# Patient Record
Sex: MALE | Race: WHITE | NOT HISPANIC OR LATINO | Employment: OTHER | URBAN - METROPOLITAN AREA
[De-identification: names, ages, dates, MRNs, and addresses within clinical notes are randomized per-mention and may not be internally consistent; named-entity substitution may affect disease eponyms.]

---

## 2017-01-11 ENCOUNTER — HOSPITAL ENCOUNTER (EMERGENCY)
Facility: HOSPITAL | Age: 47
Discharge: HOME | End: 2017-01-11
Attending: FAMILY MEDICINE
Payer: MEDICAID

## 2017-01-11 VITALS
HEIGHT: 72 IN | WEIGHT: 260 LBS | HEART RATE: 87 BPM | TEMPERATURE: 98 F | BODY MASS INDEX: 35.21 KG/M2 | OXYGEN SATURATION: 98 % | RESPIRATION RATE: 20 BRPM | DIASTOLIC BLOOD PRESSURE: 87 MMHG | SYSTOLIC BLOOD PRESSURE: 133 MMHG

## 2017-01-11 DIAGNOSIS — R10.11 RUQ PAIN: ICD-10-CM

## 2017-01-11 DIAGNOSIS — R73.9 HYPERGLYCEMIA: ICD-10-CM

## 2017-01-11 DIAGNOSIS — R74.8 ELEVATED LIVER ENZYMES: ICD-10-CM

## 2017-01-11 DIAGNOSIS — R10.31 RLQ ABDOMINAL PAIN: Primary | ICD-10-CM

## 2017-01-11 LAB
ALBUMIN SERPL BCP-MCNC: 4.6 G/DL
ALP SERPL-CCNC: 36 IU/L
ALT SERPL W/O P-5'-P-CCNC: 192 IU/L
AMYLASE SERPL-CCNC: 66 U/L
ANION GAP SERPL CALC-SCNC: 15 MMOL/L
AST SERPL-CCNC: 184 IU/L
BACTERIA #/AREA URNS AUTO: NORMAL /HPF
BASOPHILS # BLD AUTO: 0.05 K/UL
BASOPHILS NFR BLD: 0.7 %
BILIRUB SERPL-MCNC: 1.6 MG/DL
BILIRUB UR QL STRIP: NEGATIVE
BUN SERPL-MCNC: 13 MG/DL
CALCIUM SERPL-MCNC: 9.5 MG/DL
CHLORIDE SERPL-SCNC: 97 MMOL/L
CLARITY UR REFRACT.AUTO: CLEAR
CO2 SERPL-SCNC: 26 MMOL/L
COLOR UR AUTO: ABNORMAL
CREAT SERPL-MCNC: 0.75 MG/DL
DIFFERENTIAL METHOD: ABNORMAL
EOSINOPHIL # BLD AUTO: 0.1 K/UL
EOSINOPHIL NFR BLD: 1.9 %
ERYTHROCYTE [DISTWIDTH] IN BLOOD BY AUTOMATED COUNT: 11.8 %
EST. GFR  (AFRICAN AMERICAN): >60 ML/MIN/1.73 M^2
EST. GFR  (NON AFRICAN AMERICAN): >60 ML/MIN/1.73 M^2
GLUCOSE SERPL-MCNC: 293 MG/DL
GLUCOSE UR QL STRIP: ABNORMAL
HCT VFR BLD AUTO: 47.1 %
HGB BLD-MCNC: 17.2 G/DL
HGB UR QL STRIP: NEGATIVE
HYALINE CASTS UR QL AUTO: 0 /LPF
KETONES UR QL STRIP: ABNORMAL
LEUKOCYTE ESTERASE UR QL STRIP: ABNORMAL
LIPASE SERPL-CCNC: 78 U/L
LYMPHOCYTES # BLD AUTO: 1.8 K/UL
LYMPHOCYTES NFR BLD: 24.9 %
MCH RBC QN AUTO: 35.8 PG
MCHC RBC AUTO-ENTMCNC: 36.5 %
MCV RBC AUTO: 98 FL
MICROSCOPIC COMMENT: NORMAL
MONOCYTES # BLD AUTO: 0.6 K/UL
MONOCYTES NFR BLD: 8.3 %
NEUTROPHILS # BLD AUTO: 4.7 K/UL
NEUTROPHILS NFR BLD: 63.9 %
NITRITE UR QL STRIP: NEGATIVE
PH UR STRIP: 6 [PH] (ref 5–8)
PLATELET # BLD AUTO: 178 K/UL
PMV BLD AUTO: 10.3 FL
POTASSIUM SERPL-SCNC: 4.3 MMOL/L
PROT SERPL-MCNC: 8.3 G/DL
PROT UR QL STRIP: ABNORMAL
RBC # BLD AUTO: 4.8 M/UL
RBC #/AREA URNS AUTO: 1 /HPF (ref 0–4)
SODIUM SERPL-SCNC: 138 MMOL/L
SP GR UR STRIP: 1.01 (ref 1–1.03)
URN SPEC COLLECT METH UR: ABNORMAL
UROBILINOGEN UR STRIP-ACNC: ABNORMAL EU/DL
WBC # BLD AUTO: 7.34 K/UL
WBC #/AREA URNS AUTO: 1 /HPF (ref 0–5)
YEAST UR QL AUTO: NORMAL

## 2017-01-11 PROCEDURE — 80053 COMPREHEN METABOLIC PANEL: CPT

## 2017-01-11 PROCEDURE — 96375 TX/PRO/DX INJ NEW DRUG ADDON: CPT

## 2017-01-11 PROCEDURE — 96361 HYDRATE IV INFUSION ADD-ON: CPT

## 2017-01-11 PROCEDURE — 83690 ASSAY OF LIPASE: CPT

## 2017-01-11 PROCEDURE — 81000 URINALYSIS NONAUTO W/SCOPE: CPT

## 2017-01-11 PROCEDURE — 85025 COMPLETE CBC W/AUTO DIFF WBC: CPT

## 2017-01-11 PROCEDURE — 82150 ASSAY OF AMYLASE: CPT

## 2017-01-11 PROCEDURE — 63600175 PHARM REV CODE 636 W HCPCS: Performed by: FAMILY MEDICINE

## 2017-01-11 PROCEDURE — 96374 THER/PROPH/DIAG INJ IV PUSH: CPT

## 2017-01-11 PROCEDURE — 99284 EMERGENCY DEPT VISIT MOD MDM: CPT | Mod: 25

## 2017-01-11 PROCEDURE — 25000003 PHARM REV CODE 250: Performed by: FAMILY MEDICINE

## 2017-01-11 RX ORDER — KETOROLAC TROMETHAMINE 30 MG/ML
30 INJECTION, SOLUTION INTRAMUSCULAR; INTRAVENOUS
Status: COMPLETED | OUTPATIENT
Start: 2017-01-11 | End: 2017-01-11

## 2017-01-11 RX ORDER — OMEPRAZOLE 20 MG/1
CAPSULE, DELAYED RELEASE ORAL
Qty: 30 CAPSULE | Refills: 1 | Status: ON HOLD | OUTPATIENT
Start: 2017-01-11 | End: 2021-05-28 | Stop reason: HOSPADM

## 2017-01-11 RX ORDER — ONDANSETRON 2 MG/ML
4 INJECTION INTRAMUSCULAR; INTRAVENOUS
Status: COMPLETED | OUTPATIENT
Start: 2017-01-11 | End: 2017-01-11

## 2017-01-11 RX ORDER — ONDANSETRON 4 MG/1
4 TABLET, ORALLY DISINTEGRATING ORAL EVERY 6 HOURS PRN
Qty: 10 TABLET | Refills: 1 | Status: SHIPPED | OUTPATIENT
Start: 2017-01-11 | End: 2017-08-10

## 2017-01-11 RX ORDER — SODIUM CHLORIDE 9 MG/ML
500 INJECTION, SOLUTION INTRAVENOUS
Status: COMPLETED | OUTPATIENT
Start: 2017-01-11 | End: 2017-01-11

## 2017-01-11 RX ORDER — OMEPRAZOLE 20 MG/1
20 TABLET, DELAYED RELEASE ORAL DAILY
COMMUNITY
End: 2017-08-10

## 2017-01-11 RX ORDER — FAMOTIDINE 10 MG/ML
20 INJECTION INTRAVENOUS
Status: COMPLETED | OUTPATIENT
Start: 2017-01-11 | End: 2017-01-11

## 2017-01-11 RX ORDER — TRAMADOL HYDROCHLORIDE 50 MG/1
50 TABLET ORAL EVERY 6 HOURS PRN
Qty: 12 TABLET | Refills: 0 | Status: SHIPPED | OUTPATIENT
Start: 2017-01-11 | End: 2017-01-21

## 2017-01-11 RX ADMIN — ONDANSETRON HYDROCHLORIDE 4 MG: 2 SOLUTION INTRAMUSCULAR; INTRAVENOUS at 02:01

## 2017-01-11 RX ADMIN — FAMOTIDINE 20 MG: 10 INJECTION INTRAVENOUS at 03:01

## 2017-01-11 RX ADMIN — KETOROLAC TROMETHAMINE 30 MG: 30 INJECTION, SOLUTION INTRAMUSCULAR at 12:01

## 2017-01-11 RX ADMIN — SODIUM CHLORIDE 500 ML: 0.9 INJECTION, SOLUTION INTRAVENOUS at 12:01

## 2017-01-11 NOTE — DISCHARGE INSTRUCTIONS
Abdominal Pain    Abdominal pain is pain in the stomach or belly area. Everyone has this pain from time to time. In many cases it goes away on its own. But abdominal pain can sometimes be due to a serious problem, such as appendicitis. So its important to know when to seek help.  Causes of abdominal pain  There are many possible causes of abdominal pain. Common causes in adults include:  · Constipation, diarrhea, or gas  · Stomach acid flowing back up into the esophagus (acid reflux or heartburn)  · Severe acid reflux, called GERD (gastroesophageal reflux disease)  · A sore in the lining of the stomach or small intestine (peptic ulcer)  · Inflammation of the gallbladder, liver, or pancreas  · Gallstones or kidney stones  · Appendicitis   · Intestinal blockage   · An internal organ pushing through a muscle or other tissue (hernia)  · Urinary tract infections  · In women, menstrual cramps, fibroids, or endometriosis  · Inflammation or infection of the intestines  Diagnosing the cause of abdominal pain  Your healthcare provider will do a physical exam help find the cause of your pain. If needed, tests will be ordered. Belly pain has many possible causes. So it can be hard to find the reason for your pain. Giving details about your pain can help. Tell your provider where and when you feel the pain, and what makes it better or worse. Also let your provider know if you have other symptoms such as:  · Fever  · Tiredness  · Upset stomach (nausea)  · Vomiting  · Changes in bathroom habits  Treating abdominal pain  Some causes of pain need emergency medical treatment right away. These include appendicitis or a bowel blockage. Other problems can be treated with rest, fluids, or medicines. Your healthcare provider can give you specific instructions for treatment or self-care based on what is causing your pain.  If you have vomiting or diarrhea, sip water or other clear fluids. When you are ready to eat solid foods again,  start with small amounts of easy-to-digest, low-fat foods. These include apple sauce, toast, or crackers.   When to seek medical care  Call 911 or go to the hospital right away if you:  · Cant pass stool and are vomiting  · Are vomiting blood or have bloody diarrhea or black, tarry diarrhea  · Have chest, neck, or shoulder pain  · Feel like you might pass out  · Have pain in your shoulder blades with nausea  · Have sudden, severe belly pain  · Have new, severe pain unlike any you have felt before  · Have a belly that is rigid, hard, and tender to touch  Call your healthcare provider if you have:  · Pain for more than 5 days  · Bloating for more than 2 days  · Diarrhea for more than 5 days  · A fever of 100.4°F (38.0°C) or higher, or as directed by your provider  · Pain that gets worse  · Weight loss for no reason  · Continued lack of appetite  · Blood in your stool  How to prevent abdominal pain  Here are some tips to help prevent abdominal pain:  · Eat smaller amounts of food at one time.  · Avoid greasy, fried, or other high-fat foods.  · Avoid foods that give you gas.  · Exercise regularly.  · Drink plenty of fluids.  To help prevent GERD symptoms:  · Quit smoking.  · Reduce alcohol and certain foods that increase stomach acid.  · Avoid aspirin and over-the-counter pain and fever medicines (NSAIDS or nonsteroidal anti-inflammatory drugs), if possible  · Lose extra weight.  · Finish eating at least 2 hours before you go to bed or lie down.  · Raise the head of your bed.  © 4039-3253 [employer]. [address] [place] [address], [place] [address]. All rights reserved. This information is not intended as a substitute for professional medical care. Always follow your healthcare professional's instructions.

## 2017-01-11 NOTE — ED AVS SNAPSHOT
[facility]  [address]  [place] [address]               [place]   2017 11:55 AM   ED    Description:  Male : 1970   Department:  [facility]           Your Care was Coordinated By:     Provider Role From Andrews Peters MD Attending Provider 17 1216 --      Reason for Visit     Abdominal Pain           Diagnoses this Visit        Comments    RLQ abdominal pain    -  Primary     RUQ pain         Hyperglycemia         Elevated liver enzymes           ED Disposition     ED Disposition Condition Comment    Discharge             To Do List           Follow-up Information     Schedule an appointment as soon as possible for a visit with your gastroenterologist.       These Medications        Disp Refills Start End    omeprazole (PRILOSEC) 20 MG capsule 30 capsule 1 2017     One po bid for 7 days then one each morning    ondansetron (ZOFRAN-ODT) 4 MG TbDL 10 tablet 1 2017     Take 1 tablet (4 mg total) by mouth every 6 (six) hours as needed. For nausea and vomiting - Oral    tramadol (ULTRAM) 50 mg tablet 12 tablet 0 2017    Take 1 tablet (50 mg total) by mouth every 6 (six) hours as needed for Pain. - Oral      [facility] On Call Nurse Care Line -  Assistance  Registered nurses in the [facility] provide clinical advisement, health education, appointment booking, and other advisory services.  Call for this free service at [phone].             Medications           Message regarding Medications     Verify the changes and/or additions to your medication regime listed below are the same as discussed with your clinician today.  If any of these changes or additions are incorrect, please notify your healthcare provider.        START taking these NEW medications        Refills    omeprazole (PRILOSEC) 20 MG capsule 1    Sig: One po bid for 7 days then one each  morning    Class: Print    ondansetron (ZOFRAN-ODT) 4 MG TbDL 1    Sig: Take 1 tablet (4 mg total) by mouth every 6 (six) hours as needed. For nausea and vomiting    Class: Print    Route: Oral    tramadol (ULTRAM) 50 mg tablet 0    Sig: Take 1 tablet (50 mg total) by mouth every 6 (six) hours as needed for Pain.    Class: Print    Route: Oral      These medications were administered today        Dose Freq    0.9%  NaCl infusion 500 mL ED 1 Time    Sig: Inject 500 mLs into the vein ED 1 Time.    Class: Normal    Route: Intravenous    ketorolac injection 30 mg 30 mg ED 1 Time    Sig: Inject 30 mg into the vein ED 1 Time.    Class: Normal    Route: Intravenous    Non-formulary Exception Code: Defer to pharmacy    ondansetron injection 4 mg 4 mg ED 1 Time    Sig: Inject 4 mg into the vein ED 1 Time.    Class: Normal    Route: Intravenous    famotidine (PF) 20 mg/2 mL injection 20 mg 20 mg ED 1 Time    Sig: Inject 2 mLs (20 mg total) into the vein ED 1 Time.    Class: Normal    Route: Intravenous           Verify that the below list of medications is an accurate representation of the medications you are currently taking.  If none reported, the list may be blank. If incorrect, please contact your healthcare provider. Carry this list with you in case of emergency.           Current Medications     omeprazole (PRILOSEC OTC) 20 MG tablet Take 20 mg by mouth once daily.    omeprazole (PRILOSEC) 20 MG capsule One po bid for 7 days then one each morning    ondansetron (ZOFRAN-ODT) 4 MG TbDL Take 1 tablet (4 mg total) by mouth every 6 (six) hours as needed. For nausea and vomiting    tramadol (ULTRAM) 50 mg tablet Take 1 tablet (50 mg total) by mouth every 6 (six) hours as needed for Pain.           Clinical Reference Information           Your Vitals Were     BP Pulse Temp Resp Height Weight    133/87 87 98.2 °F (36.8 °C) (Oral) 20 6' (1.829 m) 117.9 kg (260 lb)    SpO2 BMI             98% 35.26 kg/m2         Allergies as of  1/11/2017        Reactions    Codeine Nausea And Vomiting    Pcn [Penicillins] Other (See Comments)    Unknown reaction      Immunizations Administered on Date of Encounter - 1/11/2017     None      ED Micro, Lab, POCT     Start Ordered       Status Ordering Provider    01/11/17 1222 01/11/17 1221  Amylase  STAT      Final result     01/11/17 1222 01/11/17 1221  CBC auto differential  STAT      Final result     01/11/17 1222 01/11/17 1221  Comprehensive metabolic panel  STAT      Final result     01/11/17 1222 01/11/17 1221  Lipase  STAT      Final result     01/11/17 1222 01/11/17 1221  Urinalysis  STAT      Final result     01/11/17 1221 01/11/17 1221  Urinalysis Microscopic  Once      Final result       ED Imaging Orders     Start Ordered       Status Ordering Provider    01/11/17 1501 01/11/17 1501  US Abdomen Limited (Gallbladder)  1 time imaging      Final result     01/11/17 1401 01/11/17 1401    1 time imaging,   Status:  Canceled      Canceled     01/11/17 1401 01/11/17 1401    1 time imaging,   Status:  Canceled      Canceled     01/11/17 1346 01/11/17 1345  CT Renal Stone Study ABD Pelvis WO  1 time imaging      Final result         Discharge Instructions         Abdominal Pain    Abdominal pain is pain in the stomach or belly area. Everyone has this pain from time to time. In many cases it goes away on its own. But abdominal pain can sometimes be due to a serious problem, such as appendicitis. So its important to know when to seek help.  Causes of abdominal pain  There are many possible causes of abdominal pain. Common causes in adults include:  · Constipation, diarrhea, or gas  · Stomach acid flowing back up into the esophagus (acid reflux or heartburn)  · Severe acid reflux, called GERD (gastroesophageal reflux disease)  · A sore in the lining of the stomach or small intestine (peptic ulcer)  · Inflammation of the gallbladder, liver, or pancreas  · Gallstones or kidney  stones  · Appendicitis   · Intestinal blockage   · An internal organ pushing through a muscle or other tissue (hernia)  · Urinary tract infections  · In women, menstrual cramps, fibroids, or endometriosis  · Inflammation or infection of the intestines  Diagnosing the cause of abdominal pain  Your healthcare provider will do a physical exam help find the cause of your pain. If needed, tests will be ordered. Belly pain has many possible causes. So it can be hard to find the reason for your pain. Giving details about your pain can help. Tell your provider where and when you feel the pain, and what makes it better or worse. Also let your provider know if you have other symptoms such as:  · Fever  · Tiredness  · Upset stomach (nausea)  · Vomiting  · Changes in bathroom habits  Treating abdominal pain  Some causes of pain need emergency medical treatment right away. These include appendicitis or a bowel blockage. Other problems can be treated with rest, fluids, or medicines. Your healthcare provider can give you specific instructions for treatment or self-care based on what is causing your pain.  If you have vomiting or diarrhea, sip water or other clear fluids. When you are ready to eat solid foods again, start with small amounts of easy-to-digest, low-fat foods. These include apple sauce, toast, or crackers.   When to seek medical care  Call 911 or go to the hospital right away if you:  · Cant pass stool and are vomiting  · Are vomiting blood or have bloody diarrhea or black, tarry diarrhea  · Have chest, neck, or shoulder pain  · Feel like you might pass out  · Have pain in your shoulder blades with nausea  · Have sudden, severe belly pain  · Have new, severe pain unlike any you have felt before  · Have a belly that is rigid, hard, and tender to touch  Call your healthcare provider if you have:  · Pain for more than 5 days  · Bloating for more than 2 days  · Diarrhea for more than 5 days  · A fever of 100.4°F (38.0°C)  or higher, or as directed by your provider  · Pain that gets worse  · Weight loss for no reason  · Continued lack of appetite  · Blood in your stool  How to prevent abdominal pain  Here are some tips to help prevent abdominal pain:  · Eat smaller amounts of food at one time.  · Avoid greasy, fried, or other high-fat foods.  · Avoid foods that give you gas.  · Exercise regularly.  · Drink plenty of fluids.  To help prevent GERD symptoms:  · Quit smoking.  · Reduce alcohol and certain foods that increase stomach acid.  · Avoid aspirin and over-the-counter pain and fever medicines (NSAIDS or nonsteroidal anti-inflammatory drugs), if possible  · Lose extra weight.  · Finish eating at least 2 hours before you go to bed or lie down.  · Raise the head of your bed.  © 2630-9119 [employer]. [address] [place] [address], [place] [address]. All rights reserved. This information is not intended as a substitute for professional medical care. Always follow your healthcare professional's instructions.          [facility] Sign-Up     Activating your [facility] account is as easy as 1-2-3!     1) Visit [email or web address], select Sign Up Now, enter this activation code and your date of birth, then select Next.  [email or web address]  Expires: 2/25/2017  4:16 PM      2) Create a username and password to use when you visit [facility] in the future and select a security question in case you lose your password and select Next.    3) Enter your e-mail address and click Sign Up!    Additional Information  If you have questions, please e-mail [email or web address] or call [phone] to talk to our [facility] staff. Remember, [facility] is NOT to be used for urgent needs. For medical emergencies, dial 911.         Smoking Cessation     If you would like to quit smoking:   You may be eligible for free services if you are a [place] resident and started smoking cigarettes before September 1, 1988.  Call the Smoking Cessation Trust (SCT) toll  free at (145) 006-6712 or (548) 137-0431.   Call 1-800-QUIT-NOW if you do not meet the above criteria.             [facility] complies with applicable Federal civil rights laws and does not discriminate on the basis of race, color, national origin, age, disability, or sex.        Language Assistance Services     ATTENTION: Language assistance services are available, free of charge. Please call [phone].      ATENCIÓN: Si habla español, tiene a kirby disposición servicios gratuitos de asistencia lingüística. Llame al [phone].     CHAITANYA Ý: N?u b?n nói Ti?ng Vi?t, có các d?ch v? h? tr? ngôn ng? mi?n phí dành cho b?n. G?i s? [phone].

## 2017-01-11 NOTE — ED PROVIDER NOTES
Encounter Date: 1/11/2017       History     Chief Complaint   Patient presents with    Abdominal Pain     right upper abdominal pain since Tuesday +nausea and vomiting x 1      Review of patient's allergies indicates:   Allergen Reactions    Codeine Nausea And Vomiting    Pcn [penicillins] Other (See Comments)     Unknown reaction     HPI Comments: Patient's a 46-year-old white male miller who presents complaining of right sided abdominal pain.  He states symptoms began with bilateral lower mid back aching 2 days ago.  It was more pronounced on the right but then moved around to involve the abdomen primarily on the right side.  He points to his right upper quadrant as area of most pain.  Is not food related.  He has no history of kidney stones or UTIs.  Normally with no hematuria.  He states that initially his pain was relieved by having a bowel movement but it returned.  He states now he has some discomfort in his right testicle.  He noticed no swelling of the scrotum/testicles: No dysuria/discharge.  Reports chronic daily alcohol intake, relatively heavy but no more than usual lately.  No NSAIDs.. No hematochezia, hematemesis, melenic stools    The history is provided by the patient.     Past Medical History   Diagnosis Date    GERD (gastroesophageal reflux disease)      No past medical history pertinent negatives.  Past Surgical History   Procedure Laterality Date    Vasectomy      Lasik       History reviewed. No pertinent family history.  Social History   Substance Use Topics    Smoking status: Current Every Day Smoker     Packs/day: 1.50    Smokeless tobacco: None    Alcohol use 3.6 - 7.2 oz/week     6 - 12 Cans of beer per week      Comment: heavy etoh use      Review of Systems   Constitutional: Negative for fever.   Respiratory: Negative for shortness of breath.    Cardiovascular: Negative for chest pain.   Gastrointestinal: Positive for abdominal pain, nausea and vomiting. Negative for  abdominal distention, constipation and diarrhea.   Genitourinary: Positive for flank pain and testicular pain. Negative for difficulty urinating, discharge, dysuria, frequency, hematuria, penile pain, penile swelling and scrotal swelling.   Musculoskeletal: Negative for neck pain and neck stiffness.   Skin: Negative for color change and rash.   Neurological: Negative for syncope.       Physical Exam   Initial Vitals   BP Pulse Resp Temp SpO2   01/11/17 1156 01/11/17 1156 01/11/17 1156 01/11/17 1156 01/11/17 1156   175/109 76 20 98.2 °F (36.8 °C) 98 %     Physical Exam    Nursing note and vitals reviewed.  Constitutional: He appears well-developed and well-nourished. No distress.   HENT:   Head: Normocephalic.   Eyes: Conjunctivae are normal. Pupils are equal, round, and reactive to light. No scleral icterus.   Neck: Neck supple.   Cardiovascular: Normal rate, regular rhythm and normal heart sounds. Exam reveals no friction rub.    No murmur heard.  Pulmonary/Chest: Breath sounds normal. No respiratory distress.   Abdominal: Soft. Bowel sounds are normal. He exhibits no distension and no mass. There is tenderness in the right upper quadrant, right lower quadrant, suprapubic area and left lower quadrant. There is no rebound and no guarding. Hernia confirmed negative in the right inguinal area and confirmed negative in the left inguinal area.       Genitourinary: Penis normal. Right testis shows tenderness. Right testis shows no mass and no swelling. Left testis shows no mass. No discharge found.   Musculoskeletal: Normal range of motion.   Lymphadenopathy: No inguinal adenopathy noted on the right or left side.   Neurological: He is alert and oriented to person, place, and time.   Skin: Skin is warm and dry.   Psychiatric: He has a normal mood and affect.         ED Course   Procedures  Labs Reviewed   AMYLASE   CBC W/ AUTO DIFFERENTIAL   COMPREHENSIVE METABOLIC PANEL   LIPASE   URINALYSIS             Medical Decision  Making:   Differential Diagnosis:   Acute appendicitis, cholecystitis, renal colic, unit tract infection, reticulitis, laboratory bowel disease, astral enteritis.  Clinical Tests:   Lab Tests: Ordered and Reviewed       <> Summary of Lab: LFTs are elevated.  Blood sugars also elevated with glucosuria and ketonuria but CO2 and anion gap normal. Pt has had elevated glucose in past.  Patient got relief with IV Pepcid.  Advised patient to abstain from alcohol and NSAIDs.  Drink plenty of non-sugared liquids.  Reduce carbohydrates and diet and follow-up with his physician procedures patient is go follow-up with a family friend gastroenterologist back in [place]).  Return to emergency if worsens  Radiological Study: Ordered and Reviewed                   ED Course     Clinical Impression:   The primary encounter diagnosis was RLQ abdominal pain. Diagnoses of RUQ pain, Hyperglycemia, and Elevated liver enzymes were also pertinent to this visit.          CANDELARIO Peters MD  01/11/17 9420

## 2017-08-01 ENCOUNTER — HOSPITAL ENCOUNTER (EMERGENCY)
Facility: HOSPITAL | Age: 47
Discharge: HOME | End: 2017-08-01
Attending: EMERGENCY MEDICINE
Payer: MEDICAID

## 2017-08-01 VITALS
HEIGHT: 72 IN | HEART RATE: 87 BPM | RESPIRATION RATE: 20 BRPM | WEIGHT: 246 LBS | DIASTOLIC BLOOD PRESSURE: 103 MMHG | SYSTOLIC BLOOD PRESSURE: 193 MMHG | OXYGEN SATURATION: 98 % | TEMPERATURE: 98 F | BODY MASS INDEX: 33.32 KG/M2

## 2017-08-01 DIAGNOSIS — F17.200 TOBACCO DEPENDENCE: ICD-10-CM

## 2017-08-01 DIAGNOSIS — I10 ESSENTIAL HYPERTENSION: Primary | ICD-10-CM

## 2017-08-01 PROCEDURE — 25000003 PHARM REV CODE 250: Performed by: EMERGENCY MEDICINE

## 2017-08-01 PROCEDURE — 99283 EMERGENCY DEPT VISIT LOW MDM: CPT

## 2017-08-01 RX ORDER — OLMESARTAN MEDOXOMIL AND HYDROCHLOROTHIAZIDE 40/12.5 40; 12.5 MG/1; MG/1
1 TABLET ORAL DAILY
Qty: 90 TABLET | Refills: 3 | Status: SHIPPED | OUTPATIENT
Start: 2017-08-01 | End: 2017-08-01 | Stop reason: CLARIF

## 2017-08-01 RX ORDER — CLONIDINE HYDROCHLORIDE 0.1 MG/1
0.1 TABLET ORAL
Status: COMPLETED | OUTPATIENT
Start: 2017-08-01 | End: 2017-08-01

## 2017-08-01 RX ORDER — OLMESARTAN MEDOXOMIL AND HYDROCHLOROTHIAZIDE 20/12.5 20; 12.5 MG/1; MG/1
1 TABLET ORAL DAILY
Qty: 90 TABLET | Refills: 3 | Status: SHIPPED | OUTPATIENT
Start: 2017-08-01 | End: 2018-02-09

## 2017-08-01 RX ADMIN — CLONIDINE HYDROCHLORIDE 0.1 MG: 0.1 TABLET ORAL at 09:08

## 2017-08-01 NOTE — ED NOTES
"Pt complains of hypertension post Dr's visit from having a lesion removed from face this am, pt denies any hx of HTN but states "I partied like a  this past weekend"  "

## 2017-08-01 NOTE — ED PROVIDER NOTES
"Encounter Date: 8/1/2017       History     Chief Complaint   Patient presents with    Hypertension     I was sent here from the dermatology clinic bc my blood pressure was high. It was 220/118.      " I was sent here from the dermatology clinic bc my   blood pressure was high. It was 220/118."  He has no complaints at this time.  No distress.  No SOB, No chest pain, No visual disturbance.             Review of patient's allergies indicates:   Allergen Reactions    Codeine Nausea And Vomiting    Pcn [penicillins] Other (See Comments)     Unknown reaction     Past Medical History:   Diagnosis Date    GERD (gastroesophageal reflux disease)      Past Surgical History:   Procedure Laterality Date    LASIK      VASECTOMY       History reviewed. No pertinent family history.  Social History   Substance Use Topics    Smoking status: Current Every Day Smoker     Packs/day: 1.50    Smokeless tobacco: Never Used    Alcohol use 3.6 - 7.2 oz/week     6 - 12 Cans of beer per week      Comment: heavy etoh use daily     Review of Systems   Constitutional: Negative for fever.   HENT: Negative for sore throat.    Respiratory: Negative for shortness of breath.    Cardiovascular: Negative for chest pain.   Gastrointestinal: Negative for nausea.   Genitourinary: Negative for dysuria.   Musculoskeletal: Negative for back pain.   Skin: Negative for rash.   Neurological: Negative for weakness.   Hematological: Does not bruise/bleed easily.   All other systems reviewed and are negative.      Physical Exam     Initial Vitals [08/01/17 0943]   BP Pulse Resp Temp SpO2   (!) 193/103 87 20 98.4 °F (36.9 °C) 98 %      MAP       133         Physical Exam    Nursing note and vitals reviewed.  Constitutional: Vital signs are normal. He appears well-developed and well-nourished. He is cooperative.  Non-toxic appearance.   HENT:   Head: Normocephalic and atraumatic.   Eyes: Conjunctivae, EOM and lids are normal.   Neck: Trachea normal and " normal range of motion. Neck supple. No stridor present. No tracheal deviation present. No neck rigidity. No Brudzinski's sign and no Kernig's sign noted.   Cardiovascular: Normal rate, regular rhythm, normal heart sounds and normal pulses.   Abdominal: Soft. Normal appearance and bowel sounds are normal. He exhibits no abdominal bruit. There is no tenderness. There is no rebound.   Neurological: He is alert and oriented to person, place, and time. He has normal strength. GCS eye subscore is 4. GCS verbal subscore is 5. GCS motor subscore is 6.   Skin: Skin is warm, dry and intact. Capillary refill takes less than 2 seconds.   Psychiatric: He has a normal mood and affect. His behavior is normal. Judgment normal. Thought content is not delusional. He expresses no homicidal and no suicidal ideation.         ED Course   Procedures  Labs Reviewed - No data to display              I have a low suspicion for medical, surgical or other life threatening illness and I believe patient is safe for discharge.  I specifically counseled the patient and/or family/caretaker that despite an unrevealing evaluation in the ED, patient may still be at risk for serious, even life threatening illness.  I have attempted to answer all questions related to her stay today.  I have given the patient explicit instructions to return immediately for any worsening or change in current symptoms, or for any concern.            Pre-hypertension/Hypertension: The pt has been informed that they may have pre-hypertension or hypertension based on a blood pressure reading in the ED. I recommend that the pt call the PCP listed on their discharge instructions or a physician of their choice this week to arrange f/u for further evaluation of possible pre-hypertension or hypertension.            ED Course     Clinical Impression:   The primary encounter diagnosis was Essential hypertension. A diagnosis of Tobacco dependence was also pertinent to this  visit.    Disposition:   Disposition: Discharged  Condition: Stable                        Juancarlos Goff MD  08/01/17 0958

## 2017-08-10 ENCOUNTER — HOSPITAL ENCOUNTER (EMERGENCY)
Facility: HOSPITAL | Age: 47
Discharge: HOME | End: 2017-08-10
Attending: EMERGENCY MEDICINE
Payer: MEDICAID

## 2017-08-10 VITALS
HEIGHT: 72 IN | HEART RATE: 61 BPM | TEMPERATURE: 99 F | WEIGHT: 246 LBS | OXYGEN SATURATION: 100 % | SYSTOLIC BLOOD PRESSURE: 157 MMHG | BODY MASS INDEX: 33.32 KG/M2 | RESPIRATION RATE: 16 BRPM | DIASTOLIC BLOOD PRESSURE: 68 MMHG

## 2017-08-10 DIAGNOSIS — N17.9 ACUTE KIDNEY INJURY: ICD-10-CM

## 2017-08-10 DIAGNOSIS — E86.0 DEHYDRATION: Primary | ICD-10-CM

## 2017-08-10 LAB
ALBUMIN SERPL BCP-MCNC: 3.9 G/DL
ALP SERPL-CCNC: 27 U/L
ALT SERPL W/O P-5'-P-CCNC: 223 U/L
ANION GAP SERPL CALC-SCNC: 10 MMOL/L
ANION GAP SERPL CALC-SCNC: 10 MMOL/L
AST SERPL-CCNC: 168 U/L
BASOPHILS # BLD AUTO: 0.07 K/UL
BASOPHILS NFR BLD: 0.8 %
BILIRUB SERPL-MCNC: 0.7 MG/DL
BILIRUB UR QL STRIP: NEGATIVE
BUN SERPL-MCNC: 22 MG/DL
BUN SERPL-MCNC: 24 MG/DL
CALCIUM SERPL-MCNC: 10 MG/DL
CALCIUM SERPL-MCNC: 9.4 MG/DL
CHLORIDE SERPL-SCNC: 101 MMOL/L
CHLORIDE SERPL-SCNC: 97 MMOL/L
CK SERPL-CCNC: 57 U/L
CLARITY UR: CLEAR
CO2 SERPL-SCNC: 23 MMOL/L
CO2 SERPL-SCNC: 24 MMOL/L
COLOR UR: YELLOW
CREAT SERPL-MCNC: 1.3 MG/DL
CREAT SERPL-MCNC: 1.5 MG/DL
DIFFERENTIAL METHOD: ABNORMAL
EOSINOPHIL # BLD AUTO: 0.1 K/UL
EOSINOPHIL NFR BLD: 1.7 %
ERYTHROCYTE [DISTWIDTH] IN BLOOD BY AUTOMATED COUNT: 12.2 %
EST. GFR  (AFRICAN AMERICAN): >60 ML/MIN/1.73 M^2
EST. GFR  (AFRICAN AMERICAN): >60 ML/MIN/1.73 M^2
EST. GFR  (NON AFRICAN AMERICAN): 55 ML/MIN/1.73 M^2
EST. GFR  (NON AFRICAN AMERICAN): >60 ML/MIN/1.73 M^2
GLUCOSE SERPL-MCNC: 136 MG/DL
GLUCOSE SERPL-MCNC: 200 MG/DL
GLUCOSE UR QL STRIP: NEGATIVE
HCT VFR BLD AUTO: 44.8 %
HGB BLD-MCNC: 16.3 G/DL
HGB UR QL STRIP: NEGATIVE
KETONES UR QL STRIP: NEGATIVE
LEUKOCYTE ESTERASE UR QL STRIP: NEGATIVE
LYMPHOCYTES # BLD AUTO: 2.6 K/UL
LYMPHOCYTES NFR BLD: 31.7 %
MCH RBC QN AUTO: 36.2 PG
MCHC RBC AUTO-ENTMCNC: 36.4 G/DL
MCV RBC AUTO: 100 FL
MONOCYTES # BLD AUTO: 0.8 K/UL
MONOCYTES NFR BLD: 9.2 %
NEUTROPHILS # BLD AUTO: 4.7 K/UL
NEUTROPHILS NFR BLD: 56.4 %
NITRITE UR QL STRIP: NEGATIVE
PH UR STRIP: 6 [PH] (ref 5–8)
PLATELET # BLD AUTO: 191 K/UL
PMV BLD AUTO: 9.7 FL
POTASSIUM SERPL-SCNC: 4.3 MMOL/L
POTASSIUM SERPL-SCNC: 4.3 MMOL/L
PROT SERPL-MCNC: 8 G/DL
PROT UR QL STRIP: NEGATIVE
RBC # BLD AUTO: 4.5 M/UL
SODIUM SERPL-SCNC: 130 MMOL/L
SODIUM SERPL-SCNC: 135 MMOL/L
SP GR UR STRIP: 1.01 (ref 1–1.03)
TROPONIN I SERPL DL<=0.01 NG/ML-MCNC: 0.01 NG/ML
URN SPEC COLLECT METH UR: NORMAL
UROBILINOGEN UR STRIP-ACNC: NEGATIVE EU/DL
WBC # BLD AUTO: 8.29 K/UL

## 2017-08-10 PROCEDURE — 96360 HYDRATION IV INFUSION INIT: CPT

## 2017-08-10 PROCEDURE — 81003 URINALYSIS AUTO W/O SCOPE: CPT

## 2017-08-10 PROCEDURE — 93005 ELECTROCARDIOGRAM TRACING: CPT

## 2017-08-10 PROCEDURE — 99284 EMERGENCY DEPT VISIT MOD MDM: CPT | Mod: 25

## 2017-08-10 PROCEDURE — 25000003 PHARM REV CODE 250: Performed by: NURSE PRACTITIONER

## 2017-08-10 PROCEDURE — 82550 ASSAY OF CK (CPK): CPT

## 2017-08-10 PROCEDURE — 85025 COMPLETE CBC W/AUTO DIFF WBC: CPT

## 2017-08-10 PROCEDURE — 80053 COMPREHEN METABOLIC PANEL: CPT

## 2017-08-10 PROCEDURE — 80048 BASIC METABOLIC PNL TOTAL CA: CPT

## 2017-08-10 PROCEDURE — 84484 ASSAY OF TROPONIN QUANT: CPT

## 2017-08-10 RX ORDER — LISINOPRIL AND HYDROCHLOROTHIAZIDE 12.5; 2 MG/1; MG/1
1 TABLET ORAL DAILY
COMMUNITY
End: 2017-08-10 | Stop reason: CLARIF

## 2017-08-10 RX ORDER — AMLODIPINE BESYLATE 5 MG/1
10 TABLET ORAL DAILY
Qty: 30 TABLET | Refills: 0 | Status: SHIPPED | OUTPATIENT
Start: 2017-08-10 | End: 2017-08-10

## 2017-08-10 RX ORDER — AMLODIPINE BESYLATE 5 MG/1
10 TABLET ORAL DAILY
Qty: 30 TABLET | Refills: 0 | Status: SHIPPED | OUTPATIENT
Start: 2017-08-10 | End: 2022-04-26 | Stop reason: DRUGHIGH

## 2017-08-10 RX ADMIN — SODIUM CHLORIDE 1000 ML: 0.9 INJECTION, SOLUTION INTRAVENOUS at 02:08

## 2017-08-10 RX ADMIN — SODIUM CHLORIDE 1000 ML: 0.9 INJECTION, SOLUTION INTRAVENOUS at 01:08

## 2017-08-10 NOTE — ED NOTES
Pt c/o with bilateral lower leg muscle cramps for 2 days and also states has back soreness. Pt works outside in the heat but thinks he is hydrating appropriately. Pt reports he started taking a new medication Lisinopril hctz 1 week ago. Pt has been urinating frequently since starting that medication.

## 2017-08-10 NOTE — ED PROVIDER NOTES
Encounter Date: 8/10/2017       History     Chief Complaint   Patient presents with    Muscle Pain     cramping to hand, feet, and legs, states he feel dehydrated and works outdoors - does take electrolyte supplements     45yo  Male with pmhx of GERD and hypertension is here for muscle cramping.  He reports that he feels dehydrated.  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  He reports that for the past 2-3 days, he has noticed muscle cramping in his arms, legs, and back.  He reports that he has been drinking fluids without difficulty and using electrolyte supplements.  Two weeks ago, he was seen in ED for elevated blood pressure and a started him on Prinzide. He reports he has been taking the medication as instructed.  He denies trauma, fever/chills, headache, numbness/tingling, weakness, neck stiffness, recent antibiotic use, nasal congestion, cough, chest pain, shortness of breath, palpitations, abdominal pain, nausea/vomiting/diarrhea, dysuria, decreased urinary output, and rash.  He has noticed that for the past 3-4 months, his urine is occasionally dark.  He denies hematuria.  He denies history of renal problems.      The history is provided by the patient.     Review of patient's allergies indicates:   Allergen Reactions    Codeine Nausea And Vomiting    Pcn [penicillins] Other (See Comments)     Unknown reaction     Past Medical History:   Diagnosis Date    GERD (gastroesophageal reflux disease)     Hypertension      Past Surgical History:   Procedure Laterality Date    LASIK      VASECTOMY       History reviewed. No pertinent family history.  Social History   Substance Use Topics    Smoking status: Current Every Day Smoker     Packs/day: 1.50    Smokeless tobacco: Never Used    Alcohol use 3.6 - 7.2 oz/week     6 - 12 Cans of beer per week      Comment: heavy etoh use daily     Review of Systems   Constitutional: Negative for activity change, appetite change,  chills and fever.   HENT: Negative for congestion.    Respiratory: Negative for cough and shortness of breath.    Cardiovascular: Negative for chest pain.   Gastrointestinal: Negative for abdominal pain, diarrhea, nausea and vomiting.   Genitourinary: Negative for decreased urine volume, difficulty urinating, dysuria and hematuria.   Musculoskeletal: Positive for myalgias. Negative for back pain.   Skin: Negative for rash.   Neurological: Negative for dizziness, weakness, light-headedness, numbness and headaches.   Psychiatric/Behavioral: Negative for confusion.       Physical Exam     Initial Vitals [08/10/17 1221]   BP Pulse Resp Temp SpO2   132/67 80 18 98.1 °F (36.7 °C) 100 %      MAP       88.67         Physical Exam    Nursing note and vitals reviewed.  Constitutional: Vital signs are normal. He appears well-developed and well-nourished. He is Obese . He is active and cooperative. He is easily aroused.  Non-toxic appearance. He does not have a sickly appearance. He does not appear ill. No distress.   HENT:   Head: Normocephalic and atraumatic.   Right Ear: External ear normal.   Left Ear: External ear normal.   Nose: Nose normal.   Mouth/Throat: Uvula is midline. Mucous membranes are not pale, dry and not cyanotic. No posterior oropharyngeal erythema.   Eyes: Conjunctivae are normal.   Neck: Normal range of motion and full passive range of motion without pain. Neck supple.   Cardiovascular: Normal rate, regular rhythm and normal heart sounds.   Pulmonary/Chest: Effort normal and breath sounds normal.   Abdominal: Soft. Normal appearance and bowel sounds are normal. There is no tenderness.   Neurological: He is alert, oriented to person, place, and time and easily aroused. GCS eye subscore is 4. GCS verbal subscore is 5. GCS motor subscore is 6.   Skin: Skin is warm, dry and intact. No rash noted.   Psychiatric: He has a normal mood and affect. His speech is normal and behavior is normal. Judgment and thought  content normal. Cognition and memory are normal.         ED Course   Procedures  Labs Reviewed   CBC W/ AUTO DIFFERENTIAL - Abnormal; Notable for the following:        Result Value    RBC 4.50 (*)      (*)     MCH 36.2 (*)     MCHC 36.4 (*)     All other components within normal limits   COMPREHENSIVE METABOLIC PANEL - Abnormal; Notable for the following:     Sodium 130 (*)     Glucose 200 (*)     BUN, Bld 24 (*)     Creatinine 1.5 (*)     Alkaline Phosphatase 27 (*)      (*)      (*)     eGFR if non  55 (*)     All other components within normal limits   BASIC METABOLIC PANEL - Abnormal; Notable for the following:     Sodium 135 (*)     Glucose 136 (*)     BUN, Bld 22 (*)     All other components within normal limits    Narrative:     After second bolus   TROPONIN I   URINALYSIS   CK             Medical Decision Making:   History:   Old Medical Records: I decided to obtain old medical records.  Old Records Summarized: other records.       <> Summary of Records: 1/11/17 BUN/Creat-13/0.75  Initial Assessment:   46-year-old male here for muscle cramping.  He reports that he feels dehydrated.  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  He also reports he recently started Prinzide.  No fever/chills, headache, weakness, numbness/tingling, chest pain, shortness of breath, abdominal pain, nausea/vomiting/diarrhea, decreased urinary output, dysuria, or hematuria.  Patient appears well, nontoxic.  Vital stable.  His membranes dry.  Heart rate regular rate and rhythm.  Lungs clear to auscultation and equal bilaterally.  Abdomen soft, nontender, nondistended.  No rebound, rigidity, or guarding.  No rash.  No signs of trauma.  Differential Diagnosis:   Dehydration, electrolyte derangement, rhabdomyolysis, UTI, JOSIE, arrhythmia, medication side effect  Clinical Tests:   Lab Tests: Ordered and Reviewed  Medical Tests: Ordered and Reviewed  ED Management:  Labs, IV fluids  BUN and  creatinine 24 and 1.5.  After second liter of normal saline, repeat BUN and creatinine 22/1.3.  The patient declined admission for acute kidney injury.  He is not an rhabdomyolysis.  He is tolerating by mouth fluids and is requesting discharge.  Dr. Son did consult  who sent the resident to the ED to speak with the patient.  He will f/u in clinic on Thursday.  I advised DC Prinzide.  Rx Norvasc 5mg PO daily.  RX was written for patient to take 10mg a day.  However, I did reach the patient by phone to inform him to only take 5mg (1 tablet) daily.  Pt verbalized understanding, compliance, and agreement with the treatment plan.  He will f/u as scheduled on Thursday.                Attending Attestation:     Physician Attestation Statement for NP/PA:   I have conducted a face to face encounter with this patient in addition to the NP/PA, due to NP/PA Request    Other NP/PA Attestation Additions:    History of Present Illness: 46-year-old male with cramping to his extremities.  Patient feels he may be dehydrated.   Physical Exam: Unremarkable physical exam.                  ED Course     Clinical Impression:   The primary encounter diagnosis was Dehydration. A diagnosis of Acute kidney injury was also pertinent to this visit.                           KATHERYN Delgado  08/10/17 1716       David Son MD  08/10/17 5319

## 2017-10-16 ENCOUNTER — OFFICE VISIT (OUTPATIENT)
Dept: UROLOGY | Facility: CLINIC | Age: 47
End: 2017-10-16
Attending: UROLOGY
Payer: MEDICAID

## 2017-10-16 VITALS
DIASTOLIC BLOOD PRESSURE: 87 MMHG | SYSTOLIC BLOOD PRESSURE: 171 MMHG | HEART RATE: 87 BPM | BODY MASS INDEX: 32.18 KG/M2 | WEIGHT: 237.56 LBS | HEIGHT: 72 IN

## 2017-10-16 DIAGNOSIS — N48.6 PEYRONIE'S DISEASE: Primary | ICD-10-CM

## 2017-10-16 DIAGNOSIS — N52.01 ERECTILE DYSFUNCTION DUE TO ARTERIAL INSUFFICIENCY: ICD-10-CM

## 2017-10-16 PROCEDURE — 99203 OFFICE O/P NEW LOW 30 MIN: CPT | Mod: S$GLB,,, | Performed by: UROLOGY

## 2017-10-16 RX ORDER — SILDENAFIL CITRATE 20 MG/1
20 TABLET ORAL SEE ADMIN INSTRUCTIONS
Qty: 100 TABLET | Refills: 11 | Status: SHIPPED | OUTPATIENT
Start: 2017-10-16 | End: 2019-03-15 | Stop reason: SDUPTHER

## 2017-10-16 NOTE — PROGRESS NOTES
"Subjective:      Veto Park is a 46 y.o. male who was self-referred for evaluation of knot in penis.      He reports a firm "knot" in dorsal penis since June with associated bend in erections toward his head. He has discomfort when erect and decreased erectile quality as well. He has never tried medication for ED.    The following portions of the patient's history were reviewed and updated as appropriate: allergies, current medications, past family history, past medical history, past social history, past surgical history and problem list.    Review of Systems  Constitutional: no fever or chills  ENT: no nasal congestion or sore throat  Respiratory: no cough or shortness of breath  Cardiovascular: no chest pain or palpitations  Gastrointestinal: no nausea or vomiting, tolerating diet  Genitourinary: as per HPI  Hematologic/Lymphatic: no easy bruising or lymphadenopathy  Musculoskeletal: no arthralgias or myalgias  Neurological: no seizures or tremors  Behavioral/Psych: no auditory or visual hallucinations     Objective:   Vitals: BP (!) 171/87 (BP Location: Left arm, Patient Position: Sitting, BP Method: X-Large (Automatic))   Pulse 87   Ht 6' (1.829 m)   Wt 107.7 kg (237 lb 8.7 oz)   BMI 32.22 kg/m²     Physical Exam   General: alert and oriented, no acute distress  Head: normocephalic, atraumatic  Neck: supple, no lymphadenopathy, normal ROM, no masses  Respiratory: Symmetric expansion, non-labored breathing  Cardiovascular: regular rate and rhythm, nomal pulses, no peripheral edema  Abdomen: soft, non tender, non distended, no palpable masses, no hernias, no hepatomegaly or splenomegaly  Genitourinary:   Penis: patent orthotopic meatus, 3cm firm plaque in dorsal shaft near the base  Scrotum: no rashes or skin changes;   Testes: descended bilaterally, no masses, nontender, normal epididymides bilaterally, no hydroceles  Skin: normal coloration and turgor, no rashes, no suspicious skin lesions " noted  Neuro: alert and oriented x3, no gross deficits  Psych: normal judgment and insight, normal mood/affect and non-anxious    Lab Review     Lab Results   Component Value Date    WBC 8.29 08/10/2017    HGB 16.3 08/10/2017    HCT 44.8 08/10/2017     (H) 08/10/2017     08/10/2017     Lab Results   Component Value Date    CREATININE 1.3 08/10/2017    BUN 22 (H) 08/10/2017       Assessment:     1. Peyronie's disease    2. Erectile dysfunction due to arterial insufficiency        Plan:   1. Explained diagnosis and natural history; he has immature plaque that is only about 4 months old.  2. Discussed treatment options, including Xiafelx and surgery. Explained these would only be indicated if problems persist once plaque has reached full maturity.  3. Will trial generic viagra for now for ED  4. FU 4 mos

## 2018-02-09 ENCOUNTER — TELEPHONE (OUTPATIENT)
Dept: UROLOGY | Facility: CLINIC | Age: 48
End: 2018-02-09

## 2018-02-09 ENCOUNTER — OFFICE VISIT (OUTPATIENT)
Dept: UROLOGY | Facility: CLINIC | Age: 48
End: 2018-02-09
Payer: MEDICAID

## 2018-02-09 VITALS
BODY MASS INDEX: 33.33 KG/M2 | DIASTOLIC BLOOD PRESSURE: 93 MMHG | HEART RATE: 88 BPM | SYSTOLIC BLOOD PRESSURE: 178 MMHG | HEIGHT: 72 IN | WEIGHT: 246.06 LBS

## 2018-02-09 DIAGNOSIS — N48.6 PEYRONIE'S DISEASE: Primary | ICD-10-CM

## 2018-02-09 PROCEDURE — 99999 PR PBB SHADOW E&M-EST. PATIENT-LVL III: CPT | Mod: PBBFAC,,, | Performed by: UROLOGY

## 2018-02-09 PROCEDURE — 99213 OFFICE O/P EST LOW 20 MIN: CPT | Mod: PBBFAC,PO | Performed by: UROLOGY

## 2018-02-09 PROCEDURE — 99214 OFFICE O/P EST MOD 30 MIN: CPT | Mod: S$PBB,,, | Performed by: UROLOGY

## 2018-02-09 PROCEDURE — 3008F BODY MASS INDEX DOCD: CPT | Mod: ,,, | Performed by: UROLOGY

## 2018-02-09 NOTE — PROGRESS NOTES
Subjective:      Veto Park is a 47 y.o. male who returns today regarding his peyronie's disease.    He states plaque is a little smaller than in October but he still has severe bend that inhibits intercourse. Erectile quality is improved w/ sildenafil.     Plaque first noted by patient in June 2017.    The following portions of the patient's history were reviewed and updated as appropriate: allergies, current medications, past family history, past medical history, past social history, past surgical history and problem list.    Review of Systems  A comprehensive multipoint review of systems was negative except as otherwise stated in the HPI.     Objective:   Vitals: BP (!) 178/93 (BP Location: Left arm, Patient Position: Sitting, BP Method: Large (Automatic))   Pulse 88   Ht 6' (1.829 m)   Wt 111.6 kg (246 lb 0.5 oz)   BMI 33.37 kg/m²     Physical Exam   General: alert and oriented, no acute distress  Respiratory: Symmetric expansion, non-labored breathing  Cardiovascular: regular rate and rhythm, no peripheral edema  Abdomen: soft, non distended  Genitourinary: uncircumcised penis; 3-4 x 1 cm plaque along dorsal proximal shaft of penis  Skin: normal coloration and turgor, no rashes, no suspicious skin lesions noted  Neuro: no gross deficits  Psych: normal judgment and insight, normal mood/affect and non-anxious    Lab Review     Lab Results   Component Value Date    WBC 8.29 08/10/2017    HGB 16.3 08/10/2017    HCT 44.8 08/10/2017     (H) 08/10/2017     08/10/2017     Lab Results   Component Value Date    CREATININE 1.3 08/10/2017    BUN 22 (H) 08/10/2017         Assessment and Plan:   1. Peyronie's disease  -- Again reviewed options for treatment and common indications  -- Currently unable to have intercourse due to severity of bend, therefore I explained further treatment is appropriate  -- He wishes to consider further. Will schedule FU w/ Dr. Greenwood to discuss.    2. Erectile  dysfunction  -- Improved w/ sildenafil - continue

## 2018-02-09 NOTE — TELEPHONE ENCOUNTER
Pt is being requesting to be seen by Dr. Greenwood, he's a medicaid pt, he prefer any day but Friday 3 months from today.      Thanks Kaci

## 2018-02-23 ENCOUNTER — HOSPITAL ENCOUNTER (EMERGENCY)
Facility: HOSPITAL | Age: 48
Discharge: HOME | End: 2018-02-23
Attending: SURGERY
Payer: MEDICAID

## 2018-02-23 VITALS
WEIGHT: 243 LBS | TEMPERATURE: 98 F | SYSTOLIC BLOOD PRESSURE: 133 MMHG | BODY MASS INDEX: 32.91 KG/M2 | RESPIRATION RATE: 18 BRPM | OXYGEN SATURATION: 94 % | HEART RATE: 70 BPM | DIASTOLIC BLOOD PRESSURE: 62 MMHG | HEIGHT: 72 IN

## 2018-02-23 DIAGNOSIS — R07.81 PLEURITIC CHEST PAIN: Primary | ICD-10-CM

## 2018-02-23 DIAGNOSIS — K21.9 GASTROESOPHAGEAL REFLUX DISEASE WITHOUT ESOPHAGITIS: ICD-10-CM

## 2018-02-23 DIAGNOSIS — R07.9 CHEST PAIN: ICD-10-CM

## 2018-02-23 LAB
ALBUMIN SERPL BCP-MCNC: 4.7 G/DL
ALP SERPL-CCNC: 33 U/L
ALT SERPL W/O P-5'-P-CCNC: 194 U/L
ANION GAP SERPL CALC-SCNC: 16 MMOL/L
AST SERPL-CCNC: 111 U/L
BASOPHILS # BLD AUTO: 0.05 K/UL
BASOPHILS NFR BLD: 0.5 %
BILIRUB SERPL-MCNC: 0.7 MG/DL
BUN SERPL-MCNC: 14 MG/DL
CALCIUM SERPL-MCNC: 9.9 MG/DL
CHLORIDE SERPL-SCNC: 106 MMOL/L
CO2 SERPL-SCNC: 24 MMOL/L
CREAT SERPL-MCNC: 0.95 MG/DL
D DIMER PPP FEU-MCNC: 268 NG/ML
DIFFERENTIAL METHOD: ABNORMAL
EOSINOPHIL # BLD AUTO: 0.1 K/UL
EOSINOPHIL NFR BLD: 1.2 %
ERYTHROCYTE [DISTWIDTH] IN BLOOD BY AUTOMATED COUNT: 13.6 %
EST. GFR  (AFRICAN AMERICAN): >60 ML/MIN/1.73 M^2
EST. GFR  (NON AFRICAN AMERICAN): >60 ML/MIN/1.73 M^2
GLUCOSE SERPL-MCNC: 189 MG/DL
HCT VFR BLD AUTO: 48.1 %
HGB BLD-MCNC: 16.5 G/DL
LYMPHOCYTES # BLD AUTO: 1.3 K/UL
LYMPHOCYTES NFR BLD: 13.1 %
MCH RBC QN AUTO: 34.5 PG
MCHC RBC AUTO-ENTMCNC: 34.3 G/DL
MCV RBC AUTO: 101 FL
MONOCYTES # BLD AUTO: 0.8 K/UL
MONOCYTES NFR BLD: 8.4 %
NEUTROPHILS # BLD AUTO: 7.5 K/UL
NEUTROPHILS NFR BLD: 76.5 %
NT-PROBNP: 25 PG/ML
PLATELET # BLD AUTO: 204 K/UL
PMV BLD AUTO: 10 FL
POTASSIUM SERPL-SCNC: 4 MMOL/L
PROT SERPL-MCNC: 8.4 G/DL
RBC # BLD AUTO: 4.78 M/UL
SODIUM SERPL-SCNC: 146 MMOL/L
TROPONIN I SERPL DL<=0.01 NG/ML-MCNC: <0.012 NG/ML
WBC # BLD AUTO: 9.82 K/UL

## 2018-02-23 PROCEDURE — 83880 ASSAY OF NATRIURETIC PEPTIDE: CPT

## 2018-02-23 PROCEDURE — 85379 FIBRIN DEGRADATION QUANT: CPT

## 2018-02-23 PROCEDURE — 93005 ELECTROCARDIOGRAM TRACING: CPT

## 2018-02-23 PROCEDURE — 85025 COMPLETE CBC W/AUTO DIFF WBC: CPT

## 2018-02-23 PROCEDURE — 93010 ELECTROCARDIOGRAM REPORT: CPT | Mod: ,,, | Performed by: INTERNAL MEDICINE

## 2018-02-23 PROCEDURE — 96374 THER/PROPH/DIAG INJ IV PUSH: CPT

## 2018-02-23 PROCEDURE — 99284 EMERGENCY DEPT VISIT MOD MDM: CPT | Mod: 25

## 2018-02-23 PROCEDURE — 63600175 PHARM REV CODE 636 W HCPCS: Performed by: SURGERY

## 2018-02-23 PROCEDURE — 96375 TX/PRO/DX INJ NEW DRUG ADDON: CPT

## 2018-02-23 PROCEDURE — 80053 COMPREHEN METABOLIC PANEL: CPT

## 2018-02-23 PROCEDURE — 25000003 PHARM REV CODE 250: Performed by: SURGERY

## 2018-02-23 PROCEDURE — 96361 HYDRATE IV INFUSION ADD-ON: CPT

## 2018-02-23 PROCEDURE — 84484 ASSAY OF TROPONIN QUANT: CPT

## 2018-02-23 RX ORDER — HYDROCODONE BITARTRATE AND ACETAMINOPHEN 7.5; 325 MG/1; MG/1
1 TABLET ORAL EVERY 6 HOURS PRN
Qty: 10 TABLET | Refills: 0 | Status: SHIPPED | OUTPATIENT
Start: 2018-02-23 | End: 2018-02-26

## 2018-02-23 RX ORDER — METFORMIN HYDROCHLORIDE 500 MG/1
500 TABLET ORAL DAILY
COMMUNITY
End: 2022-04-29

## 2018-02-23 RX ORDER — ASPIRIN 325 MG
325 TABLET ORAL
Status: COMPLETED | OUTPATIENT
Start: 2018-02-23 | End: 2018-02-23

## 2018-02-23 RX ORDER — METOCLOPRAMIDE HYDROCHLORIDE 5 MG/ML
10 INJECTION INTRAMUSCULAR; INTRAVENOUS
Status: COMPLETED | OUTPATIENT
Start: 2018-02-23 | End: 2018-02-23

## 2018-02-23 RX ORDER — LISINOPRIL 20 MG/1
40 TABLET ORAL DAILY
Status: ON HOLD | COMMUNITY
End: 2022-04-29 | Stop reason: SDUPTHER

## 2018-02-23 RX ORDER — METOCLOPRAMIDE 10 MG/1
10 TABLET ORAL
Qty: 30 TABLET | Refills: 0 | Status: SHIPPED | OUTPATIENT
Start: 2018-02-23 | End: 2018-11-08

## 2018-02-23 RX ORDER — KETOROLAC TROMETHAMINE 30 MG/ML
30 INJECTION, SOLUTION INTRAMUSCULAR; INTRAVENOUS
Status: COMPLETED | OUTPATIENT
Start: 2018-02-23 | End: 2018-02-23

## 2018-02-23 RX ORDER — ONDANSETRON 2 MG/ML
8 INJECTION INTRAMUSCULAR; INTRAVENOUS
Status: COMPLETED | OUTPATIENT
Start: 2018-02-23 | End: 2018-02-23

## 2018-02-23 RX ADMIN — SODIUM CHLORIDE 500 ML: 0.9 INJECTION, SOLUTION INTRAVENOUS at 09:02

## 2018-02-23 RX ADMIN — ASPIRIN 325 MG ORAL TABLET 325 MG: 325 PILL ORAL at 09:02

## 2018-02-23 RX ADMIN — ONDANSETRON 8 MG: 2 INJECTION INTRAMUSCULAR; INTRAVENOUS at 09:02

## 2018-02-23 RX ADMIN — METOCLOPRAMIDE 10 MG: 5 INJECTION, SOLUTION INTRAMUSCULAR; INTRAVENOUS at 09:02

## 2018-02-23 RX ADMIN — KETOROLAC TROMETHAMINE 30 MG: 30 INJECTION, SOLUTION INTRAMUSCULAR at 09:02

## 2018-02-23 NOTE — ED PROVIDER NOTES
Encounter Date: 2/23/2018       History     Chief Complaint   Patient presents with    Chest Pain     since 4 this am. intermittent and sharp. reports SOB at well. rates pain 10/10.      Onset of right-sided pleuritic chest pain this morning associated with taking a deep breath.  There is no history of substernal chest pain no radiation and no cardiac history.  He has a history of GERD as well as hypertension he has mild elevated liver enzymes in the past his pain is gone at the time of admission to the ER      The history is provided by the patient. No  was used.   Chest Pain   The current episode started several hours ago. Duration of episode(s) is 30 minutes. Chest pain occurs intermittently. The chest pain is improving. The pain is associated with breathing. At its most intense, the chest pain is at 7/10. The chest pain is currently at 4/10. The quality of the pain is described as pleuritic. The pain does not radiate. Chest pain is worsened by deep breathing. Pertinent negatives for primary symptoms include no shortness of breath and no wheezing. He tried nothing for the symptoms.   His past medical history is significant for hypertension.     Review of patient's allergies indicates:   Allergen Reactions    Codeine Nausea And Vomiting    Pcn [penicillins] Other (See Comments)     Unknown reaction     Past Medical History:   Diagnosis Date    Erectile dysfunction     GERD (gastroesophageal reflux disease)     Hypertension      Past Surgical History:   Procedure Laterality Date    LASIK      VASECTOMY       Family History   Problem Relation Age of Onset    Clotting disorder Father     Heart disease Father     Hypertension Father     Heart disease Mother     Hypertension Mother     Heart disease Maternal Aunt     Heart disease Maternal Uncle     Heart disease Paternal Aunt     Heart disease Paternal Uncle     Hypertension Sister     Hypertension Brother     Prostate cancer  Brother      Social History   Substance Use Topics    Smoking status: Current Every Day Smoker     Packs/day: 1.50     Types: Cigarettes    Smokeless tobacco: Never Used    Alcohol use 3.6 - 7.2 oz/week     6 - 12 Cans of beer per week      Comment: heavy etoh use daily     Review of Systems   Constitutional: Negative.    HENT: Negative.    Eyes: Negative.    Respiratory: Positive for chest tightness. Negative for shortness of breath and wheezing.    Cardiovascular: Positive for chest pain.   Gastrointestinal: Negative.    Endocrine: Negative.    Genitourinary: Negative.    Musculoskeletal: Negative.    Skin: Negative.    Allergic/Immunologic: Negative.    Neurological: Negative.    Hematological: Negative.    Psychiatric/Behavioral: Negative.        Physical Exam     Initial Vitals [02/23/18 0842]   BP Pulse Resp Temp SpO2   (!) 168/84 88 16 98.4 °F (36.9 °C) 97 %      MAP       112         Physical Exam    Nursing note and vitals reviewed.  Constitutional: He appears well-developed and well-nourished.   HENT:   Head: Normocephalic.   Eyes: Conjunctivae are normal.   Neck: Normal range of motion. Neck supple.   Cardiovascular: Normal rate, regular rhythm, normal heart sounds and intact distal pulses.   Pulmonary/Chest: Breath sounds normal.   Abdominal: Soft.   Musculoskeletal: Normal range of motion.   Neurological: He is alert.   Skin: Skin is warm. Capillary refill takes less than 2 seconds.         ED Course   Procedures  Labs Reviewed   CBC W/ AUTO DIFFERENTIAL - Abnormal; Notable for the following:        Result Value     (*)     MCH 34.5 (*)     Gran% 76.5 (*)     Lymph% 13.1 (*)     All other components within normal limits   COMPREHENSIVE METABOLIC PANEL - Abnormal; Notable for the following:     Sodium 146 (*)     Glucose 189 (*)     Alkaline Phosphatase 33 (*)      (*)      (*)     All other components within normal limits   D DIMER - Abnormal; Notable for the following:      D-Dimer 268 (*)     All other components within normal limits   TROPONIN I   NT-PRO NATRIURETIC PEPTIDE   TROPONIN I     EKG Readings: (Independently Interpreted)   Rhythm: Normal Sinus Rhythm.   No acute ischemic changes          Medical Decision Making:   Initial Assessment:   Pleuritic right sided chest pain  ED Management:  Patient was admitted chest pain which was present at 4 AM but at the time of the ED visit it was gone.  The pain woke him up and associated on the right side radiating down to the right lower rib cage.  It worsens on deep breath.  Patient has a history of GERD.  He has no cardiac history he has a history of hypertension the patient's workup does not show any evidence of acute myocardial ischemia or pulmonary embolus provisional diagnosis is pleurisy versus GERD                      Clinical Impression:   The primary encounter diagnosis was Pleuritic chest pain. Diagnoses of Chest pain and Gastroesophageal reflux disease without esophagitis were also pertinent to this visit.                           STANISLAW English III, MD  02/23/18 1038

## 2018-02-23 NOTE — ED NOTES
Pt sleeping. Wakes to verbal stimuli. Denies any pain at this time. Reports good relief with meds

## 2018-10-01 ENCOUNTER — TELEPHONE (OUTPATIENT)
Dept: UROLOGY | Facility: CLINIC | Age: 48
End: 2018-10-01

## 2018-10-01 NOTE — TELEPHONE ENCOUNTER
----- Message from Ed Chatman sent at 10/1/2018 12:26 PM CDT -----  Contact: pt   Pt would like a call back regarding scheduling f/up appointment pt also has new issues insist on message being sent   Pt can be reached at [phone]

## 2018-11-06 ENCOUNTER — HOSPITAL ENCOUNTER (EMERGENCY)
Facility: HOSPITAL | Age: 48
Discharge: HOME | End: 2018-11-06
Attending: EMERGENCY MEDICINE
Payer: MEDICAID

## 2018-11-06 VITALS
BODY MASS INDEX: 34 KG/M2 | RESPIRATION RATE: 16 BRPM | TEMPERATURE: 99 F | DIASTOLIC BLOOD PRESSURE: 96 MMHG | HEART RATE: 104 BPM | WEIGHT: 251 LBS | HEIGHT: 72 IN | OXYGEN SATURATION: 96 % | SYSTOLIC BLOOD PRESSURE: 169 MMHG

## 2018-11-06 DIAGNOSIS — T63.511A TOXIC EFFECT OF CONTACT WITH STINGRAY, UNINTENTIONAL, INITIAL ENCOUNTER: Primary | ICD-10-CM

## 2018-11-06 DIAGNOSIS — L53.9 REDNESS: ICD-10-CM

## 2018-11-06 LAB — POCT GLUCOSE: 217 MG/DL (ref 70–110)

## 2018-11-06 PROCEDURE — 99283 EMERGENCY DEPT VISIT LOW MDM: CPT | Mod: 25

## 2018-11-06 PROCEDURE — 82962 GLUCOSE BLOOD TEST: CPT

## 2018-11-06 RX ORDER — DOXYCYCLINE 100 MG/1
100 CAPSULE ORAL EVERY 12 HOURS
Qty: 20 CAPSULE | Refills: 0 | Status: ON HOLD | OUTPATIENT
Start: 2018-11-06 | End: 2018-11-12 | Stop reason: HOSPADM

## 2018-11-06 NOTE — ED NOTES
Went fishing on Thursday and was stung by sting ray to right inner wrist.  Bruising noted. Cap refill < 2 sec to right digits.  Radial/ulnar pulses palpable and strong.  Nailbeds pink.  Full ROM to right wrist

## 2018-11-06 NOTE — ED PROVIDER NOTES
Encounter Date: 11/6/2018       History     Chief Complaint   Patient presents with    Wrist Injury     stung by sting ray on Thursday to wrist and started swelling and tender today.     46yo male with pmhx of DM, GERD, and HTN presents to the ED for a right wrist injury.  On Thursday, he was stung by a stingray to the volar aspect of his right wrist.  The pain has improved, but he has noticed some redness and bruising around the site. No numbness/tingling.  No drainage from site.  No other complaints at this time. LUCHO UTD.       The history is provided by the patient.     Review of patient's allergies indicates:   Allergen Reactions    Codeine Nausea And Vomiting    Pcn [penicillins] Other (See Comments)     Unknown reaction     Past Medical History:   Diagnosis Date    Diabetes mellitus     Erectile dysfunction     GERD (gastroesophageal reflux disease)     Hypertension      Past Surgical History:   Procedure Laterality Date    LASIK      VASECTOMY       Family History   Problem Relation Age of Onset    Clotting disorder Father     Heart disease Father     Hypertension Father     Heart disease Mother     Hypertension Mother     Heart disease Maternal Aunt     Heart disease Maternal Uncle     Heart disease Paternal Aunt     Heart disease Paternal Uncle     Hypertension Sister     Hypertension Brother     Prostate cancer Brother      Social History     Tobacco Use    Smoking status: Current Every Day Smoker     Packs/day: 1.50     Types: Cigarettes    Smokeless tobacco: Never Used   Substance Use Topics    Alcohol use: Yes     Alcohol/week: 3.6 - 7.2 oz     Types: 6 - 12 Cans of beer per week     Comment: heavy etoh use daily    Drug use: No     Review of Systems   Constitutional: Negative for activity change, chills, fatigue and fever.   HENT: Negative for congestion.    Respiratory: Negative for cough.    Cardiovascular: Negative for chest pain.   Gastrointestinal: Negative for vomiting.    Musculoskeletal: Negative for arthralgias, back pain, joint swelling, myalgias and neck pain.   Skin: Positive for color change and wound.   Allergic/Immunologic: Positive for immunocompromised state (DM).   Neurological: Negative for weakness and headaches.   Psychiatric/Behavioral: Negative for confusion.   All other systems reviewed and are negative.      Physical Exam     Initial Vitals [11/06/18 1150]   BP Pulse Resp Temp SpO2   (!) 169/96 104 16 98.7 °F (37.1 °C) 96 %      MAP       --         Physical Exam    Nursing note and vitals reviewed.  Constitutional: He appears well-developed and well-nourished. He is active and cooperative. He is easily aroused.  Non-toxic appearance. He does not have a sickly appearance. He does not appear ill. No distress.   HENT:   Head: Normocephalic and atraumatic.   Eyes: Conjunctivae are normal.   Neck: Normal range of motion.   Cardiovascular: Normal rate.   Pulses:       Radial pulses are 2+ on the right side, and 2+ on the left side.   Pulmonary/Chest: Effort normal.   Abdominal: Soft. Normal appearance.   Musculoskeletal:        Right wrist: He exhibits tenderness. He exhibits normal range of motion, no bony tenderness, no swelling, no effusion, no crepitus, no deformity and no laceration.   Neurological: He is alert, oriented to person, place, and time and easily aroused. He has normal strength. No sensory deficit. GCS eye subscore is 4. GCS verbal subscore is 5. GCS motor subscore is 6.   Skin: Skin is warm, dry and intact. Capillary refill takes less than 2 seconds. Bruising noted. No rash noted. There is erythema.   Please see picture.    Psychiatric: He has a normal mood and affect. His speech is normal and behavior is normal. Judgment and thought content normal. Cognition and memory are normal.             ED Course   Procedures  Labs Reviewed - No data to display       Imaging Results          X-Ray Wrist Complete Right (Final result)  Result time 11/06/18  12:36:07    Final result by Rodney Zambrano MD (11/06/18 12:36:07)                 Impression:      No acute displaced fracture-dislocation identified.      Electronically signed by: Rodney Zambrano MD  Date:    11/06/2018  Time:    12:36             Narrative:    EXAMINATION:  XR WRIST COMPLETE 3 VIEWS RIGHT    CLINICAL HISTORY:  Erythematous condition, unspecified    TECHNIQUE:  PA, lateral, and oblique views of the right wrist were performed.    COMPARISON:  None    FINDINGS:  Bones are well mineralized. Overall alignment is within normal limits.  No displaced fracture, dislocation or destructive osseous process.  Minimal degenerative change about the wrist.  No subcutaneous emphysema or radiodense retained foreign body.                                 Medical Decision Making:   Initial Assessment:   48yo male here for a stingray sting which occurred on Thursday. Pt is diabetic.  No drainage from site.    Differential Diagnosis:   Cellulitis, foreign body, abscess  Clinical Tests:   Radiological Study: Reviewed and Ordered  ED Management:  Xray. Pt declined pain medicaiton   X-rays negative foreign body.  The patient will be placed on antibiotics for cellulitis.  Pt to follow up with PCP within 2 days.  I reviewed strict return precautions. In addition, pt is to return to the ED if condition changes, progresses, or if there are any concerns.  Pt verbalized understanding, compliance, and agreement with the treatment plan.    RX doxycycline (brackish water exposure)              Attending Attestation:     Physician Attestation Statement for NP/PA:   I have conducted a face to face encounter with this patient in addition to the NP/PA, due to NP/PA Request    Other NP/PA Attestation Additions:    History of Present Illness: 47-year-old male who was stung by a stingray few days ago.  He noticed increased redness to the area on his right wrist today.   Physical Exam: Circular area of erythema to the volar aspect of the  right wrist.  No areas of fluctuance.                     Clinical Impression:   The primary encounter diagnosis was Toxic effect of contact with stingray, unintentional, initial encounter. A diagnosis of Redness was also pertinent to this visit.                             KATHERYN Delgado  11/06/18 1881       David Son MD  11/06/18 1848

## 2018-11-06 NOTE — ED NOTES
APPEARANCE: Alert, oriented and in no acute distress.  CARDIAC: Normal rate and rhythm.   PERIPHERAL VASCULAR: peripheral pulses present. Normal cap refill. No edema. Warm to touch.    RESPIRATORY:Normal rate and effort. Respirations are equal and unlabored no obvious signs of distress.  GASTRO: soft, no tenderness, no abdominal distention.  MUSC: Full ROM. No bony tenderness or soft tissue tenderness. No obvious deformity.  SKIN: Skin is warm and dry, normal skin turgor, mucous membranes moist.  NEURO: 5/5 strength major flexors/extensors bilaterally. Sensory intact to light touch bilaterally. Helder coma scale: eyes open spontaneously-4, oriented & converses-5, obeys commands-6. No neurological abnormalities.   MENTAL STATUS: awake, alert and aware of environment.  EYE: PERRL, both eyes: pupils brisk and reactive to light. Normal size.  ENT: EARS: no obvious drainage. NOSE: no active bleeding.

## 2018-11-08 ENCOUNTER — TELEPHONE (OUTPATIENT)
Dept: UROLOGY | Facility: CLINIC | Age: 48
End: 2018-11-08

## 2018-11-08 ENCOUNTER — OFFICE VISIT (OUTPATIENT)
Dept: UROLOGY | Facility: CLINIC | Age: 48
End: 2018-11-08
Attending: UROLOGY
Payer: MEDICAID

## 2018-11-08 VITALS
WEIGHT: 250 LBS | HEART RATE: 106 BPM | BODY MASS INDEX: 33.86 KG/M2 | SYSTOLIC BLOOD PRESSURE: 134 MMHG | HEIGHT: 72 IN | DIASTOLIC BLOOD PRESSURE: 77 MMHG

## 2018-11-08 DIAGNOSIS — N48.6 PEYRONIE'S DISEASE: Primary | ICD-10-CM

## 2018-11-08 DIAGNOSIS — N48.6 PEYRONIE DISEASE: ICD-10-CM

## 2018-11-08 PROCEDURE — 99214 OFFICE O/P EST MOD 30 MIN: CPT | Mod: S$GLB,,, | Performed by: UROLOGY

## 2018-11-08 NOTE — PROGRESS NOTES
Subjective:      Veto Park is a 47 y.o. male who returns today regarding his peyronie's disease.    He continues to report dorsal penile curvature, close to 90 degrees. He is now having some pain with erection and is unable to penetrate.     The plaque has been present for over 1 year.    The following portions of the patient's history were reviewed and updated as appropriate: allergies, current medications, past family history, past medical history, past social history, past surgical history and problem list.    Review of Systems  A comprehensive multipoint review of systems was negative except as otherwise stated in the HPI.     Objective:   Vitals: /77   Pulse 106   Ht 6' (1.829 m)   Wt 113.4 kg (250 lb)   BMI 33.91 kg/m²     Physical Exam   General: alert and oriented, no acute distress  Respiratory: Symmetric expansion, non-labored breathing  Cardiovascular: regular rate and rhythm, no peripheral edema  Abdomen: soft, non distended  Genitourinary: uncircumcised penis; 3-4 x 1 cm plaque along dorsal proximal shaft of penis  Skin: normal coloration and turgor, no rashes, no suspicious skin lesions noted  Neuro: no gross deficits  Psych: normal judgment and insight, normal mood/affect and non-anxious    Lab Review     Lab Results   Component Value Date    WBC 9.82 02/23/2018    HGB 16.5 02/23/2018    HCT 48.1 02/23/2018     (H) 02/23/2018     02/23/2018     Lab Results   Component Value Date    CREATININE 0.95 02/23/2018    BUN 14 02/23/2018         Assessment and Plan:   1. Peyronie's disease  -- Again reviewed options for treatment and common indications  -- Currently unable to have intercourse due to severity of bend, therefore I explained further treatment is appropriate  -- Discussed options for surgery including penile plication vs plaque excision and grafting. I recommend plication for quicker recovery and lower risk of ED. He wishes to proceed.  -- Risks of surgery  were reviewed including pain, bleeding, infection, penile shortening, palpable knots, and ED.

## 2018-11-08 NOTE — TELEPHONE ENCOUNTER
----- Message from Risa Walker sent at 11/8/2018  2:49 PM CST -----  Contact: Self            Name of Who is Calling: FARIDA ARAIZA [[id number]]      What is the request in detail: Pt states he is returning a call to the clinical team. Please contact to further discuss and advise.        Can the clinic reply by [facility]: N      What Number [facility] Back [facility]: [phone]

## 2018-11-08 NOTE — TELEPHONE ENCOUNTER
----- Message from Anh Sanchez sent at 11/8/2018  2:38 PM CST -----  Contact: Pt   Name of Who is Calling: FARIDA ARAIZA [[id number]]    What is the request in detail: Pt states that the date of surgery will not work and would need to reschedule, please advise.    Can the clinic reply by [facility]:     What Number to Call Back if not in [facility]: [phone]

## 2018-11-10 ENCOUNTER — HOSPITAL ENCOUNTER (INPATIENT)
Facility: HOSPITAL | Age: 48
LOS: 2 days | Discharge: HOME | DRG: 603 | End: 2018-11-12
Attending: EMERGENCY MEDICINE | Admitting: HOSPITALIST
Payer: MEDICAID

## 2018-11-10 DIAGNOSIS — L03.113 CELLULITIS OF RIGHT UPPER EXTREMITY: Primary | ICD-10-CM

## 2018-11-10 PROBLEM — E11.9 TYPE 2 DIABETES MELLITUS, WITHOUT LONG-TERM CURRENT USE OF INSULIN: Chronic | Status: ACTIVE | Noted: 2018-11-10

## 2018-11-10 PROBLEM — I10 ESSENTIAL HYPERTENSION: Status: ACTIVE | Noted: 2018-11-10

## 2018-11-10 PROBLEM — E11.9 TYPE 2 DIABETES MELLITUS, WITHOUT LONG-TERM CURRENT USE OF INSULIN: Status: ACTIVE | Noted: 2018-11-10

## 2018-11-10 PROBLEM — K21.9 GERD (GASTROESOPHAGEAL REFLUX DISEASE): Status: ACTIVE | Noted: 2018-11-10

## 2018-11-10 PROBLEM — I10 ESSENTIAL HYPERTENSION: Chronic | Status: ACTIVE | Noted: 2018-11-10

## 2018-11-10 PROBLEM — R74.8 ABNORMAL LIVER ENZYMES: Status: ACTIVE | Noted: 2018-11-10

## 2018-11-10 LAB
ALBUMIN SERPL BCP-MCNC: 4 G/DL
ALP SERPL-CCNC: 20 U/L
ALT SERPL W/O P-5'-P-CCNC: 111 U/L
ANION GAP SERPL CALC-SCNC: 12 MMOL/L
AST SERPL-CCNC: 80 U/L
BASOPHILS # BLD AUTO: 0.04 K/UL
BASOPHILS NFR BLD: 0.6 %
BILIRUB SERPL-MCNC: 1 MG/DL
BUN SERPL-MCNC: 13 MG/DL
CALCIUM SERPL-MCNC: 10 MG/DL
CHLORIDE SERPL-SCNC: 103 MMOL/L
CO2 SERPL-SCNC: 23 MMOL/L
CREAT SERPL-MCNC: 0.9 MG/DL
DIFFERENTIAL METHOD: ABNORMAL
EOSINOPHIL # BLD AUTO: 0.3 K/UL
EOSINOPHIL NFR BLD: 3.9 %
ERYTHROCYTE [DISTWIDTH] IN BLOOD BY AUTOMATED COUNT: 12.5 %
EST. GFR  (AFRICAN AMERICAN): >60 ML/MIN/1.73 M^2
EST. GFR  (NON AFRICAN AMERICAN): >60 ML/MIN/1.73 M^2
ESTIMATED AVG GLUCOSE: 126 MG/DL
GLUCOSE SERPL-MCNC: 105 MG/DL
HBA1C MFR BLD HPLC: 6 %
HCT VFR BLD AUTO: 47 %
HGB BLD-MCNC: 16.3 G/DL
LACTATE SERPL-SCNC: 1.1 MMOL/L
LYMPHOCYTES # BLD AUTO: 1.6 K/UL
LYMPHOCYTES NFR BLD: 22.2 %
MCH RBC QN AUTO: 37.6 PG
MCHC RBC AUTO-ENTMCNC: 34.7 G/DL
MCV RBC AUTO: 108 FL
MONOCYTES # BLD AUTO: 0.8 K/UL
MONOCYTES NFR BLD: 11.6 %
NEUTROPHILS # BLD AUTO: 4.3 K/UL
NEUTROPHILS NFR BLD: 61.6 %
PLATELET # BLD AUTO: 156 K/UL
PMV BLD AUTO: 9.7 FL
POCT GLUCOSE: 124 MG/DL (ref 70–110)
POTASSIUM SERPL-SCNC: 4 MMOL/L
PROT SERPL-MCNC: 7.9 G/DL
RBC # BLD AUTO: 4.34 M/UL
SODIUM SERPL-SCNC: 138 MMOL/L
WBC # BLD AUTO: 6.98 K/UL

## 2018-11-10 PROCEDURE — 80053 COMPREHEN METABOLIC PANEL: CPT

## 2018-11-10 PROCEDURE — 63600175 PHARM REV CODE 636 W HCPCS: Performed by: PHYSICIAN ASSISTANT

## 2018-11-10 PROCEDURE — S4991 NICOTINE PATCH NONLEGEND: HCPCS | Performed by: HOSPITALIST

## 2018-11-10 PROCEDURE — 85025 COMPLETE CBC W/AUTO DIFF WBC: CPT

## 2018-11-10 PROCEDURE — A9585 GADOBUTROL INJECTION: HCPCS | Performed by: HOSPITALIST

## 2018-11-10 PROCEDURE — 83036 HEMOGLOBIN GLYCOSYLATED A1C: CPT

## 2018-11-10 PROCEDURE — 96374 THER/PROPH/DIAG INJ IV PUSH: CPT

## 2018-11-10 PROCEDURE — 25000003 PHARM REV CODE 250: Performed by: EMERGENCY MEDICINE

## 2018-11-10 PROCEDURE — 99285 EMERGENCY DEPT VISIT HI MDM: CPT | Mod: 25

## 2018-11-10 PROCEDURE — 25500020 PHARM REV CODE 255: Performed by: HOSPITALIST

## 2018-11-10 PROCEDURE — 63600175 PHARM REV CODE 636 W HCPCS: Performed by: EMERGENCY MEDICINE

## 2018-11-10 PROCEDURE — 83605 ASSAY OF LACTIC ACID: CPT

## 2018-11-10 PROCEDURE — 87040 BLOOD CULTURE FOR BACTERIA: CPT

## 2018-11-10 PROCEDURE — 25000003 PHARM REV CODE 250: Performed by: PHYSICIAN ASSISTANT

## 2018-11-10 PROCEDURE — 25000003 PHARM REV CODE 250: Performed by: HOSPITALIST

## 2018-11-10 PROCEDURE — 11000001 HC ACUTE MED/SURG PRIVATE ROOM

## 2018-11-10 PROCEDURE — 96375 TX/PRO/DX INJ NEW DRUG ADDON: CPT

## 2018-11-10 RX ORDER — AMLODIPINE BESYLATE 5 MG/1
10 TABLET ORAL DAILY
Status: DISCONTINUED | OUTPATIENT
Start: 2018-11-10 | End: 2018-11-12 | Stop reason: HOSPADM

## 2018-11-10 RX ORDER — ACETAMINOPHEN 325 MG/1
650 TABLET ORAL EVERY 4 HOURS PRN
Status: DISCONTINUED | OUTPATIENT
Start: 2018-11-10 | End: 2018-11-12 | Stop reason: HOSPADM

## 2018-11-10 RX ORDER — IBUPROFEN 200 MG
1 TABLET ORAL DAILY
Status: DISCONTINUED | OUTPATIENT
Start: 2018-11-10 | End: 2018-11-12 | Stop reason: HOSPADM

## 2018-11-10 RX ORDER — ONDANSETRON 2 MG/ML
4 INJECTION INTRAMUSCULAR; INTRAVENOUS EVERY 8 HOURS PRN
Status: DISCONTINUED | OUTPATIENT
Start: 2018-11-10 | End: 2018-11-12 | Stop reason: HOSPADM

## 2018-11-10 RX ORDER — ENOXAPARIN SODIUM 100 MG/ML
40 INJECTION SUBCUTANEOUS EVERY 24 HOURS
Status: DISCONTINUED | OUTPATIENT
Start: 2018-11-10 | End: 2018-11-12 | Stop reason: HOSPADM

## 2018-11-10 RX ORDER — PANTOPRAZOLE SODIUM 40 MG/1
40 TABLET, DELAYED RELEASE ORAL DAILY
Status: DISCONTINUED | OUTPATIENT
Start: 2018-11-10 | End: 2018-11-12 | Stop reason: HOSPADM

## 2018-11-10 RX ORDER — MEROPENEM AND SODIUM CHLORIDE 1 G/50ML
1 INJECTION, SOLUTION INTRAVENOUS
Status: COMPLETED | OUTPATIENT
Start: 2018-11-10 | End: 2018-11-10

## 2018-11-10 RX ORDER — SODIUM CHLORIDE 0.9 % (FLUSH) 0.9 %
5 SYRINGE (ML) INJECTION
Status: DISCONTINUED | OUTPATIENT
Start: 2018-11-10 | End: 2018-11-12 | Stop reason: HOSPADM

## 2018-11-10 RX ORDER — IBUPROFEN 200 MG
24 TABLET ORAL
Status: DISCONTINUED | OUTPATIENT
Start: 2018-11-10 | End: 2018-11-12 | Stop reason: HOSPADM

## 2018-11-10 RX ORDER — LISINOPRIL 20 MG/1
20 TABLET ORAL DAILY
Status: DISCONTINUED | OUTPATIENT
Start: 2018-11-10 | End: 2018-11-12 | Stop reason: HOSPADM

## 2018-11-10 RX ORDER — INSULIN ASPART 100 [IU]/ML
1-10 INJECTION, SOLUTION INTRAVENOUS; SUBCUTANEOUS
Status: DISCONTINUED | OUTPATIENT
Start: 2018-11-10 | End: 2018-11-12 | Stop reason: HOSPADM

## 2018-11-10 RX ORDER — GLUCAGON 1 MG
1 KIT INJECTION
Status: DISCONTINUED | OUTPATIENT
Start: 2018-11-10 | End: 2018-11-12 | Stop reason: HOSPADM

## 2018-11-10 RX ORDER — IBUPROFEN 200 MG
16 TABLET ORAL
Status: DISCONTINUED | OUTPATIENT
Start: 2018-11-10 | End: 2018-11-12 | Stop reason: HOSPADM

## 2018-11-10 RX ORDER — GADOBUTROL 604.72 MG/ML
10 INJECTION INTRAVENOUS
Status: COMPLETED | OUTPATIENT
Start: 2018-11-10 | End: 2018-11-10

## 2018-11-10 RX ORDER — MORPHINE SULFATE 4 MG/ML
4 INJECTION, SOLUTION INTRAMUSCULAR; INTRAVENOUS
Status: COMPLETED | OUTPATIENT
Start: 2018-11-10 | End: 2018-11-10

## 2018-11-10 RX ORDER — DOXYCYCLINE HYCLATE 100 MG
100 TABLET ORAL EVERY 12 HOURS
Status: DISCONTINUED | OUTPATIENT
Start: 2018-11-10 | End: 2018-11-12 | Stop reason: HOSPADM

## 2018-11-10 RX ADMIN — ENOXAPARIN SODIUM 40 MG: 100 INJECTION SUBCUTANEOUS at 06:11

## 2018-11-10 RX ADMIN — GADOBUTROL 10 ML: 604.72 INJECTION INTRAVENOUS at 06:11

## 2018-11-10 RX ADMIN — ACETAMINOPHEN 650 MG: 325 TABLET ORAL at 07:11

## 2018-11-10 RX ADMIN — NICOTINE 1 PATCH: 21 PATCH, EXTENDED RELEASE TRANSDERMAL at 06:11

## 2018-11-10 RX ADMIN — LISINOPRIL 20 MG: 20 TABLET ORAL at 06:11

## 2018-11-10 RX ADMIN — PANTOPRAZOLE SODIUM 40 MG: 40 TABLET, DELAYED RELEASE ORAL at 06:11

## 2018-11-10 RX ADMIN — MEROPENEM 1 G: 1 INJECTION, POWDER, FOR SOLUTION INTRAVENOUS at 06:11

## 2018-11-10 RX ADMIN — VANCOMYCIN HYDROCHLORIDE 3000 MG: 1 INJECTION, POWDER, LYOPHILIZED, FOR SOLUTION INTRAVENOUS at 01:11

## 2018-11-10 RX ADMIN — DOXYCYCLINE HYCLATE 100 MG: 100 TABLET, COATED ORAL at 08:11

## 2018-11-10 RX ADMIN — MEROPENEM AND SODIUM CHLORIDE 1 G: 1 INJECTION, SOLUTION INTRAVENOUS at 01:11

## 2018-11-10 RX ADMIN — MORPHINE SULFATE 4 MG: 4 INJECTION INTRAVENOUS at 03:11

## 2018-11-10 RX ADMIN — MEROPENEM 1 G: 1 INJECTION, POWDER, FOR SOLUTION INTRAVENOUS at 10:11

## 2018-11-10 RX ADMIN — AMLODIPINE BESYLATE 10 MG: 5 TABLET ORAL at 03:11

## 2018-11-10 NOTE — ED NOTES
APPEARANCE: Alert, oriented and in no acute distress.  CARDIAC: Normal rate and rhythm, no murmur heard. BLOOD PRESSURE ELEVATED.  PERIPHERAL VASCULAR: peripheral pulses present. Normal cap refill. No edema. Warm to touch.    RESPIRATORY:Normal rate and effort, breath sounds clear bilaterally throughout chest. Respirations are equal and unlabored no obvious signs of distress.  GASTRO: soft, bowel sounds normal, no tenderness, no abdominal distention.  MUSC: Full ROM. No bony tenderness or soft tissue tenderness. No obvious deformity.  SKIN: Skin is warm and dry, normal skin turgor, mucous membranes moist. RASH TO RIGHT WRIST FROM STING RAY BITE TWO WEEKS AGO  NEURO: 5/5 strength major flexors/extensors bilaterally. Sensory intact to light touch bilaterally. Helder coma scale: eyes open spontaneously-4, oriented & converses-5, obeys commands-6. No neurological abnormalities.   MENTAL STATUS: awake, alert and aware of environment.  EYE: PERRL, both eyes: pupils brisk and reactive to light. Normal size.  ENT: EARS: no obvious drainage. NOSE: no active bleeding.

## 2018-11-10 NOTE — ED NOTES
Patient presents to ED with c/o being stung on right wrist by a sting ray two weeks ago. Patient states he came to ED last week and was given antibiotic therapy but the rash has gotten progressively worse. Patient states he is compliant with taking his prescribe antibiotics.

## 2018-11-10 NOTE — ASSESSMENT & PLAN NOTE
Pt with cellulitis to volar aspect of right forearm x10 days with no improvement after taking doxycycline x4 days.   -Merrem, vancomycin, and doxy PO  -US soft tissue to evaluate for fluid collection  -F/U blood cultures

## 2018-11-10 NOTE — H&P
[facility] Hospital Medicine  History & Physical    Patient Name: Veto Park  MRN: [id number]  Admission Date: 11/10/2018  Attending Physician: David Son, *   Primary Care Provider: [facility]         Patient information was obtained from patient, past medical records and ER records.     Subjective:     Principal Problem:Cellulitis of right upper extremity    Chief Complaint:   Chief Complaint   Patient presents with    Insect Bite     Stingray sting to right wrist        HPI: Mr. Park is a 48 yo white male with HTN and DM who presents today with lymphangiitis and cellulitis 2/2 stingray sting. He reports that he was stung by a sting ray while [place] on 11[place]. On 11/6, he presented to the ED and was dishcarged with doxycycline. He presented again to [facility] ED on 11/10 with c/o no improvement and increased streaking to wrist. He denies fever, chills, or any other symptoms. He reports taking the doxycycline as prescribed, and that his blood sugar generally runs <200. Labs were unremarkable other than AST 80 and . Lactate was 1.1. Blood cultures were drawn. He was given vancomycin and merrem. He was admitted to hospital medicine.     Past Medical History:   Diagnosis Date    Diabetes mellitus     Erectile dysfunction     GERD (gastroesophageal reflux disease)     Hypertension     Peyronie's disease        Past Surgical History:   Procedure Laterality Date    LASIK      VASECTOMY         Review of patient's allergies indicates:   Allergen Reactions    Codeine Nausea And Vomiting    Pcn [penicillins] Other (See Comments)     Unknown reaction    Betadine [povidone-iodine] Swelling and Other (See Comments)     redness       No current facility-administered medications on file prior to encounter.      Current Outpatient Medications on File Prior to Encounter   Medication Sig    amlodipine (NORVASC) 5 MG tablet Take 2 tablets (10  mg total) by mouth once daily.    doxycycline (VIBRAMYCIN) 100 MG Cap Take 1 capsule (100 mg total) by mouth every 12 (twelve) hours. for 10 days    lisinopril (PRINIVIL,ZESTRIL) 20 MG tablet Take 20 mg by mouth once daily.    metFORMIN (GLUCOPHAGE) 500 MG tablet Take 500 mg by mouth 2 (two) times daily with meals.    omeprazole (PRILOSEC) 20 MG capsule One po bid for 7 days then one each morning (Patient taking differently: Take 20 mg by mouth once daily. )    sildenafil (REVATIO) 20 mg Tab Take 1 tablet (20 mg total) by mouth As instructed. Take 2-5 tablets as needed.     Family History     Problem Relation (Age of Onset)    Clotting disorder Father    Heart disease Father, Mother, Maternal Aunt, Maternal Uncle, Paternal Aunt, Paternal Uncle    Hypertension Father, Mother, Sister, Brother    Prostate cancer Brother        Tobacco Use    Smoking status: Current Every Day Smoker     Packs/day: 1.50     Types: Cigarettes    Smokeless tobacco: Never Used    Tobacco comment: Using patches   Substance and Sexual Activity    Alcohol use: Yes     Alcohol/week: 3.6 - 7.2 oz     Types: 6 - 12 Cans of beer per week     Comment: heavy etoh use daily    Drug use: No    Sexual activity: Not Currently     Review of Systems   Constitutional: Negative for chills and fever.   HENT: Negative for congestion and rhinorrhea.    Eyes: Negative for photophobia and visual disturbance.   Respiratory: Positive for cough (chronic ). Negative for shortness of breath.    Cardiovascular: Negative for chest pain.   Gastrointestinal: Negative for abdominal pain, nausea and vomiting.   Endocrine: Negative for polydipsia and polyphagia.   Genitourinary: Negative for decreased urine volume and difficulty urinating.   Musculoskeletal: Negative for arthralgias and myalgias.   Skin: Positive for color change and rash.   Neurological: Negative for syncope and light-headedness.   Psychiatric/Behavioral: Negative for confusion.     Objective:      Vital Signs (Most Recent):  Temp: 99 °F (37.2 °C) (11/10/18 1221)  Pulse: 91 (11/10/18 1221)  Resp: 18 (11/10/18 1221)  BP: (!) 167/85 (11/10/18 1221)  SpO2: 97 % (11/10/18 1223) Vital Signs (24h Range):  Temp:  [98.8 °F (37.1 °C)-99 °F (37.2 °C)] 99 °F (37.2 °C)  Pulse:  [91-93] 91  Resp:  [18] 18  SpO2:  [97 %-98 %] 97 %  BP: (157-167)/(85-92) 167/85     Weight: 113.4 kg (250 lb)  Body mass index is 33.91 kg/m².    Physical Exam   Constitutional: He is oriented to person, place, and time. He appears well-developed and well-nourished. No distress.   HENT:   Head: Normocephalic and atraumatic.   Eyes: EOM are normal.   Neck: Normal range of motion.   Cardiovascular: Normal rate and regular rhythm.   Pulmonary/Chest: Effort normal and breath sounds normal. He has no wheezes. He has no rales.   Abdominal: Soft. Bowel sounds are normal. He exhibits no distension. There is no tenderness.   Musculoskeletal: Normal range of motion. He exhibits tenderness. He exhibits no edema or deformity.   Neurological: He is alert and oriented to person, place, and time.   Skin: Skin is warm and dry. He is not diaphoretic. There is erythema.   Puncture wound to volar aspect of wrist  Red streaking 3/4 way to elbow  Palpable lump assisted to elbow    Psychiatric: He has a normal mood and affect. His behavior is normal.         CRANIAL NERVES     CN III, IV, VI   Extraocular motions are normal.        Significant Labs:   CBC:   Recent Labs   Lab 11/10/18  1334   WBC 6.98   HGB 16.3   HCT 47.0        CMP:   Recent Labs   Lab 11/10/18  1334      K 4.0      CO2 23      BUN 13   CREATININE 0.9   CALCIUM 10.0   PROT 7.9   ALBUMIN 4.0   BILITOT 1.0   ALKPHOS 20*   AST 80*   *   ANIONGAP 12   EGFRNONAA >60       Significant Imaging: I have reviewed all pertinent imaging results/findings within the past 24 hours.    Assessment/Plan:     * Cellulitis of right upper extremity    Pt with cellulitis to volar  aspect of right forearm x10 days with no improvement after taking doxycycline x4 days.   -Merrem, vancomycin, and doxy PO  -US soft tissue to evaluate for fluid collection  -F/U blood cultures     Type 2 diabetes mellitus, without long-term current use of insulin    No A1C on file. Takes metformin 500 mg BID.  -Check BG AC and HS and use SSI  -Diabetic diet  -Check A1C     GERD (gastroesophageal reflux disease)    No complaints  -Give pantoprazole for home omeprazole     Essential hypertension    -167.  -Continue home amlodipine and lisinopril.  -Monitor     Abnormal liver enzymes    AST 80 and . This is below pt's baseline, both are usually closer to 200.  -Monitor       VTE Risk Mitigation (From admission, onward)    None             Layne Navarrete PA-C  Department of Hospital Medicine   [facility]

## 2018-11-10 NOTE — ED PROVIDER NOTES
Encounter Date: 11/10/2018    SCRIBE #1 NOTE: I, Anatoliy Hernandez, am scribing for, and in the presence of,  . I have scribed the entire note.       History     Chief Complaint   Patient presents with    Insect Bite     Stingray sting to right wrist     Time seen by provider: 12:36 PM    This is a 47 y.o. male who presents with complaint of sting ray bite. Patient was stung 9 days pta on right wrist. 4 days pta patient was seen here at [facility]. Given doxycycline which has not helped. Patient went to [facility] today because it had gotten worse, was going to be transferred her, but he refused and drove himself.       The history is provided by the patient.     Review of patient's allergies indicates:   Allergen Reactions    Codeine Nausea And Vomiting    Pcn [penicillins] Other (See Comments)     Unknown reaction    Betadine [povidone-iodine] Swelling and Other (See Comments)     redness     Past Medical History:   Diagnosis Date    Diabetes mellitus     Erectile dysfunction     GERD (gastroesophageal reflux disease)     Hypertension     Peyronie's disease      Past Surgical History:   Procedure Laterality Date    LASIK      VASECTOMY       Family History   Problem Relation Age of Onset    Clotting disorder Father     Heart disease Father     Hypertension Father     Heart disease Mother     Hypertension Mother     Heart disease Maternal Aunt     Heart disease Maternal Uncle     Heart disease Paternal Aunt     Heart disease Paternal Uncle     Hypertension Sister     Hypertension Brother     Prostate cancer Brother      Social History     Tobacco Use    Smoking status: Current Every Day Smoker     Packs/day: 1.50     Types: Cigarettes    Smokeless tobacco: Never Used    Tobacco comment: Using patches   Substance Use Topics    Alcohol use: Yes     Alcohol/week: 3.6 - 7.2 oz     Types: 6 - 12 Cans of beer per week     Comment: heavy etoh use daily    Drug use: No      Review of Systems   Constitutional: Negative for chills and fever.   Skin:        Sting ray sting   All other systems reviewed and are negative.      Physical Exam     Initial Vitals   BP Pulse Resp Temp SpO2   11/10/18 1221 11/10/18 1221 11/10/18 1221 11/10/18 1221 11/10/18 1223   (!) 167/85 91 18 99 °F (37.2 °C) 97 %      MAP       --                Physical Exam    Nursing note and vitals reviewed.  Constitutional: He appears well-developed and well-nourished.   HENT:   Head: Normocephalic and atraumatic.   Eyes: Conjunctivae are normal.   Neck: Neck supple.   Cardiovascular: Normal rate, regular rhythm, normal heart sounds and intact distal pulses. Exam reveals no gallop and no friction rub.    No murmur heard.  Pulmonary/Chest: Breath sounds normal. He has no wheezes. He has no rhonchi. He has no rales.   Abdominal: Soft. He exhibits no distension. There is no tenderness.   Musculoskeletal: Normal range of motion.   Neurological: He is alert and oriented to person, place, and time.   Skin: No rash noted. There is erythema.   Right wrist: volar aspect circular area of erythema, with   overlying puncture wound  Right forearm: Linear area of erythema extending up flexor aspect of forearm    Psychiatric: He has a normal mood and affect.         ED Course   Procedures  Labs Reviewed   CBC W/ AUTO DIFFERENTIAL - Abnormal; Notable for the following components:       Result Value    RBC 4.34 (*)      (*)     MCH 37.6 (*)     All other components within normal limits   COMPREHENSIVE METABOLIC PANEL - Abnormal; Notable for the following components:    Alkaline Phosphatase 20 (*)     AST 80 (*)      (*)     All other components within normal limits   HEMOGLOBIN A1C - Abnormal; Notable for the following components:    Hemoglobin A1C 6.0 (*)     All other components within normal limits   CULTURE, BLOOD   CULTURE, BLOOD   LACTIC ACID, PLASMA   POCT GLUCOSE MONITORING CONTINUOUS          Imaging Results     None          Medical Decision Making:   ED Management:  47-year-old male with increasing cellulitis of his right arm from a stingray injury a few days ago.  Patient has been taking doxycycline.  He will be admitted by Dr. Valadez for IV antibiotics.                      Clinical Impression:     1. Cellulitis of right upper extremity                    I, Dr. David Son, personally performed the services described in this documentation. All medical record entries made by the scribe were at my direction and in my presence. I have reviewed the chart and agree that the record reflects my personal performance and is accurate and complete. David Son MD.  12:57 PM 11/10/2018                 David Son MD  11/10/18 7832

## 2018-11-10 NOTE — PROGRESS NOTES
Vancomycin Dosing and Monitoring Pharmacy Protocol    Veto Park is a 47 y.o. male    Height: 6' (1.829 m)   Wt Readings from Last 3 Encounters:   11/10/18 113.4 kg (250 lb)   11/10/18 113.4 kg (250 lb)   11/08/18 113.4 kg (250 lb)       Temp Readings from Last 3 Encounters:   11/10/18 98.4 °F (36.9 °C)   11/10/18 98.8 °F (37.1 °C) (Oral)   11/06/18 98.7 °F (37.1 °C) (Oral)      Lab Results   Component Value Date/Time    WBC 6.98 11/10/2018 01:34 PM    WBC 9.82 02/23/2018 09:00 AM    WBC 8.29 08/10/2017 01:14 PM      Lab Results   Component Value Date/Time    CREATININE 0.9 11/10/2018 01:34 PM    CREATININE 0.95 02/23/2018 09:00 AM    CREATININE 1.3 08/10/2017 03:45 PM      Lab Results   Component Value Date/Time    LACTATE 1.1 11/10/2018 01:34 PM       Serum creatinine: 0.9 mg/dL 11/10/18 1334  Estimated creatinine clearance: 131.9 mL/min    Antibiotics (From admission, onward)      Start     Stop Route Frequency Ordered    11/11/18 0200  vancomycin (VANCOCIN) 2,000 mg in dextrose 5 % 500 mL IVPB      -- IV Every 12 hours (non-standard times) 11/10/18 1736    11/10/18 2100  doxycycline tablet 100 mg      -- Oral Every 12 hours 11/10/18 1513    11/10/18 1515  meropenem 1 g in sodium chloride 0.9 % 100 mL IVPB (ready to mix system)      -- IV Every 8 hours (non-standard times) 11/10/18 1513          Antifungals (From admission, onward)      None            Microbiology Results (last 7 days)       Procedure Component Value Units Date/Time    Blood Culture #1 **CANNOT BE ORDERED STAT** [[id number]] Collected:  11/10/18 1358    Order Status:  Sent Specimen:  Blood from Peripheral, Antecubital, Right Updated:  11/10/18 1620    Blood Culture #2 **CANNOT BE ORDERED STAT** [[id number]] Collected:  11/10/18 1405    Order Status:  Sent Specimen:  Blood from Peripheral, Antecubital, Right Updated:  11/10/18 8827            Indication/Target trough:   Skin and skin structure (Target trough: 10-15  mcg/ml)    Hemodialysis:   N/A    Dosing Weight:   Wt Readings from Last 1 Encounters:   11/10/18 113.4 kg (250 lb)       Last Vancomycin dose: 3000 mg   Date/Time given: 11-10-18 at 1348          Vancomycin level:  No results for input(s): VANCOMYCIN-TROUGH in the last 72 hours.  No results for input(s): VANCOMYCIN, RANDOM in the last 72 hours.    Per Protocol Initial/Adjustments Dosin. Initial/Adjustment Dose: INCREASE Vancomycin will be adjusted from 1750 mg q12hr to 2000 mg q12hr  2. Vancomycin Trough Level will be drawn on 18 at 0100 date/time    Pharmacy will continue to follow.    Please contact if you have any further questions. Thank you.    Don Peck, PharmD  [phone]

## 2018-11-10 NOTE — HOSPITAL COURSE
Patient was given vancomycin 2 g IV, meropenem 1 g IV, and doxycycline 100 mg PO BID. Soft tissue US of the right arm showed an indeterminate lesion in the volar subcutaneous soft tissues of the wrist. MRI right forearm showed soft tissue edema at the volar aspect of the wrist and distal forearm; no focal fluid collections, rim enhancing abscess, or enhancing soft tissue mass. Blood cultures showed no growth to date. Patient remained afebrile and cellulitis continued to improve over the course of hospital stay. He remained hemodynamically stable and was ready for discharge. He was discharged to home in good condition with levaquin 750 mg PO daily x 10 days and doxycycline 100 mg PO BID x 10 days. He will follow-up with his PCP on 11/16/18.

## 2018-11-10 NOTE — SUBJECTIVE & OBJECTIVE
Past Medical History:   Diagnosis Date    Diabetes mellitus     Erectile dysfunction     GERD (gastroesophageal reflux disease)     Hypertension     Peyronie's disease        Past Surgical History:   Procedure Laterality Date    LASIK      VASECTOMY         Review of patient's allergies indicates:   Allergen Reactions    Codeine Nausea And Vomiting    Pcn [penicillins] Other (See Comments)     Unknown reaction    Betadine [povidone-iodine] Swelling and Other (See Comments)     redness       No current facility-administered medications on file prior to encounter.      Current Outpatient Medications on File Prior to Encounter   Medication Sig    amlodipine (NORVASC) 5 MG tablet Take 2 tablets (10 mg total) by mouth once daily.    doxycycline (VIBRAMYCIN) 100 MG Cap Take 1 capsule (100 mg total) by mouth every 12 (twelve) hours. for 10 days    lisinopril (PRINIVIL,ZESTRIL) 20 MG tablet Take 20 mg by mouth once daily.    metFORMIN (GLUCOPHAGE) 500 MG tablet Take 500 mg by mouth 2 (two) times daily with meals.    omeprazole (PRILOSEC) 20 MG capsule One po bid for 7 days then one each morning (Patient taking differently: Take 20 mg by mouth once daily. )    sildenafil (REVATIO) 20 mg Tab Take 1 tablet (20 mg total) by mouth As instructed. Take 2-5 tablets as needed.     Family History     Problem Relation (Age of Onset)    Clotting disorder Father    Heart disease Father, Mother, Maternal Aunt, Maternal Uncle, Paternal Aunt, Paternal Uncle    Hypertension Father, Mother, Sister, Brother    Prostate cancer Brother        Tobacco Use    Smoking status: Current Every Day Smoker     Packs/day: 1.50     Types: Cigarettes    Smokeless tobacco: Never Used    Tobacco comment: Using patches   Substance and Sexual Activity    Alcohol use: Yes     Alcohol/week: 3.6 - 7.2 oz     Types: 6 - 12 Cans of beer per week     Comment: heavy etoh use daily    Drug use: No    Sexual activity: Not Currently     Review of  Systems   Constitutional: Negative for chills and fever.   HENT: Negative for congestion and rhinorrhea.    Eyes: Negative for photophobia and visual disturbance.   Respiratory: Positive for cough (chronic ). Negative for shortness of breath.    Cardiovascular: Negative for chest pain.   Gastrointestinal: Negative for abdominal pain, nausea and vomiting.   Endocrine: Negative for polydipsia and polyphagia.   Genitourinary: Negative for decreased urine volume and difficulty urinating.   Musculoskeletal: Negative for arthralgias and myalgias.   Skin: Positive for color change and rash.   Neurological: Negative for syncope and light-headedness.   Psychiatric/Behavioral: Negative for confusion.     Objective:     Vital Signs (Most Recent):  Temp: 99 °F (37.2 °C) (11/10/18 1221)  Pulse: 91 (11/10/18 1221)  Resp: 18 (11/10/18 1221)  BP: (!) 167/85 (11/10/18 1221)  SpO2: 97 % (11/10/18 1223) Vital Signs (24h Range):  Temp:  [98.8 °F (37.1 °C)-99 °F (37.2 °C)] 99 °F (37.2 °C)  Pulse:  [91-93] 91  Resp:  [18] 18  SpO2:  [97 %-98 %] 97 %  BP: (157-167)/(85-92) 167/85     Weight: 113.4 kg (250 lb)  Body mass index is 33.91 kg/m².    Physical Exam   Constitutional: He is oriented to person, place, and time. He appears well-developed and well-nourished. No distress.   HENT:   Head: Normocephalic and atraumatic.   Eyes: EOM are normal.   Neck: Normal range of motion.   Cardiovascular: Normal rate and regular rhythm.   Pulmonary/Chest: Effort normal and breath sounds normal. He has no wheezes. He has no rales.   Abdominal: Soft. Bowel sounds are normal. He exhibits no distension. There is no tenderness.   Musculoskeletal: Normal range of motion. He exhibits tenderness. He exhibits no edema or deformity.   Neurological: He is alert and oriented to person, place, and time.   Skin: Skin is warm and dry. He is not diaphoretic. There is erythema.   Puncture wound to volar aspect of wrist  Red streaking 3/4 way to elbow  Palpable lump  half-way to elbow    Psychiatric: He has a normal mood and affect. His behavior is normal.         CRANIAL NERVES     CN III, IV, VI   Extraocular motions are normal.        Significant Labs:   CBC:   Recent Labs   Lab 11/10/18  1334   WBC 6.98   HGB 16.3   HCT 47.0        CMP:   Recent Labs   Lab 11/10/18  1334      K 4.0      CO2 23      BUN 13   CREATININE 0.9   CALCIUM 10.0   PROT 7.9   ALBUMIN 4.0   BILITOT 1.0   ALKPHOS 20*   AST 80*   *   ANIONGAP 12   EGFRNONAA >60       Significant Imaging: I have reviewed all pertinent imaging results/findings within the past 24 hours.

## 2018-11-10 NOTE — ASSESSMENT & PLAN NOTE
Takes metformin 500 mg BID. Hold during hospital admission.  -Check BG AC and HS and use SSI  -Diabetic diet  -A1C at 6.0

## 2018-11-10 NOTE — ASSESSMENT & PLAN NOTE
No A1C on file. Takes metformin 500 mg BID.  -Check BG AC and HS and use SSI  -Diabetic diet  -Check A1C

## 2018-11-11 PROBLEM — L29.9 PRURITUS OF FOREARM: Status: ACTIVE | Noted: 2018-11-11

## 2018-11-11 LAB
ANION GAP SERPL CALC-SCNC: 10 MMOL/L
BASOPHILS # BLD AUTO: 0.05 K/UL
BASOPHILS NFR BLD: 0.9 %
BUN SERPL-MCNC: 12 MG/DL
CALCIUM SERPL-MCNC: 9.5 MG/DL
CHLORIDE SERPL-SCNC: 101 MMOL/L
CO2 SERPL-SCNC: 26 MMOL/L
CREAT SERPL-MCNC: 1 MG/DL
DIFFERENTIAL METHOD: ABNORMAL
EOSINOPHIL # BLD AUTO: 0.3 K/UL
EOSINOPHIL NFR BLD: 5.2 %
ERYTHROCYTE [DISTWIDTH] IN BLOOD BY AUTOMATED COUNT: 12.5 %
EST. GFR  (AFRICAN AMERICAN): >60 ML/MIN/1.73 M^2
EST. GFR  (NON AFRICAN AMERICAN): >60 ML/MIN/1.73 M^2
GLUCOSE SERPL-MCNC: 126 MG/DL
HCT VFR BLD AUTO: 45.6 %
HGB BLD-MCNC: 15.8 G/DL
LYMPHOCYTES # BLD AUTO: 2 K/UL
LYMPHOCYTES NFR BLD: 35.8 %
MCH RBC QN AUTO: 37.6 PG
MCHC RBC AUTO-ENTMCNC: 34.6 G/DL
MCV RBC AUTO: 109 FL
MONOCYTES # BLD AUTO: 0.7 K/UL
MONOCYTES NFR BLD: 11.8 %
NEUTROPHILS # BLD AUTO: 2.6 K/UL
NEUTROPHILS NFR BLD: 46.1 %
PLATELET # BLD AUTO: 142 K/UL
PMV BLD AUTO: 10 FL
POCT GLUCOSE: 121 MG/DL (ref 70–110)
POCT GLUCOSE: 123 MG/DL (ref 70–110)
POCT GLUCOSE: 133 MG/DL (ref 70–110)
POCT GLUCOSE: 99 MG/DL (ref 70–110)
POTASSIUM SERPL-SCNC: 3.9 MMOL/L
RBC # BLD AUTO: 4.2 M/UL
SODIUM SERPL-SCNC: 137 MMOL/L
WBC # BLD AUTO: 5.58 K/UL

## 2018-11-11 PROCEDURE — 25000003 PHARM REV CODE 250: Performed by: PHYSICIAN ASSISTANT

## 2018-11-11 PROCEDURE — 94761 N-INVAS EAR/PLS OXIMETRY MLT: CPT

## 2018-11-11 PROCEDURE — S4991 NICOTINE PATCH NONLEGEND: HCPCS | Performed by: HOSPITALIST

## 2018-11-11 PROCEDURE — 25000003 PHARM REV CODE 250: Performed by: FAMILY MEDICINE

## 2018-11-11 PROCEDURE — 63600175 PHARM REV CODE 636 W HCPCS: Performed by: PHYSICIAN ASSISTANT

## 2018-11-11 PROCEDURE — 25000003 PHARM REV CODE 250: Performed by: HOSPITALIST

## 2018-11-11 PROCEDURE — 94660 CPAP INITIATION&MGMT: CPT

## 2018-11-11 PROCEDURE — 36415 COLL VENOUS BLD VENIPUNCTURE: CPT

## 2018-11-11 PROCEDURE — 25000003 PHARM REV CODE 250: Performed by: NURSE PRACTITIONER

## 2018-11-11 PROCEDURE — 27000190 HC CPAP FULL FACE MASK W/VALVE

## 2018-11-11 PROCEDURE — 11000001 HC ACUTE MED/SURG PRIVATE ROOM

## 2018-11-11 PROCEDURE — 85025 COMPLETE CBC W/AUTO DIFF WBC: CPT

## 2018-11-11 PROCEDURE — 80048 BASIC METABOLIC PNL TOTAL CA: CPT

## 2018-11-11 RX ORDER — IBUPROFEN 600 MG/1
600 TABLET ORAL EVERY 6 HOURS PRN
Status: DISCONTINUED | OUTPATIENT
Start: 2018-11-11 | End: 2018-11-12 | Stop reason: HOSPADM

## 2018-11-11 RX ORDER — DIPHENHYDRAMINE HCL 25 MG
25 CAPSULE ORAL EVERY 6 HOURS PRN
Status: DISCONTINUED | OUTPATIENT
Start: 2018-11-11 | End: 2018-11-12 | Stop reason: HOSPADM

## 2018-11-11 RX ORDER — HYDROCODONE BITARTRATE AND ACETAMINOPHEN 5; 325 MG/1; MG/1
1 TABLET ORAL EVERY 6 HOURS PRN
Status: DISCONTINUED | OUTPATIENT
Start: 2018-11-11 | End: 2018-11-12 | Stop reason: HOSPADM

## 2018-11-11 RX ORDER — DIPHENHYDRAMINE HCL 25 MG
CAPSULE ORAL
Status: DISPENSED
Start: 2018-11-11 | End: 2018-11-11

## 2018-11-11 RX ORDER — HYDROCORTISONE 1 %
CREAM (GRAM) TOPICAL 2 TIMES DAILY
Status: DISCONTINUED | OUTPATIENT
Start: 2018-11-11 | End: 2018-11-12 | Stop reason: HOSPADM

## 2018-11-11 RX ORDER — RAMELTEON 8 MG/1
8 TABLET ORAL NIGHTLY PRN
Status: DISCONTINUED | OUTPATIENT
Start: 2018-11-11 | End: 2018-11-12 | Stop reason: HOSPADM

## 2018-11-11 RX ADMIN — HYDROCORTISONE: 1 CREAM TOPICAL at 08:11

## 2018-11-11 RX ADMIN — RAMELTEON 8 MG: 8 TABLET, FILM COATED ORAL at 08:11

## 2018-11-11 RX ADMIN — ACETAMINOPHEN 650 MG: 325 TABLET ORAL at 11:11

## 2018-11-11 RX ADMIN — DOXYCYCLINE HYCLATE 100 MG: 100 TABLET, COATED ORAL at 08:11

## 2018-11-11 RX ADMIN — AMLODIPINE BESYLATE 10 MG: 5 TABLET ORAL at 08:11

## 2018-11-11 RX ADMIN — MEROPENEM 1 G: 1 INJECTION, POWDER, FOR SOLUTION INTRAVENOUS at 02:11

## 2018-11-11 RX ADMIN — NICOTINE 1 PATCH: 21 PATCH, EXTENDED RELEASE TRANSDERMAL at 09:11

## 2018-11-11 RX ADMIN — PANTOPRAZOLE SODIUM 40 MG: 40 TABLET, DELAYED RELEASE ORAL at 08:11

## 2018-11-11 RX ADMIN — HYDROCODONE BITARTRATE AND ACETAMINOPHEN 1 TABLET: 5; 325 TABLET ORAL at 02:11

## 2018-11-11 RX ADMIN — VANCOMYCIN HYDROCHLORIDE 2000 MG: 10 INJECTION, POWDER, LYOPHILIZED, FOR SOLUTION INTRAVENOUS at 01:11

## 2018-11-11 RX ADMIN — VANCOMYCIN HYDROCHLORIDE 2000 MG: 10 INJECTION, POWDER, LYOPHILIZED, FOR SOLUTION INTRAVENOUS at 02:11

## 2018-11-11 RX ADMIN — DIPHENHYDRAMINE HYDROCHLORIDE 25 MG: 25 CAPSULE ORAL at 01:11

## 2018-11-11 RX ADMIN — MEROPENEM 1 G: 1 INJECTION, POWDER, FOR SOLUTION INTRAVENOUS at 10:11

## 2018-11-11 RX ADMIN — DIPHENHYDRAMINE HYDROCHLORIDE 25 MG: 25 CAPSULE ORAL at 07:11

## 2018-11-11 RX ADMIN — MEROPENEM 1 G: 1 INJECTION, POWDER, FOR SOLUTION INTRAVENOUS at 07:11

## 2018-11-11 RX ADMIN — LISINOPRIL 20 MG: 20 TABLET ORAL at 08:11

## 2018-11-11 RX ADMIN — ENOXAPARIN SODIUM 40 MG: 100 INJECTION SUBCUTANEOUS at 04:11

## 2018-11-11 NOTE — PROGRESS NOTES
[facility] Hospital Medicine  Progress Note    Patient Name: Veto Park  MRN: [id number]  Patient Class: IP- Inpatient   Admission Date: 11/10/2018  Length of Stay: 1 days  Attending Physician: Flex Valadez MD  Primary Care Provider: [facility]    Subjective:     Principal Problem:Cellulitis of right upper extremity    HPI:  Mr. Park is a 48 yo white male with HTN and DM who presents today with lymphangiitis and cellulitis 2/2 stingray sting. He reports that he was stung by a sting ray while [place] on [place]. On 11/6, he presented to the ED and was dishcarged with doxycycline. He presented again to [facility] ED on 11/10 with c/o no improvement and increased streaking to wrist. He denies fever, chills, or any other symptoms. He reports taking the doxycycline as prescribed, and that his blood sugar generally runs <200. Labs were unremarkable other than AST 80 and . Lactate was 1.1. Blood cultures were drawn. He was given vancomycin and merrem. He was admitted to hospital medicine.     Hospital Course:  Patient was given IV vancomycin, IV meropenem and doxycycline 100 mg PO BID. Soft tissue US of the right arm showed an indeterminate lesion in the volar subcutaneous soft tissues of the wrist. MRI right forearm showed soft tissue edema at the volar aspect of the wrist and distal forearm; no focal fluid collections, rim enhancing abscess, or enhancing soft tissue mass. Blood cultures showed no growth to date.    Interval History: Pt examined at bedside. His family is present. He complains of itching and 5/10 pain to the right forearm. States the rash has improved since yesterday. Otherwise, no acute complaints.    Review of Systems   Constitutional: Negative for chills and fever.   HENT: Negative for congestion and rhinorrhea.    Eyes: Negative for photophobia and visual disturbance.   Respiratory: Positive for cough (chronic). Negative for  shortness of breath.    Cardiovascular: Negative for chest pain.   Gastrointestinal: Negative for abdominal pain, nausea and vomiting.   Genitourinary: Negative for decreased urine volume and difficulty urinating.   Musculoskeletal: Negative for arthralgias and myalgias.   Skin: Positive for color change and rash.   Neurological: Negative for syncope and light-headedness.   All other systems reviewed and are negative.    Objective:     Vital Signs (Most Recent):  Temp: 98.6 °F (37 °C) (11/11/18 1149)  Pulse: 74 (11/11/18 1149)  Resp: 20 (11/11/18 1149)  BP: 138/85 (11/11/18 1149)  SpO2: 98 % (11/11/18 1203) Vital Signs (24h Range):  Temp:  [96.7 °F (35.9 °C)-99 °F (37.2 °C)] 98.6 °F (37 °C)  Pulse:  [66-91] 74  Resp:  [17-20] 20  SpO2:  [95 %-99 %] 98 %  BP: (127-167)/(76-91) 138/85     Weight: 113.4 kg (250 lb)  Body mass index is 33.91 kg/m².    Intake/Output Summary (Last 24 hours) at 11/11/2018 1212  Last data filed at 11/11/2018 0800  Gross per 24 hour   Intake 320 ml   Output --   Net 320 ml      Physical Exam   Constitutional: He is oriented to person, place, and time. He appears well-developed and well-nourished. No distress.   HENT:   Head: Normocephalic and atraumatic.   Eyes: EOM are normal.   Neck: Normal range of motion.   Cardiovascular: Normal rate and regular rhythm.   Pulmonary/Chest: Effort normal and breath sounds normal. He has no wheezes. He has no rales.   Abdominal: Soft. Bowel sounds are normal. He exhibits no distension. There is no tenderness.   Musculoskeletal: Normal range of motion. He exhibits tenderness. He exhibits no edema or deformity.   Neurological: He is alert and oriented to person, place, and time.   Skin: Skin is warm and dry. He is not diaphoretic. There is erythema.   Puncture wound to volar aspect of wrist  Red streaking 3/4 way to elbow  Palpable lump has decreased in size   Psychiatric: He has a normal mood and affect. His behavior is normal.       Significant Labs: All  pertinent labs within the past 24 hours have been reviewed.    Significant Imaging: I have reviewed all pertinent imaging results/findings within the past 24 hours.    Assessment/Plan:      * Cellulitis of right upper extremity    Pruritus  Pt with cellulitis to volar aspect of right forearm x10 days with no improvement after taking doxycycline x4 days.     -Continue Merrem IV, vancomycin IV, and doxy PO  -Benadryl and hydrocortisone cream for itching  -MRI showed soft tissue edema at the volar aspect of the wrist and distal forearm  -Blood cultures NGTD. Follow     Type 2 diabetes mellitus, without long-term current use of insulin    Takes metformin 500 mg BID. Hold during hospital admission.  -Check BG AC and HS and use SSI  -Diabetic diet  -A1C at 6.0     GERD (gastroesophageal reflux disease)    No complaints  -Give pantoprazole for home omeprazole     Essential hypertension    -138.  -Continue home amlodipine and lisinopril  -Monitor     Abnormal liver enzymes    AST 80 and . This is below pt's baseline, both are usually closer to 200.  -Monitor       VTE Risk Mitigation (From admission, onward)        Ordered     enoxaparin injection 40 mg  Daily      11/10/18 1513     IP VTE HIGH RISK PATIENT  Once      11/10/18 1513     Place PITO hose  Until discontinued      11/10/18 1513          Patricia Ovalle PA-C  Department of Hospital Medicine   [facility]

## 2018-11-11 NOTE — ASSESSMENT & PLAN NOTE
Pruritus  Pt with cellulitis to volar aspect of right forearm x10 days with no improvement after taking doxycycline x4 days.     -Continue Merrem IV, vancomycin IV, and doxy PO  -Benadryl and hydrocortisone cream for itching  -MRI showed soft tissue edema at the volar aspect of the wrist and distal forearm  -Blood cultures NGTD. Follow

## 2018-11-11 NOTE — PLAN OF CARE
Problem: Patient Care Overview  Goal: Plan of Care Review  Outcome: Ongoing (interventions implemented as appropriate)  Plan of care discussed with patient. IV antibiotics continued. Blood sugar monitored. Patient has denied any pain but has c/o itching to cellulitis to right arm. Benadryl given per new orders obtained. Patient encouraged to use call light should any other needs arise. Will continue to monitor.

## 2018-11-11 NOTE — SUBJECTIVE & OBJECTIVE
Interval History: Pt examined at bedside. His family is present. He complains of itching and 5/10 pain to the right forearm. States the rash has improved since yesterday. Otherwise, no acute complaints.    Review of Systems   Constitutional: Negative for chills and fever.   HENT: Negative for congestion and rhinorrhea.    Eyes: Negative for photophobia and visual disturbance.   Respiratory: Positive for cough (chronic). Negative for shortness of breath.    Cardiovascular: Negative for chest pain.   Gastrointestinal: Negative for abdominal pain, nausea and vomiting.   Genitourinary: Negative for decreased urine volume and difficulty urinating.   Musculoskeletal: Negative for arthralgias and myalgias.   Skin: Positive for color change and rash.   Neurological: Negative for syncope and light-headedness.   All other systems reviewed and are negative.    Objective:     Vital Signs (Most Recent):  Temp: 98.6 °F (37 °C) (11/11/18 1149)  Pulse: 74 (11/11/18 1149)  Resp: 20 (11/11/18 1149)  BP: 138/85 (11/11/18 1149)  SpO2: 98 % (11/11/18 1203) Vital Signs (24h Range):  Temp:  [96.7 °F (35.9 °C)-99 °F (37.2 °C)] 98.6 °F (37 °C)  Pulse:  [66-91] 74  Resp:  [17-20] 20  SpO2:  [95 %-99 %] 98 %  BP: (127-167)/(76-91) 138/85     Weight: 113.4 kg (250 lb)  Body mass index is 33.91 kg/m².    Intake/Output Summary (Last 24 hours) at 11/11/2018 1212  Last data filed at 11/11/2018 0800  Gross per 24 hour   Intake 320 ml   Output --   Net 320 ml      Physical Exam   Constitutional: He is oriented to person, place, and time. He appears well-developed and well-nourished. No distress.   HENT:   Head: Normocephalic and atraumatic.   Eyes: EOM are normal.   Neck: Normal range of motion.   Cardiovascular: Normal rate and regular rhythm.   Pulmonary/Chest: Effort normal and breath sounds normal. He has no wheezes. He has no rales.   Abdominal: Soft. Bowel sounds are normal. He exhibits no distension. There is no tenderness.   Musculoskeletal:  Normal range of motion. He exhibits tenderness. He exhibits no edema or deformity.   Neurological: He is alert and oriented to person, place, and time.   Skin: Skin is warm and dry. He is not diaphoretic. There is erythema.   Puncture wound to volar aspect of wrist  Red streaking 3/4 way to elbow  Palpable lump has decreased in size   Psychiatric: He has a normal mood and affect. His behavior is normal.       Significant Labs: All pertinent labs within the past 24 hours have been reviewed.    Significant Imaging: I have reviewed all pertinent imaging results/findings within the past 24 hours.

## 2018-11-11 NOTE — PLAN OF CARE
Chief Complaint   Patient presents with    Right arm redness       Pt seen in [facility] ED 11/6/18 for sting ray contact and discharged, Transferred here from [facility] ED on this admit with cellulitis from the earlier sting ray contact.    Pt independent with ADLs, no HH, has C-PAP and uses nightly, pt works/drives, lives with wife: Angelina Park. Pt drove to hospital but will have family drive him home on discharge    Pt sees Dr Deloris Bah at [facility] on [address] in [facility] Appointments   Date Time Provider Department Center   11/16/2018 12:00 [facility] Brochure and Business Card and wrote name on whiteboard         11/11/18 1050   Discharge Assessment   Assessment Type Discharge Planning Assessment   Confirmed/corrected address and phone number on facesheet? Yes   Assessment information obtained from? Patient   Expected Length of Stay (days) 3   Prior to hospitilization cognitive status: Alert/Oriented   Prior to hospitalization functional status: Independent   Current cognitive status: Alert/Oriented   Current Functional Status: Independent   Facility Arrived From: (Home)   Lives With spouse  (Wife: Angelina Park [phone])   Able to Return to Prior Arrangements yes   Is patient able to care for self after discharge? Yes   Who are your caregiver(s) and their phone number(s)? Wife: Angelina Park [phone]   Patient's perception of discharge disposition home or selfcare   Readmission Within The Last 30 Days no previous admission in last 30 days   Patient currently being followed by outpatient case management? No   Patient currently receives any other outside agency services? No   Equipment Currently Used at Home CPAP   Do you have any problems affording any of your prescribed medications? No   Is the patient taking medications as prescribed? yes   Does the patient have transportation home? Yes    Transportation Available family or friend will provide;car   Dialysis Name and Scheduled days N/A   Does the patient receive services at the Coumadin Clinic? No   Discharge Plan A Home   Discharge Plan B Home with family   Patient/Family In Agreement With Plan yes     Lupe Matias RN Transitional Navigator  [phone]

## 2018-11-12 VITALS
HEIGHT: 72 IN | RESPIRATION RATE: 18 BRPM | HEART RATE: 67 BPM | TEMPERATURE: 98 F | BODY MASS INDEX: 33.45 KG/M2 | DIASTOLIC BLOOD PRESSURE: 79 MMHG | WEIGHT: 246.94 LBS | OXYGEN SATURATION: 97 % | SYSTOLIC BLOOD PRESSURE: 133 MMHG

## 2018-11-12 PROBLEM — R74.8 ABNORMAL LIVER ENZYMES: Status: RESOLVED | Noted: 2018-11-10 | Resolved: 2018-11-12

## 2018-11-12 PROBLEM — L29.9 PRURITUS OF FOREARM: Status: RESOLVED | Noted: 2018-11-11 | Resolved: 2018-11-12

## 2018-11-12 LAB
ANION GAP SERPL CALC-SCNC: 11 MMOL/L
BASOPHILS # BLD AUTO: 0.04 K/UL
BASOPHILS NFR BLD: 0.7 %
BUN SERPL-MCNC: 10 MG/DL
CALCIUM SERPL-MCNC: 9.6 MG/DL
CHLORIDE SERPL-SCNC: 103 MMOL/L
CO2 SERPL-SCNC: 23 MMOL/L
CREAT SERPL-MCNC: 0.9 MG/DL
DIFFERENTIAL METHOD: ABNORMAL
EOSINOPHIL # BLD AUTO: 0.3 K/UL
EOSINOPHIL NFR BLD: 5.7 %
ERYTHROCYTE [DISTWIDTH] IN BLOOD BY AUTOMATED COUNT: 12.3 %
EST. GFR  (AFRICAN AMERICAN): >60 ML/MIN/1.73 M^2
EST. GFR  (NON AFRICAN AMERICAN): >60 ML/MIN/1.73 M^2
GLUCOSE SERPL-MCNC: 111 MG/DL
HCT VFR BLD AUTO: 44.9 %
HGB BLD-MCNC: 15.6 G/DL
LYMPHOCYTES # BLD AUTO: 2.1 K/UL
LYMPHOCYTES NFR BLD: 35.6 %
MCH RBC QN AUTO: 37.3 PG
MCHC RBC AUTO-ENTMCNC: 34.7 G/DL
MCV RBC AUTO: 107 FL
MONOCYTES # BLD AUTO: 0.7 K/UL
MONOCYTES NFR BLD: 12.5 %
NEUTROPHILS # BLD AUTO: 2.7 K/UL
NEUTROPHILS NFR BLD: 45.3 %
PLATELET # BLD AUTO: 137 K/UL
PMV BLD AUTO: 9.8 FL
POCT GLUCOSE: 115 MG/DL (ref 70–110)
POCT GLUCOSE: 149 MG/DL (ref 70–110)
POTASSIUM SERPL-SCNC: 4 MMOL/L
RBC # BLD AUTO: 4.18 M/UL
SODIUM SERPL-SCNC: 137 MMOL/L
VANCOMYCIN TROUGH SERPL-MCNC: 13.2 UG/ML
WBC # BLD AUTO: 5.92 K/UL

## 2018-11-12 PROCEDURE — 63600175 PHARM REV CODE 636 W HCPCS: Performed by: PHYSICIAN ASSISTANT

## 2018-11-12 PROCEDURE — 36415 COLL VENOUS BLD VENIPUNCTURE: CPT

## 2018-11-12 PROCEDURE — 80202 ASSAY OF VANCOMYCIN: CPT

## 2018-11-12 PROCEDURE — 94660 CPAP INITIATION&MGMT: CPT

## 2018-11-12 PROCEDURE — 25000003 PHARM REV CODE 250: Performed by: PHYSICIAN ASSISTANT

## 2018-11-12 PROCEDURE — 94761 N-INVAS EAR/PLS OXIMETRY MLT: CPT

## 2018-11-12 PROCEDURE — 85025 COMPLETE CBC W/AUTO DIFF WBC: CPT

## 2018-11-12 PROCEDURE — 80048 BASIC METABOLIC PNL TOTAL CA: CPT

## 2018-11-12 PROCEDURE — 99900035 HC TECH TIME PER 15 MIN (STAT)

## 2018-11-12 RX ORDER — DOXYCYCLINE HYCLATE 100 MG
100 TABLET ORAL EVERY 12 HOURS
Qty: 20 TABLET | Refills: 0 | Status: SHIPPED | OUTPATIENT
Start: 2018-11-12 | End: 2018-11-22

## 2018-11-12 RX ORDER — LEVOFLOXACIN 750 MG/1
750 TABLET ORAL DAILY
Qty: 10 TABLET | Refills: 0 | Status: SHIPPED | OUTPATIENT
Start: 2018-11-12 | End: 2018-11-22

## 2018-11-12 RX ORDER — IBUPROFEN 600 MG/1
600 TABLET ORAL EVERY 6 HOURS PRN
Qty: 20 TABLET | Refills: 0 | Status: SHIPPED | OUTPATIENT
Start: 2018-11-12 | End: 2018-12-12

## 2018-11-12 RX ORDER — DIPHENHYDRAMINE HCL 25 MG
25 CAPSULE ORAL EVERY 6 HOURS PRN
Refills: 0 | Status: ON HOLD | COMMUNITY
Start: 2018-11-12 | End: 2022-04-29 | Stop reason: HOSPADM

## 2018-11-12 RX ORDER — HYDROCORTISONE 1 %
CREAM (GRAM) TOPICAL 2 TIMES DAILY
Qty: 14.2 G | Refills: 0 | Status: SHIPPED | OUTPATIENT
Start: 2018-11-12 | End: 2019-12-17

## 2018-11-12 RX ADMIN — LISINOPRIL 20 MG: 20 TABLET ORAL at 09:11

## 2018-11-12 RX ADMIN — AMLODIPINE BESYLATE 10 MG: 5 TABLET ORAL at 09:11

## 2018-11-12 RX ADMIN — HYDROCORTISONE: 1 CREAM TOPICAL at 09:11

## 2018-11-12 RX ADMIN — DOXYCYCLINE HYCLATE 100 MG: 100 TABLET, COATED ORAL at 09:11

## 2018-11-12 RX ADMIN — PANTOPRAZOLE SODIUM 40 MG: 40 TABLET, DELAYED RELEASE ORAL at 09:11

## 2018-11-12 RX ADMIN — VANCOMYCIN HYDROCHLORIDE 2000 MG: 10 INJECTION, POWDER, LYOPHILIZED, FOR SOLUTION INTRAVENOUS at 03:11

## 2018-11-12 RX ADMIN — HYDROCODONE BITARTRATE AND ACETAMINOPHEN 1 TABLET: 5; 325 TABLET ORAL at 09:11

## 2018-11-12 RX ADMIN — MEROPENEM 1 G: 1 INJECTION, POWDER, FOR SOLUTION INTRAVENOUS at 10:11

## 2018-11-12 NOTE — PROGRESS NOTES
.Pharmacy New Medication Education    Patient accepted medication education.    Pharmacy educated patient on name and purpose of medications and possible side effects, using the teach-back method.     Current Inpatient Medication Orders   acetaminophen tablet 650 mg   amLODIPine tablet 10 mg   dextrose 50% injection 12.5 g   dextrose 50% injection 25 g   diphenhydrAMINE capsule 25 mg   doxycycline tablet 100 mg   enoxaparin injection 40 mg   glucagon (human recombinant) injection 1 mg   glucose chewable tablet 16 g   glucose chewable tablet 24 g   HYDROcodone-acetaminophen 5-325 mg per tablet 1 tablet   hydrocortisone 1 % cream   ibuprofen tablet 600 mg   insulin aspart U-100 pen 1-10 Units   lisinopril tablet 20 mg   meropenem 1 g in sodium chloride 0.9 % 100 mL IVPB (ready to mix system)   nicotine 21 mg/24 hr 1 patch   ondansetron injection 4 mg   pantoprazole EC tablet 40 mg   ramelteon tablet 8 mg   sodium chloride 0.9% flush 5 mL   vancomycin (VANCOCIN) 2,000 mg in dextrose 5 % 500 mL IVPB       Learners of pharmacy medication education included:  Patient    Patient +/- learner response:  Verbalized Understanding, Teachback

## 2018-11-12 NOTE — PLAN OF CARE
Problem: Patient Care Overview  Goal: Plan of Care Review  Outcome: Ongoing (interventions implemented as appropriate)  Plan of care reviewed with patient. Denies pain and nausea. VSS. Itching has reduced in intensity now that topical ointment is being used. CPAP at HS. Blood sugar WNL. Will continue to monitor for any changes.

## 2018-11-12 NOTE — PLAN OF CARE
Discharge rounds on patient. Discussed followup appointments, blue discharge folder, discharge nurse will go over home medications and reasons for medications and final discharge instructions. All patient/caregiver questions answered. Patient verbalized understanding.      pt has transportation to home - will get his meds from his own pharmacy     discussed f/u apt   - [facility] will fax d/c summ        11/12/18 6950   Final Note   Assessment Type Final Discharge Note   Anticipated Discharge Disposition Home   What phone number can be called within the next 1-3 days to see how you are doing after discharge? [phone]   Hospital Follow Up  Appt(s) scheduled? Yes   Discharge plans and expectations educations in teach back method with documentation complete? Yes   Right Care Referral Info   Post Acute Recommendation No Care   Referral Type (no care )

## 2018-11-12 NOTE — DISCHARGE SUMMARY
[facility] Hospital Medicine  Discharge Summary    Patient Name: Veto Park  MRN: [id number]  Admission Date: 11/10/2018  Hospital Length of Stay: 2 days  Discharge Date and Time: 11/12/2018  2:05 PM  Attending Physician: Flex Valadez MD  Discharging Provider: Patricia Ovalle PA-C  Primary Care Provider: [facility]      HPI:   Mr. Park is a 46 yo white male with HTN and DM who presents today with lymphangiitis and cellulitis 2/2 stingray sting. He reports that he was stung by a sting ray while in br[place] on 11[place]1. On 11/6, he presented to the ED and was dishcarged with doxycycline. He presented again to [facility] ED on 11/10 with c/o no improvement and increased streaking to wrist. He denies fever, chills, or any other symptoms. He reports taking the doxycycline as prescribed, and that his blood sugar generally runs <200. Labs were unremarkable other than AST 80 and . Lactate was 1.1. Blood cultures were drawn. He was given vancomycin and merrem. He was admitted to hospital medicine.     * No surgery found *      Hospital Course:   Patient was given vancomycin 2 g IV, meropenem 1 g IV, and doxycycline 100 mg PO BID. Soft tissue US of the right arm showed an indeterminate lesion in the volar subcutaneous soft tissues of the wrist. MRI right forearm showed soft tissue edema at the volar aspect of the wrist and distal forearm; no focal fluid collections, rim enhancing abscess, or enhancing soft tissue mass. Blood cultures showed no growth to date. Patient remained afebrile and cellulitis continued to improve over the course of hospital stay. He remained hemodynamically stable and was ready for discharge. He was discharged to home in good condition with levaquin 750 mg PO daily x 10 days and doxycycline 100 mg PO BID x 10 days. He will follow-up with his PCP on 11/16/18.     Consults:     No new Assessment & Plan notes have been filed under this  hospital service since the last note was generated.  Service: Hospital Medicine    Final Active Diagnoses:    Diagnosis Date Noted POA    PRINCIPAL PROBLEM:  Cellulitis of right upper extremity [L03.113] 11/10/2018 Yes    Essential hypertension [I10] 11/10/2018 Yes     Chronic    GERD (gastroesophageal reflux disease) [K21.9] 11/10/2018 Yes    Type 2 diabetes mellitus, without long-term current use of insulin [E11.9] 11/10/2018 Yes     Chronic      Problems Resolved During this Admission:    Diagnosis Date Noted Date Resolved POA    Pruritus of forearm [L29.9] 11/11/2018 11/12/2018 Yes    Abnormal liver enzymes [R74.8] 11/10/2018 11/12/2018 Yes       Discharged Condition: good    Disposition: Home or Self Care    Follow Up:  Follow-up Information     [facility] On 11/16/2018.    Specialties:  Behavioral Health, Psychiatry  Why:  10:30   -- please bring all of your discharge info   -- Dr. Dianne Bah             fax  [phone]     Contact information:  [address] [address] [phone]                 Patient Instructions:      Diet diabetic     Notify your health care provider if you experience any of the following:  temperature >100.4     Notify your health care provider if you experience any of the following:  severe uncontrolled pain     Notify your health care provider if you experience any of the following:  redness, tenderness, or signs of infection (pain, swelling, redness, odor or green/yellow discharge around incision site)     Notify your health care provider if you experience any of the following:  worsening rash     No dressing needed     Activity as tolerated       Significant Diagnostic Studies: Labs: All labs within the past 24 hours have been reviewed    Pending Diagnostic Studies:     None         Medications:  Reconciled Home Medications:      Medication List      START taking these medications    diphenhydrAMINE 25 mg capsule  Commonly known  as:  BENADRYL  Take 1 each (25 mg total) by mouth every 6 (six) hours as needed for Itching.     doxycycline 100 MG tablet  Commonly known as:  VIBRA-TABS  Take 1 tablet (100 mg total) by mouth every 12 (twelve) hours. for 10 days  Replaces:  doxycycline 100 MG Cap     hydrocortisone 1 % cream  Apply topically 2 (two) times daily.     ibuprofen 600 MG tablet  Commonly known as:  ADVIL,MOTRIN  Take 1 tablet (600 mg total) by mouth every 6 (six) hours as needed.     levoFLOXacin 750 MG tablet  Commonly known as:  LEVAQUIN  Take 1 tablet (750 mg total) by mouth once daily. for 10 days        CHANGE how you take these medications    omeprazole 20 MG capsule  Commonly known as:  PRILOSEC  One po bid for 7 days then one each morning  What changed:    · how much to take  · how to take this  · when to take this  · additional instructions        CONTINUE taking these medications    amLODIPine 5 MG tablet  Commonly known as:  NORVASC  Take 2 tablets (10 mg total) by mouth once daily.     lisinopril 20 MG tablet  Commonly known as:  PRINIVIL,ZESTRIL  Take 20 mg by mouth once daily.     metFORMIN 500 MG tablet  Commonly known as:  GLUCOPHAGE  Take 500 mg by mouth 2 (two) times daily with meals.     sildenafil 20 mg Tab  Commonly known as:  REVATIO  Take 1 tablet (20 mg total) by mouth As instructed. Take 2-5 tablets as needed.        STOP taking these medications    doxycycline 100 MG Cap  Commonly known as:  VIBRAMYCIN  Replaced by:  doxycycline 100 MG tablet            Indwelling Lines/Drains at time of discharge:   Lines/Drains/Airways          None          Time spent on the discharge of patient: 35 minutes  Patient was seen and examined on the date of discharge and determined to be suitable for discharge.      ROBYN Araya MD  [facility]  MD Cleveland Camarena MD Elizabeth Knipp, PA-C Brittany Chatman, NP Kristin Stein, PA-C     Heidi Ortega NP-C  [address] [place] [address]  Office Phone: [phone]  Office Fax: [phone]

## 2018-11-12 NOTE — PLAN OF CARE
Problem: Patient Care Overview  Goal: Plan of Care Review  Outcome: Ongoing (interventions implemented as appropriate)  Iv accesses removed    Discharge instructions including handouts given per teach back method   Pt knows to  prescriptions at his local drug store.  Pain controlled with oral medications  Safety maintained.   Redness to wrist region has diminished from admission per pt     Minimal periwound swelling    Required no additional insulin per sliding scale  Ambulatory to exit with staff accompanying

## 2018-11-12 NOTE — PROGRESS NOTES
Follow-Up       Follow-up With  Details  Why  Contact Info   [facility]  On 11/16/2018  10:30 -- please bring all of your discharge info -- Dr. Dianne Bah fax [phone] [address] [address] [phone]

## 2018-11-12 NOTE — PLAN OF CARE
Problem: Patient Care Overview  Goal: Plan of Care Review  Outcome: Ongoing (interventions implemented as appropriate)  Patient AAO x 3. VS stable. In NAD. Patient pain controlled with meds. Complained of itching, benadryl given but will start with hydrocortisone cream tonight. PIV accessed for IV antibiotics. Patient safety maintained. Call light within reach. Will continue to monitor.

## 2018-11-15 LAB
BACTERIA BLD CULT: NORMAL
BACTERIA BLD CULT: NORMAL

## 2018-11-16 ENCOUNTER — TELEPHONE (OUTPATIENT)
Dept: UROLOGY | Facility: CLINIC | Age: 48
End: 2018-11-16

## 2018-11-16 ENCOUNTER — ANESTHESIA EVENT (OUTPATIENT)
Dept: SURGERY | Facility: OTHER | Age: 48
End: 2018-11-16
Payer: MEDICAID

## 2018-11-16 ENCOUNTER — HOSPITAL ENCOUNTER (OUTPATIENT)
Dept: PREADMISSION TESTING | Facility: OTHER | Age: 48
Discharge: HOME | End: 2018-11-16
Attending: UROLOGY
Payer: MEDICAID

## 2018-11-16 VITALS
WEIGHT: 242 LBS | BODY MASS INDEX: 32.78 KG/M2 | HEART RATE: 80 BPM | DIASTOLIC BLOOD PRESSURE: 75 MMHG | TEMPERATURE: 98 F | HEIGHT: 72 IN | SYSTOLIC BLOOD PRESSURE: 137 MMHG | OXYGEN SATURATION: 96 %

## 2018-11-16 RX ORDER — SODIUM CHLORIDE, SODIUM LACTATE, POTASSIUM CHLORIDE, CALCIUM CHLORIDE 600; 310; 30; 20 MG/100ML; MG/100ML; MG/100ML; MG/100ML
INJECTION, SOLUTION INTRAVENOUS CONTINUOUS
Status: CANCELLED | OUTPATIENT
Start: 2018-11-16

## 2018-11-16 RX ORDER — PREGABALIN 75 MG/1
150 CAPSULE ORAL
Status: ACTIVE | OUTPATIENT
Start: 2018-11-16 | End: 2018-11-17

## 2018-11-16 RX ORDER — TRAMADOL HYDROCHLORIDE 50 MG/1
50 TABLET ORAL EVERY 6 HOURS
COMMUNITY
End: 2019-03-15

## 2018-11-16 RX ORDER — FAMOTIDINE 20 MG/1
20 TABLET, FILM COATED ORAL
Status: CANCELLED | OUTPATIENT
Start: 2018-11-16 | End: 2018-11-16

## 2018-11-16 NOTE — DISCHARGE INSTRUCTIONS
PRE-ADMIT TESTING -  [phone]    [address]  [place] [facility]          Your surgery has been scheduled at [facility]. We are pleased to have the opportunity to serve you. For Further Information please call [phone].    On the day of surgery please report to the Information Desk on the 1st floor.    · CONTACT YOUR PHYSICIAN'S OFFICE THE DAY PRIOR TO YOUR SURGERY TO OBTAIN YOUR ARRIVAL TIME.     · The evening before surgery do not eat anything after 9 p.m. ( this includes hard candy, chewing gum and mints).  You may only have GATORADE, POWERADE AND WATER  from 9 p.m. until you leave your home.   DO NOT DRINK ANY LIQUIDS ON THE WAY TO THE HOSPITAL.      SPECIAL MEDICATION INSTRUCTIONS: TAKE medications checked off by the Anesthesiologist on your Medication List.    Angiogram Patients: Take medications as instructed by your physician, including aspirin.     Surgery Patients:    If you take ASPIRIN - Your PHYSICIAN/SURGEON will need to inform you IF/OR when you need to stop taking aspirin prior to your surgery.     Do Not take any medications containing IBUPROFEN.  Do Not Wear any make-up or dark nail polish   (especially eye make-up) to surgery. If you come to surgery with makeup on you will be required to remove the makeup or nail polish.    Do not shave your surgical area at least 5 days prior to your surgery. The surgical prep will be performed at the hospital according to Infection Control regulations.    Leave all valuables at home.   Do Not wear any jewelry or watches, including any metal in body piercings.  Contact Lens must be removed before surgery. Either do not wear the contact lens or bring a case and solution for storage.  Please bring a container for eyeglasses or dentures as required.  Bring any paperwork your physician has provided, such as consent forms,  history and physicals, doctor's orders, etc.   Bring comfortable clothes that are loose fitting to wear upon  discharge. Take into consideration the type of surgery being performed.  Maintain your diet as advised per your physician the day prior to surgery.      Adequate rest the night before surgery is advised.   Park in the Parking lot behind the hospital or in the [employer] [facility] across the street from the parking lot. Parking is complimentary.  If you will be discharged the same day as your procedure, please arrange for a responsible adult to drive you home or to accompany you if traveling by taxi.   YOU WILL NOT BE PERMITTED TO DRIVE OR TO LEAVE THE HOSPITAL ALONE AFTER SURGERY.   It is strongly recommended that you arrange for someone to remain with you for the first 24 hrs following your surgery.       Thank you for your cooperation.  The Staff of [facility].        Bathing Instructions                                                                 Please shower the evening before and morning of your procedure with    ANTIBACTERIAL SOAP. ( DIAL, etc )  Concentrate on the surgical area   for at least 3 minutes and rinse completely. Dry off as usual.   Do not use any deodorant, powder, body lotions, perfume, after shave or    cologne.

## 2018-11-16 NOTE — TELEPHONE ENCOUNTER
----- Message from Ralph Wilcox MD sent at 11/16/2018  4:00 PM CST -----  Regarding: RE: s/p stingray issue  This should not be an issue as long as the area is not near his penis.    ----- Message -----  From: Samia De Guzman MA  Sent: 11/16/2018  12:57 PM  To: Ralph Wilcox MD  Subject: FW: s/p stingray issue                           Please advise .  ----- Message -----  From: Aminta Jin RN  Sent: 11/16/2018  12:31 PM  To: Aminta Jin RN, Brenden Rosas Staff  Subject: s/p stingray issue                               Patient scheduled for surgery with Dr. Wilcox on 11/27.  Needed to inform Dr. Wilcox that patient was recently hospitalized 11/10-11/12 due to cellulitis from stingray.  Notes in Epic.    Patient is still taking antibiotics.      Please advise if any issues for upcoming surgery.    Aminta Calvert

## 2018-11-16 NOTE — ANESTHESIA PREPROCEDURE EVALUATION
11/16/2018  Veto Park is a 47 y.o., male.    Anesthesia Evaluation    I have reviewed the Patient Summary Reports.    I have reviewed the Nursing Notes.   I have reviewed the Medications.     Review of Systems  Anesthesia Hx:  No problems with previous Anesthesia  Denies Family Hx of Anesthesia complications.   Denies Personal Hx of Anesthesia complications.   Social:  Smoker    Hematology/Oncology:  Hematology Normal   Oncology Normal     Cardiovascular:   Hypertension, well controlled    Pulmonary:   Sleep Apnea, CPAP    Renal/:  Renal/ Normal     Hepatic/GI:   GERD, well controlled    Musculoskeletal:  Musculoskeletal Normal    Neurological:  Neurology Normal    Endocrine:   Diabetes, type 2, using insulin        Physical Exam  General:  Well nourished, Large Beard    Airway/Jaw/Neck:  Airway Findings: Mouth Opening: Normal Tongue: Normal  General Airway Assessment: Adult  Mallampati: II  TM Distance: Normal, at least 6 cm         Dental:  Dental Findings:Upper front ground to stubs             Anesthesia Plan  Type of Anesthesia, risks & benefits discussed:  Anesthesia Type:  general  Patient's Preference:   Intra-op Monitoring Plan: standard ASA monitors  Intra-op Monitoring Plan Comments:   Post Op Pain Control Plan: multimodal analgesia  Post Op Pain Control Plan Comments:   Induction:   IV  Beta Blocker:         Informed Consent: Patient understands risks and agrees with Anesthesia plan.  Questions answered. Anesthesia consent signed with patient.  ASA Score: 2     Day of Surgery Review of History & Physical:    H&P update referred to the surgeon.         Ready For Surgery From Anesthesia Perspective.

## 2018-11-27 ENCOUNTER — ANESTHESIA (OUTPATIENT)
Dept: SURGERY | Facility: OTHER | Age: 48
End: 2018-11-27
Payer: MEDICAID

## 2018-11-27 ENCOUNTER — HOSPITAL ENCOUNTER (OUTPATIENT)
Facility: OTHER | Age: 48
Discharge: HOME | End: 2018-11-27
Attending: UROLOGY | Admitting: UROLOGY
Payer: MEDICAID

## 2018-11-27 VITALS
WEIGHT: 242 LBS | OXYGEN SATURATION: 94 % | DIASTOLIC BLOOD PRESSURE: 82 MMHG | RESPIRATION RATE: 16 BRPM | TEMPERATURE: 98 F | BODY MASS INDEX: 32.78 KG/M2 | SYSTOLIC BLOOD PRESSURE: 138 MMHG | HEIGHT: 72 IN | HEART RATE: 76 BPM

## 2018-11-27 DIAGNOSIS — N48.6 PEYRONIE'S DISEASE: ICD-10-CM

## 2018-11-27 DIAGNOSIS — N48.6 PEYRONIE DISEASE: ICD-10-CM

## 2018-11-27 LAB — POCT GLUCOSE: 95 MG/DL (ref 70–110)

## 2018-11-27 PROCEDURE — 37000008 HC ANESTHESIA 1ST 15 MINUTES: Performed by: UROLOGY

## 2018-11-27 PROCEDURE — 71000015 HC POSTOP RECOV 1ST HR: Performed by: UROLOGY

## 2018-11-27 PROCEDURE — 63600175 PHARM REV CODE 636 W HCPCS

## 2018-11-27 PROCEDURE — 37000009 HC ANESTHESIA EA ADD 15 MINS: Performed by: UROLOGY

## 2018-11-27 PROCEDURE — 54360 PENIS PLASTIC SURGERY: CPT | Mod: ,,, | Performed by: UROLOGY

## 2018-11-27 PROCEDURE — 36000706: Performed by: UROLOGY

## 2018-11-27 PROCEDURE — 63600175 PHARM REV CODE 636 W HCPCS: Performed by: NURSE ANESTHETIST, CERTIFIED REGISTERED

## 2018-11-27 PROCEDURE — 63600175 PHARM REV CODE 636 W HCPCS: Performed by: UROLOGY

## 2018-11-27 PROCEDURE — 25000003 PHARM REV CODE 250: Performed by: ANESTHESIOLOGY

## 2018-11-27 PROCEDURE — 25000003 PHARM REV CODE 250: Performed by: UROLOGY

## 2018-11-27 PROCEDURE — 00920 ANES PX MALE GENITALIA NOS: CPT | Performed by: UROLOGY

## 2018-11-27 PROCEDURE — 71000039 HC RECOVERY, EACH ADD'L HOUR: Performed by: UROLOGY

## 2018-11-27 PROCEDURE — 63600175 PHARM REV CODE 636 W HCPCS: Performed by: ANESTHESIOLOGY

## 2018-11-27 PROCEDURE — 71000016 HC POSTOP RECOV ADDL HR: Performed by: UROLOGY

## 2018-11-27 PROCEDURE — 71000033 HC RECOVERY, INTIAL HOUR: Performed by: UROLOGY

## 2018-11-27 PROCEDURE — 36000707: Performed by: UROLOGY

## 2018-11-27 PROCEDURE — 25000003 PHARM REV CODE 250: Performed by: NURSE ANESTHETIST, CERTIFIED REGISTERED

## 2018-11-27 RX ORDER — FENTANYL CITRATE 50 UG/ML
INJECTION, SOLUTION INTRAMUSCULAR; INTRAVENOUS
Status: DISCONTINUED | OUTPATIENT
Start: 2018-11-27 | End: 2018-11-27

## 2018-11-27 RX ORDER — MIDAZOLAM HYDROCHLORIDE 1 MG/ML
INJECTION INTRAMUSCULAR; INTRAVENOUS
Status: DISCONTINUED | OUTPATIENT
Start: 2018-11-27 | End: 2018-11-27

## 2018-11-27 RX ORDER — MEPERIDINE HYDROCHLORIDE 50 MG/ML
12.5 INJECTION INTRAMUSCULAR; INTRAVENOUS; SUBCUTANEOUS ONCE AS NEEDED
Status: DISCONTINUED | OUTPATIENT
Start: 2018-11-27 | End: 2018-11-27 | Stop reason: HOSPADM

## 2018-11-27 RX ORDER — HYDROMORPHONE HYDROCHLORIDE 2 MG/ML
INJECTION, SOLUTION INTRAMUSCULAR; INTRAVENOUS; SUBCUTANEOUS
Status: DISCONTINUED | OUTPATIENT
Start: 2018-11-27 | End: 2018-11-27

## 2018-11-27 RX ORDER — ONDANSETRON 2 MG/ML
INJECTION INTRAMUSCULAR; INTRAVENOUS
Status: DISCONTINUED | OUTPATIENT
Start: 2018-11-27 | End: 2018-11-27

## 2018-11-27 RX ORDER — HYDROCODONE BITARTRATE AND ACETAMINOPHEN 5; 325 MG/1; MG/1
1 TABLET ORAL EVERY 6 HOURS PRN
Qty: 20 TABLET | Refills: 0 | Status: SHIPPED | OUTPATIENT
Start: 2018-11-27 | End: 2019-03-15

## 2018-11-27 RX ORDER — ACETAMINOPHEN 10 MG/ML
INJECTION, SOLUTION INTRAVENOUS
Status: DISCONTINUED | OUTPATIENT
Start: 2018-11-27 | End: 2018-11-27

## 2018-11-27 RX ORDER — ALPROSTADIL 500 UG/ML
INJECTION, SOLUTION, CONCENTRATE INTRAVASCULAR
Status: DISCONTINUED | OUTPATIENT
Start: 2018-11-27 | End: 2018-11-27 | Stop reason: HOSPADM

## 2018-11-27 RX ORDER — FENTANYL CITRATE 50 UG/ML
25 INJECTION, SOLUTION INTRAMUSCULAR; INTRAVENOUS EVERY 5 MIN PRN
Status: COMPLETED | OUTPATIENT
Start: 2018-11-27 | End: 2018-11-27

## 2018-11-27 RX ORDER — FAMOTIDINE 20 MG/1
20 TABLET, FILM COATED ORAL
Status: COMPLETED | OUTPATIENT
Start: 2018-11-27 | End: 2018-11-27

## 2018-11-27 RX ORDER — HYDROMORPHONE HYDROCHLORIDE 2 MG/ML
1 INJECTION, SOLUTION INTRAMUSCULAR; INTRAVENOUS; SUBCUTANEOUS EVERY 4 HOURS PRN
Status: DISCONTINUED | OUTPATIENT
Start: 2018-11-27 | End: 2018-11-27 | Stop reason: HOSPADM

## 2018-11-27 RX ORDER — LIDOCAINE HCL/PF 100 MG/5ML
SYRINGE (ML) INTRAVENOUS
Status: DISCONTINUED | OUTPATIENT
Start: 2018-11-27 | End: 2018-11-27

## 2018-11-27 RX ORDER — PHENAZOPYRIDINE HYDROCHLORIDE 200 MG/1
200 TABLET, FILM COATED ORAL ONCE AS NEEDED
Status: DISCONTINUED | OUTPATIENT
Start: 2018-11-27 | End: 2018-11-27 | Stop reason: HOSPADM

## 2018-11-27 RX ORDER — HYDROCODONE BITARTRATE AND ACETAMINOPHEN 5; 325 MG/1; MG/1
1 TABLET ORAL EVERY 4 HOURS PRN
Status: DISCONTINUED | OUTPATIENT
Start: 2018-11-27 | End: 2018-11-27 | Stop reason: HOSPADM

## 2018-11-27 RX ORDER — OXYCODONE HYDROCHLORIDE 5 MG/1
5 TABLET ORAL
Status: DISCONTINUED | OUTPATIENT
Start: 2018-11-27 | End: 2018-11-27 | Stop reason: HOSPADM

## 2018-11-27 RX ORDER — PROPOFOL 10 MG/ML
VIAL (ML) INTRAVENOUS
Status: DISCONTINUED | OUTPATIENT
Start: 2018-11-27 | End: 2018-11-27

## 2018-11-27 RX ORDER — GLYCOPYRROLATE 0.2 MG/ML
INJECTION INTRAMUSCULAR; INTRAVENOUS
Status: DISCONTINUED | OUTPATIENT
Start: 2018-11-27 | End: 2018-11-27

## 2018-11-27 RX ORDER — ONDANSETRON 2 MG/ML
4 INJECTION INTRAMUSCULAR; INTRAVENOUS DAILY PRN
Status: DISCONTINUED | OUTPATIENT
Start: 2018-11-27 | End: 2018-11-27 | Stop reason: HOSPADM

## 2018-11-27 RX ORDER — SODIUM CHLORIDE 0.9 % (FLUSH) 0.9 %
3 SYRINGE (ML) INJECTION
Status: DISCONTINUED | OUTPATIENT
Start: 2018-11-27 | End: 2018-11-27 | Stop reason: HOSPADM

## 2018-11-27 RX ORDER — ONDANSETRON 2 MG/ML
4 INJECTION INTRAMUSCULAR; INTRAVENOUS EVERY 12 HOURS PRN
Status: DISCONTINUED | OUTPATIENT
Start: 2018-11-27 | End: 2018-11-27 | Stop reason: HOSPADM

## 2018-11-27 RX ORDER — PHENYLEPHRINE HYDROCHLORIDE 10 MG/ML
INJECTION INTRAVENOUS
Status: DISCONTINUED | OUTPATIENT
Start: 2018-11-27 | End: 2018-11-27 | Stop reason: HOSPADM

## 2018-11-27 RX ORDER — BUPIVACAINE HYDROCHLORIDE 2.5 MG/ML
INJECTION, SOLUTION EPIDURAL; INFILTRATION; INTRACAUDAL
Status: DISCONTINUED | OUTPATIENT
Start: 2018-11-27 | End: 2018-11-27 | Stop reason: HOSPADM

## 2018-11-27 RX ORDER — SODIUM CHLORIDE, SODIUM LACTATE, POTASSIUM CHLORIDE, CALCIUM CHLORIDE 600; 310; 30; 20 MG/100ML; MG/100ML; MG/100ML; MG/100ML
INJECTION, SOLUTION INTRAVENOUS CONTINUOUS
Status: DISCONTINUED | OUTPATIENT
Start: 2018-11-27 | End: 2018-11-27 | Stop reason: HOSPADM

## 2018-11-27 RX ADMIN — SODIUM CHLORIDE, SODIUM LACTATE, POTASSIUM CHLORIDE, AND CALCIUM CHLORIDE: 600; 310; 30; 20 INJECTION, SOLUTION INTRAVENOUS at 04:11

## 2018-11-27 RX ADMIN — SODIUM CHLORIDE, SODIUM LACTATE, POTASSIUM CHLORIDE, AND CALCIUM CHLORIDE: 600; 310; 30; 20 INJECTION, SOLUTION INTRAVENOUS at 12:11

## 2018-11-27 RX ADMIN — FENTANYL CITRATE 25 MCG: 50 INJECTION INTRAMUSCULAR; INTRAVENOUS at 06:11

## 2018-11-27 RX ADMIN — ONDANSETRON 4 MG: 2 INJECTION INTRAMUSCULAR; INTRAVENOUS at 04:11

## 2018-11-27 RX ADMIN — FENTANYL CITRATE 150 MCG: 50 INJECTION, SOLUTION INTRAMUSCULAR; INTRAVENOUS at 03:11

## 2018-11-27 RX ADMIN — MIDAZOLAM HYDROCHLORIDE 2 MG: 1 INJECTION, SOLUTION INTRAMUSCULAR; INTRAVENOUS at 02:11

## 2018-11-27 RX ADMIN — HYDROMORPHONE HYDROCHLORIDE 0.5 MG: 2 INJECTION INTRAMUSCULAR; INTRAVENOUS; SUBCUTANEOUS at 04:11

## 2018-11-27 RX ADMIN — HYDROCODONE BITARTRATE AND ACETAMINOPHEN 1 TABLET: 5; 325 TABLET ORAL at 08:11

## 2018-11-27 RX ADMIN — PROMETHAZINE HYDROCHLORIDE 6.25 MG: 25 INJECTION, SOLUTION INTRAMUSCULAR; INTRAVENOUS at 07:11

## 2018-11-27 RX ADMIN — ONDANSETRON HYDROCHLORIDE 4 MG: 2 INJECTION, SOLUTION INTRAMUSCULAR; INTRAVENOUS at 07:11

## 2018-11-27 RX ADMIN — VANCOMYCIN HYDROCHLORIDE 1500 MG: 1 INJECTION, POWDER, LYOPHILIZED, FOR SOLUTION INTRAVENOUS at 12:11

## 2018-11-27 RX ADMIN — ACETAMINOPHEN 1000 MG: 10 INJECTION, SOLUTION INTRAVENOUS at 04:11

## 2018-11-27 RX ADMIN — PROPOFOL 200 MG: 10 INJECTION, EMULSION INTRAVENOUS at 03:11

## 2018-11-27 RX ADMIN — FENTANYL CITRATE 50 MCG: 50 INJECTION, SOLUTION INTRAMUSCULAR; INTRAVENOUS at 04:11

## 2018-11-27 RX ADMIN — PROPOFOL 50 MG: 10 INJECTION, EMULSION INTRAVENOUS at 05:11

## 2018-11-27 RX ADMIN — FAMOTIDINE 20 MG: 20 TABLET, FILM COATED ORAL at 12:11

## 2018-11-27 RX ADMIN — LIDOCAINE HYDROCHLORIDE 50 MG: 20 INJECTION, SOLUTION INTRAVENOUS at 03:11

## 2018-11-27 RX ADMIN — GLYCOPYRROLATE 0.4 MG: 0.2 INJECTION, SOLUTION INTRAMUSCULAR; INTRAVENOUS at 04:11

## 2018-11-27 RX ADMIN — HYDROMORPHONE HYDROCHLORIDE 0.5 MG: 2 INJECTION INTRAMUSCULAR; INTRAVENOUS; SUBCUTANEOUS at 05:11

## 2018-11-27 RX ADMIN — PROMETHAZINE HYDROCHLORIDE 6.25 MG: 25 INJECTION, SOLUTION INTRAMUSCULAR; INTRAVENOUS at 06:11

## 2018-11-27 RX ADMIN — CARBOXYMETHYLCELLULOSE SODIUM 2 DROP: 2.5 SOLUTION/ DROPS OPHTHALMIC at 04:11

## 2018-11-27 RX ADMIN — PROPOFOL 50 MG: 10 INJECTION, EMULSION INTRAVENOUS at 04:11

## 2018-11-27 NOTE — INTERVAL H&P NOTE
The patient has been examined and the H&P has been reviewed:    I concur with the findings and no changes have occurred since H&P was written.    Anesthesia/Surgery risks, benefits and alternative options discussed and understood by patient/family.          Active Hospital Problems    Diagnosis  POA    Peyronie disease [N48.6]  Yes      Resolved Hospital Problems   No resolved problems to display.

## 2018-11-28 NOTE — PLAN OF CARE
Veto Park has met all discharge criteria from Phase II. Vital Signs are stable, ambulating  without difficulty. Discharge instructions given, patient verbalized understanding. Discharged from facility via wheelchair in stable condition.

## 2018-11-28 NOTE — TRANSFER OF CARE
Anesthesia Transfer of Care Note    Patient: Veto Park    Procedure(s) Performed: Procedure(s) (LRB):  PLICATION, PENIS (N/A)    Patient location: PACU    Anesthesia Type: general    Transport from OR: Transported from OR on 2-3 L/min O2 by NC with adequate spontaneous ventilation    Post pain: adequate analgesia    Post assessment: no apparent anesthetic complications and tolerated procedure well    Post vital signs: stable    Level of consciousness: alert, oriented and awake    Nausea/Vomiting: no nausea/vomiting    Complications: none    Transfer of care protocol was followed      Last vitals:   Visit Vitals  BP (!) 150/89 (BP Location: Right arm, Patient Position: Lying)   Pulse 73   Temp 36.7 °C (98 °F) (Oral)   Resp 18   Ht 6' (1.829 m)   Wt 109.8 kg (241 lb 16 oz)   SpO2 97%   BMI 32.82 kg/m²

## 2018-11-28 NOTE — OP NOTE
DATE OF PROCEDURE:  11/27/2018    PREOPERATIVE DIAGNOSIS:  Peyronie's disease.    POSTOPERATIVE DIAGNOSIS:  Peyronie's disease.    PROCEDURE PERFORMED:  Penile plication for correction of Peyronie's disease.    SURGEON:  Ralph Wilcox M.D.    ANESTHESIA:  General.    COMPLICATIONS:  None.    ESTIMATED BLOOD LOSS: 10 mL.    SPECIMENS:  None.    INDICATIONS FOR PROCEDURE:  Mr. Park is a 47-year-old male with a longstanding   history of Peyronie's disease with a near 90-degree dorsal curvature of his penis.    Because of the severity of the curvature, he is unable to have intercourse.  He   has been unable to have intercourse for at least a year.  We have discussed his   options and various occasions and he has now elected to proceed with correction   of the curvature through a penile plication.  The risk of the surgery were   discussed with him preoperatively including penile pain, perceived penile length   shortening, persistent mild curvature and erectile dysfunction.    PROCEDURE DESCRIPTION:  The patient was brought to the OR and placed under   general anesthesia.  He was placed in supine position.  Prior to surgical prep,   the lateral penis near the base was injected with 20 mcg of alprostadil in order   to induce an erection.  He was then prepped and draped in normal sterile   fashion.    The erect penis was further assessed, which again demonstrated an approximately 80-degree   dorsal curvature.  The center of this curve was   marked with a marking pen to guide the plication.  A 2.5 cm longitudinal   incision was then made at the base of the penis on the ventral side.  Dissection   was carried through dartos and Buck's fascia to expose the corpora cavernosa.    Prior to the incision, a Ham catheter was placed to help aid with urethral   identification during the procedure.  Skin retractors were then used to mobilize   the incision as needed to access the corpora for plication.  The first   plicating suture  was placed on the right side of the corpora.  This was done at   the midpoint of his curvature just lateral to the urethra.  An inverse vertical   mattress suture was placed using 2-0 Ethibond.  This was then tied with four   knots to secure it.  An identical suture was then placed on the contralateral   side of the corpora in the same location.  This process of placing plicating   sutures was continued in an incremental fashion with paired sutures on each   side.  After each pair of sutures, the penis was again assessed for degree of   persistent curvature.  Additional sutures were placed as needed both proximally   and distally as well as laterally to the initial sutures.  A total of 16    plicating sutures were placed, 8 on each side.  Once these were placed, the   penis demonstrated an approximately 10-15 degree dorsal curvature, which is   within the acceptable range of less than 20 degrees.  I did not feel at this   point additional plicating sutures would be beneficial.  The corpora were   inspected and were hemostatic.    The incision was then closed in 3 layers.  Buck and dartos fascia were closed   with 3-0 chromic in a running fashion.  The skin was closed with 4-0 Monocryl in   a subcutaneous fashion.  The incision was dressed with Dermabond, gauze and   Coban.  The Ham catheter was removed.  He had mild persistent erection for   which 0.5 mg of phenylephrine were injected into the lateral base of the penis,   which resulted in full detumescence.  He was then awoken and transferred to PACU   in stable condition.      JEROME  dd: 11/27/2018 18:18:05 (CST)  td: 11/27/2018 19:01:38 (CST)  Doc ID   #[id number]  Job ID #[id number]    CC:

## 2018-11-28 NOTE — DISCHARGE INSTRUCTIONS
Resting as much as possible for the first 24 hours after your surgery will help lessen swelling and pain. Some swelling and discomfort is to be expected. During the first 24 hours, applying a cold pack on the groin for 20 minutes, then off for 20 minutes will help to decrease swelling.  It is not uncommon to experience some bruising and burning with urination. You may also experience some blood in the urine.    Resume showering after 48 hours. No tub bathing, swimming pool or hot tubs until ok'd by doctor.    Discharge Instructions: After Your Surgery  Youve just had surgery. During surgery, you were given medicine called anesthesia to keep you relaxed and free of pain. After surgery, you may have some pain or nausea. This is common. Here are some tips for feeling better and getting well after surgery.     Stay on schedule with your medicine.     Going home  Your healthcare provider will show you how to take care of yourself when you go home. He or she will also answer your questions. Have an adult family member or friend drive you home. For the first 24 hours after your surgery:    · Do not drive or use heavy equipment.  · Do not make important decisions or sign legal papers.  · Do not drink alcohol.  · Have someone stay with you, if needed. He or she can watch for problems and help keep you safe.    Be sure to go to all follow-up visits with your healthcare provider. And rest after your surgery for as long as your healthcare provider tells you to.    Coping with pain  If you have pain after surgery, pain medicine will help you feel better. Take it as told, before pain becomes severe. Also, ask your healthcare provider or pharmacist about other ways to control pain. This might be with heat, ice, or relaxation. And follow any other instructions your surgeon or nurse gives you.    Tips for taking pain medicine  To get the best relief possible, remember these points:    · Pain medicines can upset your stomach. Taking  them with a little food may help.  · Most pain relievers taken by mouth need at least 20 to 30 minutes to start to work.  · Taking medicine on a schedule can help you remember to take it. Try to time your medicine so that you can take it before starting an activity. This might be before you get dressed, go for a walk, or sit down for dinner.  · Constipation is a common side effect of pain medicines. Call your healthcare provider before taking any medicines such as laxatives or stool softeners to help ease constipation. Also ask if you should skip any foods. Drinking lots of fluids and eating foods such as fruits and vegetables that are high in fiber can also help. Remember, do not take laxatives unless your surgeon has prescribed them.  · Drinking alcohol and taking pain medicine can cause dizziness and slow your breathing. It can even be deadly. Do not drink alcohol while taking pain medicine.  · Pain medicine can make you react more slowly to things. Do not drive or run machinery while taking pain medicine.    Your healthcare provider may tell you to take acetaminophen to help ease your pain. Ask him or her how much you are supposed to take each day. Acetaminophen or other pain relievers may interact with your prescription medicines or other over-the-counter (OTC) medicines. Some prescription medicines have acetaminophen and other ingredients. Using both prescription and OTC acetaminophen for pain can cause you to overdose. Read the labels on your OTC medicines with care. This will help you to clearly know the list of ingredients, how much to take, and any warnings. It may also help you not take too much acetaminophen. If you have questions or do not understand the information, ask your pharmacist or healthcare provider to explain it to you before you take the OTC medicine.    Managing nausea  Some people have an upset stomach after surgery. This is often because of anesthesia, pain, or pain medicine, or the  stress of surgery. These tips will help you handle nausea and eat healthy foods as you get better. If you were on a special food plan before surgery, ask your healthcare provider if you should follow it while you get better. These tips may help:    · Do not push yourself to eat. Your body will tell you when to eat and how much.  · Start off with clear liquids and soup. They are easier to digest.  · Next try semi-solid foods, such as mashed potatoes, applesauce, and gelatin, as you feel ready.  · Slowly move to solid foods. Dont eat fatty, rich, or spicy foods at first.  · Do not force yourself to have 3 large meals a day. Instead eat smaller amounts more often.  · Take pain medicines with a small amount of solid food, such as crackers or toast, to avoid nausea.     Call your surgeon if  · You still have pain an hour after taking medicine. The medicine may not be strong enough.  · You feel too sleepy, dizzy, or groggy. The medicine may be too strong.  · You have side effects like nausea, vomiting, or skin changes, such as rash, itching, or hives.       If you have obstructive sleep apnea  You were given anesthesia medicine during surgery to keep you comfortable and free of pain. After surgery, you may have more apnea spells because of this medicine and other medicines you were given. The spells may last longer than usual.   At home:    · Keep using the continuous positive airway pressure (CPAP) device when you sleep. Unless your healthcare provider tells you not to, use it when you sleep, day or night. CPAP is a common device used to treat obstructive sleep apnea.  · Talk with your provider before taking any pain medicine, muscle relaxants, or sedatives. Your provider will tell you about the possible dangers of taking these medicines.    Date Last Reviewed: 12/1/2016  © 5619-8473 [employer]. [address] [place] [address], [place] [address]. All rights reserved. This information is not intended as a  substitute for professional medical care. Always follow your healthcare professional's instructions.    PLEASE FOLLOW ANY OTHER INSTRUCTIONS PROVIDED TO YOU BY DR. SHUKLA!

## 2018-11-28 NOTE — ANESTHESIA POSTPROCEDURE EVALUATION
Anesthesia Post Evaluation    Patient: Veto Park    Procedure(s) Performed: Procedure(s) (LRB):  PLICATION, PENIS (N/A)    Final Anesthesia Type: general  Patient location during evaluation: PACU  Patient participation: Yes- Able to Participate  Level of consciousness: awake and alert and oriented  Post-procedure vital signs: reviewed and stable  Pain management: adequate  Airway patency: patent  PONV status at discharge: No PONV  Anesthetic complications: no      Cardiovascular status: blood pressure returned to baseline and hemodynamically stable  Respiratory status: unassisted, spontaneous ventilation and room air  Hydration status: euvolemic  Follow-up not needed.        Visit Vitals  /69   Pulse 64   Temp 36.5 °C (97.7 °F) (Oral)   Resp 18   Ht 6' (1.829 m)   Wt 109.8 kg (241 lb 16 oz)   SpO2 96%   BMI 32.82 kg/m²       Pain/Swathi Score: Pain Assessment Performed: Yes (11/27/2018  5:58 PM)  Presence of Pain: denies (11/27/2018  6:15 PM)  Pain Rating Prior to Med Admin: 5 (11/27/2018  6:47 PM)  Swathi Score: 9 (11/27/2018  6:30 PM)

## 2018-11-28 NOTE — BRIEF OP NOTE
[facility]  Brief Operative Note     SUMMARY     Surgery Date: 11/27/2018     Surgeon(s) and Role:     * Ralph Wilcox MD - Primary    Assisting Surgeon: None    Pre-op Diagnosis:  Peyronie's disease [N48.6]    Post-op Diagnosis:  Post-Op Diagnosis Codes:     * Peyronie's disease [N48.6]    Procedure(s) (LRB):  PLICATION, PENIS (N/A)    Anesthesia: General    Description of the findings of the procedure: Penile plication performed through 2.5cm penoscrotal incision. 16 plicating sutures placed. Incision closed in 3 layers.    Estimated Blood Loss: 0cc         Specimens:   Specimen (12h ago, onward)    None          Discharge Note    SUMMARY     Admit Date: 11/27/2018    Discharge Date and Time: 11/27/2018    Hospital Course: Patient was admitted for elective outpatient procedure, which was uncomplicated and well tolerated.      Final Diagnosis: Post-Op Diagnosis Codes:     * Peyronie's disease [N48.6]    Disposition: Home or Self Care    Follow Up/Patient Instructions:     Medications:  Reconciled Home Medications:   Current Discharge Medication List      START taking these medications    Details   HYDROcodone-acetaminophen (NORCO) 5-325 mg per tablet Take 1 tablet by mouth every 6 (six) hours as needed for Pain.  Qty: 20 tablet, Refills: 0         CONTINUE these medications which have NOT CHANGED    Details   amlodipine (NORVASC) 5 MG tablet Take 2 tablets (10 mg total) by mouth once daily.  Qty: 30 tablet, Refills: 0      lisinopril (PRINIVIL,ZESTRIL) 20 MG tablet Take 40 mg by mouth once daily.       metFORMIN (GLUCOPHAGE) 500 MG tablet Take 500 mg by mouth 2 (two) times daily with meals.      omeprazole (PRILOSEC) 20 MG capsule One po bid for 7 days then one each morning  Qty: 30 capsule, Refills: 1      diphenhydrAMINE (BENADRYL) 25 mg capsule Take 1 each (25 mg total) by mouth every 6 (six) hours as needed for Itching.  Refills: 0      hydrocortisone 1 % cream Apply topically 2 (two) times  daily.  Qty: 14.2 g, Refills: 0      ibuprofen (ADVIL,MOTRIN) 600 MG tablet Take 1 tablet (600 mg total) by mouth every 6 (six) hours as needed.  Qty: 20 tablet, Refills: 0      sildenafil (REVATIO) 20 mg Tab Take 1 tablet (20 mg total) by mouth As instructed. Take 2-5 tablets as needed.  Qty: 100 tablet, Refills: 11      traMADol (ULTRAM) 50 mg tablet Take 50 mg by mouth every 6 (six) hours.           Discharge Procedure Orders   Diet general

## 2018-11-30 NOTE — ED PROVIDER NOTES
Encounter Date: 11/10/2018       History     Chief Complaint   Patient presents with    Insect Bite     Stingray sting to right wrist     HPI  Review of patient's allergies indicates:   Allergen Reactions    Codeine Nausea And Vomiting    Pcn [penicillins] Other (See Comments)     Unknown reaction    Betadine [povidone-iodine] Swelling and Other (See Comments)     redness     Past Medical History:   Diagnosis Date    Diabetes mellitus     Erectile dysfunction     GERD (gastroesophageal reflux disease)     Hypertension     ABHILASH on CPAP     Peyronie's disease     Toxic effect of contact with stingray 11/2018    right wrist     Past Surgical History:   Procedure Laterality Date    LASIK      [place]RA[place] PLICATION N/A 11/27/2018    Procedure: PLICATION, PENIS;  Surgeon: Ralph Wilcox MD;  Location: [facility] OR;  Service: Urology;  Laterality: N/A;    PLICATION, PENIS N/A 11/27/2018    Performed by Ralph Wilcox MD at [facility] OR    VASECTOMY       Family History   Problem Relation Age of Onset    Clotting disorder Father     Heart disease Father     Hypertension Father     Heart disease Mother     Hypertension Mother     Heart disease Maternal Aunt     Heart disease Maternal Uncle     Heart disease Paternal Aunt     Heart disease Paternal Uncle     Hypertension Sister     Hypertension Brother     Prostate cancer Brother      Social History     Tobacco Use    Smoking status: Current Every Day Smoker     Packs/day: 1.50     Types: Cigarettes    Smokeless tobacco: Never Used    Tobacco comment: Using patches   Substance Use Topics    Alcohol use: Yes     Alcohol/week: 3.6 - 7.2 oz     Types: 6 - 12 Cans of beer per week     Comment: heavy etoh use daily    Drug use: No     Review of Systems    Physical Exam     Initial Vitals   BP Pulse Resp Temp SpO2   11/10/18 1221 11/10/18 1221 11/10/18 1221 11/10/18 1221 11/10/18 1223   (!) 167/85 91 18 99 °F (37.2 °C) 97 %      MAP       --                 Physical Exam    ED Course   Procedures  Labs Reviewed   CBC W/ AUTO DIFFERENTIAL - Abnormal; Notable for the following components:       Result Value    RBC 4.34 (*)      (*)     MCH 37.6 (*)     All other components within normal limits   COMPREHENSIVE METABOLIC PANEL - Abnormal; Notable for the following components:    Alkaline Phosphatase 20 (*)     AST 80 (*)      (*)     All other components within normal limits   HEMOGLOBIN A1C - Abnormal; Notable for the following components:    Hemoglobin A1C 6.0 (*)     All other components within normal limits   LACTIC ACID, PLASMA          Imaging Results          US Soft Tissue Misc (Final result)  Result time 11/10/18 16:41:14    Final result by Uziel Yepez MD (11/10/18 16:41:14)                 Impression:      Indeterminate lesion in the volar subcutaneous soft tissues of the wrist as above.  Recommend further evaluation with contrast enhanced MRI.      Electronically signed by: Uziel Yepez MD  Date:    11/10/2018  Time:    16:41             Narrative:    EXAMINATION:  US SOFT TISSUE MISC    CLINICAL HISTORY:  lymphangiitis, RUE; palpable lump;    TECHNIQUE:  Targeted soft tissue ultrasound performed of the area of clinical concern along the volar surface of the wrist.    COMPARISON:  None    FINDINGS:  There is a nonspecific hypoechoic well-defined nodular lesion in the subcutaneous soft tissues along the volar aspect of the wrist measuring approximately 19 x 6 x 15 mm which correlates with the area of palpable concern.  Finding does appear to overlie the superficial margins of the flexor tendons and does not appear cystic in nature.  Striated internal echotexture suggest possible muscular origin.  There does appear to be some mild associated internal Doppler blood flow present.  More superficial subcutaneous soft tissues appear edematous.                                                      Clinical Impression:   The encounter  diagnosis was Cellulitis of right upper extremity.                             STANISLAW English III, MD  11/30/18 [id number]

## 2018-12-03 ENCOUNTER — TELEPHONE (OUTPATIENT)
Dept: UROLOGY | Facility: CLINIC | Age: 48
End: 2018-12-03

## 2018-12-03 NOTE — TELEPHONE ENCOUNTER
EDM for the pt to give the clinic a call to make arrangements to  a return work slip. Dr. Wilcox gave orders for the pt to return anytime to work as requested.        Thanks Kaci

## 2018-12-03 NOTE — TELEPHONE ENCOUNTER
----- Message from Jaret Crespo sent at 12/3/2018  2:46 PM CST -----  Contact: pt            Name of Who is Calling: pt      What is the request in detail: is needing to speak with staff in regards to getting clearance to return to work since his surgery      Can the clinic reply by [facility]: no      What [facility] Back [facility]: [phone]

## 2018-12-06 ENCOUNTER — TELEPHONE (OUTPATIENT)
Dept: UROLOGY | Facility: CLINIC | Age: 48
End: 2018-12-06

## 2018-12-06 NOTE — TELEPHONE ENCOUNTER
Spoke with pt he is now scheduled for his post op visit on 12/12 at 10:45 as preferred. Pt also demand that you send pictures of his procedure. Please advise.

## 2018-12-06 NOTE — TELEPHONE ENCOUNTER
Attempt to contact pt no answer. A voice message was left informing pt that his call was being returned and his sutures are going to dissolve and come off on their own also, to get his post op scheduled.

## 2018-12-06 NOTE — TELEPHONE ENCOUNTER
----- Message from Jazzy Hwang sent at 12/6/2018  1:52 PM CST -----  Contact: pt   Name of Who is Calling: FARIDA ARAIZA [[id number]]      What is the request in detail: Patient is requesting a call from staff in regards to getting his stitches out. Please contact to further discuss and advise      Can the clinic reply by [facility]: No       [facility] [phone]

## 2018-12-06 NOTE — TELEPHONE ENCOUNTER
----- Message from Risa Walker sent at 12/6/2018 11:41 AM CST -----  Contact: Self            Name of Who is Calling: FARIDA ARAIZA [[id number]]      What is the request in detail: Pt states he needs to set up an appt to have stitches removed. Attempt made,next available is 01/7. Pt needs a sooner appt. Please contact to further discuss and advise.        Can the clinic reply by [facility]: N      What Number to Call Back if not in [facility]: [phone]

## 2018-12-06 NOTE — TELEPHONE ENCOUNTER
Spoke with pt, he stated that he prefer that his pictures from his procedure be sent to his mychart.

## 2018-12-12 ENCOUNTER — OFFICE VISIT (OUTPATIENT)
Dept: UROLOGY | Facility: CLINIC | Age: 48
End: 2018-12-12
Attending: UROLOGY
Payer: MEDICAID

## 2018-12-12 VITALS
HEART RATE: 95 BPM | WEIGHT: 241 LBS | SYSTOLIC BLOOD PRESSURE: 158 MMHG | BODY MASS INDEX: 32.64 KG/M2 | DIASTOLIC BLOOD PRESSURE: 86 MMHG | HEIGHT: 72 IN

## 2018-12-12 DIAGNOSIS — N48.6 PEYRONIE'S DISEASE: Primary | ICD-10-CM

## 2018-12-12 DIAGNOSIS — N52.01 ERECTILE DYSFUNCTION DUE TO ARTERIAL INSUFFICIENCY: ICD-10-CM

## 2018-12-12 PROCEDURE — 99024 POSTOP FOLLOW-UP VISIT: CPT | Mod: S$GLB,,, | Performed by: UROLOGY

## 2018-12-12 NOTE — PROGRESS NOTES
Subjective:      Veto Park is a 48 y.o. male who returns today regarding his peyronie's disease.    He is s/p penile plication on 11/29/2018. Prior to surgery he had 80-90 degree dorsal curvature and was unable to have intercourse for almost 2 years.     He has only mild pain at this point. He has not had erection or attempted intercourse.    The following portions of the patient's history were reviewed and updated as appropriate: allergies, current medications, past family history, past medical history, past social history, past surgical history and problem list.    Review of Systems  A comprehensive multipoint review of systems was negative except as otherwise stated in the HPI.     Objective:   Vitals: BP (!) 158/86 (BP Location: Left arm, Patient Position: Sitting, BP Method: Large (Automatic))   Pulse 95   Ht 6' (1.829 m)   Wt 109.3 kg (241 lb)   BMI 32.69 kg/m²     Physical Exam   General: alert and oriented, no acute distress  Respiratory: Symmetric expansion, non-labored breathing  Genitourinary: well healed incision on ventral proximal penile shift  Neuro: no gross deficits  Psych: normal judgment and insight, normal mood/affect and non-anxious    Lab Review     Lab Results   Component Value Date    WBC 5.92 11/12/2018    HGB 15.6 11/12/2018    HCT 44.9 11/12/2018     (H) 11/12/2018     (L) 11/12/2018     Lab Results   Component Value Date    CREATININE 0.9 11/12/2018    BUN 10 11/12/2018         Assessment and Plan:   1. Peyronie's disease  -- Doing well s/p plication  -- OK to resume intercourse    2. Erectile dysfunction due to arterial insufficiency  -- Viagra PRN    FU 3 mos

## 2019-01-14 ENCOUNTER — PATIENT MESSAGE (OUTPATIENT)
Dept: ADMINISTRATIVE | Facility: OTHER | Age: 49
End: 2019-01-14

## 2019-03-15 ENCOUNTER — OFFICE VISIT (OUTPATIENT)
Dept: UROLOGY | Facility: CLINIC | Age: 49
End: 2019-03-15
Payer: MEDICAID

## 2019-03-15 VITALS
HEIGHT: 72 IN | WEIGHT: 241 LBS | BODY MASS INDEX: 32.64 KG/M2 | HEART RATE: 81 BPM | DIASTOLIC BLOOD PRESSURE: 93 MMHG | SYSTOLIC BLOOD PRESSURE: 167 MMHG

## 2019-03-15 DIAGNOSIS — N48.6 PEYRONIE'S DISEASE: Primary | ICD-10-CM

## 2019-03-15 PROCEDURE — 99999 PR PBB SHADOW E&M-EST. PATIENT-LVL III: ICD-10-PCS | Mod: PBBFAC,,, | Performed by: UROLOGY

## 2019-03-15 PROCEDURE — 99999 PR PBB SHADOW E&M-EST. PATIENT-LVL III: CPT | Mod: PBBFAC,,, | Performed by: UROLOGY

## 2019-03-15 PROCEDURE — 99213 OFFICE O/P EST LOW 20 MIN: CPT | Mod: S$PBB,,, | Performed by: UROLOGY

## 2019-03-15 PROCEDURE — 99213 OFFICE O/P EST LOW 20 MIN: CPT | Mod: PBBFAC,PO | Performed by: UROLOGY

## 2019-03-15 PROCEDURE — 99213 PR OFFICE/OUTPT VISIT, EST, LEVL III, 20-29 MIN: ICD-10-PCS | Mod: S$PBB,,, | Performed by: UROLOGY

## 2019-03-15 RX ORDER — SILDENAFIL CITRATE 20 MG/1
20 TABLET ORAL SEE ADMIN INSTRUCTIONS
Qty: 100 TABLET | Refills: 11 | Status: ON HOLD | OUTPATIENT
Start: 2019-03-15 | End: 2021-05-28 | Stop reason: HOSPADM

## 2019-03-15 NOTE — PROGRESS NOTES
Subjective:      Veto Park is a 48 y.o. male who returns today regarding his peyronie's disease.    He is s/p penile plication on 11/29/2018. Prior to surgery he had 80-90 degree dorsal curvature and was unable to have intercourse for almost 2 years.     He has only mild pain at this point. He has strong erections with viagra. There is only a mild persistent curve and he is able to again have intercourse.    The following portions of the patient's history were reviewed and updated as appropriate: allergies, current medications, past family history, past medical history, past social history, past surgical history and problem list.    Review of Systems  A comprehensive multipoint review of systems was negative except as otherwise stated in the HPI.     Objective:   Vitals: BP (!) 167/93 (BP Location: Left arm, Patient Position: Sitting, BP Method: Large (Automatic))   Pulse 81   Ht 6' (1.829 m)   Wt 109.3 kg (241 lb)   BMI 32.69 kg/m²     Physical Exam   General: alert and oriented, no acute distress  Respiratory: Symmetric expansion, non-labored breathing  Genitourinary: well healed incision on ventral proximal penile shift  Neuro: no gross deficits  Psych: normal judgment and insight, normal mood/affect and non-anxious    Lab Review     Lab Results   Component Value Date    WBC 5.92 11/12/2018    HGB 15.6 11/12/2018    HCT 44.9 11/12/2018     (H) 11/12/2018     (L) 11/12/2018     Lab Results   Component Value Date    CREATININE 0.9 11/12/2018    BUN 10 11/12/2018         Assessment and Plan:   1. Peyronie's disease  -- Doing well s/p plication    2. Erectile dysfunction due to arterial insufficiency  -- Viagra PRN    FU 12 mos

## 2019-08-06 ENCOUNTER — TELEPHONE (OUTPATIENT)
Dept: UROLOGY | Facility: CLINIC | Age: 49
End: 2019-08-06

## 2019-08-06 NOTE — TELEPHONE ENCOUNTER
----- Message from Regla Manrique sent at 8/6/2019 12:39 PM CDT -----  Contact: Patient  Type:  Sooner Appointment Request    Patient is requesting a sooner appointment.  Patient declined first available appointment listed as well as another facility and provider .  Patient will not accept being placed on the waitlist and is requesting a message be sent to doctor.    Name of Caller: Patient    When is the first available appointment? 9/18/2019    Symptoms: frequent urination and pain    Would the patient rather a call back or a response via [facility]? Call back     Best Call Back Number:  [phone]    Additional Information: [email or web address]

## 2019-08-06 NOTE — TELEPHONE ENCOUNTER
Attempt to contact pt no answer. A voice message was left informing pt that his call was being returned. A call back number was left.

## 2019-12-17 ENCOUNTER — HOSPITAL ENCOUNTER (EMERGENCY)
Facility: HOSPITAL | Age: 49
Discharge: HOME | End: 2019-12-17
Attending: EMERGENCY MEDICINE
Payer: MEDICAID

## 2019-12-17 VITALS
RESPIRATION RATE: 16 BRPM | DIASTOLIC BLOOD PRESSURE: 71 MMHG | BODY MASS INDEX: 33.86 KG/M2 | HEART RATE: 82 BPM | SYSTOLIC BLOOD PRESSURE: 149 MMHG | HEIGHT: 72 IN | OXYGEN SATURATION: 95 % | WEIGHT: 250 LBS | TEMPERATURE: 98 F

## 2019-12-17 DIAGNOSIS — R06.02 SHORTNESS OF BREATH: ICD-10-CM

## 2019-12-17 DIAGNOSIS — J44.1 COPD EXACERBATION: Primary | ICD-10-CM

## 2019-12-17 LAB
ALBUMIN SERPL BCP-MCNC: 4.3 G/DL (ref 3.5–5.2)
ALP SERPL-CCNC: 43 U/L (ref 38–126)
ALT SERPL W/O P-5'-P-CCNC: 269 U/L (ref 10–44)
ANION GAP SERPL CALC-SCNC: 7 MMOL/L (ref 8–16)
AST SERPL-CCNC: 260 U/L (ref 15–46)
BASOPHILS # BLD AUTO: 0.08 K/UL (ref 0–0.2)
BASOPHILS NFR BLD: 1.2 % (ref 0–1.9)
BILIRUB SERPL-MCNC: 0.8 MG/DL (ref 0.1–1)
BUN SERPL-MCNC: 11 MG/DL (ref 2–20)
CALCIUM SERPL-MCNC: 9.2 MG/DL (ref 8.7–10.5)
CHLORIDE SERPL-SCNC: 100 MMOL/L (ref 95–110)
CO2 SERPL-SCNC: 27 MMOL/L (ref 23–29)
CREAT SERPL-MCNC: 0.85 MG/DL (ref 0.5–1.4)
D DIMER PPP FEU-MCNC: 220 NG/ML
DIFFERENTIAL METHOD: ABNORMAL
EOSINOPHIL # BLD AUTO: 0.2 K/UL (ref 0–0.5)
EOSINOPHIL NFR BLD: 2.9 % (ref 0–8)
ERYTHROCYTE [DISTWIDTH] IN BLOOD BY AUTOMATED COUNT: 11.5 % (ref 11.5–14.5)
EST. GFR  (AFRICAN AMERICAN): >60 ML/MIN/1.73 M^2
EST. GFR  (NON AFRICAN AMERICAN): >60 ML/MIN/1.73 M^2
GLUCOSE SERPL-MCNC: 186 MG/DL (ref 70–110)
HCT VFR BLD AUTO: 48.1 % (ref 40–54)
HGB BLD-MCNC: 17 G/DL (ref 14–18)
IMM GRANULOCYTES # BLD AUTO: 0.03 K/UL (ref 0–0.04)
IMM GRANULOCYTES NFR BLD AUTO: 0.4 % (ref 0–0.5)
INFLUENZA A, MOLECULAR: NEGATIVE
INFLUENZA B, MOLECULAR: NEGATIVE
LYMPHOCYTES # BLD AUTO: 1.8 K/UL (ref 1–4.8)
LYMPHOCYTES NFR BLD: 26.8 % (ref 18–48)
MCH RBC QN AUTO: 36.4 PG (ref 27–31)
MCHC RBC AUTO-ENTMCNC: 35.3 G/DL (ref 32–36)
MCV RBC AUTO: 103 FL (ref 82–98)
MONOCYTES # BLD AUTO: 0.7 K/UL (ref 0.3–1)
MONOCYTES NFR BLD: 10.6 % (ref 4–15)
NEUTROPHILS # BLD AUTO: 3.9 K/UL (ref 1.8–7.7)
NEUTROPHILS NFR BLD: 58.1 % (ref 38–73)
NRBC BLD-RTO: 0 /100 WBC
NT-PROBNP: 42 PG/ML (ref 5–450)
PLATELET # BLD AUTO: 164 K/UL (ref 150–350)
PMV BLD AUTO: 9.6 FL (ref 9.2–12.9)
POTASSIUM SERPL-SCNC: 4.4 MMOL/L (ref 3.5–5.1)
PROT SERPL-MCNC: 8.1 G/DL (ref 6–8.4)
RBC # BLD AUTO: 4.67 M/UL (ref 4.6–6.2)
SODIUM SERPL-SCNC: 134 MMOL/L (ref 136–145)
SPECIMEN SOURCE: NORMAL
TROPONIN I SERPL DL<=0.01 NG/ML-MCNC: <0.012 NG/ML (ref 0.01–0.03)
WBC # BLD AUTO: 6.78 K/UL (ref 3.9–12.7)

## 2019-12-17 PROCEDURE — 93010 EKG 12-LEAD: ICD-10-PCS | Mod: ,,, | Performed by: INTERNAL MEDICINE

## 2019-12-17 PROCEDURE — 94640 AIRWAY INHALATION TREATMENT: CPT | Mod: ER

## 2019-12-17 PROCEDURE — 80053 COMPREHEN METABOLIC PANEL: CPT | Mod: ER

## 2019-12-17 PROCEDURE — 83880 ASSAY OF NATRIURETIC PEPTIDE: CPT | Mod: ER

## 2019-12-17 PROCEDURE — 85379 FIBRIN DEGRADATION QUANT: CPT | Mod: ER

## 2019-12-17 PROCEDURE — 87502 INFLUENZA DNA AMP PROBE: CPT | Mod: ER

## 2019-12-17 PROCEDURE — 85025 COMPLETE CBC W/AUTO DIFF WBC: CPT | Mod: ER

## 2019-12-17 PROCEDURE — 25000003 PHARM REV CODE 250: Mod: ER | Performed by: EMERGENCY MEDICINE

## 2019-12-17 PROCEDURE — 99285 EMERGENCY DEPT VISIT HI MDM: CPT | Mod: 25,ER

## 2019-12-17 PROCEDURE — 25000242 PHARM REV CODE 250 ALT 637 W/ HCPCS: Mod: ER | Performed by: EMERGENCY MEDICINE

## 2019-12-17 PROCEDURE — 84484 ASSAY OF TROPONIN QUANT: CPT | Mod: ER

## 2019-12-17 PROCEDURE — 93005 ELECTROCARDIOGRAM TRACING: CPT | Mod: ER

## 2019-12-17 PROCEDURE — 93010 ELECTROCARDIOGRAM REPORT: CPT | Mod: ,,, | Performed by: INTERNAL MEDICINE

## 2019-12-17 RX ORDER — IPRATROPIUM BROMIDE AND ALBUTEROL SULFATE 2.5; .5 MG/3ML; MG/3ML
3 SOLUTION RESPIRATORY (INHALATION)
Status: COMPLETED | OUTPATIENT
Start: 2019-12-17 | End: 2019-12-17

## 2019-12-17 RX ORDER — FLUTICASONE PROPIONATE AND SALMETEROL 250; 50 UG/1; UG/1
1 POWDER RESPIRATORY (INHALATION) 2 TIMES DAILY
Qty: 60 EACH | Refills: 0 | Status: ON HOLD | OUTPATIENT
Start: 2019-12-17 | End: 2020-01-14

## 2019-12-17 RX ORDER — PREDNISONE 20 MG/1
60 TABLET ORAL DAILY
Qty: 15 TABLET | Refills: 0 | Status: SHIPPED | OUTPATIENT
Start: 2019-12-17 | End: 2019-12-22

## 2019-12-17 RX ORDER — AMLODIPINE BESYLATE 5 MG/1
10 TABLET ORAL
Status: COMPLETED | OUTPATIENT
Start: 2019-12-17 | End: 2019-12-17

## 2019-12-17 RX ORDER — ASPIRIN 325 MG
325 TABLET ORAL
Status: COMPLETED | OUTPATIENT
Start: 2019-12-17 | End: 2019-12-17

## 2019-12-17 RX ORDER — ALBUTEROL SULFATE 90 UG/1
1-2 AEROSOL, METERED RESPIRATORY (INHALATION) EVERY 6 HOURS PRN
Qty: 1 INHALER | Refills: 2 | Status: SHIPPED | OUTPATIENT
Start: 2019-12-17 | End: 2020-12-21 | Stop reason: SDUPTHER

## 2019-12-17 RX ORDER — LISINOPRIL 20 MG/1
40 TABLET ORAL
Status: COMPLETED | OUTPATIENT
Start: 2019-12-17 | End: 2019-12-17

## 2019-12-17 RX ADMIN — AMLODIPINE BESYLATE 10 MG: 5 TABLET ORAL at 06:12

## 2019-12-17 RX ADMIN — LISINOPRIL 40 MG: 20 TABLET ORAL at 06:12

## 2019-12-17 RX ADMIN — IPRATROPIUM BROMIDE AND ALBUTEROL SULFATE 3 ML: .5; 3 SOLUTION RESPIRATORY (INHALATION) at 06:12

## 2019-12-17 RX ADMIN — ASPIRIN 325 MG ORAL TABLET 325 MG: 325 PILL ORAL at 06:12

## 2019-12-17 NOTE — ED NOTES
Received pt resting in bed, in room without any complaint without any distress, will continue to monitor

## 2019-12-17 NOTE — PROVIDER PROGRESS NOTES - EMERGENCY DEPT.
Encounter Date: 12/17/2019    ED Physician Progress Notes           **This is an assumption of care note**    Case accepted from Dr. MARX at 6:00 a.m., pending workup, re-evaluation and disposition.  I agree with previous physician's history/physical and overall assessment.   Patient presents with sudden-onset shortness of breath just prior to arrival.   Patient has associated chest tightness and cough.  On exam he has a very faint expiratory wheeze with deep inspiration/expiration.  No leg swelling or pain.  No chest pain.      PHYSICAL EXAMINATION:  GENERAL:   Alert and oriented x3, no acute distress  VITAL SIGNS:  Per nurse's note, reviewed by me .  SKIN:  Warm, dry; no cyanosis; no rash, no pallor  HEAD:  Atraumatic, normocephalic  EYES:  no conjunctival pallor, no scleral icterus, EOMI.  ENMT:  Ears normal bilaterally.  Nose:  Mucosal congestion without discharge bilaterally.  Pharynx:  no erythema; airway patent: no stridor; mucous membranes moist  NECK:  no tenderness, normal ROM, no lymphadenopathy.  CHEST AND RESPIRATORY:  No distress, no rales, no rhonchi, mild expiratory wheeze bilateral upper lung field.  HEART AND CARDIOVASCULAR:  Normal rate, no irregularity; no murmur  EXTREMITIES:  no deformity, no edema, no tenderness.  NEURO AND PSYCH:  Mental status as above.  Mildly anxious.  GCS 15.  Strength grossly intact bilaterally.      LABS:  Labs Reviewed   CBC W/ AUTO DIFFERENTIAL - Abnormal; Notable for the following components:       Result Value    Mean Corpuscular Volume 103 (*)     Mean Corpuscular Hemoglobin 36.4 (*)     All other components within normal limits   COMPREHENSIVE METABOLIC PANEL - Abnormal; Notable for the following components:    Sodium 134 (*)     Glucose 186 (*)      (*)      (*)     Anion Gap 7 (*)     All other components within normal limits   INFLUENZA A & B BY MOLECULAR   TROPONIN I   NT-PRO NATRIURETIC PEPTIDE   D DIMER   TROPONIN I         IMAGING:    Imaging  Results          X-Ray Chest PA And Lateral (Final result)  Result time 12/17/19 06:55:54    Final result by Minesh Steele MD (12/17/19 06:55:54)                 Impression:      No acute process seen.      Electronically signed by: Minesh Steele MD  Date:    12/17/2019  Time:    06:55             Narrative:    EXAMINATION:  XR CHEST PA AND LATERAL    CLINICAL HISTORY:  Chest Pain;    COMPARISON:  02/23/2018    FINDINGS:  Cardiac silhouette is normal.  The lungs demonstrate no evidence of active disease.  No evidence of pleural effusion or pneumothorax.  Bones appear intact.                                  ED COURSE:  ED Course as of Dec 17 0902   Tue Dec 17, 2019   0619 Patient presents with shortness of breath.  Initial workup started by Dr. Nagel.  Will add on a D-dimer.  Will give patient his usual BP meds in the a.m..  Will additionally give a DuoNeb treatment and reassess.    [NP]   0708 CXR: This is a(n) PA and lateral chest exam with adequate penetration, positioning and good air entry. Trachea is midline, cardiac and mediastinal silhouettes are WNL. There are no infiltrates, consolidations or effusions. Impression:  No acute process. This exam was independently interpreted by me.    [NP]   0708 D-Dimer: 220 [NP]   0708 Troponin I: <0.012 [NP]   0708 NT-proBNP: 42 [NP]   0708 Sodium(!): 134 [NP]   0708 Potassium: 4.4 [NP]   0708 Chloride: 100 [NP]   0708 CO2: 27 [NP]   0708 Glucose(!): 186 [NP]   0708 BUN, Bld: 11 [NP]   0708 Creatinine: 0.85 [NP]   0708 WBC: 6.78 [NP]   0708 Hemoglobin: 17.0 [NP]   0708 Platelets: 164 [NP]   0719 Patient feelingBetter on re-evaluation, wheezing is resolved.  Will monitor blood pressure for improvement and discharge at that time.    [NP]   0859 BP(!): 149/71 [NP]   0859 BP improved.  Stable for discharge at this time.      Rx for albuterol as needed, prednisone course.    Follow up with PCP in 1-2 weeks or return for any emergent concerns.    [NP]      ED Course User  Index  [NP] John Wiggins MD          DIAGNOSIS:  1. COPD exacerbation    2. Shortness of breath           DISPOSITION:     discharged in stable condition

## 2019-12-17 NOTE — DISCHARGE INSTRUCTIONS
Thank you for choosing [facility]! We appreciate you coming to us for your medical care. We hope you feel better soon! Please come back to [facility] for all of your future medical needs.    Our goal in the emergency department is to always give you outstanding care and exceptional service. You may receive a survey by mail or e-mail in the next week regarding your experience in our ED. We would greatly appreciate your completing and returning the survey. Your feedback provides us with a way to recognize our staff who give very good care and it helps us learn how to improve when your experience was below our aspiration of excellence.       Sincerely,    John Wiggins MD  Medical Director  Emergency Department  [facility]

## 2019-12-17 NOTE — ED NOTES
Adult Physical Assessment  LOC: [place], 49 y.o. male verified via two identifiers.  The patient is awake, alert, oriented and speaking appropriately at this time.  APPEARANCE: Patient resting comfortably and appears to be in no acute distress at this time. Patient is clean and well groomed, patient's clothing is properly fastened.  SKIN:The skin is warm and dry, color consistent with ethnicity, patient has normal skin turgor and moist mucus membranes, skin intact, no breakdown or brusing noted.  MUSCULOSKELETAL: Patient moving all extremities well, no obvious swelling or deformities noted.  RESPIRATORY: Airway is open and patent, respirations are spontaneous. Reports shortness of breath.  CARDIAC: Patient has a normal rate and rhythm, no periphreal edema noted in any extremity, capillary refill < 3 seconds in all extremities  ABDOMEN: Soft and non tender to palpation, no abdominal distention noted. Bowel sounds present in all four quadrants.  NEUROLOGIC: Eyes open spontaneously, behavior appropriate to situation, follows commands, facial expression symmetrical, bilateral hand grasp equal and even, purposeful motor response noted, normal sensation in all extremities when touched with a finger.

## 2019-12-17 NOTE — ED PROVIDER NOTES
Encounter Date: 12/17/2019       History     Chief Complaint   Patient presents with    Shortness of Breath     SOB x 2.5 hours with home bp of 187/109. Pt reports worsening of SOB when lying down. +Smoking hx     The history is provided by the patient.   Shortness of Breath   This is a new problem. The average episode lasts 3 hours. The problem occurs continuously.The current episode started 3 to 5 hours ago. The problem has not changed since onset.Associated symptoms include cough and orthopnea. Pertinent negatives include no headaches, no coryza, no rhinorrhea, no sore throat, no sputum production, no hemoptysis, no wheezing, no PND, no chest pain, no rash, no leg pain and no leg swelling. He has tried nothing for the symptoms. He has had prior hospitalizations. He has had prior ED visits. He has had no prior ICU admissions. Associated medical issues do not include asthma, COPD, pneumonia, PE, CAD, heart failure or past MI.     Review of patient's allergies indicates:   Allergen Reactions    Codeine Nausea And Vomiting    Pcn [penicillins] Other (See Comments)     Unknown reaction    Betadine [povidone-iodine] Swelling and Other (See Comments)     redness     Past Medical History:   Diagnosis Date    Diabetes mellitus     Erectile dysfunction     GERD (gastroesophageal reflux disease)     Hypertension     ABHILASH on CPAP     Peyronie's disease     Toxic effect of contact with stingray 11/2018    right wrist     Past Surgical History:   Procedure Laterality Date    LASIK      ISRAEL PLICATION N/A 11/27/2018    Procedure: PLICATION, PENIS;  Surgeon: Ralph Wilcox MD;  Location: [facility];  Service: Urology;  Laterality: N/A;    VASECTOMY       Family History   Problem Relation Age of Onset    Clotting disorder Father     Heart disease Father     Hypertension Father     Heart disease Mother     Hypertension Mother     Heart disease Maternal Aunt     Heart disease Maternal Uncle     Heart disease  Paternal Aunt     Heart disease Paternal Uncle     Hypertension Sister     Hypertension Brother     Prostate cancer Brother      Social History     Tobacco Use    Smoking status: Current Every Day Smoker     Packs/day: 1.50     Types: Cigarettes    Smokeless tobacco: Never Used    Tobacco comment: Using patches   Substance Use Topics    Alcohol use: Yes     Alcohol/week: 6.0 - 12.0 standard drinks     Types: 6 - 12 Cans of beer per week     Comment: heavy etoh use daily    Drug use: No     Review of Systems   HENT: Negative for rhinorrhea and sore throat.    Respiratory: Positive for cough and shortness of breath. Negative for hemoptysis, sputum production and wheezing.    Cardiovascular: Positive for orthopnea. Negative for chest pain, leg swelling and PND.   Skin: Negative for rash.   Neurological: Negative for headaches.   All other systems reviewed and are negative.      Physical Exam     Initial Vitals [12/17/19 0539]   BP Pulse Resp Temp SpO2   (!) 181/94 90 20 98 °F (36.7 °C) 97 %      MAP       --         Physical Exam    Nursing note and vitals reviewed.  Constitutional: He appears well-developed and well-nourished.   HENT:   Head: Normocephalic and atraumatic.   Eyes: Conjunctivae and EOM are normal.   Neck: Normal range of motion. Neck supple.   Cardiovascular: Normal rate, regular rhythm and normal heart sounds.   Pulmonary/Chest: Breath sounds normal. No respiratory distress. He has no wheezes. He has no rhonchi. He has no rales.   Abdominal: Soft. He exhibits no distension. There is no tenderness. There is no rebound and no guarding.   Musculoskeletal: Normal range of motion.   Neurological: He is alert and oriented to person, place, and time. GCS score is 15. GCS eye subscore is 4. GCS verbal subscore is 5. GCS motor subscore is 6.   Skin: Skin is warm and dry. Capillary refill takes less than 2 seconds.   Psychiatric: He has a normal mood and affect. His behavior is normal. Judgment and  thought content normal.         ED Course   Procedures  Labs Reviewed - No data to display       Imaging Results    None                            ED Course as of Dec 29 1829   Tue Dec 17, 2019   0619 Patient presents with shortness of breath.  Initial workup started by Dr. Nagel.  Will add on a D-dimer.  Will give patient his usual BP meds in the a.m..  Will additionally give a DuoNeb treatment and reassess.    [NP]   0708 CXR: This is a(n) PA and lateral chest exam with adequate penetration, positioning and good air entry. Trachea is midline, cardiac and mediastinal silhouettes are WNL. There are no infiltrates, consolidations or effusions. Impression:  No acute process. This exam was independently interpreted by me.    [NP]   0708 D-Dimer: 220 [NP]   0708 Troponin I: <0.012 [NP]   0708 NT-proBNP: 42 [NP]   0708 Sodium(!): 134 [NP]   0708 Potassium: 4.4 [NP]   0708 Chloride: 100 [NP]   0708 CO2: 27 [NP]   0708 Glucose(!): 186 [NP]   0708 BUN, Bld: 11 [NP]   0708 Creatinine: 0.85 [NP]   0708 WBC: 6.78 [NP]   0708 Hemoglobin: 17.0 [NP]   0708 Platelets: 164 [NP]   0719 Patient feelingBetter on re-evaluation, wheezing is resolved.  Will monitor blood pressure for improvement and discharge at that time.    [NP]   0859 BP(!): 149/71 [NP]   0859 BP improved.  Stable for discharge at this time.      Rx for albuterol as needed, prednisone course.    Follow up with PCP in 1-2 weeks or return for any emergent concerns.    [NP]      ED Course User Index  [NP] John Wiggins MD                Clinical Impression:       ICD-10-CM ICD-9-CM   1. COPD exacerbation J44.1 491.21   2. Shortness of breath R06.02 786.05                             Alessia Nagel MD  12/29/19 [id number]

## 2020-01-14 ENCOUNTER — HOSPITAL ENCOUNTER (OUTPATIENT)
Facility: HOSPITAL | Age: 50
Discharge: HOME | End: 2020-01-15
Attending: EMERGENCY MEDICINE | Admitting: FAMILY MEDICINE
Payer: MEDICAID

## 2020-01-14 DIAGNOSIS — I10 HYPERTENSION, UNSPECIFIED TYPE: ICD-10-CM

## 2020-01-14 DIAGNOSIS — R53.1 WEAKNESS: ICD-10-CM

## 2020-01-14 DIAGNOSIS — G51.4 FACIAL TWITCHING: ICD-10-CM

## 2020-01-14 DIAGNOSIS — F10.20 ALCOHOLISM: ICD-10-CM

## 2020-01-14 DIAGNOSIS — G45.9 TIA (TRANSIENT ISCHEMIC ATTACK): Primary | ICD-10-CM

## 2020-01-14 DIAGNOSIS — R56.9 SEIZURE-LIKE ACTIVITY: ICD-10-CM

## 2020-01-14 DIAGNOSIS — I63.9 STROKE: ICD-10-CM

## 2020-01-14 PROBLEM — E78.5 HYPERLIPIDEMIA: Status: ACTIVE | Noted: 2020-01-14

## 2020-01-14 PROBLEM — F10.10 ETOH ABUSE: Status: ACTIVE | Noted: 2020-01-14

## 2020-01-14 PROBLEM — E66.01 MORBID OBESITY: Status: ACTIVE | Noted: 2020-01-14

## 2020-01-14 LAB
ALBUMIN SERPL BCP-MCNC: 4 G/DL (ref 3.5–5.2)
ALP SERPL-CCNC: <20 U/L (ref 38–126)
ALT SERPL W/O P-5'-P-CCNC: 134 U/L (ref 10–44)
ANION GAP SERPL CALC-SCNC: 7 MMOL/L (ref 8–16)
AST SERPL-CCNC: 119 U/L (ref 15–46)
BASOPHILS # BLD AUTO: 0.05 K/UL (ref 0–0.2)
BASOPHILS NFR BLD: 0.9 % (ref 0–1.9)
BILIRUB SERPL-MCNC: 1 MG/DL (ref 0.1–1)
BUN SERPL-MCNC: 9 MG/DL (ref 2–20)
CALCIUM SERPL-MCNC: 9.3 MG/DL (ref 8.7–10.5)
CHLORIDE SERPL-SCNC: 97 MMOL/L (ref 95–110)
CHOLEST SERPL-MCNC: 226 MG/DL (ref 120–199)
CHOLEST/HDLC SERPL: 3.1 {RATIO} (ref 2–5)
CO2 SERPL-SCNC: 29 MMOL/L (ref 23–29)
CREAT SERPL-MCNC: 0.65 MG/DL (ref 0.5–1.4)
DIFFERENTIAL METHOD: ABNORMAL
EOSINOPHIL # BLD AUTO: 0.1 K/UL (ref 0–0.5)
EOSINOPHIL NFR BLD: 1.2 % (ref 0–8)
ERYTHROCYTE [DISTWIDTH] IN BLOOD BY AUTOMATED COUNT: 11.5 % (ref 11.5–14.5)
EST. GFR  (AFRICAN AMERICAN): >60 ML/MIN/1.73 M^2
EST. GFR  (NON AFRICAN AMERICAN): >60 ML/MIN/1.73 M^2
ESTIMATED AVG GLUCOSE: 143 MG/DL (ref 68–131)
GLUCOSE SERPL-MCNC: 172 MG/DL (ref 70–110)
HBA1C MFR BLD HPLC: 6.6 % (ref 4–5.6)
HCT VFR BLD AUTO: 42.5 % (ref 40–54)
HDLC SERPL-MCNC: 73 MG/DL (ref 40–75)
HDLC SERPL: 32.3 % (ref 20–50)
HGB BLD-MCNC: 15.1 G/DL (ref 14–18)
IMM GRANULOCYTES # BLD AUTO: 0.02 K/UL (ref 0–0.04)
IMM GRANULOCYTES NFR BLD AUTO: 0.4 % (ref 0–0.5)
INR PPP: 1.2 (ref 0.8–1.2)
LDLC SERPL CALC-MCNC: 138.2 MG/DL (ref 63–159)
LYMPHOCYTES # BLD AUTO: 1.1 K/UL (ref 1–4.8)
LYMPHOCYTES NFR BLD: 18.7 % (ref 18–48)
MCH RBC QN AUTO: 35.7 PG (ref 27–31)
MCHC RBC AUTO-ENTMCNC: 35.5 G/DL (ref 32–36)
MCV RBC AUTO: 101 FL (ref 82–98)
MONOCYTES # BLD AUTO: 0.6 K/UL (ref 0.3–1)
MONOCYTES NFR BLD: 10.2 % (ref 4–15)
NEUTROPHILS # BLD AUTO: 3.9 K/UL (ref 1.8–7.7)
NEUTROPHILS NFR BLD: 68.6 % (ref 38–73)
NONHDLC SERPL-MCNC: 153 MG/DL
NRBC BLD-RTO: 0 /100 WBC
PLATELET # BLD AUTO: 152 K/UL (ref 150–350)
PMV BLD AUTO: 10 FL (ref 9.2–12.9)
POCT GLUCOSE: 101 MG/DL (ref 70–110)
POCT GLUCOSE: 135 MG/DL (ref 70–110)
POCT GLUCOSE: 152 MG/DL (ref 70–110)
POTASSIUM SERPL-SCNC: 4.6 MMOL/L (ref 3.5–5.1)
PROT SERPL-MCNC: 7.3 G/DL (ref 6–8.4)
PROTHROMBIN TIME: 12.8 SEC (ref 9–12.5)
RBC # BLD AUTO: 4.23 M/UL (ref 4.6–6.2)
SODIUM SERPL-SCNC: 133 MMOL/L (ref 136–145)
TRIGL SERPL-MCNC: 74 MG/DL (ref 30–150)
TSH SERPL DL<=0.005 MIU/L-ACNC: 0.95 UIU/ML (ref 0.4–4)
WBC # BLD AUTO: 5.66 K/UL (ref 3.9–12.7)

## 2020-01-14 PROCEDURE — 99204 PR OFFICE/OUTPT VISIT, NEW, LEVL IV, 45-59 MIN: ICD-10-PCS | Mod: 95,,, | Performed by: PSYCHIATRY & NEUROLOGY

## 2020-01-14 PROCEDURE — S4991 NICOTINE PATCH NONLEGEND: HCPCS | Performed by: NURSE PRACTITIONER

## 2020-01-14 PROCEDURE — 82962 GLUCOSE BLOOD TEST: CPT | Mod: ER

## 2020-01-14 PROCEDURE — 96372 THER/PROPH/DIAG INJ SC/IM: CPT

## 2020-01-14 PROCEDURE — 27000221 HC OXYGEN, UP TO 24 HOURS: Mod: ER

## 2020-01-14 PROCEDURE — 25000003 PHARM REV CODE 250: Performed by: NURSE PRACTITIONER

## 2020-01-14 PROCEDURE — 83036 HEMOGLOBIN GLYCOSYLATED A1C: CPT

## 2020-01-14 PROCEDURE — 36415 COLL VENOUS BLD VENIPUNCTURE: CPT

## 2020-01-14 PROCEDURE — 85025 COMPLETE CBC W/AUTO DIFF WBC: CPT | Mod: ER

## 2020-01-14 PROCEDURE — 99204 OFFICE O/P NEW MOD 45 MIN: CPT | Mod: 95,,, | Performed by: PSYCHIATRY & NEUROLOGY

## 2020-01-14 PROCEDURE — 99285 EMERGENCY DEPT VISIT HI MDM: CPT | Mod: 25,ER

## 2020-01-14 PROCEDURE — 25000003 PHARM REV CODE 250: Performed by: FAMILY MEDICINE

## 2020-01-14 PROCEDURE — 84443 ASSAY THYROID STIM HORMONE: CPT | Mod: ER

## 2020-01-14 PROCEDURE — 93010 ELECTROCARDIOGRAM REPORT: CPT | Mod: ,,, | Performed by: INTERNAL MEDICINE

## 2020-01-14 PROCEDURE — C9399 UNCLASSIFIED DRUGS OR BIOLOG: HCPCS | Performed by: FAMILY MEDICINE

## 2020-01-14 PROCEDURE — 94760 N-INVAS EAR/PLS OXIMETRY 1: CPT | Mod: ER

## 2020-01-14 PROCEDURE — 80053 COMPREHEN METABOLIC PANEL: CPT | Mod: ER

## 2020-01-14 PROCEDURE — 63600175 PHARM REV CODE 636 W HCPCS: Performed by: FAMILY MEDICINE

## 2020-01-14 PROCEDURE — 85610 PROTHROMBIN TIME: CPT | Mod: ER

## 2020-01-14 PROCEDURE — G0378 HOSPITAL OBSERVATION PER HR: HCPCS

## 2020-01-14 PROCEDURE — 94761 N-INVAS EAR/PLS OXIMETRY MLT: CPT

## 2020-01-14 PROCEDURE — 93005 ELECTROCARDIOGRAM TRACING: CPT | Mod: ER

## 2020-01-14 PROCEDURE — 80061 LIPID PANEL: CPT

## 2020-01-14 PROCEDURE — 93010 EKG 12-LEAD: ICD-10-PCS | Mod: ,,, | Performed by: INTERNAL MEDICINE

## 2020-01-14 PROCEDURE — 25000003 PHARM REV CODE 250: Mod: ER | Performed by: EMERGENCY MEDICINE

## 2020-01-14 RX ORDER — DIAZEPAM 5 MG/1
5 TABLET ORAL EVERY 6 HOURS PRN
Status: COMPLETED | OUTPATIENT
Start: 2020-01-14 | End: 2020-01-15

## 2020-01-14 RX ORDER — IBUPROFEN 200 MG
16 TABLET ORAL
Status: DISCONTINUED | OUTPATIENT
Start: 2020-01-14 | End: 2020-01-14

## 2020-01-14 RX ORDER — AMLODIPINE BESYLATE 5 MG/1
5 TABLET ORAL
Status: COMPLETED | OUTPATIENT
Start: 2020-01-14 | End: 2020-01-14

## 2020-01-14 RX ORDER — ACETAMINOPHEN 500 MG
1000 TABLET ORAL
Status: COMPLETED | OUTPATIENT
Start: 2020-01-14 | End: 2020-01-14

## 2020-01-14 RX ORDER — GLUCAGON 1 MG
1 KIT INJECTION
Status: DISCONTINUED | OUTPATIENT
Start: 2020-01-14 | End: 2020-01-14

## 2020-01-14 RX ORDER — CLOPIDOGREL BISULFATE 75 MG/1
75 TABLET ORAL DAILY
Status: DISCONTINUED | OUTPATIENT
Start: 2020-01-15 | End: 2020-01-15 | Stop reason: HOSPADM

## 2020-01-14 RX ORDER — ENOXAPARIN SODIUM 100 MG/ML
40 INJECTION SUBCUTANEOUS EVERY 24 HOURS
Status: DISCONTINUED | OUTPATIENT
Start: 2020-01-14 | End: 2020-01-15 | Stop reason: HOSPADM

## 2020-01-14 RX ORDER — IBUPROFEN 200 MG
1 TABLET ORAL DAILY
Status: DISCONTINUED | OUTPATIENT
Start: 2020-01-14 | End: 2020-01-15 | Stop reason: HOSPADM

## 2020-01-14 RX ORDER — ASPIRIN 325 MG
325 TABLET ORAL
Status: COMPLETED | OUTPATIENT
Start: 2020-01-14 | End: 2020-01-14

## 2020-01-14 RX ORDER — SODIUM CHLORIDE 0.9 % (FLUSH) 0.9 %
10 SYRINGE (ML) INJECTION
Status: DISCONTINUED | OUTPATIENT
Start: 2020-01-14 | End: 2020-01-15 | Stop reason: HOSPADM

## 2020-01-14 RX ORDER — LISINOPRIL 5 MG/1
10 TABLET ORAL
Status: COMPLETED | OUTPATIENT
Start: 2020-01-14 | End: 2020-01-14

## 2020-01-14 RX ORDER — NAPROXEN SODIUM 220 MG/1
81 TABLET, FILM COATED ORAL DAILY
Status: DISCONTINUED | OUTPATIENT
Start: 2020-01-15 | End: 2020-01-15 | Stop reason: HOSPADM

## 2020-01-14 RX ORDER — ONDANSETRON 8 MG/1
8 TABLET, ORALLY DISINTEGRATING ORAL EVERY 6 HOURS PRN
Status: DISCONTINUED | OUTPATIENT
Start: 2020-01-14 | End: 2020-01-15 | Stop reason: HOSPADM

## 2020-01-14 RX ORDER — HYDRALAZINE HYDROCHLORIDE 20 MG/ML
10 INJECTION INTRAMUSCULAR; INTRAVENOUS EVERY 8 HOURS PRN
Status: DISCONTINUED | OUTPATIENT
Start: 2020-01-14 | End: 2020-01-15 | Stop reason: HOSPADM

## 2020-01-14 RX ORDER — IBUPROFEN 200 MG
24 TABLET ORAL
Status: DISCONTINUED | OUTPATIENT
Start: 2020-01-14 | End: 2020-01-14

## 2020-01-14 RX ORDER — IBUPROFEN 200 MG
24 TABLET ORAL
Status: DISCONTINUED | OUTPATIENT
Start: 2020-01-14 | End: 2020-01-15 | Stop reason: HOSPADM

## 2020-01-14 RX ORDER — INSULIN ASPART 100 [IU]/ML
0-5 INJECTION, SOLUTION INTRAVENOUS; SUBCUTANEOUS
Status: DISCONTINUED | OUTPATIENT
Start: 2020-01-14 | End: 2020-01-14

## 2020-01-14 RX ORDER — IBUPROFEN 200 MG
16 TABLET ORAL
Status: DISCONTINUED | OUTPATIENT
Start: 2020-01-14 | End: 2020-01-15 | Stop reason: HOSPADM

## 2020-01-14 RX ORDER — DIAZEPAM 5 MG/1
5 TABLET ORAL
Status: COMPLETED | OUTPATIENT
Start: 2020-01-14 | End: 2020-01-14

## 2020-01-14 RX ORDER — ATORVASTATIN CALCIUM 20 MG/1
20 TABLET, FILM COATED ORAL NIGHTLY
Status: DISCONTINUED | OUTPATIENT
Start: 2020-01-14 | End: 2020-01-15 | Stop reason: HOSPADM

## 2020-01-14 RX ORDER — ATORVASTATIN CALCIUM 40 MG/1
40 TABLET, FILM COATED ORAL NIGHTLY
Status: DISCONTINUED | OUTPATIENT
Start: 2020-01-14 | End: 2020-01-14

## 2020-01-14 RX ORDER — CLOPIDOGREL BISULFATE 75 MG/1
300 TABLET ORAL
Status: COMPLETED | OUTPATIENT
Start: 2020-01-14 | End: 2020-01-14

## 2020-01-14 RX ADMIN — INSULIN DETEMIR 10 UNITS: 100 INJECTION, SOLUTION SUBCUTANEOUS at 04:01

## 2020-01-14 RX ADMIN — DIAZEPAM 5 MG: 5 TABLET ORAL at 09:01

## 2020-01-14 RX ADMIN — LISINOPRIL 10 MG: 5 TABLET ORAL at 08:01

## 2020-01-14 RX ADMIN — DIAZEPAM 5 MG: 5 TABLET ORAL at 10:01

## 2020-01-14 RX ADMIN — ENOXAPARIN SODIUM 40 MG: 100 INJECTION SUBCUTANEOUS at 04:01

## 2020-01-14 RX ADMIN — ONDANSETRON 8 MG: 8 TABLET, ORALLY DISINTEGRATING ORAL at 04:01

## 2020-01-14 RX ADMIN — ASPIRIN 325 MG ORAL TABLET 325 MG: 325 PILL ORAL at 12:01

## 2020-01-14 RX ADMIN — DIAZEPAM 5 MG: 5 TABLET ORAL at 04:01

## 2020-01-14 RX ADMIN — ATORVASTATIN CALCIUM 20 MG: 20 TABLET, FILM COATED ORAL at 08:01

## 2020-01-14 RX ADMIN — IBUPROFEN 600 MG: 400 TABLET ORAL at 08:01

## 2020-01-14 RX ADMIN — AMLODIPINE BESYLATE 5 MG: 5 TABLET ORAL at 08:01

## 2020-01-14 RX ADMIN — NICOTINE 1 PATCH: 21 PATCH TRANSDERMAL at 08:01

## 2020-01-14 RX ADMIN — CLOPIDOGREL BISULFATE 300 MG: 75 TABLET ORAL at 12:01

## 2020-01-14 RX ADMIN — ACETAMINOPHEN 1000 MG: 500 TABLET ORAL at 08:01

## 2020-01-14 NOTE — ED NOTES
[facility] called for status update on room for pt at [facility]. [facility] states they have not been able to get in touch with the hospitalist at [facility] to get a room. Pt was accepted by regional hospitalist.

## 2020-01-14 NOTE — HPI
"48 y/o WM awoke 4a "shaking" and unsteady.  Facial asymmetry noted by his wife with "twitching" of left side of the face constantly.  Last known well around 10p by wife.  Patient is an alcoholic and diabetic.  "

## 2020-01-14 NOTE — ED NOTES
Pt told MD during eval that he had slurred speech and facial droop last night around 3AM. Pt did not disclose this info during triage or nurse assessment. Pt is asymptomatic at this time. No slurred speech or facial droop. No extremity weakness. Pt AAOx3. Telestroke ordered for neuro eval.

## 2020-01-14 NOTE — ED NOTES
Reports HTN for several weeks. Seen here 4 weeks ago for same complaint. Pt has not followed up with PCP. Pt also reports he is chronic alcoholic and drank 1 pint of liqueur last night. Pt denies any headache or dizziness. No blurred vision. Awaiting MD zepeda.

## 2020-01-14 NOTE — SUBJECTIVE & OBJECTIVE
Woke up with symptoms?: yes    Recent bleeding noted: no  Does the patient take any Blood Thinners? no  Medications: No relevant medications    Past Medical History:   Diagnosis Date    Alcoholic     Diabetes mellitus     Erectile dysfunction     GERD (gastroesophageal reflux disease)     Hypertension     ABHILASH on CPAP     Peyronie's disease     Toxic effect of contact with stingray 11/2018    right wrist     Past Surgical History:   Procedure Laterality Date    LASIK      ISRAEL PLICATION N/A 11/27/2018    Procedure: PLICATION, PENIS;  Surgeon: Ralph Wilcox MD;  Location[facility];  Service: Urology;  Laterality: N/A;    VASECTOMY       Social History     Tobacco Use    Smoking status: Current Every Day Smoker     Packs/day: 1.50     Types: Cigarettes    Smokeless tobacco: Never Used    Tobacco comment: Using patches   Substance Use Topics    Alcohol use: Yes     Alcohol/week: 6.0 - 12.0 standard drinks     Types: 6 - 12 Cans of beer per week     Comment: heavy etoh use daily    Drug use: No     Family History   Problem Relation Age of Onset    Clotting disorder Father     Heart disease Father     Hypertension Father     Heart disease Mother     Hypertension Mother     Heart disease Maternal Aunt     Heart disease Maternal Uncle     Heart disease Paternal Aunt     Heart disease Paternal Uncle     Hypertension Sister     Hypertension Brother     Prostate cancer Brother      Current Facility-Administered Medications on File Prior to Encounter   Medication Dose Route Frequency Provider Last Rate Last Dose    alprostadil injection 20 mcg  20 mcg Intracavitary Once Ralph Wilcox MD         Current Outpatient Medications on File Prior to Encounter   Medication Sig Dispense Refill    albuterol (PROVENTIL/VENTOLIN HFA) 90 mcg/actuation inhaler Inhale 1-2 puffs into the lungs every 6 (six) hours as needed for Wheezing. Rescue 1 Inhaler 2    amlodipine (NORVASC) 5 MG tablet Take 2  tablets (10 mg total) by mouth once daily. 30 tablet 0    diphenhydrAMINE (BENADRYL) 25 mg capsule Take 1 each (25 mg total) by mouth every 6 (six) hours as needed for Itching.  0    fluticasone-salmeterol diskus inhaler 250-50 mcg Inhale 1 puff into the lungs 2 (two) times daily. Controller.  Do not take during acute exacerbations. 60 each 0    lisinopril (PRINIVIL,ZESTRIL) 20 MG tablet Take 40 mg by mouth once daily.       metFORMIN (GLUCOPHAGE) 500 MG tablet Take 500 mg by mouth 2 (two) times daily with meals.      omeprazole (PRILOSEC) 20 MG capsule One po bid for 7 days then one each morning (Patient taking differently: Take 20 mg by mouth once daily. ) 30 capsule 1    sildenafil (REVATIO) 20 mg Tab Take 1 tablet (20 mg total) by mouth As instructed. Take 2-5 tablets as needed. 100 tablet 11   Allergies: Codeine  Pcn [Penicillins]  Betadine [Povidone-Iodine]     Review of Systems   All other systems reviewed and are negative.    Objective:   Vitals: Blood pressure (!) 186/96, pulse 108, temperature 98.1 °F (36.7 °C), temperature source Oral, resp. rate 18, height 6' (1.829 m), weight 113.4 kg (250 lb), SpO2 96 %.  Glu 152    CT READ: Yes  No hemmorhage. No mass effect. No early infarct signs.     Physical Exam   Vitals reviewed.

## 2020-01-14 NOTE — PLAN OF CARE
" (Physician in Lead of Transfers)   Outside Transfer Acceptance Note / Regional Referral Center      Transferring Physician: Areli Charles MD    Accepting Physician: Sunitha Post MD    Date of Acceptance: 01/14/2020    Transferring Facility: [facility] ED     Reason for Transfer: facial twitching needing neurology consult    Report from Transferring Physician/Hospital course:  Patient is a 49 y.o. male who has a past medical history of Alcoholic, Diabetes mellitus, Erectile dysfunction, GERD, Hypertension, ABHILASH on CPAP, Peyronie's disease, and Toxic effect of contact with stingray presented with suddend onset generalized shaking, facial asymmetry, and left facial twitching at 4 am. Since then the symptoms has resolved with residual L facial numbness. Initial NIH score was 0 and has not changed since then. Tele stroke was conducted who stated that "the etiology uncertain but the positive phenomena raise the prospect of focal seizure. Stroke syndrome less likely but in DDx. Not a tPA (nor interventional) candidate as awoke with symptoms and only with L facial numbness at this point. Rec admission for MRI, vascular imaging and possible EEG."    Vital Signs (Most Recent):  Temp: 98.1 °F (36.7 °C) (01/14/20 0736)  Pulse: 89(Simultaneous filing. User may not have seen previous data.) (01/14/20 0838)  Resp: 18 (01/14/20 0838)  BP: (!) 163/87 (01/14/20 0838)  SpO2: 97 %(Simultaneous filing. User may not have seen previous data.) (01/14/20 0838) Vital Signs (24h Range):  Temp:  [98.1 °F (36.7 °C)] 98.1 °F (36.7 °C)  Pulse:  [] 89  Resp:  [18-20] 18  SpO2:  [94 %-97 %] 97 %  BP: (163-186)/(87-96) 163/87       Labs & Radiographs: see EPIC    Significant Labs:   CMP:   Recent Labs   Lab 01/14/20  0835   *   K 4.6   CL 97   CO2 29   *   BUN 9   CREATININE 0.65   CALCIUM 9.3   PROT 7.3   ALBUMIN 4.0   BILITOT 1.0   ALKPHOS <20*   *   *   ANIONGAP 7*   ESTGFRAFRICA >60.0 "   EGFRNONAA >60.0   , CBC:   Recent Labs   Lab 01/14/20  0835   WBC 5.66   HGB 15.1   HCT 42.5      , INR:   Recent Labs   Lab 01/14/20  0835   INR 1.2    and Troponin No results for input(s): TROPONINI in the last 48 hours.    Significant Imaging:     CT head: No acute abnormality      To Do List:   1. Admit to   2. Monitor for ETOH withdrawal  3. Consult neurology  4. MRI  5. EEG  6. 2D echo  7. TIA work up        Sunitha Post MD  Hospital Medicine Staff

## 2020-01-14 NOTE — ASSESSMENT & PLAN NOTE
TIA  Facial twitching  Essential hypertension  Hyperlipidemia  CT head - no acute findings  Loaded with ASa and Plavix, continue ASA and Plavix  Allow permissive hypertension- hold home meds. Continue with Hydralazine prn  Awaiting echo, MRI head, MRA head and neck.   PT/OT/SLP eval and treat.   FU lipid panel- cholesterol elevated a 226   Start Lipitor  HbA1C.- 6.6  EEG   Consult neurology  tele stroke consult

## 2020-01-14 NOTE — ED NOTES
Attempted to call report. [place] transferred me to receiving nurse's phone. No answer after several rings.

## 2020-01-14 NOTE — ED PROVIDER NOTES
Encounter Date: 1/14/2020       History     Chief Complaint   Patient presents with    Hypertension     Pt reports seen here for high blood pressure 4 weeks ago and blood pressure is still running high. Pt rerpots he drank a 5th of Vodka last night.     Patient is a 49y WM hx DM, HTN, alcoholism presenting with multiple complaints.  Wife stated he woke up about 7 am and she noticed his face looked strange on the left side and 30 minutes later he started slurring his speech.  She also noted facial twitching on both sides of his face. She states she has never seen him like this before.  He complains of uncontrolled hypertension for the past 2-3 days despite medication complaince.      Accucheck here 152        Review of patient's allergies indicates:   Allergen Reactions    Codeine Nausea And Vomiting    Pcn [penicillins] Other (See Comments)     Unknown reaction    Betadine [povidone-iodine] Swelling and Other (See Comments)     redness     Past Medical History:   Diagnosis Date    Alcoholic     Diabetes mellitus     Erectile dysfunction     GERD (gastroesophageal reflux disease)     Hypertension     ABHILASH on CPAP     Peyronie's disease     Toxic effect of contact with stingray 11/2018    right wrist     Past Surgical History:   Procedure Laterality Date    LASIK      ISRAEL PLICATION N/A 11/27/2018    Procedure: PLICATION, PENIS;  Surgeon: Ralph Wilcox MD;  Location: [facility];  Service: Urology;  Laterality: N/A;    VASECTOMY       Family History   Problem Relation Age of Onset    Clotting disorder Father     Heart disease Father     Hypertension Father     Heart disease Mother     Hypertension Mother     Heart disease Maternal Aunt     Heart disease Maternal Uncle     Heart disease Paternal Aunt     Heart disease Paternal Uncle     Hypertension Sister     Hypertension Brother     Prostate cancer Brother      Social History     Tobacco Use    Smoking status: Current Every Day Smoker      Packs/day: 1.50     Types: Cigarettes    Smokeless tobacco: Never Used    Tobacco comment: Using patches   Substance Use Topics    Alcohol use: Yes     Alcohol/week: 6.0 - 12.0 standard drinks     Types: 6 - 12 Cans of beer per week     Comment: heavy etoh use daily    Drug use: No     Review of Systems   Constitutional: Negative for chills and fever.   Respiratory: Negative for shortness of breath.    Cardiovascular: Negative for chest pain.   Gastrointestinal: Negative for abdominal pain and vomiting.   Genitourinary: Negative for difficulty urinating.   Musculoskeletal: Negative for back pain.   Skin: Negative for wound.   Neurological: Positive for tremors and weakness. Negative for dizziness.   Psychiatric/Behavioral: Negative for behavioral problems.   All other systems reviewed and are negative.      Physical Exam     Initial Vitals [01/14/20 0736]   BP Pulse Resp Temp SpO2   (!) 186/96 108 18 98.1 °F (36.7 °C) 96 %      MAP       --         Physical Exam    Nursing note and vitals reviewed.  Constitutional: He appears well-developed and well-nourished.   HENT:   Head: Normocephalic and atraumatic.   Mouth/Throat: Oropharynx is clear and moist.   Eyes: EOM are normal. Pupils are equal, round, and reactive to light.   Neck: Normal range of motion. Neck supple. No JVD present.   Cardiovascular: Normal rate and intact distal pulses.   Pulmonary/Chest: Breath sounds normal. No stridor. No respiratory distress. He has no wheezes. He has no rhonchi. He has no rales. He exhibits no tenderness.   Abdominal: Soft. He exhibits no distension and no mass. There is no tenderness. There is no rebound and no guarding.   Musculoskeletal: Normal range of motion. He exhibits no edema or tenderness.   Neurological: He is alert and oriented to person, place, and time. GCS score is 15. GCS eye subscore is 4. GCS verbal subscore is 5. GCS motor subscore is 6.   No tongue fasciculations  Mild resting tremor of hands   Skin:  Skin is warm. Capillary refill takes less than 2 seconds.         ED Course   Critical Care  Date/Time: 1/14/2020 12:22 AM  Performed by: Areli Charles MD  Authorized by: Areli Charles MD   Direct patient critical care time: 45 minutes  Total critical care time (exclusive of procedural time) : 45 minutes  Critical care was necessary to treat or prevent imminent or life-threatening deterioration of the following conditions: CNS failure or compromise.        Labs Reviewed   CBC W/ AUTO DIFFERENTIAL - Abnormal; Notable for the following components:       Result Value    RBC 4.23 (*)     Mean Corpuscular Volume 101 (*)     Mean Corpuscular Hemoglobin 35.7 (*)     All other components within normal limits   COMPREHENSIVE METABOLIC PANEL - Abnormal; Notable for the following components:    Sodium 133 (*)     Glucose 172 (*)     Alkaline Phosphatase <20 (*)      (*)      (*)     Anion Gap 7 (*)     All other components within normal limits   PROTIME-INR - Abnormal; Notable for the following components:    Prothrombin Time 12.8 (*)     All other components within normal limits   LIPID PANEL - Abnormal; Notable for the following components:    Cholesterol 226 (*)     All other components within normal limits   POCT GLUCOSE - Abnormal; Notable for the following components:    POCT Glucose 152 (*)     All other components within normal limits   TSH   POCT GLUCOSE, HAND-HELD DEVICE     EKG Readings: (Independently Interpreted)   Initial Reading: No STEMI. Rhythm: Normal Sinus Rhythm. Heart Rate: 88. Ectopy: No Ectopy. Conduction: incomplete RBBB. Axis: Normal. Other Impression: QTc 464     ECG Results          ECG 12 lead (Final result)  Result time 01/14/20 08:50:23    Final result by Interface, Lab In [facility] (01/14/20 08:50:23)                 Narrative:    Test Reason : I63.9,    Vent. Rate : 088 BPM     Atrial Rate : 088 BPM     P-R Int : 138 ms          QRS Dur : 094 ms      QT Int : 384 ms       P-R-T  Axes : 050 068 035 degrees     QTc Int : 464 ms    Normal sinus rhythm  Incomplete right bundle branch block  Borderline Abnormal ECG  When compared with ECG of 17-DEC-2019 05:39,  No significant change was found  Confirmed by Veto Barkley MD ([id number]) on 1/14/2020 8:50:11 AM    Referred By: ROSARIO   SELF           Confirmed By:Veto Barkley MD                            Imaging Results          X-Ray Chest AP Portable (Final result)  Result time 01/14/20 08:49:01    Final result by IAN Seals Sr., MD (01/14/20 08:49:01)                 Impression:      1. The current examination is limited secondary to the lack of inclusion of the entire thorax on the film. The costophrenic angles are not completely included on the film.  2. The visualized portion of the lungs is clear.  .      Electronically signed by: Martin Seals MD  Date:    01/14/2020  Time:    08:49             Narrative:    EXAMINATION:  XR CHEST AP PORTABLE    CLINICAL HISTORY:  Stroke;    COMPARISON:  12/17/2019    FINDINGS:  The current examination is limited secondary to the lack of inclusion of the entire thorax on the film.  The costophrenic angles are not completely included on the film.  The size of the heart is normal.  The visualized portion of the lungs is clear.  There is no pneumothorax.                               CT Head Without Contrast (Final result)  Result time 01/14/20 08:32:52    Final result by Minesh Steele MD (01/14/20 08:32:52)                 Impression:      No acute abnormality.    Sinus disease.    All CT scans at this facility use dose modulation, iterative reconstruction, and/or weight based dosing when appropriate to reduce radiation dose to as low as reasonable achievable.      Electronically signed by: Minesh Steele MD  Date:    01/14/2020  Time:    08:32             Narrative:    EXAMINATION:  CT HEAD WITHOUT CONTRAST    CLINICAL HISTORY:  Stroke;    TECHNIQUE:  Low dose axial CT images obtained  throughout the head without intravenous contrast. Sagittal and coronal reconstructions were performed.    All CT scans at this facility use dose modulation, iterative reconstruction, and/or weight based dosing when appropriate to reduce radiation dose to as low as reasonable achievable.    COMPARISON:  None.    FINDINGS:  Intracranial compartment:    The brain parenchyma appears normal. No parenchymal mass, hemorrhage, edema or major vascular distribution infarct.    Ventricles and sulci are normal in size for age without evidence of hydrocephalus.    No extra-axial blood or fluid collections.    Skull/extracranial contents (limited evaluation): No fracture.  Opacification left maxillary sinus partially visualized.  Diffuse sinus mucosal thickening.  Mastoid air cells appear clear.                                 Medical Decision Making:   ED Management:  This is an emergent evaluation of a 49y WM hx alcoholism, HTN, DM presenting with reported facial droop, facial numbness and slurred speech.  Exam he is non toxic, afebrile and NIH 0.  Labs did not show leukocytosis or anemia.  NA mildly low at 133.    CT head negative.     Spoke with telestroke who stated patient is not a TPA candidate.  Recommends MRI and EEG along with TIA workup.  Will transfer to facility that has those capabilities likely [facility].   Patient transfer delayed 2/2 push back from [facility] ED.  Contacted transfer center who planned transfer to [facility].   Given concern for patient safety and continued delay of transfer, contacted [facility] ED and directly discussed with ED physician Dr. Méndez the need for STAT MRI and transfer along with the importance of physician to physician communication  instead of liaisons.  Patient accepted by [facility] hospitalist Dr. Mayo who will order stat MRI upon arrival.  EEG is available in the morning.   Patient loaded with 300 mg plavix and 325mg aspirin.    Other:   I have discussed this case with another health care  provider.    Additional MDM:     NIH Stroke Scale:   Interval = baseline (upon arrival/admit)  Level of consciousness = 0 - alert  LOC questions = 0 - answers both correctly  Best gaze = 0 - normal  Visual = 0 - no visual loss  Facial palsy = 0 - normal  Motor left arm =  0 - no drift  Motor right arm =  0 - no drift  Motor left leg = 0 - no drift  Limb ataxia = 0 - absent  Sensory = 0 - normal  Dysarthria = 0 - normal articulation  Extinction and inattention = 0 - no neglect  NIH Stroke Scale Total = 0                              Clinical Impression:       ICD-10-CM ICD-9-CM   1. TIA (transient ischemic attack) G45.9 435.9   2. Stroke I63.9 434.91   3. Seizure-like activity R56.9 780.39   4. Alcoholism F10.20 303.90   5. Hypertension, unspecified type I10 401.9                             Areli Charles MD  01/14/20 1312       Areli Charles MD  02/06/20 0022

## 2020-01-14 NOTE — CONSULTS
[facility]  Vascular Neurology  Comprehensive Stroke Center  Tele-Consultation Note      Consults    Consulting Provider: ANTONI WARE  Current Providers  No providers found    Patient Location:  [facility] EMERGENCY DEPARTMENT Emergency Department  Spoke hospital nurse at bedside with patient assisting consultant.     Patient information was obtained from patient and spouse/SO.         Assessment/Plan:     STROKE DOCUMENTATION     Acute Stroke Times:   Acute Stroke Times   Stroke Team Arrival Time: 0815    NIH Scale:  Interval: baseline  1a. Level of Consciousness: 0-->Alert, keenly responsive  1b. LOC Questions: 0-->Answers both questions correctly  1c. LOC Commands: 0-->Performs both tasks correctly  2. Best Gaze: 0-->Normal  3. Visual: 0-->No visual loss  4. Facial Palsy: 0-->Normal symmetrical movements  5a. Motor Arm, Left: 0-->No drift, limb holds 90 (or 45) degrees for full 10 secs  5b. Motor Arm, Right: 0-->No drift, limb holds 90 (or 45) degrees for full 10 secs  6a. Motor Leg, Left: 0-->No drift, leg holds 30 degree position for full 5 secs  6b. Motor Leg, Right: 0-->No drift, leg holds 30 degree position for full 5 secs  7. Limb Ataxia: 0-->Absent  8. Sensory: 1-->Mild-to-moderate sensory loss, patient feels pinprick is less sharp or is dull on the affected side, or there is a loss of superficial pain with pinprick, but patient is aware of being touched(mild decrease L face)  9. Best Language: 0-->No aphasia, normal  10. Dysarthria: 0-->Normal  11. Extinction and Inattention (formerly Neglect): 0-->No abnormality  Total (NIH Stroke Scale): 1     Modified Deepali    Wharton Coma Scale:    ABCD2 Score:    [id number] Score:   HAS -BLED Score:   ICH Score:   Hunt & Pennington Classification:       Diagnoses:   Facial twitching  Generalized shaking and L facial twitching that has resolved with residual L facial numbness:  Etiology uncertain but the positive phenomena raise the  "prospect of focal seizure.  Stroke syndrome less likely but in DDx.  Not a tPA (nor interventional) candidate as awoke with symptoms and only with L facial numbness at this point.  Rec admission for MRI, vascular imaging and possible EEG.        Blood pressure (!) 186/96, pulse 108, temperature 98.1 °F (36.7 °C), temperature source Oral, resp. rate 18, height 6' (1.829 m), weight 113.4 kg (250 lb), SpO2 96 %.  Alteplase Eligible?: No  Alteplase Recommendation: Alteplase not recommended due to Outside of treatment window  and minimal deficit   Possible Interventional Revascularization Candidate? No; No significant neurological deficit    Disposition Recommendation: admit to inpatient    Subjective:     History of Present Illness:  48 y/o WM awoke 4a "shaking" and unsteady.  Facial asymmetry noted by his wife with "twitching" of left side of the face constantly.  Last known well around 10p by wife.  Patient is an alcoholic and diabetic.      Woke up with symptoms?: yes    Recent bleeding noted: no  Does the patient take any Blood Thinners? no  Medications: No relevant medications    Past Medical History:   Diagnosis Date    Alcoholic     Diabetes mellitus     Erectile dysfunction     GERD (gastroesophageal reflux disease)     Hypertension     ABHILASH on CPAP     Peyronie's disease     Toxic effect of contact with stingray 11/2018    right wrist     Past Surgical History:   Procedure Laterality Date    LASIK      ISRAEL PLICATION N/A 11/27/2018    Procedure: PLICATION, PENIS;  Surgeon: Ralph Wilcox MD;  [facility];  Service: Urology;  Laterality: N/A;    VASECTOMY       Social History     Tobacco Use    Smoking status: Current Every Day Smoker     Packs/day: 1.50     Types: Cigarettes    Smokeless tobacco: Never Used    Tobacco comment: Using patches   Substance Use Topics    Alcohol use: Yes     Alcohol/week: 6.0 - 12.0 standard drinks     Types: 6 - 12 Cans of beer per week     Comment: heavy " etoh use daily    Drug use: No     Family History   Problem Relation Age of Onset    Clotting disorder Father     Heart disease Father     Hypertension Father     Heart disease Mother     Hypertension Mother     Heart disease Maternal Aunt     Heart disease Maternal Uncle     Heart disease Paternal Aunt     Heart disease Paternal Uncle     Hypertension Sister     Hypertension Brother     Prostate cancer Brother      Current Facility-Administered Medications on File Prior to Encounter   Medication Dose Route Frequency Provider Last Rate Last Dose    alprostadil injection 20 mcg  20 mcg Intracavitary Once Ralph Wilcox MD         Current Outpatient Medications on File Prior to Encounter   Medication Sig Dispense Refill    albuterol (PROVENTIL/VENTOLIN HFA) 90 mcg/actuation inhaler Inhale 1-2 puffs into the lungs every 6 (six) hours as needed for Wheezing. Rescue 1 Inhaler 2    amlodipine (NORVASC) 5 MG tablet Take 2 tablets (10 mg total) by mouth once daily. 30 tablet 0    diphenhydrAMINE (BENADRYL) 25 mg capsule Take 1 each (25 mg total) by mouth every 6 (six) hours as needed for Itching.  0    fluticasone-salmeterol diskus inhaler 250-50 mcg Inhale 1 puff into the lungs 2 (two) times daily. Controller.  Do not take during acute exacerbations. 60 each 0    lisinopril (PRINIVIL,ZESTRIL) 20 MG tablet Take 40 mg by mouth once daily.       metFORMIN (GLUCOPHAGE) 500 MG tablet Take 500 mg by mouth 2 (two) times daily with meals.      omeprazole (PRILOSEC) 20 MG capsule One po bid for 7 days then one each morning (Patient taking differently: Take 20 mg by mouth once daily. ) 30 capsule 1    sildenafil (REVATIO) 20 mg Tab Take 1 tablet (20 mg total) by mouth As instructed. Take 2-5 tablets as needed. 100 tablet 11   Allergies: Codeine  Pcn [Penicillins]  Betadine [Povidone-Iodine]     Review of Systems   All other systems reviewed and are negative.    Objective:   Vitals: Blood pressure (!)  186/96, pulse 108, temperature 98.1 °F (36.7 °C), temperature source Oral, resp. rate 18, height 6' (1.829 m), weight 113.4 kg (250 lb), SpO2 96 %.  Glu 152    CT READ: Yes  No hemmorhage. No mass effect. No early infarct signs.     Physical Exam   Vitals reviewed.            Recommended the emergency room physician to have a brief discussion with the patient and/or family if available regarding the risks and benefits of treatment, and to briefly document the occurrence of that discussion in his clinical encounter note.     The attending portion of this evaluation, treatment, and documentation was performed per Hang Pressley MD via audiovisual.    Billing code:  (time dependent stroke, complex case, unstable patient, hemorrhages, any intervention, some mimics)    · There is a possibility for acute neurological change leading to clinical and possibly life-threatening deterioration requiring highest level of physician preparedness for urgent intervention.  · There is possibility that this condition will require treatment with high risk medications as quickly as possible.  · Care was coordinated with other physicians involved in the patient's care.  · Radiologic studies and laboratory data were reviewed and interpreted, and plan of care was re-assessed based on the results.  · Diagnosis, treatment options and prognosis may have been discussed with the patient and/or family members or caregiver.  · Further advanced medical management and further evaluation is warranted for his care.      In your opinion, this was a: Tier 2 Van Negative    Consult End Time: 8:31 AM     Hang Pressley MD  [facility] Stroke Center  Vascular Neurology   [facility]

## 2020-01-14 NOTE — ED NOTES
[facility] does not have any tele beds. Attempting to find bed at [facility].

## 2020-01-14 NOTE — HPI
"Veto Park is a 48 y/o M with a past medical history of alcoholism, Diabetes mellitus, Erectile dysfunction, GERD, Hypertension, ABHILASH on CPAP, Peyronie's disease, presented today with sudden onset generalized shaking, facial asymmetry, and left facial twitching at 4 am. Since then the symptoms has resolved with residual L facial numbness. Initial NIH score was 0 and has not changed since then. Tele stroke was conducted who stated that "the etiology uncertain but the positive phenomena raise the prospect of focal seizure. Stroke syndrome less likely but in DDx. Not a tPA (nor interventional) candidate as awoke with symptoms and only with L facial numbness at this point. Rec admission for MRI, vascular imaging and possible EEG." CT head with no acute abnormality. Transferred from [facility] for stroke work up.  "

## 2020-01-14 NOTE — NURSING
Patient given education on Strokes, risk factors including HTN, DM, ABHILASH, HLD, Smoking, unhealthy diet, s/s of strokes, and plan for MRI/MRA.  Teachback performed to prove understanding and Stroke Education Packet given to patient and individualized according to his care.

## 2020-01-15 ENCOUNTER — CLINICAL SUPPORT (OUTPATIENT)
Dept: SMOKING CESSATION | Facility: CLINIC | Age: 50
End: 2020-01-15
Payer: COMMERCIAL

## 2020-01-15 VITALS
DIASTOLIC BLOOD PRESSURE: 80 MMHG | TEMPERATURE: 98 F | HEART RATE: 86 BPM | RESPIRATION RATE: 18 BRPM | SYSTOLIC BLOOD PRESSURE: 133 MMHG | OXYGEN SATURATION: 96 % | BODY MASS INDEX: 33.83 KG/M2 | WEIGHT: 249.81 LBS | HEIGHT: 72 IN

## 2020-01-15 DIAGNOSIS — F17.210 CIGARETTE SMOKER: Primary | ICD-10-CM

## 2020-01-15 LAB
ALBUMIN SERPL BCP-MCNC: 3.6 G/DL (ref 3.5–5.2)
ALP SERPL-CCNC: 20 U/L (ref 55–135)
ALT SERPL W/O P-5'-P-CCNC: 106 U/L (ref 10–44)
ANION GAP SERPL CALC-SCNC: 13 MMOL/L (ref 8–16)
ANION GAP SERPL CALC-SCNC: 13 MMOL/L (ref 8–16)
AORTIC ROOT ANNULUS: 3.12 CM
ASCENDING AORTA: 2.62 CM
AST SERPL-CCNC: 61 U/L (ref 10–40)
AV INDEX (PROSTH): 1.08
AV MEAN GRADIENT: 4 MMHG
AV PEAK GRADIENT: 7 MMHG
AV VALVE AREA: 4.44 CM2
AV VELOCITY RATIO: 0.91
BILIRUB SERPL-MCNC: 0.8 MG/DL (ref 0.1–1)
BSA FOR ECHO PROCEDURE: 2.4 M2
BUN SERPL-MCNC: 10 MG/DL (ref 6–20)
BUN SERPL-MCNC: 10 MG/DL (ref 6–20)
CALCIUM SERPL-MCNC: 9.4 MG/DL (ref 8.7–10.5)
CALCIUM SERPL-MCNC: 9.4 MG/DL (ref 8.7–10.5)
CHLORIDE SERPL-SCNC: 99 MMOL/L (ref 95–110)
CHLORIDE SERPL-SCNC: 99 MMOL/L (ref 95–110)
CO2 SERPL-SCNC: 25 MMOL/L (ref 23–29)
CO2 SERPL-SCNC: 25 MMOL/L (ref 23–29)
CREAT SERPL-MCNC: 1.1 MG/DL (ref 0.5–1.4)
CREAT SERPL-MCNC: 1.1 MG/DL (ref 0.5–1.4)
CV ECHO LV RWT: 0.42 CM
DOP CALC AO PEAK VEL: 1.3 M/S
DOP CALC AO VTI: 18.83 CM
DOP CALC LVOT AREA: 4.1 CM2
DOP CALC LVOT DIAMETER: 2.29 CM
DOP CALC LVOT PEAK VEL: 1.18 M/S
DOP CALC LVOT STROKE VOLUME: 83.53 CM3
DOP CALCLVOT PEAK VEL VTI: 20.29 CM
E WAVE DECELERATION TIME: 190.78 MSEC
E/A RATIO: 1.02
ECHO LV POSTERIOR WALL: 1.18 CM (ref 0.6–1.1)
EST. GFR  (AFRICAN AMERICAN): >60 ML/MIN/1.73 M^2
EST. GFR  (AFRICAN AMERICAN): >60 ML/MIN/1.73 M^2
EST. GFR  (NON AFRICAN AMERICAN): >60 ML/MIN/1.73 M^2
EST. GFR  (NON AFRICAN AMERICAN): >60 ML/MIN/1.73 M^2
FRACTIONAL SHORTENING: 25 % (ref 28–44)
GLUCOSE SERPL-MCNC: 117 MG/DL (ref 70–110)
GLUCOSE SERPL-MCNC: 117 MG/DL (ref 70–110)
INTERVENTRICULAR SEPTUM: 1.02 CM (ref 0.6–1.1)
IVRT: 0.07 MSEC
LA MAJOR: 4.79 CM
LA MINOR: 4.96 CM
LA WIDTH: 3.37 CM
LEFT ATRIUM SIZE: 4.07 CM
LEFT ATRIUM VOLUME INDEX: 24.3 ML/M2
LEFT ATRIUM VOLUME: 56.82 CM3
LEFT INTERNAL DIMENSION IN SYSTOLE: 4.18 CM (ref 2.1–4)
LEFT VENTRICLE DIASTOLIC VOLUME INDEX: 64.83 ML/M2
LEFT VENTRICLE DIASTOLIC VOLUME: 151.79 ML
LEFT VENTRICLE MASS INDEX: 106 G/M2
LEFT VENTRICLE SYSTOLIC VOLUME INDEX: 33.2 ML/M2
LEFT VENTRICLE SYSTOLIC VOLUME: 77.65 ML
LEFT VENTRICULAR INTERNAL DIMENSION IN DIASTOLE: 5.57 CM (ref 3.5–6)
LEFT VENTRICULAR MASS: 247.11 G
MV PEAK A VEL: 0.62 M/S
MV PEAK E VEL: 0.63 M/S
POCT GLUCOSE: 116 MG/DL (ref 70–110)
POCT GLUCOSE: 235 MG/DL (ref 70–110)
POTASSIUM SERPL-SCNC: 4.5 MMOL/L (ref 3.5–5.1)
POTASSIUM SERPL-SCNC: 4.5 MMOL/L (ref 3.5–5.1)
PROT SERPL-MCNC: 7.2 G/DL (ref 6–8.4)
PULM VEIN S/D RATIO: 1.75
PV PEAK D VEL: 0.28 M/S
PV PEAK S VEL: 0.49 M/S
RA MAJOR: 4.35 CM
RA PRESSURE: 3 MMHG
RA WIDTH: 3.27 CM
RIGHT VENTRICULAR END-DIASTOLIC DIMENSION: 2.89 CM
SODIUM SERPL-SCNC: 137 MMOL/L (ref 136–145)
SODIUM SERPL-SCNC: 137 MMOL/L (ref 136–145)
STJ: 2.28 CM

## 2020-01-15 PROCEDURE — 99900035 HC TECH TIME PER 15 MIN (STAT)

## 2020-01-15 PROCEDURE — S4991 NICOTINE PATCH NONLEGEND: HCPCS | Performed by: NURSE PRACTITIONER

## 2020-01-15 PROCEDURE — 99999 PR PBB SHADOW E&M-EST. PATIENT-LVL II: CPT | Mod: PBBFAC,,,

## 2020-01-15 PROCEDURE — 94660 CPAP INITIATION&MGMT: CPT

## 2020-01-15 PROCEDURE — G0378 HOSPITAL OBSERVATION PER HR: HCPCS

## 2020-01-15 PROCEDURE — 95816 EEG AWAKE AND DROWSY: CPT | Mod: 26,,, | Performed by: PSYCHIATRY & NEUROLOGY

## 2020-01-15 PROCEDURE — 25000003 PHARM REV CODE 250: Performed by: FAMILY MEDICINE

## 2020-01-15 PROCEDURE — 99999 PR PBB SHADOW E&M-EST. PATIENT-LVL II: ICD-10-PCS | Mod: PBBFAC,,,

## 2020-01-15 PROCEDURE — 99900038 HC OT GENERIC THERAPY SCREENING (STAT)

## 2020-01-15 PROCEDURE — 99407 BEHAV CHNG SMOKING > 10 MIN: CPT | Mod: S$GLB,,,

## 2020-01-15 PROCEDURE — 99407 PR TOBACCO USE CESSATION INTENSIVE >10 MINUTES: ICD-10-PCS | Mod: S$GLB,,,

## 2020-01-15 PROCEDURE — 36415 COLL VENOUS BLD VENIPUNCTURE: CPT

## 2020-01-15 PROCEDURE — 95816 PR EEG,W/AWAKE & DROWSY RECORD: ICD-10-PCS | Mod: 26,,, | Performed by: PSYCHIATRY & NEUROLOGY

## 2020-01-15 PROCEDURE — 25000003 PHARM REV CODE 250: Performed by: NURSE PRACTITIONER

## 2020-01-15 PROCEDURE — 80053 COMPREHEN METABOLIC PANEL: CPT

## 2020-01-15 PROCEDURE — 27000190 HC CPAP FULL FACE MASK W/VALVE

## 2020-01-15 PROCEDURE — 99900037 HC PT THERAPY SCREENING (STAT)

## 2020-01-15 RX ORDER — ATORVASTATIN CALCIUM 20 MG/1
20 TABLET, FILM COATED ORAL NIGHTLY
Qty: 90 TABLET | Refills: 3 | Status: SHIPPED | OUTPATIENT
Start: 2020-01-15 | End: 2022-04-29

## 2020-01-15 RX ORDER — CLOPIDOGREL BISULFATE 75 MG/1
75 TABLET ORAL DAILY
Qty: 30 TABLET | Refills: 11 | Status: ON HOLD | OUTPATIENT
Start: 2020-01-16 | End: 2021-05-28 | Stop reason: HOSPADM

## 2020-01-15 RX ORDER — NAPROXEN SODIUM 220 MG/1
81 TABLET, FILM COATED ORAL DAILY
Qty: 30 TABLET | Refills: 0 | Status: SHIPPED | OUTPATIENT
Start: 2020-01-16 | End: 2022-04-26

## 2020-01-15 RX ADMIN — DIAZEPAM 5 MG: 5 TABLET ORAL at 07:01

## 2020-01-15 RX ADMIN — NICOTINE 1 PATCH: 21 PATCH TRANSDERMAL at 08:01

## 2020-01-15 RX ADMIN — ASPIRIN 81 MG 81 MG: 81 TABLET ORAL at 08:01

## 2020-01-15 RX ADMIN — IBUPROFEN 600 MG: 400 TABLET ORAL at 08:01

## 2020-01-15 RX ADMIN — CLOPIDOGREL 75 MG: 75 TABLET, FILM COATED ORAL at 09:01

## 2020-01-15 NOTE — DISCHARGE INSTRUCTIONS
Clopidogrel Bisulfate Oral tablet (English) View Edit Remove  Aspirin, ASA chewable tablets (English) View Edit Remove  Atorvastatin tablets (English) View Edit Remove  Stroke, Discharge Instructions for (English) View Edit Remove  Stroke and High Blood Pressure, Understanding the Link Between (English) View Edit Remove  Stroke and Heart Disease (English) View Edit Remove  Kicking the Smoking Habit (English) View Edit Remove  Smoking, Tips for Quitting (Cardiovascular) (English) View Edit Remove  Smoking, Getting Support for Quitting (English) View Edit Remove  Alcoholism: How to be Part of the Solution (English) View Edit Remove  Addiction, Recovering: Continuing with Counseling (English) View Edit

## 2020-01-15 NOTE — PT/OT/SLP PROGRESS
Physical Therapy  Functional Screen and Discharge    Patient Name:  Veto Park   MRN:  [id number]    [id number]: PT eval and treat orders received, chart reviewed, pt interviewed, and functional screen completed.     Pt lives with his wife and child in a H with no MOOK and tub/shower combo. Pt was independent without AD for mobility and ADLs, drives, and works full time in construction.    Pt ambulated >300 ft independently without AD with no instability or safety concerns. Pt is adamant he is back to his baseline with only slight residual L facial numbness, denies any other symptoms and reports he is ready to d/c home. No balance, coordination, or strength deficits noted. No further PT needs, d/c PT -- no needs upon d/c.    Lexi Kumar, PT   1/15/2020

## 2020-01-15 NOTE — PROCEDURES
EEG Report  ELECTROENCEPHALOGRAM REPORT    DATE OF SERVICE:  01/15/2020  EEG NUMBER: OK   REQUESTED BY:   LOCATION OF SERVICE:  Room 421    METHODOLOGY   Electroencephalographic (EEG) recording is with electrodes placed according to the International 10-20 placement system.  Thirty two (32) channels of digital signal (sampling rate of 512/sec) including T1 and T2 was simultaneously recorded from the scalp and may include  EKG, EMG, and/or eye monitors.  Recording band pass was 0.1 to 512 hz.  Digital video recording of the patient is simultaneously recorded with the EEG.  The patient is instructed report clinical symptoms which may occur during the recording session.  EEG and video recording is stored and archived in digital format. Activation procedures which include photic stimulation, hyperventilation and instructing patients to perform simple task are done in selected patients.    The EEG is displayed on a monitor screen and can be reviewed using different montages.  Computer assisted analysis is employed to detect spike and electrographic seizure activity.   The entire record is submitted for computer analysis.  The entire recording is visually reviewed and the times identified by computer analysis as being spikes or seizures are reviewed again.  Compresses spectral analysis (CSA) is also performed on the activity recorded from each individual channel.  This is displayed as a power display of frequencies from 0 to 30 Hz over time.   The CSA is reviewed looking for asymmetries in power between homologous areas of the scalp and then compared with the original EEG recording.     Patterns software was also utilized in the review of this study.  This software suite analyzes the EEG recording in multiple domains.  Coherence and rhythmicity is computed to identify EEG sections which may contain organized seizures.  Each channel undergoes analysis to detect presence of spike and sharp waves which have special and  morphological characteristic of epileptic activity.  The routine EEG recording is converted from spacial into frequency domain.  This is then displayed comparing homologous areas to identify areas of significant asymmetry.  Algorithm to identify non-cortically generated artifact is used to separate eye movement, EMG and other artifact from the EEG    EEG FINGINGS  Recording was obtained with the patient in the waking state.  Background showed very rhythmic 10 hertz the posterior dominant rhythm seen in the occipital lead a bilaterally with some spread at times in the parietal posterior temporal area.  The posterior dominant rhythm did attenuate for short period time is light sleep was noted.  Background at this times is that of a generalized low-voltage mixture of beta and theta frequencies.  The waking and sleeping background was symmetrical and there is no lateralized or focal changes and no spike or sharp wave activity was seen.  Hyperventilation and photic stimulation were not performed.  The patient was ask questions and correctly look reported the location the date name of the president was able to count backwards.  Also give correctly the some of a nickel hi scot.  The       IMPRESSION:  Normal EEG

## 2020-01-15 NOTE — H&P
"[facility] Medicine  History & Physical    Patient Name: Veto Park  MRN: [id number]  Admission Date: 1/14/2020  Attending Physician: Marisa Ford MD  Primary Care Provider: Deloris Bah MD         Patient information was obtained from patient, spouse/SO, relative(s) and ER records.     Subjective:     Principal Problem:Stroke    Chief Complaint:   Chief Complaint   Patient presents with    Hypertension     Pt reports seen here for high blood pressure 4 weeks ago and blood pressure is still running high. Pt rerpots he drank a 5th of Vodka last night.        HPI: Veto Park is a 48 y/o M with a past medical history of alcoholism, Diabetes mellitus, Erectile dysfunction, GERD, Hypertension, ABHILASH on CPAP, Peyronie's disease, presented today with sudden onset generalized shaking, facial asymmetry, and left facial twitching at 4 am. Since then the symptoms has resolved with residual L facial numbness. Initial NIH score was 0 and has not changed since then. Tele stroke was conducted who stated that "the etiology uncertain but the positive phenomena raise the prospect of focal seizure. Stroke syndrome less likely but in DDx. Not a tPA (nor interventional) candidate as awoke with symptoms and only with L facial numbness at this point. Rec admission for MRI, vascular imaging and possible EEG." CT head with no acute abnormality. Transferred from [facility] for stroke work up.    Past Medical History:   Diagnosis Date    Alcoholic     Diabetes mellitus     Erectile dysfunction     GERD (gastroesophageal reflux disease)     Hypertension     ABHILASH on CPAP     Peyronie's disease     Toxic effect of contact with stingray 11/2018    right wrist       Past Surgical History:   Procedure Laterality Date    LASIK      [place] PLICATION N/A 11/27/2018    Procedure: PLICATION, PENIS;  Surgeon: Ralph Wilcox MD;  Location: [facility];  Service: Urology;  Laterality: N/A;    " VASECTOMY         Review of patient's allergies indicates:   Allergen Reactions    Codeine Nausea And Vomiting    Pcn [penicillins] Other (See Comments)     Unknown reaction    Betadine [povidone-iodine] Swelling and Other (See Comments)     redness       Current Facility-Administered Medications on File Prior to Encounter   Medication    alprostadil injection 20 mcg     Current Outpatient Medications on File Prior to Encounter   Medication Sig    albuterol (PROVENTIL/VENTOLIN HFA) 90 mcg/actuation inhaler Inhale 1-2 puffs into the lungs every 6 (six) hours as needed for Wheezing. Rescue    amlodipine (NORVASC) 5 MG tablet Take 2 tablets (10 mg total) by mouth once daily.    diphenhydrAMINE (BENADRYL) 25 mg capsule Take 1 each (25 mg total) by mouth every 6 (six) hours as needed for Itching.    fluticasone-salmeterol diskus inhaler 250-50 mcg Inhale 1 puff into the lungs 2 (two) times daily. Controller.  Do not take during acute exacerbations.    lisinopril (PRINIVIL,ZESTRIL) 20 MG tablet Take 40 mg by mouth once daily.     metFORMIN (GLUCOPHAGE) 500 MG tablet Take 500 mg by mouth 2 (two) times daily with meals.    omeprazole (PRILOSEC) 20 MG capsule One po bid for 7 days then one each morning (Patient taking differently: Take 20 mg by mouth once daily. )    sildenafil (REVATIO) 20 mg Tab Take 1 tablet (20 mg total) by mouth As instructed. Take 2-5 tablets as needed.     Family History     Problem Relation (Age of Onset)    Clotting disorder Father    Heart disease Father, Mother, Maternal Aunt, Maternal Uncle, Paternal Aunt, Paternal Uncle    Hypertension Father, Mother, Sister, Brother    Prostate cancer Brother        Tobacco Use    Smoking status: Current Every Day Smoker     Packs/day: 1.50     Types: Cigarettes    Smokeless tobacco: Never Used    Tobacco comment: Using patches   Substance and Sexual Activity    Alcohol use: Yes     Alcohol/week: 6.0 - 12.0 standard drinks     Types: 6 - 12 Cans  of beer per week     Comment: heavy etoh use daily    Drug use: No    Sexual activity: Not Currently     Review of Systems   Constitutional: Negative for chills and fever.   HENT: Negative for ear discharge, sinus pressure and tinnitus.    Eyes: Negative for redness and itching.   Respiratory: Negative for apnea, shortness of breath and wheezing.    Cardiovascular: Negative for chest pain.   Gastrointestinal: Negative for abdominal distention.   Endocrine: Negative for polydipsia and polyphagia.   Genitourinary: Negative for difficulty urinating.   Musculoskeletal: Negative for back pain.   Neurological: Positive for facial asymmetry, speech difficulty, weakness and headaches. Negative for syncope and light-headedness.   Psychiatric/Behavioral: Positive for agitation and decreased concentration.     Objective:     Vital Signs (Most Recent):  Temp: 98.7 °F (37.1 °C) (01/14/20 1630)  Pulse: 74 (01/14/20 1630)  Resp: 17 (01/14/20 1630)  BP: (!) 176/97 (01/14/20 1630)  SpO2: 95 % (01/14/20 1630) Vital Signs (24h Range):  Temp:  [97.8 °F (36.6 °C)-98.7 °F (37.1 °C)] 98.7 °F (37.1 °C)  Pulse:  [] 74  Resp:  [17-20] 17  SpO2:  [94 %-99 %] 95 %  BP: (124-186)/(60-97) 176/97     Weight: 113.4 kg (250 lb)  Body mass index is 33.91 kg/m².    Physical Exam   Constitutional: He is oriented to person, place, and time. He appears well-developed and well-nourished.   obese   HENT:   Head: Normocephalic and atraumatic.   Eyes: Pupils are equal, round, and reactive to light. EOM are normal.   Neck: Normal range of motion. Neck supple.   Cardiovascular: Normal rate, regular rhythm and normal heart sounds. Exam reveals no gallop and no friction rub.   No murmur heard.  Pulmonary/Chest: Effort normal and breath sounds normal.   Abdominal: Soft. Bowel sounds are normal. There is no tenderness.   obese   Musculoskeletal: Normal range of motion. He exhibits tenderness.   Neurological: He is alert and oriented to person, place, and  time. He displays normal reflexes. No cranial nerve deficit or sensory deficit. He exhibits normal muscle tone.   Skin: Skin is warm. Capillary refill takes less than 2 seconds.   Psychiatric: He has a normal mood and affect. His speech is normal. His mood appears not anxious. Cognition and memory are normal. He does not exhibit a depressed mood.         CRANIAL NERVES     CN III, IV, VI   Pupils are equal, round, and reactive to light.  Extraocular motions are normal.        Significant Labs:   A1C:   Recent Labs   Lab 01/14/20  1542   HGBA1C 6.6*     ABGs: No results for input(s): PH, PCO2, HCO3, POCSATURATED, BE, TOTALHB, COHB, METHB, O2HB, POCFIO2 in the last 48 hours.  CBC:   Recent Labs   Lab 01/14/20  0835   WBC 5.66   HGB 15.1   HCT 42.5        CMP:   Recent Labs   Lab 01/14/20  0835   *   K 4.6   CL 97   CO2 29   *   BUN 9   CREATININE 0.65   CALCIUM 9.3   PROT 7.3   ALBUMIN 4.0   BILITOT 1.0   ALKPHOS <20*   *   *   ANIONGAP 7*   EGFRNONAA >60.0     Cardiac Markers: No results for input(s): CKMB, MYOGLOBIN, BNP, TROPISTAT in the last 48 hours.  Coagulation:   Recent Labs   Lab 01/14/20  0835   INR 1.2     Lactic Acid: No results for input(s): LACTATE in the last 48 hours.  Lipid Panel:   Recent Labs   Lab 01/14/20  0835   CHOL 226*   HDL 73   LDLCALC 138.2   TRIG 74   CHOLHDL 32.3     Magnesium: No results for input(s): MG in the last 48 hours.  TSH:   Recent Labs   Lab 01/14/20  0835   TSH 0.954     Urine Culture: No results for input(s): LABURIN in the last 48 hours.  Urine Studies: No results for input(s): COLORU, APPEARANCEUA, PHUR, SPECGRAV, PROTEINUA, GLUCUA, KETONESU, BILIRUBINUA, OCCULTUA, NITRITE, UROBILINOGEN, LEUKOCYTESUR, RBCUA, WBCUA, BACTERIA, SQUAMEPITHEL, HYALINECASTS in the last 48 hours.    Invalid input(s): WRIGHTSUR      Imaging Results          X-Ray Chest AP Portable (Final result)  Result time 01/14/20 08:49:01    Final result by IAN Seals  MD Ivonne (01/14/20 08:49:01)                 Impression:      1. The current examination is limited secondary to the lack of inclusion of the entire thorax on the film. The costophrenic angles are not completely included on the film.  2. The visualized portion of the lungs is clear.  .      Electronically signed by: Martin Seals MD  Date:    01/14/2020  Time:    08:49             Narrative:    EXAMINATION:  XR CHEST AP PORTABLE    CLINICAL HISTORY:  Stroke;    COMPARISON:  12/17/2019    FINDINGS:  The current examination is limited secondary to the lack of inclusion of the entire thorax on the film.  The costophrenic angles are not completely included on the film.  The size of the heart is normal.  The visualized portion of the lungs is clear.  There is no pneumothorax.                               CT Head Without Contrast (Final result)  Result time 01/14/20 08:32:52    Final result by Minesh Steele MD (01/14/20 08:32:52)                 Impression:      No acute abnormality.    Sinus disease.    All CT scans at this facility use dose modulation, iterative reconstruction, and/or weight based dosing when appropriate to reduce radiation dose to as low as reasonable achievable.      Electronically signed by: Minesh Steele MD  Date:    01/14/2020  Time:    08:32             Narrative:    EXAMINATION:  CT HEAD WITHOUT CONTRAST    CLINICAL HISTORY:  Stroke;    TECHNIQUE:  Low dose axial CT images obtained throughout the head without intravenous contrast. Sagittal and coronal reconstructions were performed.    All CT scans at this facility use dose modulation, iterative reconstruction, and/or weight based dosing when appropriate to reduce radiation dose to as low as reasonable achievable.    COMPARISON:  None.    FINDINGS:  Intracranial compartment:    The brain parenchyma appears normal. No parenchymal mass, hemorrhage, edema or major vascular distribution infarct.    Ventricles and sulci are normal in size for age  without evidence of hydrocephalus.    No extra-axial blood or fluid collections.    Skull/extracranial contents (limited evaluation): No fracture.  Opacification left maxillary sinus partially visualized.  Diffuse sinus mucosal thickening.  Mastoid air cells appear clear.                                Significant Imaging: I have reviewed all pertinent imaging results/findings within the past 24 hours.    Assessment/Plan:     * Stroke  TIA  Facial twitching  Essential hypertension  Hyperlipidemia  CT head - no acute findings  Loaded with ASa and Plavix, continue ASA and Plavix  Allow permissive hypertension- hold home meds. Continue with Hydralazine prn  Awaiting echo, MRI head, MRA head and neck.   PT/OT/SLP eval and treat.   FU lipid panel- cholesterol elevated a 226   Start Lipitor  HbA1C.- 6.6  EEG   Consult neurology  tele stroke consult    ETOH abuse  Noted last drink was 16 hours at admission, started having some tremor  Valium      Facial twitching  Generalized shaking and L facial twitching that has resolved with residual L facial numbness:  Etiology uncertain but the positive phenomena raise the prospect of focal seizure.  Stroke syndrome less likely but in DDx.  Not a tPA (nor interventional) candidate as awoke with symptoms and only with L facial numbness at this point.  Rec admission for MRI, vascular imaging and possible EEG.    Type 2 diabetes mellitus, without long-term current use of insulin  Morbidly obese  Hold metformin  Levemir  Low dose SSi  Accucheck  Diabetic diet        GERD (gastroesophageal reflux disease)  PPI          VTE Risk Mitigation (From admission, onward)         Ordered     enoxaparin injection 40 mg  Daily      01/14/20 1534     IP VTE HIGH RISK PATIENT  Once      01/14/20 1534     Place sequential compression device  Until discontinued      01/14/20 1534                   Marisa N Innocent-MD Prem  Department of Hospital Medicine   [facility]

## 2020-01-15 NOTE — PLAN OF CARE
Received patient upon rounds at 1930H, patient seen sitting up in bed. Conscious , coherent to time, date, place, person and situation. GCS 15.With  subjective complaint of pain all over the body joints, neck and headache. NP Vijay updated , ibuprofen PO given as ordered, noted mild relief. With saline lock gauge 18 right hand flushed patent, with clean and dry dressing. Advised patient to call for any assistance. Call bell within reach. Bed alarm On. Safety fall precaution maintained. CPAP at HS on.Neuro checks rendered no drift. No deficits neurological deficits noted.Nicotine patch applied.Will continue to monitor patient.  0552H- Patient stable VS over the night, afebrile. No untoward signs and symptoms over the night, No new neurological deficits noted.CIWA-0. Telemetry no ectopy noted over the night. Free from fall. IV line patent.Will endorse patient to day shift Nurse.

## 2020-01-15 NOTE — PLAN OF CARE
Pt arrived to unit via stretcher; ambulated to room. Pt oriented to room, VN introduced, iPad offered and declined. Pt placed on fall risk. Plan of care reviewed with patient. Patient voiced understanding. Placed on monitor. No acute distress noted. Side rails x 2, bed in lowest position, call bell within reach, pt advised to call for assistance. Maintain bed alarm for patient safety.

## 2020-01-15 NOTE — DISCHARGE SUMMARY
"[facility] Medicine  Discharge Summary      Patient Name: Veto Park  MRN: [id number]  Admission Date: 1/14/2020  Hospital Length of Stay: 0 days  Discharge Date and Time: 1/15/2020  1:50 PM  Attending Physician: Florentino Ford*   Discharging Provider: Florentino Ford MD  Primary Care Provider: Deloris Bah MD      HPI:   Veto Park is a 50 y/o M with a past medical history of alcoholism, Diabetes mellitus, Erectile dysfunction, GERD, Hypertension, ABHILASH on CPAP, Peyronie's disease, presented today with sudden onset generalized shaking, facial asymmetry, and left facial twitching at 4 am. Since then the symptoms has resolved with residual L facial numbness. Initial NIH score was 0 and has not changed since then. Tele stroke was conducted who stated that "the etiology uncertain but the positive phenomena raise the prospect of focal seizure. Stroke syndrome less likely but in DDx. Not a tPA (nor interventional) candidate as awoke with symptoms and only with L facial numbness at this point. Rec admission for MRI, vascular imaging and possible EEG." CT head with no acute abnormality. Transferred from [facility] for stroke work up.    * No surgery found *      Hospital Course:   No notes on file     Consults:   Consults (From admission, onward)        Status Ordering Provider     Inpatient consult to Neurology  Once     Provider:  Floresita Ruiz MD    Acknowledged FLORENTINO FORD     Inpatient consult to Social Work/Case Management  Once     Provider:  (Not yet assigned)    Acknowledged FLORENTINO FORD     Inpatient consult to Telemedicine-Stroke  Once     Provider:  (Not yet assigned)    Acknowledged ANTONI WARE          * Stroke  TIA  Facial twitching  Essential hypertension  Hyperlipidemia  CT head - no acute findings  Loaded with ASa and Plavix, continue ASA and Plavix  Allow permissive hypertension- hold home meds. Continue with " Hydralazine prn  Awaiting echo, MRI head, MRA head and neck.   PT/OT/SLP eval and treat.   FU lipid panel- cholesterol elevated a 226   Start Lipitor  HbA1C.- 6.6  EEG   Consult neurology- outpatient FU with EEG report  MRA brain and MRA neck are within normal limits without evidence of large vessel occlusion, hemodynamically significant stenosis or other significant abnormality.  MRI brain No acute intracranial process seen.  No evidence of infarction, mass or hemorrhage.    ETOH abuse  Noted last drink was 16 hours at admission, started having some tremor  Valium      Type 2 diabetes mellitus, without long-term current use of insulin  Morbidly obese  Hold metformin  Levemir  Low dose SSi  Accucheck  Diabetic diet        GERD (gastroesophageal reflux disease)  PPI          Final Active Diagnoses:    Diagnosis Date Noted POA    PRINCIPAL PROBLEM:  Stroke [I63.9] 01/14/2020 Yes    Facial twitching [G51.4] 01/14/2020 Yes    Weakness [R53.1] 01/14/2020 Yes    ETOH abuse [F10.10] 01/14/2020 Yes    Morbid obesity [E66.01] 01/14/2020 Yes    Hyperlipidemia [E78.5] 01/14/2020 Yes    Essential hypertension [I10] 11/10/2018 Yes     Chronic    Type 2 diabetes mellitus, without long-term current use of insulin [E11.9] 11/10/2018 Yes     Chronic    GERD (gastroesophageal reflux disease) [K21.9] 11/10/2018 Yes      Problems Resolved During this Admission:       Discharged Condition: stable    Disposition: Home or Self Care    Follow Up:  Follow-up Information     Deloris Bah MD In 1 week.    Specialty:  Family Medicine  Contact information:  [address] [id number]  [phone]             [facility] - NEUROLOGY In 2 weeks.    Specialty:  Neurology  Contact information:  [facility] [address] [place] [address]  [phone]                 Patient Instructions:      Ambulatory referral to Neurology   Referral Priority: Routine Referral Type: Consultation   Referral Reason: Specialty  Services Required   Requested Specialty: Neurology   Number of Visits Requested: 1     Activity as tolerated       Significant Diagnostic Studies:     Pending Diagnostic Studies:     Procedure Component Value Units Date/Time    Echo Color Flow Doppler? Yes; Bubble Contrast? Yes [[id number]]     Order Status:  Sent Lab Status:  No result          Medications:  Reconciled Home Medications:      Medication List      START taking these medications    aspirin 81 MG Chew  Take 1 tablet (81 mg total) by mouth once daily.     atorvastatin 20 MG tablet  Commonly known as:  LIPITOR  Take 1 tablet (20 mg total) by mouth every evening.     clopidogrel 75 mg tablet  Commonly known as:  PLAVIX  Take 1 tablet (75 mg total) by mouth once daily.        CHANGE how you take these medications    omeprazole 20 MG capsule  Commonly known as:  PRILOSEC  One po bid for 7 days then one each morning  What changed:    · how much to take  · how to take this  · when to take this  · additional instructions        CONTINUE taking these medications    albuterol 90 mcg/actuation inhaler  Commonly known as:  PROVENTIL/VENTOLIN HFA  Inhale 1-2 puffs into the lungs every 6 (six) hours as needed for Wheezing. Rescue     amLODIPine 5 MG tablet  Commonly known as:  NORVASC  Take 2 tablets (10 mg total) by mouth once daily.     diphenhydrAMINE 25 mg capsule  Commonly known as:  BENADRYL  Take 1 each (25 mg total) by mouth every 6 (six) hours as needed for Itching.     lisinopril 20 MG tablet  Commonly known as:  PRINIVIL,ZESTRIL  Take 40 mg by mouth once daily.     metFORMIN 500 MG tablet  Commonly known as:  GLUCOPHAGE  Take 500 mg by mouth 2 (two) times daily with meals.     sildenafil 20 mg Tab  Commonly known as:  REVATIO  Take 1 tablet (20 mg total) by mouth As instructed. Take 2-5 tablets as needed.            Indwelling Lines/Drains at time of discharge:   Lines/Drains/Airways     None                 Time spent on the discharge of patient: 35  minutes  Patient was seen and examined on the date of discharge and determined to be suitable for discharge.         Marisa Ford MD  Department of Hospital Medicine  [facility]

## 2020-01-15 NOTE — NURSING
Pt c/o headache, neck pain and pain to joints 10/10. Pt states he was given tylenol previously and that it did not work and is requesting something else for pain. Pt also smokes one and a half packs of cigarettes daily and requesting nicotine patch. Pt also states that he was unable to do mri today and they are to retry liyah but that they are going to give him xanax prior. Pt requesting vn to make sure that that is ordered. Pt also on cpap at home. VIJAY Linares NP notified. New orders placed. Informed by NP that dr montalvo will address mri in am. Bedside nurse updated.

## 2020-01-15 NOTE — SUBJECTIVE & OBJECTIVE
Past Medical History:   Diagnosis Date    Alcoholic     Diabetes mellitus     Erectile dysfunction     GERD (gastroesophageal reflux disease)     Hypertension     ABHILASH on CPAP     Peyronie's disease     Toxic effect of contact with stingray 11/2018    right wrist       Past Surgical History:   Procedure Laterality Date    LASIK      ISRAEL PLICATION N/A 11/27/2018    Procedure: PLICATION, PENIS;  Surgeon: Ralph Wilcox MD;  Location: [facility];  Service: Urology;  Laterality: N/A;    VASECTOMY         Review of patient's allergies indicates:   Allergen Reactions    Codeine Nausea And Vomiting    Pcn [penicillins] Other (See Comments)     Unknown reaction    Betadine [povidone-iodine] Swelling and Other (See Comments)     redness       Current Facility-Administered Medications on File Prior to Encounter   Medication    alprostadil injection 20 mcg     Current Outpatient Medications on File Prior to Encounter   Medication Sig    albuterol (PROVENTIL/VENTOLIN HFA) 90 mcg/actuation inhaler Inhale 1-2 puffs into the lungs every 6 (six) hours as needed for Wheezing. Rescue    amlodipine (NORVASC) 5 MG tablet Take 2 tablets (10 mg total) by mouth once daily.    diphenhydrAMINE (BENADRYL) 25 mg capsule Take 1 each (25 mg total) by mouth every 6 (six) hours as needed for Itching.    fluticasone-salmeterol diskus inhaler 250-50 mcg Inhale 1 puff into the lungs 2 (two) times daily. Controller.  Do not take during acute exacerbations.    lisinopril (PRINIVIL,ZESTRIL) 20 MG tablet Take 40 mg by mouth once daily.     metFORMIN (GLUCOPHAGE) 500 MG tablet Take 500 mg by mouth 2 (two) times daily with meals.    omeprazole (PRILOSEC) 20 MG capsule One po bid for 7 days then one each morning (Patient taking differently: Take 20 mg by mouth once daily. )    sildenafil (REVATIO) 20 mg Tab Take 1 tablet (20 mg total) by mouth As instructed. Take 2-5 tablets as needed.     Family History     Problem Relation (Age  of Onset)    Clotting disorder Father    Heart disease Father, Mother, Maternal Aunt, Maternal Uncle, Paternal Aunt, Paternal Uncle    Hypertension Father, Mother, Sister, Brother    Prostate cancer Brother        Tobacco Use    Smoking status: Current Every Day Smoker     Packs/day: 1.50     Types: Cigarettes    Smokeless tobacco: Never Used    Tobacco comment: Using patches   Substance and Sexual Activity    Alcohol use: Yes     Alcohol/week: 6.0 - 12.0 standard drinks     Types: 6 - 12 Cans of beer per week     Comment: heavy etoh use daily    Drug use: No    Sexual activity: Not Currently     Review of Systems   Constitutional: Negative for chills and fever.   HENT: Negative for ear discharge, sinus pressure and tinnitus.    Eyes: Negative for redness and itching.   Respiratory: Negative for apnea, shortness of breath and wheezing.    Cardiovascular: Negative for chest pain.   Gastrointestinal: Negative for abdominal distention.   Endocrine: Negative for polydipsia and polyphagia.   Genitourinary: Negative for difficulty urinating.   Musculoskeletal: Negative for back pain.   Neurological: Positive for facial asymmetry, speech difficulty, weakness and headaches. Negative for syncope and light-headedness.   Psychiatric/Behavioral: Positive for agitation and decreased concentration.     Objective:     Vital Signs (Most Recent):  Temp: 98.7 °F (37.1 °C) (01/14/20 1630)  Pulse: 74 (01/14/20 1630)  Resp: 17 (01/14/20 1630)  BP: (!) 176/97 (01/14/20 1630)  SpO2: 95 % (01/14/20 1630) Vital Signs (24h Range):  Temp:  [97.8 °F (36.6 °C)-98.7 °F (37.1 °C)] 98.7 °F (37.1 °C)  Pulse:  [] 74  Resp:  [17-20] 17  SpO2:  [94 %-99 %] 95 %  BP: (124-186)/(60-97) 176/97     Weight: 113.4 kg (250 lb)  Body mass index is 33.91 kg/m².    Physical Exam   Constitutional: He is oriented to person, place, and time. He appears well-developed and well-nourished.   obese   HENT:   Head: Normocephalic and atraumatic.   Eyes:  Pupils are equal, round, and reactive to light. EOM are normal.   Neck: Normal range of motion. Neck supple.   Cardiovascular: Normal rate, regular rhythm and normal heart sounds. Exam reveals no gallop and no friction rub.   No murmur heard.  Pulmonary/Chest: Effort normal and breath sounds normal.   Abdominal: Soft. Bowel sounds are normal. There is no tenderness.   obese   Musculoskeletal: Normal range of motion. He exhibits tenderness.   Neurological: He is alert and oriented to person, place, and time. He displays normal reflexes. No cranial nerve deficit or sensory deficit. He exhibits normal muscle tone.   Skin: Skin is warm. Capillary refill takes less than 2 seconds.   Psychiatric: He has a normal mood and affect. His speech is normal. His mood appears not anxious. Cognition and memory are normal. He does not exhibit a depressed mood.         CRANIAL NERVES     CN III, IV, VI   Pupils are equal, round, and reactive to light.  Extraocular motions are normal.        Significant Labs:   A1C:   Recent Labs   Lab 01/14/20  1542   HGBA1C 6.6*     ABGs: No results for input(s): PH, PCO2, HCO3, POCSATURATED, BE, TOTALHB, COHB, METHB, O2HB, POCFIO2 in the last 48 hours.  CBC:   Recent Labs   Lab 01/14/20  0835   WBC 5.66   HGB 15.1   HCT 42.5        CMP:   Recent Labs   Lab 01/14/20  0835   *   K 4.6   CL 97   CO2 29   *   BUN 9   CREATININE 0.65   CALCIUM 9.3   PROT 7.3   ALBUMIN 4.0   BILITOT 1.0   ALKPHOS <20*   *   *   ANIONGAP 7*   EGFRNONAA >60.0     Cardiac Markers: No results for input(s): CKMB, MYOGLOBIN, BNP, TROPISTAT in the last 48 hours.  Coagulation:   Recent Labs   Lab 01/14/20  0835   INR 1.2     Lactic Acid: No results for input(s): LACTATE in the last 48 hours.  Lipid Panel:   Recent Labs   Lab 01/14/20  0835   CHOL 226*   HDL 73   LDLCALC 138.2   TRIG 74   CHOLHDL 32.3     Magnesium: No results for input(s): MG in the last 48 hours.  TSH:   Recent Labs   Lab  01/14/20  0835   TSH 0.954     Urine Culture: No results for input(s): LABURIN in the last 48 hours.  Urine Studies: No results for input(s): COLORU, APPEARANCEUA, PHUR, SPECGRAV, PROTEINUA, GLUCUA, KETONESU, BILIRUBINUA, OCCULTUA, NITRITE, UROBILINOGEN, LEUKOCYTESUR, RBCUA, WBCUA, BACTERIA, SQUAMEPITHEL, HYALINECASTS in the last 48 hours.    Invalid input(s): WRIGHTSUR      Imaging Results          X-Ray Chest AP Portable (Final result)  Result time 01/14/20 08:49:01    Final result by IAN Seals Sr., MD (01/14/20 08:49:01)                 Impression:      1. The current examination is limited secondary to the lack of inclusion of the entire thorax on the film. The costophrenic angles are not completely included on the film.  2. The visualized portion of the lungs is clear.  .      Electronically signed by: Martin Seals MD  Date:    01/14/2020  Time:    08:49             Narrative:    EXAMINATION:  XR CHEST AP PORTABLE    CLINICAL HISTORY:  Stroke;    COMPARISON:  12/17/2019    FINDINGS:  The current examination is limited secondary to the lack of inclusion of the entire thorax on the film.  The costophrenic angles are not completely included on the film.  The size of the heart is normal.  The visualized portion of the lungs is clear.  There is no pneumothorax.                               CT Head Without Contrast (Final result)  Result time 01/14/20 08:32:52    Final result by Minesh Steele MD (01/14/20 08:32:52)                 Impression:      No acute abnormality.    Sinus disease.    All CT scans at this facility use dose modulation, iterative reconstruction, and/or weight based dosing when appropriate to reduce radiation dose to as low as reasonable achievable.      Electronically signed by: Minesh Steele MD  Date:    01/14/2020  Time:    08:32             Narrative:    EXAMINATION:  CT HEAD WITHOUT CONTRAST    CLINICAL HISTORY:  Stroke;    TECHNIQUE:  Low dose axial CT images obtained throughout  the head without intravenous contrast. Sagittal and coronal reconstructions were performed.    All CT scans at this facility use dose modulation, iterative reconstruction, and/or weight based dosing when appropriate to reduce radiation dose to as low as reasonable achievable.    COMPARISON:  None.    FINDINGS:  Intracranial compartment:    The brain parenchyma appears normal. No parenchymal mass, hemorrhage, edema or major vascular distribution infarct.    Ventricles and sulci are normal in size for age without evidence of hydrocephalus.    No extra-axial blood or fluid collections.    Skull/extracranial contents (limited evaluation): No fracture.  Opacification left maxillary sinus partially visualized.  Diffuse sinus mucosal thickening.  Mastoid air cells appear clear.                                Significant Imaging: I have reviewed all pertinent imaging results/findings within the past 24 hours.

## 2020-01-15 NOTE — PLAN OF CARE
VN cued into pt's room for introduction. VN informed pt that VN would be working along side bedside nurse and PCT throughout shift. Level of present pain assessed. At present no distress noted. Thoroughly discussed with patient today's plan of care. At present, patient having EEG preformed at bedside. Discussed with patient High fall risk protocol and interventions that have been initiated and cont be in place for safety. Patient verbalized clear understanding and cooperation using teach back method. Bed alarm presently activated and in use. Will cont to be available to patient and intervene prn.

## 2020-01-15 NOTE — PLAN OF CARE
Plan of care reviewed with patient. Patient voiced understanding. NSR on monitor. No acute distress noted. Side rails x 2, bed in lowest position, call bell within reach, pt advised to call for assistance. Maintain bed alarm for patient safety. Wife at bedside.

## 2020-01-15 NOTE — PT/OT/SLP PROGRESS
Occupational Therapy  Screen and D/c     Patient Name:  Veto Park   MRN:  [id number]    Patient participating in screen this date. Pt lives with spouse, SSH, no steps to enter, t/s combo. Pt independent, drives, and works.     Pt's current and only complaint is L sided face numbness. Pt performing ADLs and functional mobility independently at this time. ROM and strength WFL based on observed function.     Educated pt on safety and signs/symptoms CVA.     No further OT/PT needs at this time. D/c OT.    Jaida Brady, OT  1/15/2020

## 2020-01-15 NOTE — PLAN OF CARE
met with patient's spouse, Angelina Park, [phone],  to complete discharge assessment. Patient was working with therapy at time of assessment. Prior to admission patient was independent and caring for himself. Patient still drives and is self employed. Patient's help at home is his spouse Angelina and 17 year old daughter. Patient has medical equipment (CPAP) in the home, no   services. Patient has no issues affording medications.    Patient has no social needs at this time. SW provided spouse with discharge brochure and wrote contact information on the whiteboard. Sw encouraged patient and spouse to contact if any needs or issues arise. Both verbalized understanding.     01/15/20 1035   Discharge Assessment   Assessment Type Discharge Planning Assessment   Confirmed/corrected address and phone number on facesheet? Yes   Assessment information obtained from? Patient;Caregiver   Prior to hospitilization cognitive status: Alert/Oriented   Prior to hospitalization functional status: Independent   Current cognitive status: Alert/Oriented   Current Functional Status: Independent   Lives With child(veronica), dependent;spouse   Able to Return to Prior Arrangements yes   Is patient able to care for self after discharge? Yes   Who are your caregiver(s) and their phone number(s)? Angelina Park, spouse, [phone]   Readmission Within the Last 30 Days no previous admission in last 30 days   Patient currently being followed by outpatient case management? No   Patient currently receives any other outside agency services? No   Equipment Currently Used at Home CPAP   Do you have any problems affording any of your prescribed medications? No   Is the patient taking medications as prescribed? yes   Does the patient have transportation home? Yes   Transportation Anticipated car, drives self;family or friend will provide   Does the patient receive services at the [facility]? No   Discharge Plan A Home   Discharge Plan B  Home with family   DME Needed Upon Discharge  none   Patient/Family in Agreement with Plan yes

## 2020-01-15 NOTE — ASSESSMENT & PLAN NOTE
TIA  Facial twitching  Essential hypertension  Hyperlipidemia  CT head - no acute findings  Loaded with ASa and Plavix, continue ASA and Plavix  Allow permissive hypertension- hold home meds. Continue with Hydralazine prn  Awaiting echo, MRI head, MRA head and neck.   PT/OT/SLP eval and treat.   FU lipid panel- cholesterol elevated a 226   Start Lipitor  HbA1C.- 6.6  EEG   Consult neurology- outpatient FU with EEG report  MRA brain and MRA neck are within normal limits without evidence of large vessel occlusion, hemodynamically significant stenosis or other significant abnormality.  MRI brain No acute intracranial process seen.  No evidence of infarction, mass or hemorrhage.

## 2020-01-15 NOTE — PROGRESS NOTES
Individual Follow-Up Form    1/15/2020    Quit Date: To be determined    Clinical Status of Patient: Inpatient    Length of Service: 30 minutes    Comments: Smoking cessation education provided. Pt states that he started smoking at the age of 12 and that he smokes 1.5 packs of cigarettes per day.  21 mg nicotine patch ordered, however, pt still reporting significant nicotine withdrawal symptoms.  Pt states that he is ready to quit smoking and he was enrolled in the [employer] during this encounter.     Diagnosis: F17.210    Next Visit: Ambulatory referral to Smoking Cessation program following hospital discharge.

## 2020-01-15 NOTE — NURSING
All progress notes reviewed. VN cued into room. No response to VN's verbal cues x 2 attempts. Pt not visualized in room. Pt off unit. Will attempt again as time allows.to complete admit process

## 2020-01-15 NOTE — CONSULTS
"NEUROLOGY FLOOR CONSULT    Reason for consult:  TIA r/o Seizure    Informant: family in the room       Other sources of information : Chart Review    CC:  "h/a"    HPI: Mr. Park is 49 y.o male R handed with PMHx of ETOH abuse, DM, HTN, ABHILASH on CPAP who presented due to  sudden onset generalized shaking, facial asymmetry, and left facial twitching.  Symptoms resolved but neurology was called due to the concern for TIA vs Seizure like episode.   Patient was stroke activated and CTH was done showing not acute hemorrhagic or ischemic events.   Patient was c/o L facial numbness, palpitations and tremor. Last alcohol consume was in the window of 24 hours for alcohol withdraw symptoms.   Had uneventful overnight. No further seizures like episodes were reported. Patient denied withdraw seizures in the past, DT, ICU admission. Oriented by4, able to follow directions and no neuro deficit note.       ROS:   Constitutional: no fever or chills   Eyes: no visual changes, no eye pain, or diplopia   ENT: no nasal congestion or sore throat   Respiratory: no cough or shorness of breath   Cardiovascular: no chest pain or palpitations   Gastrointestinal: No nausea or vomiting, No abdominal pain, no change in bowel habits   Genitourinary: no hematuria or dysuria   Integument/Breast: no rash or pruritis   Hematologic/Lymphatic: no easy bruising or lymphadenopathy   Musculoskeletal: no arthralgias or myalgias   Neurological: no seizures, tremors, dizziness or headaches   Behavioral/Psych: no auditory or visual hallucinations, denies SI/HI   Endocrine: no heat or cold intolerance.  All other systems: negative for any new symptoms      Histories:     Allergies:  Codeine; Pcn [penicillins]; and Betadine [povidone-iodine]    Current Medications:    Current Facility-Administered Medications   Medication Dose Route Frequency Provider Last Rate Last Dose    aspirin chewable tablet 81 mg  81 mg Oral Daily Marisa Ford MD   81 mg at " 01/15/20 0843    atorvastatin tablet 20 mg  20 mg Oral QHS Marisa Ford MD   20 mg at 01/14/20 2039    clopidogrel tablet 75 mg  75 mg Oral Daily Marisa Ford MD   75 mg at 01/15/20 0900    dextrose 10% (D10W) Bolus  12.5 g Intravenous PRN Marisa Ford MD        enoxaparin injection 40 mg  40 mg Subcutaneous Daily Marisa Ford MD   40 mg at 01/14/20 1608    glucose chewable tablet 16 g  16 g Oral PRN Marisa Ford MD        glucose chewable tablet 24 g  24 g Oral PRN Marisa Ford MD        hydrALAZINE injection 10 mg  10 mg Intravenous Q8H PRN Marisa Ford MD        ibuprofen tablet 600 mg  600 mg Oral Q6H PRN Love Linares NP   600 mg at 01/15/20 0845    influenza (QUADRIVALENT PF) vaccine 0.5 mL  0.5 mL Intramuscular vaccine x 1 dose Marisa Ford MD        insulin detemir U-100 pen 10 Units  10 Units Subcutaneous Daily Marisa Ford MD   10 Units at 01/14/20 1609    nicotine 21 mg/24 hr 1 patch  1 patch Transdermal Daily Love Linares NP   1 patch at 01/15/20 0843    ondansetron disintegrating tablet 8 mg  8 mg Oral Q6H PRN Marisa Ford MD   8 mg at 01/14/20 1608    pneumoc 13-deidra conj-dip cr(PF) (PREVNAR 13 (PF)) 0.5 mL  0.5 mL Intramuscular vaccine x 1 dose Marisa Ford MD        sodium chloride 0.9% flush 10 mL  10 mL Intravenous PRN Areli Charles MD        sodium chloride 0.9% flush 10 mL  10 mL Intravenous PRN Marisa Ford MD         Facility-Administered Medications Ordered in Other Encounters   Medication Dose Route Frequency Provider Last Rate Last Dose    alprostadil injection 20 mcg  20 mcg Intracavitary Once Ralph Wilcox MD           Past Medical/Surgical/Family History:  Medical:   Past Medical History:   Diagnosis Date    Alcoholic     Arthritis     COPD (chronic obstructive pulmonary disease)     Diabetes mellitus      Erectile dysfunction     GERD (gastroesophageal reflux disease)     Hypertension     ABHILASH on CPAP     Peyronie's disease     Toxic effect of contact with stingray 11/2018    right wrist      Surgeries:   Past Surgical History:   Procedure Laterality Date    LASIK      ISRAEL PLICATION N/A 11/27/2018    Procedure: PLICATION, PENIS;  Surgeon: Ralph Wilcox MD;  [facility];  Service: Urology;  Laterality: N/A;    VASECTOMY        Family:   Family History   Problem Relation Age of Onset    Clotting disorder Father     Heart disease Father     Hypertension Father     Heart disease Mother     Hypertension Mother     Heart disease Maternal Aunt     Heart disease Maternal Uncle     Heart disease Paternal Aunt     Heart disease Paternal Uncle     Hypertension Sister     Hypertension Brother     Prostate cancer Brother      Social History:    Substance Abuse/Dependence History:  Tobacco: Yes  EtOH: 6-12 cans of beers and heavy etoh use daily  Ilicits:denies      Current Evaluation:     Vital Signs:   Vitals:    01/15/20 1145   BP: 133/80   Pulse: 86   Resp: 18   Temp: 98 °F (36.7 °C)      Neurological Exam   Patient was gone for procedure first and then dc. Neurology team went 3 times to try to examine this patient.    LABORATORY STUDIES:  Recent Results (from the past 24 hour(s))   Hemoglobin A1c    Collection Time: 01/14/20  3:42 PM   Result Value Ref Range    Hemoglobin A1C 6.6 (H) 4.0 - 5.6 %    Estimated Avg Glucose 143 (H) 68 - 131 mg/dL   POCT glucose    Collection Time: 01/14/20  4:29 PM   Result Value Ref Range    POCT Glucose 101 70 - 110 mg/dL   POCT glucose    Collection Time: 01/14/20  8:22 PM   Result Value Ref Range    POCT Glucose 135 (H) 70 - 110 mg/dL   Basic Metabolic Panel (BMP)    Collection Time: 01/15/20  3:26 AM   Result Value Ref Range    Sodium 137 136 - 145 mmol/L    Potassium 4.5 3.5 - 5.1 mmol/L    Chloride 99 95 - 110 mmol/L    CO2 25 23 - 29 mmol/L    Glucose  117 (H) 70 - 110 mg/dL    BUN, Bld 10 6 - 20 mg/dL    Creatinine 1.1 0.5 - 1.4 mg/dL    Calcium 9.4 8.7 - 10.5 mg/dL    Anion Gap 13 8 - 16 mmol/L    eGFR if African American >60 >60 mL/min/1.73 m^2    eGFR if non African American >60 >60 mL/min/1.73 m^2   Comprehensive Metabolic Panel (CMP)    Collection Time: 01/15/20  3:26 AM   Result Value Ref Range    Sodium 137 136 - 145 mmol/L    Potassium 4.5 3.5 - 5.1 mmol/L    Chloride 99 95 - 110 mmol/L    CO2 25 23 - 29 mmol/L    Glucose 117 (H) 70 - 110 mg/dL    BUN, Bld 10 6 - 20 mg/dL    Creatinine 1.1 0.5 - 1.4 mg/dL    Calcium 9.4 8.7 - 10.5 mg/dL    Total Protein 7.2 6.0 - 8.4 g/dL    Albumin 3.6 3.5 - 5.2 g/dL    Total Bilirubin 0.8 0.1 - 1.0 mg/dL    Alkaline Phosphatase 20 (L) 55 - 135 U/L    AST 61 (H) 10 - 40 U/L     (H) 10 - 44 U/L    Anion Gap 13 8 - 16 mmol/L    eGFR if African American >60 >60 mL/min/1.73 m^2    eGFR if non African American >60 >60 mL/min/1.73 m^2   POCT glucose    Collection Time: 01/15/20  5:09 AM   Result Value Ref Range    POCT Glucose 116 (H) 70 - 110 mg/dL   POCT glucose    Collection Time: 01/15/20 11:42 AM   Result Value Ref Range    POCT Glucose 235 (H) 70 - 110 mg/dL       RADIOLOGY STUDIES:  I have personally reviewed the images performed.     HEAD CT: Normal    BRAIN MRI : No diffusion restriction      Assessment:  P     Mr. Park is 49 y.o male R handed with PMHx of ETOH abuse, DM, HTN, ABHILASH on CPAP who presented due to  sudden onset generalized shaking, facial asymmetry, and left facial twitching. Patient symptoms resolved    1.Transient ischemic attack vs alcohol withdrawal symptoms  -patient with multiples cerebrovascular risk factors like tobacco abuse, ABHILASH, HLD, DM  -labs showed LDL: 138.2, A1c 6.6 and TSH WNL. Please order folic acid and B12  -MRI Brain and MRA head and neck with no acute CVA and not stenosis, atherosclerosis or aneurysm  -ECHO pending result  -Continue with ASA 81 mg 1 tab PO daily. Can stop  Plavix  -Increase Atorvastatin to 80 mg 1 tab PO daily (patient with TIA episode, diabetic and LDL goal is <70)  -Seen by PT/OT and ST  -Lifestyle risk factors modification. Alcohol and tobacco cessation. Better BP and glucose control. Diet and exercise. Use CPAP for ABHILASH      Differential diagnosis was explained to the patient. All questions were answered. Patient understood and agreed to adhere to plan.       Case discussed with Dr. Mcnamara      Will follow for additional input regarding management of current neurologic condition and monitor for any new signs/symptoms to suggest neurologic detrimental changes.     Appreciate the consult.     ROE Benitez MD  Neurology PGY-4

## 2020-01-15 NOTE — PLAN OF CARE
Rounds completed on pt.  All questions addressed.  I faxed neurology referral to [facility]specialty clinic.  They will set up appt.  Bedside nurse to discuss d/c medications.  Discussed importance to attend all f/u appts and take medications as prescribed.  Verbalized understanding.     Follow up with [facility] - Neurology   The office will call you to set up appointment.  [address] [place] [address]   [phone]    Jan21 Follow up with Deloris Bah MD   Tuesday Jan 21, 2020   9:30am  [facility]   [phone]         01/15/20 1209   Final Note   Assessment Type Final Discharge Note   Anticipated Discharge Disposition Home   Hospital Follow Up  Appt(s) scheduled? Yes   Discharge plans and expectations educations in teach back method with documentation complete? Yes   Right Care Referral Info   Post Acute Recommendation No Care     Aleksander Dupree RN,   [phone]

## 2020-01-15 NOTE — PLAN OF CARE
VN note: VN completed AVS and attachments and notified bedside nurseDisha. Waiting for family to arrive to admin discharge education. Will cont to be available and intervene prn.

## 2020-01-15 NOTE — PROGRESS NOTES
"[facility] Hospital Medicine  Progress Note    Patient Name: Veto Park  MRN: [id number]  Patient Class: OP- Observation   Admission Date: 1/14/2020  Length of Stay: 0 days  Attending Physician: Marisa Ford*  Primary Care Provider: Deloris Bah MD        Subjective:     Principal Problem:Stroke        HPI:  Veto Park is a 50 y/o M with a past medical history of alcoholism, Diabetes mellitus, Erectile dysfunction, GERD, Hypertension, ABHILASH on CPAP, Peyronie's disease, presented today with sudden onset generalized shaking, facial asymmetry, and left facial twitching at 4 am. Since then the symptoms has resolved with residual L facial numbness. Initial NIH score was 0 and has not changed since then. Tele stroke was conducted who stated that "the etiology uncertain but the positive phenomena raise the prospect of focal seizure. Stroke syndrome less likely but in DDx. Not a tPA (nor interventional) candidate as awoke with symptoms and only with L facial numbness at this point. Rec admission for MRI, vascular imaging and possible EEG." CT head with no acute abnormality. Transferred from [facility] for stroke work up.    Overview/Hospital Course:  No notes on file    Interval History: awake and alert, no symptoms  Discussed all result with patient and family  Ongoing discussion on Etoh    Review of Systems   Constitutional: Negative for chills and fever.   Respiratory: Negative for shortness of breath and wheezing.    Neurological: Negative for syncope, facial asymmetry, speech difficulty, weakness and light-headedness.   Psychiatric/Behavioral: Negative for agitation and decreased concentration.     Objective:     Vital Signs (Most Recent):  Temp: 97 °F (36.1 °C) (01/15/20 0729)  Pulse: 70 (01/15/20 0902)  Resp: 18 (01/15/20 0510)  BP: 138/89 (01/15/20 0729)  SpO2: 96 % (01/15/20 0510) Vital Signs (24h Range):  Temp:  [97 °F (36.1 °C)-98.7 °F (37.1 °C)] 97 °F (36.1 °C)  Pulse:  " [17-90] 70  Resp:  [17-20] 18  SpO2:  [95 %-99 %] 96 %  BP: (132-176)/(68-97) 138/89     Weight: 113.3 kg (249 lb 12.5 oz)  Body mass index is 33.88 kg/m².    Intake/Output Summary (Last 24 hours) at 1/15/2020 1134  Last data filed at 1/15/2020 0700  Gross per 24 hour   Intake 350 ml   Output 1300 ml   Net -950 ml      Physical Exam   Constitutional: He is oriented to person, place, and time. He appears well-developed and well-nourished.   obese   HENT:   Head: Normocephalic and atraumatic.   Cardiovascular: Normal rate, regular rhythm and normal heart sounds. Exam reveals no gallop.   No murmur heard.  Pulmonary/Chest: Effort normal and breath sounds normal.   Abdominal: Soft. Bowel sounds are normal. There is no tenderness.   obese   Musculoskeletal: Normal range of motion.   Neurological: He is alert and oriented to person, place, and time.   Skin: Skin is warm. Capillary refill takes less than 2 seconds.       Significant Labs:   CBC:   Recent Labs   Lab 01/14/20  0835   WBC 5.66   HGB 15.1   HCT 42.5        CMP:   Recent Labs   Lab 01/14/20  0835 01/15/20  0326   * 137  137   K 4.6 4.5  4.5   CL 97 99  99   CO2 29 25  25   * 117*  117*   BUN 9 10  10   CREATININE 0.65 1.1  1.1   CALCIUM 9.3 9.4  9.4   PROT 7.3 7.2   ALBUMIN 4.0 3.6   BILITOT 1.0 0.8   ALKPHOS <20* 20*   * 61*   * 106*   ANIONGAP 7* 13  13   EGFRNONAA >60.0 >60  >60     Lactic Acid: No results for input(s): LACTATE in the last 48 hours.  Urine Culture: No results for input(s): LABURIN in the last 48 hours.  Urine Studies: No results for input(s): COLORU, APPEARANCEUA, PHUR, SPECGRAV, PROTEINUA, GLUCUA, KETONESU, BILIRUBINUA, OCCULTUA, NITRITE, UROBILINOGEN, LEUKOCYTESUR, RBCUA, WBCUA, BACTERIA, SQUAMEPITHEL, HYALINECASTS in the last 48 hours.    Invalid input(s): TIMI    Significant Imaging: I have reviewed all pertinent imaging results/findings within the past 24 hours.      Assessment/Plan:       * Stroke  TIA  Facial twitching  Essential hypertension  Hyperlipidemia  CT head - no acute findings  Loaded with ASa and Plavix, continue ASA and Plavix  Allow permissive hypertension- hold home meds. Continue with Hydralazine prn  Awaiting echo, MRI head, MRA head and neck.   PT/OT/SLP eval and treat.   FU lipid panel- cholesterol elevated a 226   Start Lipitor  HbA1C.- 6.6  EEG   Consult neurology- outpatient FU with EEG report  MRA brain and MRA neck are within normal limits without evidence of large vessel occlusion, hemodynamically significant stenosis or other significant abnormality.  MRI brain No acute intracranial process seen.  No evidence of infarction, mass or hemorrhage.    ETOH abuse  Noted last drink was 16 hours at admission, started having some tremor  Valium      Type 2 diabetes mellitus, without long-term current use of insulin  Morbidly obese  Hold metformin  Levemir  Low dose SSi  Accucheck  Diabetic diet        GERD (gastroesophageal reflux disease)  PPI          VTE Risk Mitigation (From admission, onward)         Ordered     enoxaparin injection 40 mg  Daily      01/14/20 1534     IP VTE HIGH RISK PATIENT  Once      01/14/20 1534     Place sequential compression device  Until discontinued      01/14/20 1534                      Marisa N SonyaocentMD Suzette  Department of Hospital Medicine   [facility]

## 2020-01-15 NOTE — SUBJECTIVE & OBJECTIVE
Interval History: awake and alert, no symptoms  Discussed all result with patient and family  Ongoing discussion on Etoh    Review of Systems   Constitutional: Negative for chills and fever.   Respiratory: Negative for shortness of breath and wheezing.    Neurological: Negative for syncope, facial asymmetry, speech difficulty, weakness and light-headedness.   Psychiatric/Behavioral: Negative for agitation and decreased concentration.     Objective:     Vital Signs (Most Recent):  Temp: 97 °F (36.1 °C) (01/15/20 0729)  Pulse: 70 (01/15/20 0902)  Resp: 18 (01/15/20 0510)  BP: 138/89 (01/15/20 0729)  SpO2: 96 % (01/15/20 0510) Vital Signs (24h Range):  Temp:  [97 °F (36.1 °C)-98.7 °F (37.1 °C)] 97 °F (36.1 °C)  Pulse:  [17-90] 70  Resp:  [17-20] 18  SpO2:  [95 %-99 %] 96 %  BP: (132-176)/(68-97) 138/89     Weight: 113.3 kg (249 lb 12.5 oz)  Body mass index is 33.88 kg/m².    Intake/Output Summary (Last 24 hours) at 1/15/2020 1134  Last data filed at 1/15/2020 0700  Gross per 24 hour   Intake 350 ml   Output 1300 ml   Net -950 ml      Physical Exam   Constitutional: He is oriented to person, place, and time. He appears well-developed and well-nourished.   obese   HENT:   Head: Normocephalic and atraumatic.   Cardiovascular: Normal rate, regular rhythm and normal heart sounds. Exam reveals no gallop.   No murmur heard.  Pulmonary/Chest: Effort normal and breath sounds normal.   Abdominal: Soft. Bowel sounds are normal. There is no tenderness.   obese   Musculoskeletal: Normal range of motion.   Neurological: He is alert and oriented to person, place, and time.   Skin: Skin is warm. Capillary refill takes less than 2 seconds.       Significant Labs:   CBC:   Recent Labs   Lab 01/14/20  0835   WBC 5.66   HGB 15.1   HCT 42.5        CMP:   Recent Labs   Lab 01/14/20  0835 01/15/20  0326   * 137  137   K 4.6 4.5  4.5   CL 97 99  99   CO2 29 25  25   * 117*  117*   BUN 9 10  10   CREATININE 0.65 1.1   1.1   CALCIUM 9.3 9.4  9.4   PROT 7.3 7.2   ALBUMIN 4.0 3.6   BILITOT 1.0 0.8   ALKPHOS <20* 20*   * 61*   * 106*   ANIONGAP 7* 13  13   EGFRNONAA >60.0 >60  >60     Lactic Acid: No results for input(s): LACTATE in the last 48 hours.  Urine Culture: No results for input(s): LABURIN in the last 48 hours.  Urine Studies: No results for input(s): COLORU, APPEARANCEUA, PHUR, SPECGRAV, PROTEINUA, GLUCUA, KETONESU, BILIRUBINUA, OCCULTUA, NITRITE, UROBILINOGEN, LEUKOCYTESUR, RBCUA, WBCUA, BACTERIA, SQUAMEPITHEL, HYALINECASTS in the last 48 hours.    Invalid input(s): TIMI    Significant Imaging: I have reviewed all pertinent imaging results/findings within the past 24 hours.

## 2020-01-15 NOTE — PLAN OF CARE
AVS and educational attachments shared with patient via [employer]. Discussed thoroughly. Patient verbalized clear understanding using teach back method. Notified bedside nurse of completion of education. At present no distress noted. Patient to be discharged via w/c with escort service and family with all of patient's belonings. Will cont to be available to patient and family and intervene prn.

## 2020-01-29 ENCOUNTER — CLINICAL SUPPORT (OUTPATIENT)
Dept: SMOKING CESSATION | Facility: CLINIC | Age: 50
End: 2020-01-29
Payer: COMMERCIAL

## 2020-01-29 DIAGNOSIS — F17.200 NICOTINE DEPENDENCE: Primary | ICD-10-CM

## 2020-01-29 PROCEDURE — 99404 PR PREVENT COUNSEL,INDIV,60 MIN: ICD-10-PCS | Mod: S$GLB,,,

## 2020-01-29 PROCEDURE — 99999 PR PBB SHADOW E&M-EST. PATIENT-LVL I: ICD-10-PCS | Mod: PBBFAC,,,

## 2020-01-29 PROCEDURE — 99999 PR PBB SHADOW E&M-EST. PATIENT-LVL I: CPT | Mod: PBBFAC,,,

## 2020-01-29 PROCEDURE — 99404 PREV MED CNSL INDIV APPRX 60: CPT | Mod: S$GLB,,,

## 2020-01-29 RX ORDER — ASPIRIN/CALCIUM CARB/MAGNESIUM 325 MG
4 TABLET ORAL
Qty: 270 LOZENGE | Refills: 0 | Status: SHIPPED | OUTPATIENT
Start: 2020-01-29 | End: 2020-05-26 | Stop reason: DRUGHIGH

## 2020-01-29 RX ORDER — IBUPROFEN 200 MG
1 TABLET ORAL DAILY
Qty: 14 PATCH | Refills: 0 | Status: SHIPPED | OUTPATIENT
Start: 2020-01-29 | End: 2020-02-12 | Stop reason: SDUPTHER

## 2020-01-29 NOTE — Clinical Note
Pt currently smoke two packs of cigarettes per day and will begin weekly tobacco cessation sessions. Pt agreed to take prescribed tobacco cessation medication regimen of 21 mg nicotine patches and 4 mg nicotine lozenges. Prescribed medications will be managed by physician and monitored by CTTS. CO measurement=20 ppm

## 2020-02-12 ENCOUNTER — CLINICAL SUPPORT (OUTPATIENT)
Dept: SMOKING CESSATION | Facility: CLINIC | Age: 50
End: 2020-02-12
Payer: COMMERCIAL

## 2020-02-12 DIAGNOSIS — F17.200 NICOTINE DEPENDENCE: Primary | ICD-10-CM

## 2020-02-12 PROCEDURE — 99999 PR PBB SHADOW E&M-EST. PATIENT-LVL I: CPT | Mod: PBBFAC,,,

## 2020-02-12 PROCEDURE — 99404 PR PREVENT COUNSEL,INDIV,60 MIN: ICD-10-PCS | Mod: S$GLB,,,

## 2020-02-12 PROCEDURE — 99404 PREV MED CNSL INDIV APPRX 60: CPT | Mod: S$GLB,,,

## 2020-02-12 PROCEDURE — 99999 PR PBB SHADOW E&M-EST. PATIENT-LVL I: ICD-10-PCS | Mod: PBBFAC,,,

## 2020-02-12 RX ORDER — IBUPROFEN 200 MG
1 TABLET ORAL DAILY
Qty: 14 PATCH | Refills: 0 | Status: SHIPPED | OUTPATIENT
Start: 2020-02-12 | End: 2020-02-19

## 2020-02-12 NOTE — PROGRESS NOTES
Individual Follow-Up Form    2/12/2020    Quit Date: 1/30/2020    Clinical Status of Patient: Outpatient    Length of Service: 60 minutes    Continuing Medication: yes  Patches or Nicotine Lozenges    Other Medications: none     Target Symptoms: Withdrawal and medication side effects. The following were  rated moderate (3) to severe (4) on TCRS:  · Moderate (3): none  · Severe (4): insomnia    Comments: Pt remain tobacco free since 1/30/2020. Pt remain on prescribed tobacco cessation medication regimen of 21 mg patches and 4 mg nicotine lozenges without any negative side effects at this time. Pt commended for the accomplishment thus far. Discussed and reviewed cues, triggers, and habitual behavior. Pt denies any abnormal behavior or mental changes at this time. Pt will continue with individual therapy sessions and medication monitoring by CTTS. Prescribed medication management will be by physician.     Diagnosis: F17.200    Next Visit: 1 week

## 2020-02-12 NOTE — Clinical Note
Pt remain tobacco free since 1/30/2020. Pt remain on prescribed tobacco cessation medication regimen of 21 mg patches and 4 mg nicotine lozenges without any negative side effects at this time. Pt commended for the accomplishment thus far. Discussed and reviewed cues, triggers, and habitual behavior. Pt denies any abnormal behavior or mental changes at this time. Pt will continue with individual therapy sessions and medication monitoring by CTTS. Prescribed medication management will be by physician.

## 2020-02-18 ENCOUNTER — HOSPITAL ENCOUNTER (EMERGENCY)
Facility: HOSPITAL | Age: 50
Discharge: HOME | End: 2020-02-18
Attending: EMERGENCY MEDICINE
Payer: MEDICAID

## 2020-02-18 VITALS
TEMPERATURE: 99 F | DIASTOLIC BLOOD PRESSURE: 83 MMHG | SYSTOLIC BLOOD PRESSURE: 167 MMHG | RESPIRATION RATE: 18 BRPM | OXYGEN SATURATION: 100 % | HEART RATE: 87 BPM | BODY MASS INDEX: 32.1 KG/M2 | WEIGHT: 237 LBS | HEIGHT: 72 IN

## 2020-02-18 DIAGNOSIS — L02.91 ABSCESS: Primary | ICD-10-CM

## 2020-02-18 LAB — POCT GLUCOSE: 88 MG/DL (ref 70–110)

## 2020-02-18 PROCEDURE — 99284 EMERGENCY DEPT VISIT MOD MDM: CPT | Mod: 25,ER

## 2020-02-18 PROCEDURE — 25000003 PHARM REV CODE 250: Mod: ER | Performed by: PHYSICIAN ASSISTANT

## 2020-02-18 PROCEDURE — 82962 GLUCOSE BLOOD TEST: CPT | Mod: ER

## 2020-02-18 RX ORDER — KETOROLAC TROMETHAMINE 10 MG/1
10 TABLET, FILM COATED ORAL
Status: COMPLETED | OUTPATIENT
Start: 2020-02-18 | End: 2020-02-18

## 2020-02-18 RX ORDER — KETOROLAC TROMETHAMINE 10 MG/1
10 TABLET, FILM COATED ORAL EVERY 6 HOURS
Qty: 10 TABLET | Refills: 0 | Status: SHIPPED | OUTPATIENT
Start: 2020-02-18 | End: 2021-03-01 | Stop reason: CLARIF

## 2020-02-18 RX ORDER — CLINDAMYCIN HYDROCHLORIDE 150 MG/1
300 CAPSULE ORAL
Status: COMPLETED | OUTPATIENT
Start: 2020-02-18 | End: 2020-02-18

## 2020-02-18 RX ORDER — CLINDAMYCIN HYDROCHLORIDE 150 MG/1
300 CAPSULE ORAL 4 TIMES DAILY
Qty: 56 CAPSULE | Refills: 0 | Status: SHIPPED | OUTPATIENT
Start: 2020-02-18 | End: 2020-02-18 | Stop reason: SDUPTHER

## 2020-02-18 RX ORDER — CLINDAMYCIN HYDROCHLORIDE 150 MG/1
300 CAPSULE ORAL 4 TIMES DAILY
Qty: 56 CAPSULE | Refills: 0 | Status: SHIPPED | OUTPATIENT
Start: 2020-02-18 | End: 2020-02-25

## 2020-02-18 RX ADMIN — KETOROLAC TROMETHAMINE 10 MG: 10 TABLET, FILM COATED ORAL at 04:02

## 2020-02-18 RX ADMIN — CLINDAMYCIN HYDROCHLORIDE 300 MG: 150 CAPSULE ORAL at 04:02

## 2020-02-18 NOTE — ED PROVIDER NOTES
Encounter Date: 2/18/2020       History     Chief Complaint   Patient presents with    Abscess     under the right arm pit, its been there for about 2 weeks painful and red.     49-year-old male presents to the emergency department for evaluation of small abscess to his right axilla.  He reports noticing a small red bump like a pimple 2 weeks ago which has increased in size and pain since that time.  He reports that he has an appointment with his dermatologist next week, but wanted to get it checked before then.  He denies any fever, headache, dizziness, nausea, vomiting, diarrhea or generalized rash.  He reports that his blood glucose levels at home have been stable. He states that his glucose level was 97 this morning.  No treatment was attempted prior to arrival.        Review of patient's allergies indicates:   Allergen Reactions    Codeine Nausea And Vomiting    Pcn [penicillins] Other (See Comments)     Unknown reaction    Betadine [povidone-iodine] Swelling and Other (See Comments)     redness     Past Medical History:   Diagnosis Date    Alcoholic     Arthritis     COPD (chronic obstructive pulmonary disease)     Diabetes mellitus     Erectile dysfunction     GERD (gastroesophageal reflux disease)     Hypertension     ABHILASH on CPAP     Peyronie's disease     Stroke     vs TIA 1/14/2020    Toxic effect of contact with stingray 11/2018    right wrist     Past Surgical History:   Procedure Laterality Date    LASIK      ISRA[place] PLICATION N/A 11/27/2018    Procedure: PLICATION, PENIS;  Surgeon: Ralph Wilcox MD;  Location: [facility];  Service: Urology;  Laterality: N/A;    VASECTOMY       Family History   Problem Relation Age of Onset    Clotting disorder Father     Heart disease Father     Hypertension Father     Heart disease Mother     Hypertension Mother     Heart disease Maternal Aunt     Heart disease Maternal Uncle     Heart disease Paternal Aunt     Heart disease Paternal  Uncle     Hypertension Sister     Hypertension Brother     Prostate cancer Brother      Social History     Tobacco Use    Smoking status: Current Every Day Smoker     Packs/day: 1.50     Years: 38.00     Pack years: 57.00     Types: Cigarettes     Start date: 1982    Smokeless tobacco: Never Used    Tobacco comment: Pt enrolled in [employer].  Ambulatory referral to Smoking Cessation program.    Substance Use Topics    Alcohol use: Yes     Comment: heavy etoh use daily;5th of vodka a day    Drug use: No     Review of Systems   Constitutional: Negative for activity change, appetite change and fever.   HENT: Negative for congestion, ear pain, rhinorrhea, sinus pain, sore throat, trouble swallowing and voice change.    Respiratory: Negative for cough and shortness of breath.    Cardiovascular: Negative for chest pain.   Gastrointestinal: Negative for abdominal pain, constipation, diarrhea, nausea and vomiting.   Genitourinary: Negative for decreased urine volume and dysuria.   Musculoskeletal: Negative for back pain, joint swelling, neck pain and neck stiffness.   Skin: Negative for rash.        Abscess   Neurological: Negative for dizziness, weakness, light-headedness, numbness and headaches.   Hematological: Does not bruise/bleed easily.       Physical Exam     Initial Vitals [02/18/20 1547]   BP Pulse Resp Temp SpO2   (!) 167/83 87 18 98.6 °F (37 °C) 100 %      MAP       --         Physical Exam    Nursing note and vitals reviewed.  Constitutional: He appears well-developed and well-nourished. He is not diaphoretic. No distress.   HENT:   Head: Normocephalic and atraumatic.   Right Ear: External ear normal.   Left Ear: External ear normal.   Mouth/Throat: Oropharynx is clear and moist. No oropharyngeal exudate.   Eyes: Conjunctivae and EOM are normal. Pupils are equal, round, and reactive to light.   Neck: Normal range of motion. Neck supple.   Cardiovascular: Normal rate, regular rhythm and normal heart  sounds.   Pulmonary/Chest: Breath sounds normal. No respiratory distress. He has no wheezes. He has no rhonchi. He has no rales. He exhibits no tenderness.   Lymphadenopathy:     He has no cervical adenopathy.   Neurological: He is alert and oriented to person, place, and time.   Skin: Skin is warm and dry.        Psychiatric: He has a normal mood and affect.         ED Course   Procedures  Labs Reviewed   POCT GLUCOSE MONITORING CONTINUOUS          Imaging Results    None          Medical Decision Making:   Initial Assessment:   49-year-old male presents to the emergency department for evaluation possible abscess to his right axilla.  Physical exam reveals a nontoxic-appearing male in no acute distress. Patient is afebrile vital signs within normal limits.  Neurological exam reveals an alert and oriented patient.  Lungs clear to auscultation bilaterally.  Skin exam reveals 1 x 1 cm area of erythema and induration noted to the right axilla.  No fluctuance noted. No vesicles, pustules or bulla noted.  No ulceration noted. No lymphadenopathy noted.  Differential Diagnosis:   Early abscess  Cellulitis  ED Management:  No indication for incision and drainage at this time.  Discussed with patient the possibility that this will need to be drained did later date.  Will cover the patient with clindamycin upon discharge. Instructed the patient to use warm compresses and keep the appointment with his dermatologist next week.  Instructed patient to return to the emergency department immediately for any new or worsening symptoms.                                 Clinical Impression:       ICD-10-CM ICD-9-CM   1. Abscess L02.91 682.9                             Marlene Cai PA-C  02/18/20 1610

## 2020-02-19 ENCOUNTER — CLINICAL SUPPORT (OUTPATIENT)
Dept: SMOKING CESSATION | Facility: CLINIC | Age: 50
End: 2020-02-19
Payer: COMMERCIAL

## 2020-02-19 DIAGNOSIS — F17.200 NICOTINE DEPENDENCE: Primary | ICD-10-CM

## 2020-02-19 PROCEDURE — 99401 PREV MED CNSL INDIV APPRX 15: CPT | Mod: S$GLB,,,

## 2020-02-19 PROCEDURE — 99999 PR PBB SHADOW E&M-EST. PATIENT-LVL I: ICD-10-PCS | Mod: PBBFAC,,,

## 2020-02-19 PROCEDURE — 99999 PR PBB SHADOW E&M-EST. PATIENT-LVL I: CPT | Mod: PBBFAC,,,

## 2020-02-19 PROCEDURE — 99401 PR PREVENT COUNSEL,INDIV,15 MIN: ICD-10-PCS | Mod: S$GLB,,,

## 2020-02-19 RX ORDER — IBUPROFEN 200 MG
1 TABLET ORAL DAILY
Qty: 14 PATCH | Refills: 0 | Status: SHIPPED | OUTPATIENT
Start: 2020-02-19 | End: 2020-05-07 | Stop reason: DRUGHIGH

## 2020-02-19 NOTE — Clinical Note
Telephone encounter with patient in regards to his smoking status. Pt remain tobacco free since 1/30/2020. Pt continue to use prescribed tobacco cessation medication regimen of 21 mg nicotine patches and 2 mg nicotine lozenges without any negative side effects at this time. Decreased patches to 14 mg after today's phone session. Pt will continue with individual therapy sessions and medication monitoring by CTTS. Prescribed medication management will be by physician. Pt denies any abnormal behavior or mental changes at this time.

## 2020-02-19 NOTE — PROGRESS NOTES
Individual Follow-Up Form    2/19/2020    Quit Date: 1/30/2020    Clinical Status of Patient: Outpatient    Length of Service: 15 minutes    Continuing Medication: yes  Patches or Nicotine Lozenges    Other Medications: none     Target Symptoms: Withdrawal and medication side effects. The following were  rated moderate (3) to severe (4) on TCRS:  · Moderate (3): n/a  · Severe (4): n/a    Comments: Telephone encounter with patient in regards to his smoking status. Pt remain tobacco free since 1/30/2020. Pt continue to use prescribed tobacco cessation medication regimen of 21 mg nicotine patches and 2 mg nicotine lozenges without any negative side effects at this time. Decreased patches to 14 mg after today's phone session. Pt will continue with individual therapy sessions and medication monitoring by CTTS. Prescribed medication management will be by physician. Pt denies any abnormal behavior or mental changes at this time.     Diagnosis: F17.200    Next Visit: 1 week

## 2020-02-26 ENCOUNTER — CLINICAL SUPPORT (OUTPATIENT)
Dept: SMOKING CESSATION | Facility: CLINIC | Age: 50
End: 2020-02-26
Payer: COMMERCIAL

## 2020-02-26 DIAGNOSIS — F17.200 NICOTINE DEPENDENCE: Primary | ICD-10-CM

## 2020-02-26 PROCEDURE — 99404 PR PREVENT COUNSEL,INDIV,60 MIN: ICD-10-PCS | Mod: S$GLB,,,

## 2020-02-26 PROCEDURE — 99404 PREV MED CNSL INDIV APPRX 60: CPT | Mod: S$GLB,,,

## 2020-02-26 NOTE — PROGRESS NOTES
Individual Follow-Up Form    2/26/2020    Quit Date: 1/30/2020    Clinical Status of Patient: Outpatient    Length of Service: 60 minutes    Continuing Medication: yes  Patches    Other Medications: none     Target Symptoms: Withdrawal and medication side effects. The following were  rated moderate (3) to severe (4) on TCRS:  · Moderate (3): none  · Severe (4): none    Comments: Completion of TCRS (Tobacco Cessation Rating Scale) reviewed strategies, controlling environment, cues, triggers, new goals set. Introduced high risk situations with preparation interventions, caffeine similarities with withdrawal issues of habit and nicotine, Alcohol, Understanding urges, cravings, stress and relaxation. Open discussion with intervention discussion. Pt remain tobacco free since 1/30/2020. Current CO measurement=4 ppm (0-5 ppm is a non smoker). Pt remain on prescribed tobacco cessation medication regimen of 14 mg nicotine patches and 4 mg nicotine lozenges without any negative side effects at this time. Pt denies any abnormal behavior or mental changes at this time. Pt feels, he can continue his tobacco free journey on his own. Will continue to follow up on his status via telephone.     Diagnosis: F17.200    Next Visit: PRN

## 2020-02-26 NOTE — Clinical Note
Completion of TCRS (Tobacco Cessation Rating Scale) reviewed strategies, controlling environment, cues, triggers, new goals set. Introduced high risk situations with preparation interventions, caffeine similarities with withdrawal issues of habit and nicotine, Alcohol, Understanding urges, cravings, stress and relaxation. Open discussion with intervention discussion. Pt remain tobacco free since 1/30/2020. Current CO measurement=4 ppm (0-5 ppm is a non smoker). Pt remain on prescribed tobacco cessation medication regimen of 14 mg nicotine patches and 4 mg nicotine lozenges without any negative side effects at this time. Pt denies any abnormal behavior or mental changes at this time. Pt feels, he can continue his tobacco free journey on his own. Will continue to follow up on his status via telephone.

## 2020-04-22 ENCOUNTER — HOSPITAL ENCOUNTER (EMERGENCY)
Facility: HOSPITAL | Age: 50
Discharge: HOME | End: 2020-04-22
Attending: EMERGENCY MEDICINE
Payer: MEDICAID

## 2020-04-22 VITALS
SYSTOLIC BLOOD PRESSURE: 173 MMHG | OXYGEN SATURATION: 100 % | HEART RATE: 69 BPM | WEIGHT: 237 LBS | TEMPERATURE: 99 F | BODY MASS INDEX: 32.1 KG/M2 | DIASTOLIC BLOOD PRESSURE: 94 MMHG | HEIGHT: 72 IN | RESPIRATION RATE: 20 BRPM

## 2020-04-22 DIAGNOSIS — R42 DIZZINESS: ICD-10-CM

## 2020-04-22 DIAGNOSIS — R03.0 ELEVATED BLOOD PRESSURE READING: ICD-10-CM

## 2020-04-22 DIAGNOSIS — R51.9 NONINTRACTABLE HEADACHE, UNSPECIFIED CHRONICITY PATTERN, UNSPECIFIED HEADACHE TYPE: Primary | ICD-10-CM

## 2020-04-22 LAB
ALBUMIN SERPL BCP-MCNC: 4 G/DL (ref 3.5–5.2)
ALP SERPL-CCNC: 19 U/L (ref 55–135)
ALT SERPL W/O P-5'-P-CCNC: 38 U/L (ref 10–44)
ANION GAP SERPL CALC-SCNC: 11 MMOL/L (ref 8–16)
AST SERPL-CCNC: 37 U/L (ref 10–40)
BILIRUB SERPL-MCNC: 0.4 MG/DL (ref 0.1–1)
BNP SERPL-MCNC: 14 PG/ML (ref 0–99)
BUN SERPL-MCNC: 12 MG/DL (ref 6–20)
CALCIUM SERPL-MCNC: 9.4 MG/DL (ref 8.7–10.5)
CHLORIDE SERPL-SCNC: 101 MMOL/L (ref 95–110)
CO2 SERPL-SCNC: 25 MMOL/L (ref 23–29)
CREAT SERPL-MCNC: 1 MG/DL (ref 0.5–1.4)
ERYTHROCYTE [DISTWIDTH] IN BLOOD BY AUTOMATED COUNT: 13 % (ref 11.5–14.5)
EST. GFR  (AFRICAN AMERICAN): >60 ML/MIN/1.73 M^2
EST. GFR  (NON AFRICAN AMERICAN): >60 ML/MIN/1.73 M^2
GLUCOSE SERPL-MCNC: 155 MG/DL (ref 70–110)
HCT VFR BLD AUTO: 42 % (ref 40–54)
HGB BLD-MCNC: 14.5 G/DL (ref 14–18)
INR PPP: 1 (ref 0.8–1.2)
MCH RBC QN AUTO: 35.1 PG (ref 27–31)
MCHC RBC AUTO-ENTMCNC: 34.5 G/DL (ref 32–36)
MCV RBC AUTO: 102 FL (ref 82–98)
PLATELET # BLD AUTO: 191 K/UL (ref 150–350)
PMV BLD AUTO: 9.5 FL (ref 9.2–12.9)
POTASSIUM SERPL-SCNC: 3.6 MMOL/L (ref 3.5–5.1)
PROT SERPL-MCNC: 7.7 G/DL (ref 6–8.4)
PROTHROMBIN TIME: 11.2 SEC (ref 9–12.5)
RBC # BLD AUTO: 4.13 M/UL (ref 4.6–6.2)
SODIUM SERPL-SCNC: 137 MMOL/L (ref 136–145)
TROPONIN I SERPL DL<=0.01 NG/ML-MCNC: <0.006 NG/ML (ref 0–0.03)
WBC # BLD AUTO: 7.36 K/UL (ref 3.9–12.7)

## 2020-04-22 PROCEDURE — 84484 ASSAY OF TROPONIN QUANT: CPT

## 2020-04-22 PROCEDURE — 93010 EKG 12-LEAD: ICD-10-PCS | Mod: ,,, | Performed by: INTERNAL MEDICINE

## 2020-04-22 PROCEDURE — 96375 TX/PRO/DX INJ NEW DRUG ADDON: CPT

## 2020-04-22 PROCEDURE — 96361 HYDRATE IV INFUSION ADD-ON: CPT

## 2020-04-22 PROCEDURE — 25000003 PHARM REV CODE 250: Performed by: EMERGENCY MEDICINE

## 2020-04-22 PROCEDURE — 96374 THER/PROPH/DIAG INJ IV PUSH: CPT

## 2020-04-22 PROCEDURE — 63600175 PHARM REV CODE 636 W HCPCS: Performed by: EMERGENCY MEDICINE

## 2020-04-22 PROCEDURE — 85610 PROTHROMBIN TIME: CPT

## 2020-04-22 PROCEDURE — 85027 COMPLETE CBC AUTOMATED: CPT

## 2020-04-22 PROCEDURE — 99284 EMERGENCY DEPT VISIT MOD MDM: CPT | Mod: 25

## 2020-04-22 PROCEDURE — 83880 ASSAY OF NATRIURETIC PEPTIDE: CPT

## 2020-04-22 PROCEDURE — 93010 ELECTROCARDIOGRAM REPORT: CPT | Mod: ,,, | Performed by: INTERNAL MEDICINE

## 2020-04-22 PROCEDURE — 80053 COMPREHEN METABOLIC PANEL: CPT

## 2020-04-22 PROCEDURE — 93005 ELECTROCARDIOGRAM TRACING: CPT

## 2020-04-22 RX ORDER — SODIUM CHLORIDE 9 MG/ML
500 INJECTION, SOLUTION INTRAVENOUS
Status: COMPLETED | OUTPATIENT
Start: 2020-04-22 | End: 2020-04-22

## 2020-04-22 RX ORDER — LABETALOL HYDROCHLORIDE 5 MG/ML
10 INJECTION, SOLUTION INTRAVENOUS
Status: DISCONTINUED | OUTPATIENT
Start: 2020-04-22 | End: 2020-04-22 | Stop reason: HOSPADM

## 2020-04-22 RX ORDER — DIPHENHYDRAMINE HYDROCHLORIDE 50 MG/ML
25 INJECTION INTRAMUSCULAR; INTRAVENOUS
Status: COMPLETED | OUTPATIENT
Start: 2020-04-22 | End: 2020-04-22

## 2020-04-22 RX ORDER — PROCHLORPERAZINE EDISYLATE 5 MG/ML
5 INJECTION INTRAMUSCULAR; INTRAVENOUS
Status: COMPLETED | OUTPATIENT
Start: 2020-04-22 | End: 2020-04-22

## 2020-04-22 RX ORDER — MECLIZINE HYDROCHLORIDE 25 MG/1
25 TABLET ORAL
Status: COMPLETED | OUTPATIENT
Start: 2020-04-22 | End: 2020-04-22

## 2020-04-22 RX ORDER — KETOROLAC TROMETHAMINE 30 MG/ML
15 INJECTION, SOLUTION INTRAMUSCULAR; INTRAVENOUS
Status: COMPLETED | OUTPATIENT
Start: 2020-04-22 | End: 2020-04-22

## 2020-04-22 RX ADMIN — DIPHENHYDRAMINE HYDROCHLORIDE 25 MG: 50 INJECTION, SOLUTION INTRAMUSCULAR; INTRAVENOUS at 01:04

## 2020-04-22 RX ADMIN — MECLIZINE HYDROCHLORIDE 25 MG: 25 TABLET ORAL at 11:04

## 2020-04-22 RX ADMIN — KETOROLAC TROMETHAMINE 15 MG: 30 INJECTION, SOLUTION INTRAMUSCULAR at 02:04

## 2020-04-22 RX ADMIN — PROCHLORPERAZINE EDISYLATE 5 MG: 5 INJECTION INTRAMUSCULAR; INTRAVENOUS at 01:04

## 2020-04-22 RX ADMIN — SODIUM CHLORIDE 500 ML: 0.9 INJECTION, SOLUTION INTRAVENOUS at 02:04

## 2020-04-22 NOTE — ED NOTES
Spoke to pt. Wife on phone with permission and given status update. Pt. Reports headache pain remains 9/10.

## 2020-04-22 NOTE — ED NOTES
md at bedside for reassessment and to discuss test results and discharge instructions. Pt. Reports relief of headache presently.

## 2020-04-22 NOTE — ED NOTES
Pt. C/o awoke at 3am with headache, dizziness, tingling in bilateral arms and blurred vision/photophobia. Some symptoms similar to previous TIA. Denies sob, chest pain, n/v or abdominal pain.

## 2020-04-22 NOTE — DISCHARGE INSTRUCTIONS
Your blood pressure was elevated today.  There is no evidence of any significant issues directly related to your blood pressure currently.  However, your blood pressure will need to be rechecked and managed closely by your primary care doctor.  Return to the ER if you experience any severe chest pain, shortness of breath, weakness to 1 side of her body, difficulty speaking, facial droop, or any other concerning symptoms.
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
## 2020-05-07 ENCOUNTER — TELEPHONE (OUTPATIENT)
Dept: SMOKING CESSATION | Facility: CLINIC | Age: 50
End: 2020-05-07

## 2020-05-07 ENCOUNTER — CLINICAL SUPPORT (OUTPATIENT)
Dept: SMOKING CESSATION | Facility: CLINIC | Age: 50
End: 2020-05-07
Payer: COMMERCIAL

## 2020-05-07 DIAGNOSIS — F17.200 NICOTINE DEPENDENCE: Primary | ICD-10-CM

## 2020-05-07 PROCEDURE — 99999 PR PBB SHADOW E&M-EST. PATIENT-LVL I: CPT | Mod: PBBFAC,,,

## 2020-05-07 PROCEDURE — 99404 PREV MED CNSL INDIV APPRX 60: CPT | Mod: S$GLB,,,

## 2020-05-07 PROCEDURE — 99404 PR PREVENT COUNSEL,INDIV,60 MIN: ICD-10-PCS | Mod: S$GLB,,,

## 2020-05-07 PROCEDURE — 99999 PR PBB SHADOW E&M-EST. PATIENT-LVL I: ICD-10-PCS | Mod: PBBFAC,,,

## 2020-05-07 RX ORDER — IBUPROFEN 200 MG
1 TABLET ORAL DAILY
Qty: 14 PATCH | Refills: 0 | Status: SHIPPED | OUTPATIENT
Start: 2020-05-07 | End: 2020-05-26 | Stop reason: SDUPTHER

## 2020-05-07 NOTE — Clinical Note
Pt currently smoke one pack of cigarettes per day and will begin weekly tobacco cessation therapy sessions. Pt agreed to take prescribed tobacco cessation medication regimen of 21 mg nicotine patches. Prescribed medications will be managed by physician and monitored by CTTS.

## 2020-05-07 NOTE — TELEPHONE ENCOUNTER
Unsuccessful contact with patient in regards to his tobacco cessation status. Left a message with contact information to schedule an appointment.

## 2020-05-14 ENCOUNTER — TELEPHONE (OUTPATIENT)
Dept: SMOKING CESSATION | Facility: CLINIC | Age: 50
End: 2020-05-14

## 2020-05-14 NOTE — TELEPHONE ENCOUNTER
Unsuccessful contact with patient in regards to today's tobacco cessation follow up. Left a message with contact information to reschedule.

## 2020-05-18 ENCOUNTER — CLINICAL SUPPORT (OUTPATIENT)
Dept: SMOKING CESSATION | Facility: CLINIC | Age: 50
End: 2020-05-18
Payer: COMMERCIAL

## 2020-05-18 DIAGNOSIS — F17.200 NICOTINE DEPENDENCE: Primary | ICD-10-CM

## 2020-05-18 PROCEDURE — 99999 PR PBB SHADOW E&M-EST. PATIENT-LVL I: ICD-10-PCS | Mod: PBBFAC,,,

## 2020-05-18 PROCEDURE — 99999 PR PBB SHADOW E&M-EST. PATIENT-LVL I: CPT | Mod: PBBFAC,,,

## 2020-05-18 PROCEDURE — 99401 PREV MED CNSL INDIV APPRX 15: CPT | Mod: S$GLB,,,

## 2020-05-18 PROCEDURE — 99401 PR PREVENT COUNSEL,INDIV,15 MIN: ICD-10-PCS | Mod: S$GLB,,,
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
## 2020-05-26 ENCOUNTER — CLINICAL SUPPORT (OUTPATIENT)
Dept: SMOKING CESSATION | Facility: CLINIC | Age: 50
End: 2020-05-26
Payer: COMMERCIAL

## 2020-05-26 DIAGNOSIS — F17.200 NICOTINE DEPENDENCE: Primary | ICD-10-CM

## 2020-05-26 PROCEDURE — 99999 PR PBB SHADOW E&M-EST. PATIENT-LVL I: CPT | Mod: PBBFAC,,,

## 2020-05-26 PROCEDURE — 99999 PR PBB SHADOW E&M-EST. PATIENT-LVL I: ICD-10-PCS | Mod: PBBFAC,,,

## 2020-05-26 PROCEDURE — 99402 PREV MED CNSL INDIV APPRX 30: CPT | Mod: S$GLB,,,

## 2020-05-26 PROCEDURE — 99402 PR PREVENT COUNSEL,INDIV,30 MIN: ICD-10-PCS | Mod: S$GLB,,,

## 2020-05-26 RX ORDER — DM/P-EPHED/ACETAMINOPH/DOXYLAM 30-7.5/3
2 LIQUID (ML) ORAL
Qty: 216 LOZENGE | Refills: 0 | Status: SHIPPED | OUTPATIENT
Start: 2020-05-26 | End: 2021-03-01 | Stop reason: CLARIF

## 2020-05-26 RX ORDER — IBUPROFEN 200 MG
1 TABLET ORAL DAILY
Qty: 14 PATCH | Refills: 0 | Status: SHIPPED | OUTPATIENT
Start: 2020-05-26 | End: 2021-03-11 | Stop reason: SDUPTHER

## 2020-05-26 NOTE — Clinical Note
Telephone follow up with patient in regards to his tobacco cessation status. Pt report, he continue to smoke 1-1.5 packs of cigarettes per day. Pt remain on prescribed tobacco cessation medication regimen of 21 mg nicotine patches without any negative side effects at this time. Discussed and  reviewed strategies, cues, and triggers. Introduced the negative impact of tobacco on health, the health advantages of discontinuing the use of tobacco, time line improved health changes after a quit, withdrawal issues to expect from nicotine and habit, and ways to achieve the goal of a quit. 2 mg nicotine lozenges added to the tobacco cessation regimen. Pt denies any abnormal behavior or mental changes at this time. Pt will continue with individual therapy sessions and medication monitoring by CTTS. Prescribed medication management will be by physician.

## 2020-05-26 NOTE — PROGRESS NOTES
Individual Follow-Up Form    5/26/2020    Quit Date: TBD    Clinical Status of Patient: Outpatient    Length of Service: 30 minutes    Continuing Medication: yes  Patches or Nicotine Lozenges    Other Medications: none     Target Symptoms: Withdrawal and medication side effects. The following were  rated moderate (3) to severe (4) on TCRS:  · Moderate (3): none   · Severe (4): none    Comments: Telephone follow up with patient in regards to his tobacco cessation status. Pt report, he continue to smoke 1-1.5 packs of cigarettes per day. Pt remain on prescribed tobacco cessation medication regimen of 21 mg nicotine patches without any negative side effects at this time. Discussed and  reviewed strategies, cues, and triggers. Introduced the negative impact of tobacco on health, the health advantages of discontinuing the use of tobacco, time line improved health changes after a quit, withdrawal issues to expect from nicotine and habit, and ways to achieve the goal of a quit. 2 mg nicotine lozenges added to the tobacco cessation regimen. Pt denies any abnormal behavior or mental changes at this time. Pt will continue with individual therapy sessions and medication monitoring by CTTS. Prescribed medication management will be by physician.     Diagnosis: F17.200    Next Visit: 1 week

## 2020-06-09 ENCOUNTER — TELEPHONE (OUTPATIENT)
Dept: UROLOGY | Facility: CLINIC | Age: 50
End: 2020-06-09

## 2020-06-09 NOTE — TELEPHONE ENCOUNTER
----- Message from Camron Rogel sent at 6/8/2020  4:04 PM CDT -----  Contact: FARIDA ARAIZA [[id number]]  Name of Who is Calling: FARIDA ARAIZA [[id number]]      What is the request in detail: (m) Would like to speak with staff in regards to an appointment for issues urinating. Patient refused appt tomorrow with NP, only want to see physician. Please advise, no availably at this time.      Can the clinic reply by [facility]: no      [facility] Back [facility]: [phone]

## 2020-06-09 NOTE — TELEPHONE ENCOUNTER
Spoke with patient regarding his concerns. Patient did not want to see any other provider but Dr. Wilcox because he knows the patients history. I advised the patient that Dr. Wilcox is out of the office and next opening is not until June 18th patient wanted to wait until that appointment. Schedule patient on the 18th at 3pm with Dr. Wilcox     ---Batsheva Alejandro

## 2020-06-18 ENCOUNTER — TELEPHONE (OUTPATIENT)
Dept: UROLOGY | Facility: CLINIC | Age: 50
End: 2020-06-18

## 2020-06-18 NOTE — TELEPHONE ENCOUNTER
----- Message from Madeline De sent at 6/18/2020 12:27 PM CDT -----  Name of Who is Calling: FARIDA ARAIZA      What is the request in detail: Would like to speak to staff in regards to needing to reschedule his appointment for another day in the morning. Please advise.       Can the clinic reply by [facility]: No      [facility]: [phone]

## 2020-07-24 ENCOUNTER — OFFICE VISIT (OUTPATIENT)
Dept: UROLOGY | Facility: CLINIC | Age: 50
End: 2020-07-24
Attending: UROLOGY
Payer: MEDICAID

## 2020-07-24 VITALS
HEIGHT: 72 IN | BODY MASS INDEX: 31.2 KG/M2 | SYSTOLIC BLOOD PRESSURE: 151 MMHG | HEART RATE: 82 BPM | WEIGHT: 230.38 LBS | DIASTOLIC BLOOD PRESSURE: 85 MMHG

## 2020-07-24 DIAGNOSIS — N52.01 ERECTILE DYSFUNCTION DUE TO ARTERIAL INSUFFICIENCY: Primary | ICD-10-CM

## 2020-07-24 DIAGNOSIS — R39.9 LOWER URINARY TRACT SYMPTOMS (LUTS): ICD-10-CM

## 2020-07-24 DIAGNOSIS — N48.6 PEYRONIE DISEASE: ICD-10-CM

## 2020-07-24 LAB
BILIRUB SERPL-MCNC: NEGATIVE MG/DL
BLOOD URINE, POC: NEGATIVE
CLARITY, POC UA: CLEAR
COLOR, POC UA: YELLOW
GLUCOSE UR QL STRIP: NORMAL
KETONES UR QL STRIP: NEGATIVE
LEUKOCYTE ESTERASE URINE, POC: ABNORMAL
NITRITE, POC UA: NEGATIVE
PH, POC UA: 8
PROTEIN, POC: NEGATIVE
SPECIFIC GRAVITY, POC UA: 1
UROBILINOGEN, POC UA: NORMAL

## 2020-07-24 PROCEDURE — 99214 PR OFFICE/OUTPT VISIT, EST, LEVL IV, 30-39 MIN: ICD-10-PCS | Mod: S$PBB,,, | Performed by: UROLOGY

## 2020-07-24 PROCEDURE — 99999 PR PBB SHADOW E&M-EST. PATIENT-LVL III: ICD-10-PCS | Mod: PBBFAC,,, | Performed by: UROLOGY

## 2020-07-24 PROCEDURE — 99213 OFFICE O/P EST LOW 20 MIN: CPT | Mod: PBBFAC,PO | Performed by: UROLOGY

## 2020-07-24 PROCEDURE — 99214 OFFICE O/P EST MOD 30 MIN: CPT | Mod: S$PBB,,, | Performed by: UROLOGY

## 2020-07-24 PROCEDURE — 99999 PR PBB SHADOW E&M-EST. PATIENT-LVL III: CPT | Mod: PBBFAC,,, | Performed by: UROLOGY

## 2020-07-24 PROCEDURE — 81002 URINALYSIS NONAUTO W/O SCOPE: CPT | Mod: PBBFAC,PO | Performed by: UROLOGY

## 2020-07-24 RX ORDER — TADALAFIL 20 MG/1
20 TABLET ORAL
Qty: 30 TABLET | Refills: 11 | Status: SHIPPED | OUTPATIENT
Start: 2020-07-24 | End: 2020-07-27 | Stop reason: SDUPTHER
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
## 2020-07-27 DIAGNOSIS — N52.01 ERECTILE DYSFUNCTION DUE TO ARTERIAL INSUFFICIENCY: Primary | ICD-10-CM

## 2020-07-27 RX ORDER — TADALAFIL 20 MG/1
20 TABLET ORAL
Qty: 30 TABLET | Refills: 11 | Status: SHIPPED | OUTPATIENT
Start: 2020-07-27 | End: 2021-08-13 | Stop reason: SDUPTHER

## 2020-07-27 NOTE — TELEPHONE ENCOUNTER
----- Message from Radha Mcneil sent at 7/27/2020  8:39 AM CDT -----  Regarding: Call Back  Name of Who is Calling : FARIDA ARAIZA [[id number]]    Patient is requesting a call from staff in regards to medication  tadalafiL (CIALIS) 20 MG Tab being to costly would like to discuss a alternative medication .....Please contact to further discuss and advise.    Can the clinic reply by [facility] : No    What Number to Call Back :  [phone]

## 2020-07-28 ENCOUNTER — CLINICAL SUPPORT (OUTPATIENT)
Dept: SMOKING CESSATION | Facility: CLINIC | Age: 50
End: 2020-07-28
Payer: COMMERCIAL

## 2020-07-28 DIAGNOSIS — F17.200 NICOTINE DEPENDENCE: Primary | ICD-10-CM

## 2020-07-28 PROCEDURE — 99407 PR TOBACCO USE CESSATION INTENSIVE >10 MINUTES: ICD-10-PCS | Mod: S$GLB,,,

## 2020-07-28 PROCEDURE — 99407 BEHAV CHNG SMOKING > 10 MIN: CPT | Mod: S$GLB,,,

## 2020-07-28 NOTE — PROGRESS NOTES
Spoke with patient today in regard to smoking cessation progress for 3-6 month telephone follow up on quit 1 and 3 month on Quit 2. Patient states that he is not tobacco free at this time. Not interested in making an appointment.stated that would call counselor when ready to make appointment.  Informed patient of benefit period, future follow up, and contact information if any further help or support is needed. Will complete smart form for 3-6 month follow up on Quit attempt #1 and 3 month on quit 2..

## 2020-08-04 ENCOUNTER — TELEPHONE (OUTPATIENT)
Dept: UROLOGY | Facility: CLINIC | Age: 50
End: 2020-08-04

## 2020-11-05 ENCOUNTER — HOSPITAL ENCOUNTER (EMERGENCY)
Facility: HOSPITAL | Age: 50
Discharge: HOME | End: 2020-11-05
Attending: EMERGENCY MEDICINE
Payer: MEDICAID

## 2020-11-05 VITALS
DIASTOLIC BLOOD PRESSURE: 72 MMHG | RESPIRATION RATE: 20 BRPM | HEART RATE: 75 BPM | SYSTOLIC BLOOD PRESSURE: 135 MMHG | BODY MASS INDEX: 32.91 KG/M2 | OXYGEN SATURATION: 100 % | TEMPERATURE: 99 F | HEIGHT: 72 IN | WEIGHT: 243 LBS

## 2020-11-05 DIAGNOSIS — K62.5 RECTAL BLEEDING: Primary | ICD-10-CM

## 2020-11-05 LAB
ABO GROUP BLD: NORMAL
ALBUMIN SERPL BCP-MCNC: 4 G/DL (ref 3.5–5.2)
ALP SERPL-CCNC: 25 U/L (ref 55–135)
ALT SERPL W/O P-5'-P-CCNC: 248 U/L (ref 10–44)
ANION GAP SERPL CALC-SCNC: 15 MMOL/L (ref 8–16)
AST SERPL-CCNC: 171 U/L (ref 10–40)
BILIRUB SERPL-MCNC: 0.9 MG/DL (ref 0.1–1)
BLD GP AB SCN CELLS X3 SERPL QL: NORMAL
BUN SERPL-MCNC: 23 MG/DL (ref 6–20)
CALCIUM SERPL-MCNC: 9.2 MG/DL (ref 8.7–10.5)
CHLORIDE SERPL-SCNC: 99 MMOL/L (ref 95–110)
CO2 SERPL-SCNC: 19 MMOL/L (ref 23–29)
CREAT SERPL-MCNC: 1.1 MG/DL (ref 0.5–1.4)
ERYTHROCYTE [DISTWIDTH] IN BLOOD BY AUTOMATED COUNT: 12.6 % (ref 11.5–14.5)
EST. GFR  (AFRICAN AMERICAN): >60 ML/MIN/1.73 M^2
EST. GFR  (NON AFRICAN AMERICAN): >60 ML/MIN/1.73 M^2
ETHANOL SERPL-MCNC: <10 MG/DL
GLUCOSE SERPL-MCNC: 117 MG/DL (ref 70–110)
HCT VFR BLD AUTO: 34.1 % (ref 40–54)
HGB BLD-MCNC: 12.1 G/DL (ref 14–18)
INR PPP: 1.1 (ref 0.8–1.2)
LIPASE SERPL-CCNC: 142 U/L (ref 4–60)
MCH RBC QN AUTO: 36.3 PG (ref 27–31)
MCHC RBC AUTO-ENTMCNC: 35.5 G/DL (ref 32–36)
MCV RBC AUTO: 102 FL (ref 82–98)
PLATELET # BLD AUTO: 179 K/UL (ref 150–350)
PMV BLD AUTO: 10.2 FL (ref 9.2–12.9)
POTASSIUM SERPL-SCNC: 3.8 MMOL/L (ref 3.5–5.1)
PROT SERPL-MCNC: 7.3 G/DL (ref 6–8.4)
PROTHROMBIN TIME: 11.2 SEC (ref 9–12.5)
RBC # BLD AUTO: 3.33 M/UL (ref 4.6–6.2)
RH BLD: NORMAL
SODIUM SERPL-SCNC: 133 MMOL/L (ref 136–145)
WBC # BLD AUTO: 8.14 K/UL (ref 3.9–12.7)

## 2020-11-05 PROCEDURE — 80053 COMPREHEN METABOLIC PANEL: CPT

## 2020-11-05 PROCEDURE — 86901 BLOOD TYPING SEROLOGIC RH(D): CPT

## 2020-11-05 PROCEDURE — 80320 DRUG SCREEN QUANTALCOHOLS: CPT

## 2020-11-05 PROCEDURE — 83690 ASSAY OF LIPASE: CPT

## 2020-11-05 PROCEDURE — 86900 BLOOD TYPING SEROLOGIC ABO: CPT

## 2020-11-05 PROCEDURE — 85610 PROTHROMBIN TIME: CPT

## 2020-11-05 PROCEDURE — 99284 EMERGENCY DEPT VISIT MOD MDM: CPT | Mod: 25

## 2020-11-05 PROCEDURE — 85027 COMPLETE CBC AUTOMATED: CPT

## 2020-11-05 PROCEDURE — 86850 RBC ANTIBODY SCREEN: CPT

## 2020-11-05 RX ORDER — POLYETHYLENE GLYCOL 3350 17 G/17G
17 POWDER, FOR SOLUTION ORAL DAILY
Qty: 119 G | Refills: 0 | Status: SHIPPED | OUTPATIENT
Start: 2020-11-05 | End: 2020-11-12

## 2020-11-05 RX ORDER — DOCUSATE SODIUM 100 MG/1
100 CAPSULE, LIQUID FILLED ORAL 2 TIMES DAILY
Qty: 30 CAPSULE | Refills: 0 | Status: SHIPPED | OUTPATIENT
Start: 2020-11-05 | End: 2020-11-15

## 2020-11-05 NOTE — ED PROVIDER NOTES
Encounter Date: 11/5/2020    SCRIBE #1 NOTE: I, Madi Noland, am scribing for, and in the presence of, Gabriele Méndez MD.       History     Chief Complaint   Patient presents with    Rectal Bleeding     pt c/o maroon rectal bleeding x2 weeks, LBM 2 days ago; reports hx of hemorrhoids; denies any cp, sob; hx of daily drinking, currently on plavix      Veto Park is a 49 y.o. male who  has a past medical history of Alcoholic, Arthritis, COPD (chronic obstructive pulmonary disease), Diabetes mellitus, Erectile dysfunction, GERD (gastroesophageal reflux disease), Hypertension, ABHILASH on CPAP, Peyronie's disease, Stroke, and Toxic effect of contact with stingray (11/2018).    The patient presents to the ED due to rectal bleeding for the past 3 weeks.  Patient reports bright red blood after he wipes and occasionally after having a bowel movement, seeing the toilet water turn bright red.  He reports a history of hemorrhoids, but denies any significant abdominal pain, pain with bowel movements, N/V/D, fever, or any other concerns.  He reports intermittent constipation, and takes fiber daily.  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  He is currently on Plavix.  He tried to make an appointment with his GI physician but instead was told to come to the ED for evaluation.    The history is provided by the patient.     Review of patient's allergies indicates:   Allergen Reactions    Codeine Nausea And Vomiting    Pcn [penicillins] Other (See Comments)     Unknown reaction    Betadine [povidone-iodine] Swelling and Other (See Comments)     redness     Past Medical History:   Diagnosis Date    Alcoholic     Arthritis     COPD (chronic obstructive pulmonary disease)     Diabetes mellitus     Erectile dysfunction     GERD (gastroesophageal reflux disease)     Hypertension     ABHILASH on CPAP     Peyronie's disease     Stroke     vs TIA  2020    Toxic effect of contact with stingray 2018    right wrist     Past Surgical History:   Procedure Laterality Date    LASIK      ISRAEL PLICATION N/A 2018    Procedure: PLICATION, PENIS;  Surgeon: Ralph Wilcox MD;  Location[facility];  Service: Urology;  Laterality: N/A;    VASECTOMY       Family History   Problem Relation Age of Onset    Clotting disorder Father     Heart disease Father     Hypertension Father     Heart disease Mother     Hypertension Mother     Heart disease Maternal Aunt     Heart disease Maternal Uncle     Heart disease Paternal Aunt     Heart disease Paternal Uncle     Hypertension Sister     Hypertension Brother     Prostate cancer Brother      Social History     Tobacco Use    Smoking status: Current Some Day Smoker     Packs/day: 1.50     Years: 38.00     Pack years: 57.00     Types: Cigarettes     Start date:      Last attempt to quit: 2020     Years since quittin.7    Smokeless tobacco: Never Used    Tobacco comment: Pt enrolled in [employer].  Ambulatory referral to Smoking Cessation program.    Substance Use Topics    Alcohol use: Yes     Comment: heavy etoh use daily;5th of vodka a day    Drug use: No     Review of Systems   Constitutional: Negative for chills and fever.   HENT: Negative for sore throat.    Respiratory: Negative for cough and shortness of breath.    Cardiovascular: Negative for chest pain.   Gastrointestinal: Positive for anal bleeding and constipation. Negative for abdominal pain, blood in stool, diarrhea, nausea and vomiting.   Genitourinary: Negative for dysuria, frequency and urgency.   Musculoskeletal: Negative for back pain.   Skin: Negative for rash and wound.   Neurological: Negative for weakness.   Hematological: Does not bruise/bleed easily.   Psychiatric/Behavioral: Negative for agitation, behavioral problems and confusion.   All other systems reviewed and are negative.      Physical Exam      Initial Vitals [11/05/20 1540]   BP Pulse Resp Temp SpO2   (!) 150/71 83 20 98.2 °F (36.8 °C) 98 %      MAP       --         Physical Exam    Nursing note and vitals reviewed.  Constitutional: He appears well-developed and well-nourished. No distress.   HENT:   Head: Normocephalic and atraumatic.   Mouth/Throat: Oropharynx is clear and moist.   Eyes: EOM are normal. Pupils are equal, round, and reactive to light.   Neck: Neck supple. No tracheal deviation present.   Cardiovascular: Normal rate, regular rhythm, normal heart sounds and intact distal pulses.   Pulmonary/Chest: Breath sounds normal. No stridor. No respiratory distress. He has no wheezes.   Abdominal: Soft. Bowel sounds are normal. He exhibits no distension and no mass. There is no abdominal tenderness.   Genitourinary: Rectum:      External hemorrhoid present.      No rectal mass, anal fissure, tenderness or abnormal anal tone.      Genitourinary Comments: Small non-tender external hemorrhoid.  No gross blood on rectal exam, no melena.     Musculoskeletal: Normal range of motion. No edema.   Neurological: He is alert and oriented to person, place, and time. No cranial nerve deficit or sensory deficit.   Skin: Skin is warm and dry. No rash noted.         ED Course   Procedures  Labs Reviewed   CBC WITHOUT DIFFERENTIAL - Abnormal; Notable for the following components:       Result Value    RBC 3.33 (*)     Hemoglobin 12.1 (*)     Hematocrit 34.1 (*)      (*)     MCH 36.3 (*)     All other components within normal limits   COMPREHENSIVE METABOLIC PANEL - Abnormal; Notable for the following components:    Sodium 133 (*)     CO2 19 (*)     Glucose 117 (*)     BUN 23 (*)     Alkaline Phosphatase 25 (*)      (*)      (*)     All other components within normal limits   LIPASE - Abnormal; Notable for the following components:    Lipase 142 (*)     All other components within normal limits   PROTIME-INR   ALCOHOL,MEDICAL (ETHANOL)   TYPE  & SCREEN   GROUP & RH          Imaging Results    None          Medical Decision Making:   History:   Old Medical Records: I decided to obtain old medical records.  Old Records Summarized: records from clinic visits.  Initial Assessment:   50 yo M with chronic constipation and rectal bleeding for the last few weeks.  Vitals reassuring, exam benign, no significant abdominal tenderness.  Will obtain labs and reassess.  Differential Diagnosis:   Differential Diagnosis includes, but is not limited to:  Lower GI bleed (AVM, colitis, colon cancer, polyp, internal/external hemorrhoid, IBS, rectal injury/foreign body, chronic diarrhea, anal fissure), upper GI bleed (PUD, perforated ulcer, esophageal variceal bleed), Crohns disease, ulcerative colitis, chronic diarrhea, dietary intake    Clinical Tests:   Lab Tests: Reviewed and Ordered  ED Management:  Labs unremarkable, H/H 12/34.  No indication for transfusion, further imaging, or admission at this time. Bleeding most likely related to hemorrhoids.  Patient counseled on symptomatic and supportive care. F/u with PCP/GI as needed. Given return precautions including melena, weakness/syncope, abdominal pain, or any other concerns.    On re-evaluation, the patient's status has improved.  After complete ED evaluation, clinical impression is most consistent with rectal bleeding.  PCP/GI follow-up within 2-3 days was recommended.    After taking into careful account the patient's history, physical exam findings, as well as empirical and objective data obtained throughout ED workup, I feel no emergent medical condition has been identified. No further evaluation or admission was felt to be required, and the patient is stable for discharge from the ED. The patient and any additional family present were updated with test results, overall clinical impression, and recommended further plan of care, including discharge instructions as provided and outpatient follow-up for continued  evaluation and management as needed. All questions were answered. The patient expressed understanding and agreed with current plan for discharge and follow-up plan of care. Strict ED return precautions were provided, including return/worsening of current symptoms, new symptoms, or any other concerns.                               Clinical Impression:     ICD-10-CM ICD-9-CM   1. Rectal bleeding  K62.5 569.3                      Disposition:   Disposition: Discharged  Condition: Stable     ED Disposition Condition    Discharge Stable        ED Prescriptions     Medication Sig Dispense Start Date End Date Auth. Provider    docusate sodium (COLACE) 100 MG capsule Take 1 capsule (100 mg total) by mouth 2 (two) times daily. for 10 days 30 capsule 11/5/2020 11/15/2020 Gabriele Méndez MD    polyethylene glycol (GLYCOLAX) 17 gram/dose powder Take 17 g by mouth once daily. May titrate up to 3 times per day with meals for 7 days 119 g 11/5/2020 11/12/2020 Gabriele Méndez MD        Follow-up Information     Follow up With Specialties Details Why Contact Info    Deloris Bah MD Family Medicine   [address] [place] [address] [id number]  [phone]      your GI physician  Schedule an appointment as soon as possible for a visit   as soon as able                      I, Dr. Gabriele Méndez, personally performed the services described in this documentation. All medical record entries made by the scribe were at my direction and in my presence.  I have reviewed the chart and agree that the record reflects my personal performance and is accurate and complete.     Gabriele Méndez MD.             Gabriele Méndez MD  11/06/20 8000

## 2020-12-21 ENCOUNTER — HOSPITAL ENCOUNTER (EMERGENCY)
Facility: HOSPITAL | Age: 50
Discharge: HOME | End: 2020-12-21
Attending: EMERGENCY MEDICINE
Payer: MEDICAID

## 2020-12-21 VITALS
HEIGHT: 72 IN | SYSTOLIC BLOOD PRESSURE: 171 MMHG | WEIGHT: 243 LBS | BODY MASS INDEX: 32.91 KG/M2 | OXYGEN SATURATION: 99 % | HEART RATE: 74 BPM | TEMPERATURE: 98 F | DIASTOLIC BLOOD PRESSURE: 90 MMHG | RESPIRATION RATE: 18 BRPM

## 2020-12-21 DIAGNOSIS — B34.9 VIRAL SYNDROME: Primary | ICD-10-CM

## 2020-12-21 LAB
INFLUENZA A, MOLECULAR: NEGATIVE
INFLUENZA B, MOLECULAR: NEGATIVE
SARS-COV-2 RDRP RESP QL NAA+PROBE: NEGATIVE
SPECIMEN SOURCE: NORMAL

## 2020-12-21 PROCEDURE — 99284 EMERGENCY DEPT VISIT MOD MDM: CPT | Mod: 25,ER

## 2020-12-21 PROCEDURE — 87502 INFLUENZA DNA AMP PROBE: CPT | Mod: ER

## 2020-12-21 PROCEDURE — U0002 COVID-19 LAB TEST NON-CDC: HCPCS | Mod: ER

## 2020-12-21 RX ORDER — ALBUTEROL SULFATE 90 UG/1
1-2 AEROSOL, METERED RESPIRATORY (INHALATION) EVERY 6 HOURS PRN
Qty: 6.7 G | Refills: 0 | Status: SHIPPED | OUTPATIENT
Start: 2020-12-21 | End: 2021-07-21

## 2020-12-21 RX ORDER — BENZONATATE 100 MG/1
100 CAPSULE ORAL 3 TIMES DAILY PRN
Qty: 20 CAPSULE | Refills: 0 | Status: SHIPPED | OUTPATIENT
Start: 2020-12-21 | End: 2021-03-01 | Stop reason: CLARIF

## 2020-12-21 NOTE — Clinical Note
"Veto"Peri Park was seen and treated in our emergency department on 12/21/2020.     COVID-19 is present in our communities across the state. There is limited testing for COVID at this time, so not all patients can be tested. In this situation, your employee meets the following criteria:    Veto Park has met the criteria for COVID-19 testing and has a NEGATIVE result. The employee can return to work once they are asymptomatic for 72 hours without the use of fever reducing medications (Tylenol, Motrin, etc).     If you have any questions or concerns, or if I can be of further assistance, please do not hesitate to contact me.    Sincerely,             ELVIS Kyle RN"

## 2020-12-21 NOTE — ED PROVIDER NOTES
Encounter Date: 12/21/2020       History     Chief Complaint   Patient presents with    COVID-19 Concerns     Pt reports nasal congest, runny nose, fatigue, cough. Symptoms started yesterday. No meds.     Patient is a pleasant 50-year-old white male past with a past medical history of GERD, diabetes mellitus, COPD who presents ED with complaint of COVID concerns.  Patient reports that he has been suffering with nasal congestion, fatigue, nonproductive cough x1 day.  Patient reports prior onset of myalgias approximately 1 day before the aforementioned onset of symptoms that have continued to persist.  Patient denies any shortness of breath, chest pain, headache, vision change, numbness/tingling, nausea, fever, abdominal pain, diarrhea, loss of sense of taste and/or smell.  Patient states that he has been around numerous sick contacts who have tested positive for the novel Coronavirus.          Review of patient's allergies indicates:   Allergen Reactions    Codeine Nausea And Vomiting    Pcn [penicillins] Other (See Comments)     Unknown reaction    Betadine [povidone-iodine] Swelling and Other (See Comments)     redness     Past Medical History:   Diagnosis Date    Alcoholic     Arthritis     COPD (chronic obstructive pulmonary disease)     Diabetes mellitus     Erectile dysfunction     GERD (gastroesophageal reflux disease)     Hypertension     ABHILASH on CPAP     Peyronie's disease     Stroke     vs TIA 1/14/2020    Toxic effect of contact with stingray 11/2018    right wrist     Past Surgical History:   Procedure Laterality Date    LASIK      ISRAEL PLICATION N/A 11/27/2018    Procedure: PLICATION, PENIS;  Surgeon: Ralph Wilcox MD;  Location: [facility];  Service: Urology;  Laterality: N/A;    VASECTOMY       Family History   Problem Relation Age of Onset    Clotting disorder Father     Heart disease Father     Hypertension Father     Heart disease Mother     Hypertension Mother     Heart  disease Maternal Aunt     Heart disease Maternal Uncle     Heart disease Paternal Aunt     Heart disease Paternal Uncle     Hypertension Sister     Hypertension Brother     Prostate cancer Brother      Social History     Tobacco Use    Smoking status: Current Some Day Smoker     Packs/day: 1.50     Years: 38.00     Pack years: 57.00     Types: Cigarettes     Start date:      Last attempt to quit: 2020     Years since quittin.8    Smokeless tobacco: Never Used    Tobacco comment: Pt enrolled in [employer].  Ambulatory referral to Smoking Cessation program.    Substance Use Topics    Alcohol use: Yes     Comment: heavy etoh use daily;5th of vodka a day    Drug use: No     Review of Systems   Constitutional: Positive for fatigue. Negative for activity change, appetite change, chills, diaphoresis and fever.   HENT: Negative for congestion, ear pain, sinus pain, sore throat and tinnitus.    Respiratory: Positive for cough. Negative for shortness of breath.    Cardiovascular: Negative for chest pain, palpitations and leg swelling.   Gastrointestinal: Negative for abdominal pain, constipation, nausea and vomiting.   Genitourinary: Negative for dysuria and frequency.   Musculoskeletal: Positive for myalgias. Negative for back pain and neck pain.   Neurological: Negative for dizziness and headaches.   Psychiatric/Behavioral: Negative for confusion.       Physical Exam     Initial Vitals [20 1220]   BP Pulse Resp Temp SpO2   (!) 187/89 65 19 98 °F (36.7 °C) 97 %      MAP       --         Physical Exam    Constitutional: He appears well-developed and well-nourished. He is not diaphoretic. No distress.   Nontoxic  No acute distress   HENT:   Right Ear: Hearing and tympanic membrane normal.   Left Ear: Hearing and tympanic membrane normal.   Nose: Nose normal. No mucosal edema.   Mouth/Throat: Uvula is midline, oropharynx is clear and moist and mucous membranes are normal. No oropharyngeal  exudate, posterior oropharyngeal edema or posterior oropharyngeal erythema.   Eyes: Conjunctivae and EOM are normal. Pupils are equal, round, and reactive to light.   Neck: Normal range of motion. Neck supple.   Cardiovascular: Normal rate, regular rhythm and normal heart sounds.   Pulmonary/Chest: Breath sounds normal. No respiratory distress.   Lungs clear to auscultation bilaterally  Chest rise is symmetric  No increased work of breathing   Abdominal: Soft. Bowel sounds are normal.   Neurological: He is alert and oriented to person, place, and time.   Skin: Skin is warm.   Psychiatric: He has a normal mood and affect.         ED Course   Procedures  Labs Reviewed   INFLUENZA A & B BY MOLECULAR   SARS-COV-2 RNA AMPLIFICATION, QUAL          Imaging Results          X-Ray Chest AP Portable (Final result)  Result time 12/21/20 12:52:56    Final result by IAN Seals Sr., MD (12/21/20 12:52:56)                 Impression:      1.  The lungs are clear.    2.  The size of the heart is prominent.  This may be secondary to magnification.      Electronically signed by: Martin Seals MD  Date:    12/21/2020  Time:    12:52             Narrative:    EXAMINATION:  XR CHEST AP PORTABLE    CLINICAL HISTORY:  COVID-19    COMPARISON:  01/14/2020    FINDINGS:  The size of the heart is prominent.  The lungs are clear. There is no pneumothorax.  The costophrenic angles are sharp.                                 Medical Decision Making:   Clinical Tests:   Lab Tests: Ordered and Reviewed  Radiological Study: Ordered and Reviewed  ED Management:  - blood pressure elevated, but VS otherwise stable; pt afebrile; NAD noted   - CXR with prominent heart, but no pleural effusion, interstitial markings, consolidation, or ground-glass opacities noted per my interpretation  - COVID negative  - Influenza negative  - Will treat symptomatically as OP for viral syndrome  - will give rx for benzonatate prn cough and albuterol inhaler prn  SOB, wheezing  - recommend increased fluid hydration, rest, proper hand hygiene  - pt given strict return precautions for any new or worsening symptoms  - No further intervention is indicated at this time after having taken into account the patient's history, physical exam findings, and empirical and objective data obtained during the patient's emergency department workup.   - The patient is at low risk for an emergent medical condition at this time, and I am of the belief that that it is safe to discharge the patient from the emergency department.   - The patient is instructed to follow up as outpatient as indicated on the discharge paperwork.    - I have discussed the specifics of the workup with the patient and the patient has verbalized understanding of the details of the workup, the diagnosis, the treatment plan, and the need for outpatient follow-up.    - Although the patient has no emergent etiology today this does not preclude the development of an emergent condition so, in addition, I have advised the patient that they can return to the ED and/or activate EMS at any time with worsening of their symptoms, change of their symptoms, or with any other medical complaint.    - The patient remained comfortable and stable during their visit in the ED.    - Discharge and follow-up instructions discussed with the patient who expressed understanding and willingness to comply with my recommendations.  - Results of all emergency department tests  discussed thoroughly with patient; all patient questions answered; pt in agreement with plan  - Pt instructed to follow up with PCP in 2-3 days for recheck of today's complaints  - Pt given strict emergency department return precautions for any new or worsening of symptoms  - Pt discharged from the emergency department in stable condition, in no acute distress                               Clinical Impression:       ICD-10-CM ICD-9-CM   1. Viral syndrome  B34.9 079.99                           ED Disposition Condition    Discharge Stable        ED Prescriptions     Medication Sig Dispense Start Date End Date Auth. Provider    benzonatate (TESSALON) 100 MG capsule Take 1 capsule (100 mg total) by mouth 3 (three) times daily as needed for Cough. 20 capsule 12/21/2020  Jah Lopez MD    albuterol (PROVENTIL/VENTOLIN HFA) 90 mcg/actuation inhaler Inhale 1-2 puffs into the lungs every 6 (six) hours as needed for Wheezing or Shortness of Breath. Rescue 6.7 g 12/21/2020 12/21/2021 Jah Lopez MD        Follow-up Information     Follow up With Specialties Details Why Contact Info    [facility] Emergency Medicine  If symptoms worsen [address] [facility] [id number] [phone]    Deloris Bah MD Family Medicine  Follow up with your primary care physician in the next 2-3 days for recheck of today's complaints. [id number] [id number]  [phone]                                         Jah Lopez MD  12/21/20 1416       Jah Lopez MD  12/21/20 1419

## 2021-01-21 ENCOUNTER — CLINICAL SUPPORT (OUTPATIENT)
Dept: SMOKING CESSATION | Facility: CLINIC | Age: 51
End: 2021-01-21
Payer: COMMERCIAL

## 2021-01-21 ENCOUNTER — TELEPHONE (OUTPATIENT)
Dept: SMOKING CESSATION | Facility: CLINIC | Age: 51
End: 2021-01-21

## 2021-01-21 DIAGNOSIS — F17.200 NICOTINE DEPENDENCE: Primary | ICD-10-CM

## 2021-01-21 PROCEDURE — 99407 PR TOBACCO USE CESSATION INTENSIVE >10 MINUTES: ICD-10-PCS | Mod: S$GLB,,,

## 2021-01-21 PROCEDURE — 99407 BEHAV CHNG SMOKING > 10 MIN: CPT | Mod: S$GLB,,,

## 2021-02-03 ENCOUNTER — HOSPITAL ENCOUNTER (EMERGENCY)
Facility: HOSPITAL | Age: 51
Discharge: HOME | End: 2021-02-03
Attending: EMERGENCY MEDICINE
Payer: MEDICAID

## 2021-02-03 VITALS
DIASTOLIC BLOOD PRESSURE: 80 MMHG | SYSTOLIC BLOOD PRESSURE: 160 MMHG | HEIGHT: 72 IN | RESPIRATION RATE: 19 BRPM | HEART RATE: 75 BPM | OXYGEN SATURATION: 99 % | WEIGHT: 237 LBS | TEMPERATURE: 99 F | BODY MASS INDEX: 32.1 KG/M2

## 2021-02-03 DIAGNOSIS — B34.9 VIRAL SYNDROME: Primary | ICD-10-CM

## 2021-02-03 DIAGNOSIS — R05.9 COUGH: ICD-10-CM

## 2021-02-03 LAB
POCT GLUCOSE: 122 MG/DL (ref 70–110)
SARS-COV-2 RDRP RESP QL NAA+PROBE: NEGATIVE

## 2021-02-03 PROCEDURE — 93010 EKG 12-LEAD: ICD-10-PCS | Mod: ,,, | Performed by: INTERNAL MEDICINE

## 2021-02-03 PROCEDURE — 99284 EMERGENCY DEPT VISIT MOD MDM: CPT | Mod: 25,ER

## 2021-02-03 PROCEDURE — U0002 COVID-19 LAB TEST NON-CDC: HCPCS | Mod: ER

## 2021-02-03 PROCEDURE — 82962 GLUCOSE BLOOD TEST: CPT | Mod: ER

## 2021-02-03 PROCEDURE — 93010 ELECTROCARDIOGRAM REPORT: CPT | Mod: ,,, | Performed by: INTERNAL MEDICINE

## 2021-02-03 PROCEDURE — 93005 ELECTROCARDIOGRAM TRACING: CPT | Mod: ER

## 2021-02-03 RX ORDER — ONDANSETRON 4 MG/1
4 TABLET, FILM COATED ORAL EVERY 12 HOURS PRN
Qty: 12 TABLET | Refills: 0 | Status: ON HOLD | OUTPATIENT
Start: 2021-02-03 | End: 2022-04-29 | Stop reason: HOSPADM

## 2021-02-19 ENCOUNTER — HOSPITAL ENCOUNTER (EMERGENCY)
Facility: HOSPITAL | Age: 51
Discharge: HOME | End: 2021-02-19
Attending: EMERGENCY MEDICINE
Payer: MEDICAID

## 2021-02-19 VITALS
WEIGHT: 237 LBS | BODY MASS INDEX: 32.1 KG/M2 | TEMPERATURE: 99 F | DIASTOLIC BLOOD PRESSURE: 70 MMHG | HEART RATE: 92 BPM | RESPIRATION RATE: 18 BRPM | HEIGHT: 72 IN | SYSTOLIC BLOOD PRESSURE: 146 MMHG | OXYGEN SATURATION: 96 %

## 2021-02-19 DIAGNOSIS — Z20.822 EXPOSURE TO COVID-19 VIRUS: ICD-10-CM

## 2021-02-19 DIAGNOSIS — R09.81 NASAL CONGESTION: Primary | ICD-10-CM

## 2021-02-19 LAB
CTP QC/QA: YES
POCT GLUCOSE: 140 MG/DL (ref 70–110)
SARS-COV-2 RDRP RESP QL NAA+PROBE: NEGATIVE

## 2021-02-19 PROCEDURE — 82962 GLUCOSE BLOOD TEST: CPT

## 2021-02-19 PROCEDURE — U0002 COVID-19 LAB TEST NON-CDC: HCPCS | Performed by: EMERGENCY MEDICINE

## 2021-02-19 PROCEDURE — 99282 EMERGENCY DEPT VISIT SF MDM: CPT

## 2021-03-01 ENCOUNTER — HOSPITAL ENCOUNTER (EMERGENCY)
Facility: HOSPITAL | Age: 51
Discharge: HOME | End: 2021-03-01
Attending: EMERGENCY MEDICINE
Payer: MEDICAID

## 2021-03-01 VITALS
SYSTOLIC BLOOD PRESSURE: 157 MMHG | HEIGHT: 72 IN | HEART RATE: 81 BPM | WEIGHT: 237 LBS | RESPIRATION RATE: 20 BRPM | OXYGEN SATURATION: 97 % | TEMPERATURE: 98 F | BODY MASS INDEX: 32.1 KG/M2 | DIASTOLIC BLOOD PRESSURE: 96 MMHG

## 2021-03-01 DIAGNOSIS — K64.9 HEMORRHOIDS, UNSPECIFIED HEMORRHOID TYPE: ICD-10-CM

## 2021-03-01 DIAGNOSIS — K29.70 GASTRITIS, PRESENCE OF BLEEDING UNSPECIFIED, UNSPECIFIED CHRONICITY, UNSPECIFIED GASTRITIS TYPE: ICD-10-CM

## 2021-03-01 DIAGNOSIS — K92.2 GASTROINTESTINAL HEMORRHAGE, UNSPECIFIED GASTROINTESTINAL HEMORRHAGE TYPE: Primary | ICD-10-CM

## 2021-03-01 LAB
ALBUMIN SERPL BCP-MCNC: 4.3 G/DL (ref 3.5–5.2)
ALP SERPL-CCNC: 25 U/L (ref 55–135)
ALT SERPL W/O P-5'-P-CCNC: 205 U/L (ref 10–44)
ANION GAP SERPL CALC-SCNC: 13 MMOL/L (ref 8–16)
APTT BLDCRRT: 25.4 SEC (ref 21–32)
AST SERPL-CCNC: 179 U/L (ref 10–40)
BASOPHILS # BLD AUTO: 0.03 K/UL (ref 0–0.2)
BASOPHILS NFR BLD: 0.5 % (ref 0–1.9)
BILIRUB SERPL-MCNC: 1.4 MG/DL (ref 0.1–1)
BUN SERPL-MCNC: 13 MG/DL (ref 6–20)
CALCIUM SERPL-MCNC: 9.4 MG/DL (ref 8.7–10.5)
CHLORIDE SERPL-SCNC: 94 MMOL/L (ref 95–110)
CO2 SERPL-SCNC: 24 MMOL/L (ref 23–29)
CREAT SERPL-MCNC: 0.8 MG/DL (ref 0.5–1.4)
DIFFERENTIAL METHOD: ABNORMAL
EOSINOPHIL # BLD AUTO: 0.2 K/UL (ref 0–0.5)
EOSINOPHIL NFR BLD: 2.8 % (ref 0–8)
ERYTHROCYTE [DISTWIDTH] IN BLOOD BY AUTOMATED COUNT: 13.4 % (ref 11.5–14.5)
EST. GFR  (AFRICAN AMERICAN): >60 ML/MIN/1.73 M^2
EST. GFR  (NON AFRICAN AMERICAN): >60 ML/MIN/1.73 M^2
GLUCOSE SERPL-MCNC: 127 MG/DL (ref 70–110)
HCT VFR BLD AUTO: 41.9 % (ref 40–54)
HGB BLD-MCNC: 14.9 G/DL (ref 14–18)
IMM GRANULOCYTES # BLD AUTO: 0.04 K/UL (ref 0–0.04)
IMM GRANULOCYTES NFR BLD AUTO: 0.7 % (ref 0–0.5)
INR PPP: 1.1 (ref 0.8–1.2)
LYMPHOCYTES # BLD AUTO: 1.3 K/UL (ref 1–4.8)
LYMPHOCYTES NFR BLD: 22.7 % (ref 18–48)
MCH RBC QN AUTO: 35.8 PG (ref 27–31)
MCHC RBC AUTO-ENTMCNC: 35.6 G/DL (ref 32–36)
MCV RBC AUTO: 101 FL (ref 82–98)
MONOCYTES # BLD AUTO: 0.8 K/UL (ref 0.3–1)
MONOCYTES NFR BLD: 14.3 % (ref 4–15)
NEUTROPHILS # BLD AUTO: 3.4 K/UL (ref 1.8–7.7)
NEUTROPHILS NFR BLD: 59 % (ref 38–73)
NRBC BLD-RTO: 0 /100 WBC
PLATELET # BLD AUTO: 124 K/UL (ref 150–350)
PLATELET BLD QL SMEAR: ABNORMAL
PMV BLD AUTO: 10.5 FL (ref 9.2–12.9)
POTASSIUM SERPL-SCNC: 4 MMOL/L (ref 3.5–5.1)
PROT SERPL-MCNC: 8 G/DL (ref 6–8.4)
PROTHROMBIN TIME: 11.3 SEC (ref 9–12.5)
RBC # BLD AUTO: 4.16 M/UL (ref 4.6–6.2)
SODIUM SERPL-SCNC: 131 MMOL/L (ref 136–145)
WBC # BLD AUTO: 5.68 K/UL (ref 3.9–12.7)

## 2021-03-01 PROCEDURE — 63600175 PHARM REV CODE 636 W HCPCS: Performed by: EMERGENCY MEDICINE

## 2021-03-01 PROCEDURE — 85610 PROTHROMBIN TIME: CPT

## 2021-03-01 PROCEDURE — 99285 EMERGENCY DEPT VISIT HI MDM: CPT | Mod: 25

## 2021-03-01 PROCEDURE — 96375 TX/PRO/DX INJ NEW DRUG ADDON: CPT | Mod: 59

## 2021-03-01 PROCEDURE — 96374 THER/PROPH/DIAG INJ IV PUSH: CPT

## 2021-03-01 PROCEDURE — 25500020 PHARM REV CODE 255: Performed by: EMERGENCY MEDICINE

## 2021-03-01 PROCEDURE — 85025 COMPLETE CBC W/AUTO DIFF WBC: CPT

## 2021-03-01 PROCEDURE — 80053 COMPREHEN METABOLIC PANEL: CPT

## 2021-03-01 PROCEDURE — 85730 THROMBOPLASTIN TIME PARTIAL: CPT

## 2021-03-01 RX ORDER — MORPHINE SULFATE 2 MG/ML
2 INJECTION, SOLUTION INTRAMUSCULAR; INTRAVENOUS
Status: COMPLETED | OUTPATIENT
Start: 2021-03-01 | End: 2021-03-01

## 2021-03-01 RX ORDER — ONDANSETRON 2 MG/ML
4 INJECTION INTRAMUSCULAR; INTRAVENOUS
Status: COMPLETED | OUTPATIENT
Start: 2021-03-01 | End: 2021-03-01

## 2021-03-01 RX ORDER — PANTOPRAZOLE SODIUM 40 MG/1
40 TABLET, DELAYED RELEASE ORAL DAILY
Qty: 30 TABLET | Refills: 0 | Status: ON HOLD | OUTPATIENT
Start: 2021-03-01 | End: 2021-05-28 | Stop reason: HOSPADM

## 2021-03-01 RX ADMIN — IOHEXOL 130 ML: 350 INJECTION, SOLUTION INTRAVENOUS at 01:03

## 2021-03-01 RX ADMIN — ONDANSETRON 4 MG: 2 INJECTION INTRAMUSCULAR; INTRAVENOUS at 12:03

## 2021-03-01 RX ADMIN — MORPHINE SULFATE 2 MG: 2 INJECTION, SOLUTION INTRAMUSCULAR; INTRAVENOUS at 12:03

## 2021-03-11 ENCOUNTER — CLINICAL SUPPORT (OUTPATIENT)
Dept: SMOKING CESSATION | Facility: CLINIC | Age: 51
End: 2021-03-11
Payer: COMMERCIAL

## 2021-03-11 DIAGNOSIS — F17.200 NICOTINE DEPENDENCE: Primary | ICD-10-CM

## 2021-03-11 PROCEDURE — 99999 PR PBB SHADOW E&M-EST. PATIENT-LVL I: CPT | Mod: PBBFAC,,,

## 2021-03-11 PROCEDURE — 99404 PREV MED CNSL INDIV APPRX 60: CPT | Mod: S$GLB,,,

## 2021-03-11 PROCEDURE — 99404 PR PREVENT COUNSEL,INDIV,60 MIN: ICD-10-PCS | Mod: S$GLB,,,

## 2021-03-11 PROCEDURE — 99999 PR PBB SHADOW E&M-EST. PATIENT-LVL I: ICD-10-PCS | Mod: PBBFAC,,,

## 2021-03-11 RX ORDER — IBUPROFEN 200 MG
1 TABLET ORAL DAILY
Qty: 28 PATCH | Refills: 0 | Status: SHIPPED | OUTPATIENT
Start: 2021-03-11 | End: 2021-04-21 | Stop reason: SDUPTHER

## 2021-03-29 ENCOUNTER — HOSPITAL ENCOUNTER (EMERGENCY)
Facility: HOSPITAL | Age: 51
Discharge: HOME | End: 2021-03-29
Attending: EMERGENCY MEDICINE
Payer: MEDICAID

## 2021-03-29 VITALS
BODY MASS INDEX: 30.2 KG/M2 | SYSTOLIC BLOOD PRESSURE: 131 MMHG | HEIGHT: 72 IN | DIASTOLIC BLOOD PRESSURE: 69 MMHG | TEMPERATURE: 98 F | WEIGHT: 223 LBS | RESPIRATION RATE: 12 BRPM | OXYGEN SATURATION: 99 % | HEART RATE: 82 BPM

## 2021-03-29 DIAGNOSIS — S83.411A SPRAIN OF MEDIAL COLLATERAL LIGAMENT OF RIGHT KNEE, INITIAL ENCOUNTER: Primary | ICD-10-CM

## 2021-03-29 PROCEDURE — 63600175 PHARM REV CODE 636 W HCPCS: Mod: ER | Performed by: EMERGENCY MEDICINE

## 2021-03-29 PROCEDURE — 96372 THER/PROPH/DIAG INJ SC/IM: CPT | Mod: ER

## 2021-03-29 PROCEDURE — 99284 EMERGENCY DEPT VISIT MOD MDM: CPT | Mod: 25,ER

## 2021-03-29 RX ORDER — KETOROLAC TROMETHAMINE 30 MG/ML
10 INJECTION, SOLUTION INTRAMUSCULAR; INTRAVENOUS
Status: COMPLETED | OUTPATIENT
Start: 2021-03-29 | End: 2021-03-29

## 2021-03-29 RX ORDER — MELOXICAM 7.5 MG/1
7.5 TABLET ORAL DAILY
Qty: 7 TABLET | Refills: 0 | Status: SHIPPED | OUTPATIENT
Start: 2021-03-29 | End: 2021-04-05

## 2021-03-29 RX ADMIN — KETOROLAC TROMETHAMINE 10 MG: 30 INJECTION, SOLUTION INTRAMUSCULAR at 10:03

## 2021-04-01 ENCOUNTER — CLINICAL SUPPORT (OUTPATIENT)
Dept: SMOKING CESSATION | Facility: CLINIC | Age: 51
End: 2021-04-01
Payer: COMMERCIAL

## 2021-04-01 DIAGNOSIS — F17.200 NICOTINE DEPENDENCE: Primary | ICD-10-CM

## 2021-04-01 PROCEDURE — 99404 PREV MED CNSL INDIV APPRX 60: CPT | Mod: S$GLB,,,

## 2021-04-01 PROCEDURE — 99404 PR PREVENT COUNSEL,INDIV,60 MIN: ICD-10-PCS | Mod: S$GLB,,,

## 2021-04-21 ENCOUNTER — CLINICAL SUPPORT (OUTPATIENT)
Dept: SMOKING CESSATION | Facility: CLINIC | Age: 51
End: 2021-04-21
Payer: COMMERCIAL

## 2021-04-21 DIAGNOSIS — F17.200 NICOTINE DEPENDENCE: Primary | ICD-10-CM

## 2021-04-21 PROCEDURE — 99999 PR PBB SHADOW E&M-EST. PATIENT-LVL I: CPT | Mod: PBBFAC,,,

## 2021-04-21 PROCEDURE — 99402 PR PREVENT COUNSEL,INDIV,30 MIN: ICD-10-PCS | Mod: S$GLB,,,

## 2021-04-21 PROCEDURE — 99402 PREV MED CNSL INDIV APPRX 30: CPT | Mod: S$GLB,,,

## 2021-04-21 PROCEDURE — 99999 PR PBB SHADOW E&M-EST. PATIENT-LVL I: ICD-10-PCS | Mod: PBBFAC,,,

## 2021-04-21 RX ORDER — IBUPROFEN 200 MG
1 TABLET ORAL DAILY
Qty: 28 PATCH | Refills: 0 | Status: SHIPPED | OUTPATIENT
Start: 2021-04-21 | End: 2021-07-21

## 2021-05-20 ENCOUNTER — CLINICAL SUPPORT (OUTPATIENT)
Dept: SMOKING CESSATION | Facility: CLINIC | Age: 51
End: 2021-05-20
Payer: COMMERCIAL

## 2021-05-20 DIAGNOSIS — F17.200 NICOTINE DEPENDENCE: Primary | ICD-10-CM

## 2021-05-20 PROCEDURE — 99407 PR TOBACCO USE CESSATION INTENSIVE >10 MINUTES: ICD-10-PCS | Mod: S$GLB,,,

## 2021-05-20 PROCEDURE — 99407 BEHAV CHNG SMOKING > 10 MIN: CPT | Mod: S$GLB,,,

## 2021-05-25 ENCOUNTER — HOSPITAL ENCOUNTER (INPATIENT)
Facility: HOSPITAL | Age: 51
LOS: 3 days | Discharge: HOME | DRG: 377 | End: 2021-05-28
Attending: EMERGENCY MEDICINE | Admitting: HOSPITALIST
Payer: MEDICAID

## 2021-05-25 DIAGNOSIS — F10.939 ALCOHOL WITHDRAWAL: Primary | ICD-10-CM

## 2021-05-25 DIAGNOSIS — E78.5 HYPERLIPIDEMIA, UNSPECIFIED HYPERLIPIDEMIA TYPE: ICD-10-CM

## 2021-05-25 DIAGNOSIS — I63.9 STROKE: ICD-10-CM

## 2021-05-25 DIAGNOSIS — I10 ESSENTIAL HYPERTENSION: Chronic | ICD-10-CM

## 2021-05-25 DIAGNOSIS — R41.82 ALTERED MENTAL STATUS: ICD-10-CM

## 2021-05-25 DIAGNOSIS — G47.33 OBSTRUCTIVE SLEEP APNEA: ICD-10-CM

## 2021-05-25 DIAGNOSIS — R20.0 LEFT FACIAL NUMBNESS: ICD-10-CM

## 2021-05-25 DIAGNOSIS — Z72.0 TOBACCO ABUSE: Chronic | ICD-10-CM

## 2021-05-25 DIAGNOSIS — F10.10 ETOH ABUSE: ICD-10-CM

## 2021-05-25 DIAGNOSIS — R20.2 FACIAL TINGLING: ICD-10-CM

## 2021-05-25 DIAGNOSIS — R74.8 ELEVATED LIVER ENZYMES: ICD-10-CM

## 2021-05-25 DIAGNOSIS — K92.0 COFFEE GROUND EMESIS: ICD-10-CM

## 2021-05-25 PROBLEM — K92.2 GI BLEED: Status: ACTIVE | Noted: 2021-05-25

## 2021-05-25 LAB
ALBUMIN SERPL BCP-MCNC: 4.9 G/DL (ref 3.5–5.2)
ALLENS TEST: ABNORMAL
ALP SERPL-CCNC: 33 U/L (ref 38–126)
ALT SERPL W/O P-5'-P-CCNC: 107 U/L (ref 10–44)
AMMONIA BLD-SCNC: <9 UMOL/L (ref 9–30)
ANION GAP SERPL CALC-SCNC: 28 MMOL/L (ref 8–16)
AST SERPL-CCNC: 165 U/L (ref 15–46)
BACTERIA #/AREA URNS AUTO: ABNORMAL /HPF
BASOPHILS # BLD AUTO: 0.03 K/UL (ref 0–0.2)
BASOPHILS NFR BLD: 0.6 % (ref 0–1.9)
BILIRUB SERPL-MCNC: 0.8 MG/DL (ref 0.1–1)
BILIRUB UR QL STRIP: NEGATIVE
CALCIUM SERPL-MCNC: 10.1 MG/DL (ref 8.7–10.5)
CHLORIDE SERPL-SCNC: 99 MMOL/L (ref 95–110)
CHOLEST SERPL-MCNC: 228 MG/DL (ref 120–199)
CHOLEST/HDLC SERPL: 2.6 {RATIO} (ref 2–5)
CLARITY UR REFRACT.AUTO: CLEAR
CO2 SERPL-SCNC: 23 MMOL/L (ref 23–29)
COLOR UR AUTO: ABNORMAL
CREAT SERPL-MCNC: 0.81 MG/DL (ref 0.5–1.4)
DELSYS: ABNORMAL
DIFFERENTIAL METHOD: ABNORMAL
EOSINOPHIL # BLD AUTO: 0.1 K/UL (ref 0–0.5)
EOSINOPHIL NFR BLD: 1.5 % (ref 0–8)
ERYTHROCYTE [DISTWIDTH] IN BLOOD BY AUTOMATED COUNT: 14.4 % (ref 11.5–14.5)
EST. GFR  (AFRICAN AMERICAN): >60 ML/MIN/1.73 M^2
EST. GFR  (NON AFRICAN AMERICAN): >60 ML/MIN/1.73 M^2
ETHANOL SERPL-MCNC: 174 MG/DL
FLOW: 2
GLUCOSE SERPL-MCNC: 86 MG/DL (ref 70–110)
GLUCOSE UR QL STRIP: NEGATIVE
HCO3 UR-SCNC: 22.6 MMOL/L (ref 24–28)
HCT VFR BLD AUTO: 40.8 % (ref 40–54)
HDLC SERPL-MCNC: 89 MG/DL (ref 40–75)
HDLC SERPL: 39 % (ref 20–50)
HGB BLD-MCNC: 14.1 G/DL (ref 14–18)
HGB UR QL STRIP: NEGATIVE
HYALINE CASTS UR QL AUTO: 2 /LPF
IMM GRANULOCYTES # BLD AUTO: 0.01 K/UL (ref 0–0.04)
IMM GRANULOCYTES NFR BLD AUTO: 0.2 % (ref 0–0.5)
KETONES UR QL STRIP: ABNORMAL
LACTATE SERPL-SCNC: 3.7 MMOL/L (ref 0.5–2.2)
LACTATE SERPL-SCNC: 3.9 MMOL/L (ref 0.5–2.2)
LDLC SERPL CALC-MCNC: 105.2 MG/DL (ref 63–159)
LEUKOCYTE ESTERASE UR QL STRIP: NEGATIVE
LYMPHOCYTES # BLD AUTO: 1.5 K/UL (ref 1–4.8)
LYMPHOCYTES NFR BLD: 30.6 % (ref 18–48)
MAGNESIUM SERPL-MCNC: 1.6 MG/DL (ref 1.6–2.6)
MCH RBC QN AUTO: 37.3 PG (ref 27–31)
MCHC RBC AUTO-ENTMCNC: 34.6 G/DL (ref 32–36)
MCV RBC AUTO: 108 FL (ref 82–98)
MICROSCOPIC COMMENT: ABNORMAL
MODE: ABNORMAL
MONOCYTES # BLD AUTO: 0.7 K/UL (ref 0.3–1)
MONOCYTES NFR BLD: 13.7 % (ref 4–15)
NEUTROPHILS # BLD AUTO: 2.6 K/UL (ref 1.8–7.7)
NEUTROPHILS NFR BLD: 53.4 % (ref 38–73)
NITRITE UR QL STRIP: NEGATIVE
NONHDLC SERPL-MCNC: 139 MG/DL
NRBC BLD-RTO: 0 /100 WBC
OSMOLALITY SERPL: 320 MOSM/KG (ref 280–300)
PCO2 BLDA: 38.6 MMHG (ref 35–45)
PH SMN: 7.38 [PH] (ref 7.35–7.45)
PH UR STRIP: 6 [PH] (ref 5–8)
PLATELET # BLD AUTO: 120 K/UL (ref 150–450)
PMV BLD AUTO: 9.6 FL (ref 9.2–12.9)
PO2 BLDA: 46 MMHG (ref 40–60)
POC BE: -3 MMOL/L
POC SATURATED O2: 81 % (ref 95–100)
POC TCO2: 24 MMOL/L (ref 24–29)
POCT GLUCOSE: 71 MG/DL (ref 70–110)
POCT GLUCOSE: 97 MG/DL (ref 70–110)
POTASSIUM SERPL-SCNC: 4.2 MMOL/L (ref 3.5–5.1)
PROT SERPL-MCNC: 8.3 G/DL (ref 6–8.4)
PROT UR QL STRIP: ABNORMAL
RBC # BLD AUTO: 3.78 M/UL (ref 4.6–6.2)
RBC #/AREA URNS AUTO: 3 /HPF (ref 0–4)
SAMPLE: ABNORMAL
SARS-COV-2 RDRP RESP QL NAA+PROBE: NEGATIVE
SITE: ABNORMAL
SODIUM SERPL-SCNC: 150 MMOL/L (ref 136–145)
SP GR UR STRIP: 1.02 (ref 1–1.03)
SQUAMOUS #/AREA URNS AUTO: ABNORMAL /HPF
T4 FREE SERPL-MCNC: 0.99 NG/DL (ref 0.71–1.51)
TRIGL SERPL-MCNC: 169 MG/DL (ref 30–150)
TSH SERPL DL<=0.005 MIU/L-ACNC: 0.4 UIU/ML (ref 0.4–4)
URN SPEC COLLECT METH UR: ABNORMAL
UROBILINOGEN UR STRIP-ACNC: 1 EU/DL
UUN UR-MCNC: 11 MG/DL (ref 2–20)
WBC # BLD AUTO: 4.81 K/UL (ref 3.9–12.7)
WBC #/AREA URNS AUTO: 1 /HPF (ref 0–5)

## 2021-05-25 PROCEDURE — 63600175 PHARM REV CODE 636 W HCPCS: Mod: ER | Performed by: EMERGENCY MEDICINE

## 2021-05-25 PROCEDURE — G0378 HOSPITAL OBSERVATION PER HR: HCPCS

## 2021-05-25 PROCEDURE — 25000003 PHARM REV CODE 250: Mod: ER | Performed by: HOSPITALIST

## 2021-05-25 PROCEDURE — 84443 ASSAY THYROID STIM HORMONE: CPT | Mod: ER | Performed by: EMERGENCY MEDICINE

## 2021-05-25 PROCEDURE — U0002 COVID-19 LAB TEST NON-CDC: HCPCS | Mod: ER | Performed by: EMERGENCY MEDICINE

## 2021-05-25 PROCEDURE — 63600175 PHARM REV CODE 636 W HCPCS: Performed by: NURSE PRACTITIONER

## 2021-05-25 PROCEDURE — 99285 EMERGENCY DEPT VISIT HI MDM: CPT | Mod: 25,ER

## 2021-05-25 PROCEDURE — 80061 LIPID PANEL: CPT | Performed by: EMERGENCY MEDICINE

## 2021-05-25 PROCEDURE — 80053 COMPREHEN METABOLIC PANEL: CPT | Mod: ER | Performed by: EMERGENCY MEDICINE

## 2021-05-25 PROCEDURE — 84439 ASSAY OF FREE THYROXINE: CPT | Performed by: EMERGENCY MEDICINE

## 2021-05-25 PROCEDURE — 99900035 HC TECH TIME PER 15 MIN (STAT): Mod: ER

## 2021-05-25 PROCEDURE — 81000 URINALYSIS NONAUTO W/SCOPE: CPT | Mod: ER | Performed by: EMERGENCY MEDICINE

## 2021-05-25 PROCEDURE — C9113 INJ PANTOPRAZOLE SODIUM, VIA: HCPCS | Performed by: NURSE PRACTITIONER

## 2021-05-25 PROCEDURE — 94760 N-INVAS EAR/PLS OXIMETRY 1: CPT | Mod: ER

## 2021-05-25 PROCEDURE — 96376 TX/PRO/DX INJ SAME DRUG ADON: CPT

## 2021-05-25 PROCEDURE — 93010 EKG 12-LEAD: ICD-10-PCS | Mod: ,,, | Performed by: INTERNAL MEDICINE

## 2021-05-25 PROCEDURE — 83605 ASSAY OF LACTIC ACID: CPT | Mod: 91,ER | Performed by: EMERGENCY MEDICINE

## 2021-05-25 PROCEDURE — 63600175 PHARM REV CODE 636 W HCPCS: Performed by: HOSPITALIST

## 2021-05-25 PROCEDURE — 25000003 PHARM REV CODE 250: Mod: ER | Performed by: EMERGENCY MEDICINE

## 2021-05-25 PROCEDURE — 93010 ELECTROCARDIOGRAM REPORT: CPT | Mod: ,,, | Performed by: INTERNAL MEDICINE

## 2021-05-25 PROCEDURE — 25000003 PHARM REV CODE 250: Performed by: HOSPITALIST

## 2021-05-25 PROCEDURE — 82962 GLUCOSE BLOOD TEST: CPT | Mod: ER

## 2021-05-25 PROCEDURE — 82140 ASSAY OF AMMONIA: CPT | Mod: ER | Performed by: EMERGENCY MEDICINE

## 2021-05-25 PROCEDURE — 96374 THER/PROPH/DIAG INJ IV PUSH: CPT | Mod: ER

## 2021-05-25 PROCEDURE — 11000001 HC ACUTE MED/SURG PRIVATE ROOM

## 2021-05-25 PROCEDURE — 96375 TX/PRO/DX INJ NEW DRUG ADDON: CPT

## 2021-05-25 PROCEDURE — 99900035 HC TECH TIME PER 15 MIN (STAT)

## 2021-05-25 PROCEDURE — 83605 ASSAY OF LACTIC ACID: CPT | Mod: ER | Performed by: EMERGENCY MEDICINE

## 2021-05-25 PROCEDURE — 93005 ELECTROCARDIOGRAM TRACING: CPT | Mod: ER

## 2021-05-25 PROCEDURE — 82077 ASSAY SPEC XCP UR&BREATH IA: CPT | Mod: ER | Performed by: EMERGENCY MEDICINE

## 2021-05-25 PROCEDURE — 27000221 HC OXYGEN, UP TO 24 HOURS: Mod: ER

## 2021-05-25 PROCEDURE — 83930 ASSAY OF BLOOD OSMOLALITY: CPT | Mod: ER | Performed by: EMERGENCY MEDICINE

## 2021-05-25 PROCEDURE — 83036 HEMOGLOBIN GLYCOSYLATED A1C: CPT | Performed by: EMERGENCY MEDICINE

## 2021-05-25 PROCEDURE — 83735 ASSAY OF MAGNESIUM: CPT | Mod: ER | Performed by: EMERGENCY MEDICINE

## 2021-05-25 PROCEDURE — 85025 COMPLETE CBC W/AUTO DIFF WBC: CPT | Mod: ER | Performed by: EMERGENCY MEDICINE

## 2021-05-25 PROCEDURE — 82803 BLOOD GASES ANY COMBINATION: CPT | Mod: ER

## 2021-05-25 RX ORDER — DIAZEPAM 5 MG/1
10 TABLET ORAL EVERY 8 HOURS
Status: DISCONTINUED | OUTPATIENT
Start: 2021-05-26 | End: 2021-05-25

## 2021-05-25 RX ORDER — SODIUM CHLORIDE 0.9 % (FLUSH) 0.9 %
10 SYRINGE (ML) INJECTION
Status: DISCONTINUED | OUTPATIENT
Start: 2021-05-25 | End: 2021-05-28 | Stop reason: HOSPADM

## 2021-05-25 RX ORDER — CLOPIDOGREL BISULFATE 75 MG/1
75 TABLET ORAL DAILY
Status: DISCONTINUED | OUTPATIENT
Start: 2021-05-26 | End: 2021-05-25

## 2021-05-25 RX ORDER — ONDANSETRON 2 MG/ML
4 INJECTION INTRAMUSCULAR; INTRAVENOUS EVERY 12 HOURS PRN
Status: DISCONTINUED | OUTPATIENT
Start: 2021-05-25 | End: 2021-05-25

## 2021-05-25 RX ORDER — ONDANSETRON 2 MG/ML
4 INJECTION INTRAMUSCULAR; INTRAVENOUS EVERY 6 HOURS PRN
Status: DISCONTINUED | OUTPATIENT
Start: 2021-05-26 | End: 2021-05-28 | Stop reason: HOSPADM

## 2021-05-25 RX ORDER — FOLIC ACID 5 MG/ML
1 INJECTION, SOLUTION INTRAMUSCULAR; INTRAVENOUS; SUBCUTANEOUS DAILY
Status: DISCONTINUED | OUTPATIENT
Start: 2021-05-26 | End: 2021-05-25

## 2021-05-25 RX ORDER — ONDANSETRON 2 MG/ML
4 INJECTION INTRAMUSCULAR; INTRAVENOUS
Status: COMPLETED | OUTPATIENT
Start: 2021-05-25 | End: 2021-05-25

## 2021-05-25 RX ORDER — DIAZEPAM 10 MG/2ML
10 INJECTION INTRAMUSCULAR EVERY 8 HOURS
Status: DISCONTINUED | OUTPATIENT
Start: 2021-05-25 | End: 2021-05-27

## 2021-05-25 RX ORDER — THIAMINE HCL 100 MG
100 TABLET ORAL DAILY
Status: DISCONTINUED | OUTPATIENT
Start: 2021-05-26 | End: 2021-05-25

## 2021-05-25 RX ORDER — ALBUTEROL SULFATE 90 UG/1
2 AEROSOL, METERED RESPIRATORY (INHALATION) EVERY 6 HOURS PRN
Status: DISCONTINUED | OUTPATIENT
Start: 2021-05-25 | End: 2021-05-28 | Stop reason: HOSPADM

## 2021-05-25 RX ORDER — THIAMINE HYDROCHLORIDE 100 MG/ML
100 INJECTION, SOLUTION INTRAMUSCULAR; INTRAVENOUS DAILY
Status: DISCONTINUED | OUTPATIENT
Start: 2021-05-26 | End: 2021-05-26

## 2021-05-25 RX ORDER — SODIUM CHLORIDE, SODIUM LACTATE, POTASSIUM CHLORIDE, CALCIUM CHLORIDE 600; 310; 30; 20 MG/100ML; MG/100ML; MG/100ML; MG/100ML
INJECTION, SOLUTION INTRAVENOUS CONTINUOUS
Status: DISCONTINUED | OUTPATIENT
Start: 2021-05-25 | End: 2021-05-28

## 2021-05-25 RX ORDER — THIAMINE HYDROCHLORIDE 100 MG/ML
100 INJECTION, SOLUTION INTRAMUSCULAR; INTRAVENOUS
Status: COMPLETED | OUTPATIENT
Start: 2021-05-25 | End: 2021-05-25

## 2021-05-25 RX ORDER — FERROUS GLUCONATE 324(38)MG
324 TABLET ORAL
Status: ON HOLD | COMMUNITY
End: 2022-04-29 | Stop reason: HOSPADM

## 2021-05-25 RX ORDER — FOLIC ACID 1 MG/1
1 TABLET ORAL
Status: COMPLETED | OUTPATIENT
Start: 2021-05-25 | End: 2021-05-25

## 2021-05-25 RX ORDER — LABETALOL HYDROCHLORIDE 5 MG/ML
10 INJECTION, SOLUTION INTRAVENOUS
Status: DISCONTINUED | OUTPATIENT
Start: 2021-05-25 | End: 2021-05-28 | Stop reason: HOSPADM

## 2021-05-25 RX ORDER — ATORVASTATIN CALCIUM 20 MG/1
20 TABLET, FILM COATED ORAL NIGHTLY
Status: DISCONTINUED | OUTPATIENT
Start: 2021-05-25 | End: 2021-05-28 | Stop reason: HOSPADM

## 2021-05-25 RX ORDER — NAPROXEN SODIUM 220 MG/1
81 TABLET, FILM COATED ORAL DAILY
Status: DISCONTINUED | OUTPATIENT
Start: 2021-05-26 | End: 2021-05-25

## 2021-05-25 RX ORDER — PANTOPRAZOLE SODIUM 40 MG/10ML
40 INJECTION, POWDER, LYOPHILIZED, FOR SOLUTION INTRAVENOUS 2 TIMES DAILY
Status: DISCONTINUED | OUTPATIENT
Start: 2021-05-25 | End: 2021-05-26

## 2021-05-25 RX ORDER — PANTOPRAZOLE SODIUM 40 MG/1
40 TABLET, DELAYED RELEASE ORAL DAILY
Status: DISCONTINUED | OUTPATIENT
Start: 2021-05-26 | End: 2021-05-25

## 2021-05-25 RX ADMIN — SODIUM CHLORIDE, SODIUM LACTATE, POTASSIUM CHLORIDE, AND CALCIUM CHLORIDE: .6; .31; .03; .02 INJECTION, SOLUTION INTRAVENOUS at 11:05

## 2021-05-25 RX ADMIN — LORAZEPAM 2 MG: 2 INJECTION INTRAMUSCULAR; INTRAVENOUS at 10:05

## 2021-05-25 RX ADMIN — ONDANSETRON 4 MG: 2 INJECTION INTRAMUSCULAR; INTRAVENOUS at 09:05

## 2021-05-25 RX ADMIN — DIAZEPAM 10 MG: 10 INJECTION, SOLUTION INTRAMUSCULAR; INTRAVENOUS at 11:05

## 2021-05-25 RX ADMIN — FOLIC ACID 1 MG: 1 TABLET ORAL at 07:05

## 2021-05-25 RX ADMIN — ONDANSETRON 4 MG: 2 INJECTION INTRAMUSCULAR; INTRAVENOUS at 10:05

## 2021-05-25 RX ADMIN — PANTOPRAZOLE SODIUM 40 MG: 40 INJECTION, POWDER, FOR SOLUTION INTRAVENOUS at 10:05

## 2021-05-25 RX ADMIN — THIAMINE HYDROCHLORIDE 100 MG: 100 INJECTION, SOLUTION INTRAMUSCULAR; INTRAVENOUS at 05:05

## 2021-05-25 RX ADMIN — SODIUM CHLORIDE 1000 ML: 0.9 INJECTION, SOLUTION INTRAVENOUS at 06:05

## 2021-05-25 RX ADMIN — ATORVASTATIN CALCIUM 20 MG: 20 TABLET, FILM COATED ORAL at 10:05

## 2021-05-26 ENCOUNTER — CLINICAL SUPPORT (OUTPATIENT)
Dept: SMOKING CESSATION | Facility: CLINIC | Age: 51
End: 2021-05-26
Payer: COMMERCIAL

## 2021-05-26 DIAGNOSIS — F17.210 CIGARETTE SMOKER: Primary | ICD-10-CM

## 2021-05-26 PROBLEM — D64.9 ANEMIA: Chronic | Status: ACTIVE | Noted: 2021-05-26

## 2021-05-26 PROBLEM — K92.0 COFFEE GROUND EMESIS: Status: ACTIVE | Noted: 2021-05-26

## 2021-05-26 PROBLEM — R41.82 ALTERED MENTAL STATUS: Status: ACTIVE | Noted: 2021-05-26

## 2021-05-26 PROBLEM — Z72.0 TOBACCO ABUSE: Chronic | Status: ACTIVE | Noted: 2021-05-26

## 2021-05-26 PROBLEM — G93.41 METABOLIC ENCEPHALOPATHY: Status: ACTIVE | Noted: 2021-05-26

## 2021-05-26 LAB
ABO GROUP BLD: NORMAL
ALBUMIN SERPL BCP-MCNC: 3.9 G/DL (ref 3.5–5.2)
ALP SERPL-CCNC: 22 U/L (ref 55–135)
ALT SERPL W/O P-5'-P-CCNC: 83 U/L (ref 10–44)
ANION GAP SERPL CALC-SCNC: 14 MMOL/L (ref 8–16)
APTT BLDCRRT: 25.9 SEC (ref 21–32)
AST SERPL-CCNC: 99 U/L (ref 10–40)
BASOPHILS # BLD AUTO: 0.04 K/UL (ref 0–0.2)
BASOPHILS # BLD AUTO: 0.04 K/UL (ref 0–0.2)
BASOPHILS NFR BLD: 0.9 % (ref 0–1.9)
BASOPHILS NFR BLD: 1 % (ref 0–1.9)
BILIRUB SERPL-MCNC: 0.9 MG/DL (ref 0.1–1)
BLD GP AB SCN CELLS X3 SERPL QL: NORMAL
BUN SERPL-MCNC: 14 MG/DL (ref 6–20)
CALCIUM SERPL-MCNC: 9.1 MG/DL (ref 8.7–10.5)
CHLORIDE SERPL-SCNC: 99 MMOL/L (ref 95–110)
CK MB SERPL-MCNC: 1.3 NG/ML (ref 0.1–6.5)
CK MB SERPL-RTO: 2.4 % (ref 0–5)
CK SERPL-CCNC: 55 U/L (ref 20–200)
CK SERPL-CCNC: 55 U/L (ref 20–200)
CO2 SERPL-SCNC: 23 MMOL/L (ref 23–29)
CREAT SERPL-MCNC: 0.8 MG/DL (ref 0.5–1.4)
DIFFERENTIAL METHOD: ABNORMAL
DIFFERENTIAL METHOD: ABNORMAL
EOSINOPHIL # BLD AUTO: 0 K/UL (ref 0–0.5)
EOSINOPHIL # BLD AUTO: 0 K/UL (ref 0–0.5)
EOSINOPHIL NFR BLD: 0.2 % (ref 0–8)
EOSINOPHIL NFR BLD: 0.5 % (ref 0–8)
ERYTHROCYTE [DISTWIDTH] IN BLOOD BY AUTOMATED COUNT: 14.2 % (ref 11.5–14.5)
ERYTHROCYTE [DISTWIDTH] IN BLOOD BY AUTOMATED COUNT: 14.4 % (ref 11.5–14.5)
EST. GFR  (AFRICAN AMERICAN): >60 ML/MIN/1.73 M^2
EST. GFR  (NON AFRICAN AMERICAN): >60 ML/MIN/1.73 M^2
ESTIMATED AVG GLUCOSE: 94 MG/DL (ref 68–131)
GLUCOSE SERPL-MCNC: 86 MG/DL (ref 70–110)
HAV IGM SERPL QL IA: NEGATIVE
HBA1C MFR BLD: 4.9 % (ref 4–5.6)
HBV CORE IGM SERPL QL IA: NEGATIVE
HBV SURFACE AG SERPL QL IA: NEGATIVE
HCT VFR BLD AUTO: 38.1 % (ref 40–54)
HCT VFR BLD AUTO: 38.9 % (ref 40–54)
HCV AB SERPL QL IA: NEGATIVE
HGB BLD-MCNC: 13.3 G/DL (ref 14–18)
HGB BLD-MCNC: 13.5 G/DL (ref 14–18)
IMM GRANULOCYTES # BLD AUTO: 0.01 K/UL (ref 0–0.04)
IMM GRANULOCYTES # BLD AUTO: 0.02 K/UL (ref 0–0.04)
IMM GRANULOCYTES NFR BLD AUTO: 0.2 % (ref 0–0.5)
IMM GRANULOCYTES NFR BLD AUTO: 0.5 % (ref 0–0.5)
INR PPP: 1 (ref 0.8–1.2)
LACTATE SERPL-SCNC: 0.9 MMOL/L (ref 0.5–2.2)
LYMPHOCYTES # BLD AUTO: 0.6 K/UL (ref 1–4.8)
LYMPHOCYTES # BLD AUTO: 1 K/UL (ref 1–4.8)
LYMPHOCYTES NFR BLD: 12.6 % (ref 18–48)
LYMPHOCYTES NFR BLD: 24.3 % (ref 18–48)
MAGNESIUM SERPL-MCNC: 1.6 MG/DL (ref 1.6–2.6)
MCH RBC QN AUTO: 37.2 PG (ref 27–31)
MCH RBC QN AUTO: 37.2 PG (ref 27–31)
MCHC RBC AUTO-ENTMCNC: 34.7 G/DL (ref 32–36)
MCHC RBC AUTO-ENTMCNC: 34.9 G/DL (ref 32–36)
MCV RBC AUTO: 106 FL (ref 82–98)
MCV RBC AUTO: 107 FL (ref 82–98)
MONOCYTES # BLD AUTO: 0.4 K/UL (ref 0.3–1)
MONOCYTES # BLD AUTO: 0.5 K/UL (ref 0.3–1)
MONOCYTES NFR BLD: 10.1 % (ref 4–15)
MONOCYTES NFR BLD: 12.4 % (ref 4–15)
NEUTROPHILS # BLD AUTO: 2.5 K/UL (ref 1.8–7.7)
NEUTROPHILS # BLD AUTO: 3.3 K/UL (ref 1.8–7.7)
NEUTROPHILS NFR BLD: 61.9 % (ref 38–73)
NEUTROPHILS NFR BLD: 75.4 % (ref 38–73)
NRBC BLD-RTO: 0 /100 WBC
NRBC BLD-RTO: 0 /100 WBC
PHOSPHATE SERPL-MCNC: 4.9 MG/DL (ref 2.7–4.5)
PLATELET # BLD AUTO: 102 K/UL (ref 150–450)
PLATELET # BLD AUTO: 104 K/UL (ref 150–450)
PMV BLD AUTO: 9.6 FL (ref 9.2–12.9)
PMV BLD AUTO: 9.9 FL (ref 9.2–12.9)
POCT GLUCOSE: 146 MG/DL (ref 70–110)
POCT GLUCOSE: 90 MG/DL (ref 70–110)
POTASSIUM SERPL-SCNC: 4.3 MMOL/L (ref 3.5–5.1)
PROT SERPL-MCNC: 7.4 G/DL (ref 6–8.4)
PROTHROMBIN TIME: 11 SEC (ref 9–12.5)
RBC # BLD AUTO: 3.58 M/UL (ref 4.6–6.2)
RBC # BLD AUTO: 3.63 M/UL (ref 4.6–6.2)
RH BLD: NORMAL
SODIUM SERPL-SCNC: 136 MMOL/L (ref 136–145)
TROPONIN I SERPL DL<=0.01 NG/ML-MCNC: <0.006 NG/ML (ref 0–0.03)
WBC # BLD AUTO: 4.11 K/UL (ref 3.9–12.7)
WBC # BLD AUTO: 4.37 K/UL (ref 3.9–12.7)

## 2021-05-26 PROCEDURE — 86900 BLOOD TYPING SEROLOGIC ABO: CPT | Performed by: NURSE PRACTITIONER

## 2021-05-26 PROCEDURE — 99999 PR PBB SHADOW E&M-EST. PATIENT-LVL I: ICD-10-PCS | Mod: PBBFAC,,,

## 2021-05-26 PROCEDURE — 11000001 HC ACUTE MED/SURG PRIVATE ROOM

## 2021-05-26 PROCEDURE — 84484 ASSAY OF TROPONIN QUANT: CPT | Performed by: HOSPITALIST

## 2021-05-26 PROCEDURE — C9113 INJ PANTOPRAZOLE SODIUM, VIA: HCPCS | Performed by: NURSE PRACTITIONER

## 2021-05-26 PROCEDURE — 83605 ASSAY OF LACTIC ACID: CPT | Performed by: NURSE PRACTITIONER

## 2021-05-26 PROCEDURE — 85730 THROMBOPLASTIN TIME PARTIAL: CPT | Performed by: HOSPITALIST

## 2021-05-26 PROCEDURE — 63600175 PHARM REV CODE 636 W HCPCS: Performed by: NURSE PRACTITIONER

## 2021-05-26 PROCEDURE — 36415 COLL VENOUS BLD VENIPUNCTURE: CPT | Performed by: HOSPITALIST

## 2021-05-26 PROCEDURE — 99214 PR OFFICE/OUTPT VISIT, EST, LEVL IV, 30-39 MIN: ICD-10-PCS | Mod: ,,, | Performed by: INTERNAL MEDICINE

## 2021-05-26 PROCEDURE — 83735 ASSAY OF MAGNESIUM: CPT | Performed by: NURSE PRACTITIONER

## 2021-05-26 PROCEDURE — 36415 COLL VENOUS BLD VENIPUNCTURE: CPT | Performed by: NURSE PRACTITIONER

## 2021-05-26 PROCEDURE — 86850 RBC ANTIBODY SCREEN: CPT | Performed by: NURSE PRACTITIONER

## 2021-05-26 PROCEDURE — 25000003 PHARM REV CODE 250: Performed by: NURSE PRACTITIONER

## 2021-05-26 PROCEDURE — 84100 ASSAY OF PHOSPHORUS: CPT | Performed by: NURSE PRACTITIONER

## 2021-05-26 PROCEDURE — 82550 ASSAY OF CK (CPK): CPT | Performed by: HOSPITALIST

## 2021-05-26 PROCEDURE — 94760 N-INVAS EAR/PLS OXIMETRY 1: CPT

## 2021-05-26 PROCEDURE — 85610 PROTHROMBIN TIME: CPT | Performed by: NURSE PRACTITIONER

## 2021-05-26 PROCEDURE — S4991 NICOTINE PATCH NONLEGEND: HCPCS | Performed by: HOSPITALIST

## 2021-05-26 PROCEDURE — 25000003 PHARM REV CODE 250: Performed by: HOSPITALIST

## 2021-05-26 PROCEDURE — 85025 COMPLETE CBC W/AUTO DIFF WBC: CPT | Performed by: NURSE PRACTITIONER

## 2021-05-26 PROCEDURE — 96376 TX/PRO/DX INJ SAME DRUG ADON: CPT

## 2021-05-26 PROCEDURE — 97162 PT EVAL MOD COMPLEX 30 MIN: CPT | Performed by: PHYSICAL THERAPIST

## 2021-05-26 PROCEDURE — 99999 PR PBB SHADOW E&M-EST. PATIENT-LVL I: CPT | Mod: PBBFAC,,,

## 2021-05-26 PROCEDURE — 86901 BLOOD TYPING SEROLOGIC RH(D): CPT | Performed by: NURSE PRACTITIONER

## 2021-05-26 PROCEDURE — 92610 EVALUATE SWALLOWING FUNCTION: CPT

## 2021-05-26 PROCEDURE — 96361 HYDRATE IV INFUSION ADD-ON: CPT

## 2021-05-26 PROCEDURE — 85025 COMPLETE CBC W/AUTO DIFF WBC: CPT | Mod: 91 | Performed by: HOSPITALIST

## 2021-05-26 PROCEDURE — 97535 SELF CARE MNGMENT TRAINING: CPT

## 2021-05-26 PROCEDURE — 80074 ACUTE HEPATITIS PANEL: CPT | Performed by: NURSE PRACTITIONER

## 2021-05-26 PROCEDURE — 97165 OT EVAL LOW COMPLEX 30 MIN: CPT

## 2021-05-26 PROCEDURE — 99407 BEHAV CHNG SMOKING > 10 MIN: CPT | Mod: S$GLB,,,

## 2021-05-26 PROCEDURE — 80053 COMPREHEN METABOLIC PANEL: CPT | Performed by: NURSE PRACTITIONER

## 2021-05-26 PROCEDURE — 96375 TX/PRO/DX INJ NEW DRUG ADDON: CPT

## 2021-05-26 PROCEDURE — 94761 N-INVAS EAR/PLS OXIMETRY MLT: CPT

## 2021-05-26 PROCEDURE — 82550 ASSAY OF CK (CPK): CPT | Mod: 91 | Performed by: NURSE PRACTITIONER

## 2021-05-26 PROCEDURE — 99407 PR TOBACCO USE CESSATION INTENSIVE >10 MINUTES: ICD-10-PCS | Mod: S$GLB,,,

## 2021-05-26 PROCEDURE — 99214 OFFICE O/P EST MOD 30 MIN: CPT | Mod: ,,, | Performed by: INTERNAL MEDICINE

## 2021-05-26 RX ORDER — IBUPROFEN 200 MG
1 TABLET ORAL DAILY
Status: DISCONTINUED | OUTPATIENT
Start: 2021-05-26 | End: 2021-05-28 | Stop reason: HOSPADM

## 2021-05-26 RX ORDER — LISINOPRIL 20 MG/1
40 TABLET ORAL DAILY
Status: DISCONTINUED | OUTPATIENT
Start: 2021-05-26 | End: 2021-05-28 | Stop reason: HOSPADM

## 2021-05-26 RX ORDER — CARVEDILOL 6.25 MG/1
6.25 TABLET ORAL 2 TIMES DAILY
Status: DISCONTINUED | OUTPATIENT
Start: 2021-05-26 | End: 2021-05-28 | Stop reason: HOSPADM

## 2021-05-26 RX ORDER — FOLIC ACID 1 MG/1
1 TABLET ORAL DAILY
Status: DISCONTINUED | OUTPATIENT
Start: 2021-05-27 | End: 2021-05-28 | Stop reason: HOSPADM

## 2021-05-26 RX ORDER — PANTOPRAZOLE SODIUM 40 MG/1
40 TABLET, DELAYED RELEASE ORAL 2 TIMES DAILY
Status: DISCONTINUED | OUTPATIENT
Start: 2021-05-26 | End: 2021-05-28 | Stop reason: HOSPADM

## 2021-05-26 RX ORDER — AMLODIPINE BESYLATE 5 MG/1
10 TABLET ORAL DAILY
Status: DISCONTINUED | OUTPATIENT
Start: 2021-05-26 | End: 2021-05-28 | Stop reason: HOSPADM

## 2021-05-26 RX ORDER — FERROUS GLUCONATE 324(38)MG
324 TABLET ORAL
Status: DISCONTINUED | OUTPATIENT
Start: 2021-05-26 | End: 2021-05-28 | Stop reason: HOSPADM

## 2021-05-26 RX ORDER — GLUCAGON 1 MG
1 KIT INJECTION
Status: DISCONTINUED | OUTPATIENT
Start: 2021-05-26 | End: 2021-05-28 | Stop reason: HOSPADM

## 2021-05-26 RX ORDER — INSULIN ASPART 100 [IU]/ML
0-5 INJECTION, SOLUTION INTRAVENOUS; SUBCUTANEOUS EVERY 6 HOURS PRN
Status: DISCONTINUED | OUTPATIENT
Start: 2021-05-26 | End: 2021-05-28 | Stop reason: HOSPADM

## 2021-05-26 RX ORDER — THIAMINE HCL 100 MG
100 TABLET ORAL DAILY
Status: DISCONTINUED | OUTPATIENT
Start: 2021-05-27 | End: 2021-05-28 | Stop reason: HOSPADM

## 2021-05-26 RX ADMIN — CARVEDILOL 6.25 MG: 6.25 TABLET, FILM COATED ORAL at 09:05

## 2021-05-26 RX ADMIN — PANTOPRAZOLE SODIUM 40 MG: 40 TABLET, DELAYED RELEASE ORAL at 09:05

## 2021-05-26 RX ADMIN — SODIUM CHLORIDE, SODIUM LACTATE, POTASSIUM CHLORIDE, AND CALCIUM CHLORIDE: .6; .31; .03; .02 INJECTION, SOLUTION INTRAVENOUS at 05:05

## 2021-05-26 RX ADMIN — LISINOPRIL 40 MG: 20 TABLET ORAL at 10:05

## 2021-05-26 RX ADMIN — PANTOPRAZOLE SODIUM 40 MG: 40 INJECTION, POWDER, FOR SOLUTION INTRAVENOUS at 10:05

## 2021-05-26 RX ADMIN — CARVEDILOL 6.25 MG: 6.25 TABLET, FILM COATED ORAL at 10:05

## 2021-05-26 RX ADMIN — AMLODIPINE BESYLATE 10 MG: 5 TABLET ORAL at 10:05

## 2021-05-26 RX ADMIN — THIAMINE HYDROCHLORIDE 100 MG: 100 INJECTION, SOLUTION INTRAMUSCULAR; INTRAVENOUS at 10:05

## 2021-05-26 RX ADMIN — FOLIC ACID 1 MG: 5 INJECTION, SOLUTION INTRAMUSCULAR; INTRAVENOUS; SUBCUTANEOUS at 09:05

## 2021-05-26 RX ADMIN — DIAZEPAM 10 MG: 10 INJECTION, SOLUTION INTRAMUSCULAR; INTRAVENOUS at 01:05

## 2021-05-26 RX ADMIN — FERROUS GLUCONATE TAB 324 MG (37.5 MG ELEMENTAL IRON) 324 MG: 324 (37.5 FE) TAB at 10:05

## 2021-05-26 RX ADMIN — ONDANSETRON 4 MG: 2 INJECTION INTRAMUSCULAR; INTRAVENOUS at 10:05

## 2021-05-26 RX ADMIN — DIAZEPAM 10 MG: 10 INJECTION, SOLUTION INTRAMUSCULAR; INTRAVENOUS at 05:05

## 2021-05-26 RX ADMIN — DIAZEPAM 10 MG: 10 INJECTION, SOLUTION INTRAMUSCULAR; INTRAVENOUS at 09:05

## 2021-05-26 RX ADMIN — Medication 1 PATCH: at 10:05

## 2021-05-26 RX ADMIN — ATORVASTATIN CALCIUM 20 MG: 20 TABLET, FILM COATED ORAL at 09:05

## 2021-05-27 LAB
ALBUMIN SERPL BCP-MCNC: 3.6 G/DL (ref 3.5–5.2)
ALP SERPL-CCNC: 21 U/L (ref 55–135)
ALT SERPL W/O P-5'-P-CCNC: 72 U/L (ref 10–44)
ANION GAP SERPL CALC-SCNC: 11 MMOL/L (ref 8–16)
AST SERPL-CCNC: 78 U/L (ref 10–40)
BASOPHILS # BLD AUTO: 0.02 K/UL (ref 0–0.2)
BASOPHILS NFR BLD: 0.5 % (ref 0–1.9)
BILIRUB SERPL-MCNC: 1.5 MG/DL (ref 0.1–1)
BUN SERPL-MCNC: 15 MG/DL (ref 6–20)
CALCIUM SERPL-MCNC: 8.8 MG/DL (ref 8.7–10.5)
CHLORIDE SERPL-SCNC: 100 MMOL/L (ref 95–110)
CO2 SERPL-SCNC: 23 MMOL/L (ref 23–29)
CREAT SERPL-MCNC: 0.8 MG/DL (ref 0.5–1.4)
DIFFERENTIAL METHOD: ABNORMAL
EOSINOPHIL # BLD AUTO: 0.2 K/UL (ref 0–0.5)
EOSINOPHIL NFR BLD: 3.6 % (ref 0–8)
ERYTHROCYTE [DISTWIDTH] IN BLOOD BY AUTOMATED COUNT: 13.6 % (ref 11.5–14.5)
EST. GFR  (AFRICAN AMERICAN): >60 ML/MIN/1.73 M^2
EST. GFR  (NON AFRICAN AMERICAN): >60 ML/MIN/1.73 M^2
GLUCOSE SERPL-MCNC: 103 MG/DL (ref 70–110)
HCT VFR BLD AUTO: 39.1 % (ref 40–54)
HGB BLD-MCNC: 13.6 G/DL (ref 14–18)
IMM GRANULOCYTES # BLD AUTO: 0 K/UL (ref 0–0.04)
IMM GRANULOCYTES NFR BLD AUTO: 0 % (ref 0–0.5)
LYMPHOCYTES # BLD AUTO: 1.2 K/UL (ref 1–4.8)
LYMPHOCYTES NFR BLD: 28.7 % (ref 18–48)
MCH RBC QN AUTO: 37.1 PG (ref 27–31)
MCHC RBC AUTO-ENTMCNC: 34.8 G/DL (ref 32–36)
MCV RBC AUTO: 107 FL (ref 82–98)
MONOCYTES # BLD AUTO: 0.5 K/UL (ref 0.3–1)
MONOCYTES NFR BLD: 11.1 % (ref 4–15)
NEUTROPHILS # BLD AUTO: 2.4 K/UL (ref 1.8–7.7)
NEUTROPHILS NFR BLD: 56.1 % (ref 38–73)
NRBC BLD-RTO: 0 /100 WBC
PLATELET # BLD AUTO: 79 K/UL (ref 150–450)
PMV BLD AUTO: 10.2 FL (ref 9.2–12.9)
POCT GLUCOSE: 108 MG/DL (ref 70–110)
POCT GLUCOSE: 116 MG/DL (ref 70–110)
POCT GLUCOSE: 145 MG/DL (ref 70–110)
POCT GLUCOSE: 153 MG/DL (ref 70–110)
POTASSIUM SERPL-SCNC: 4 MMOL/L (ref 3.5–5.1)
PROT SERPL-MCNC: 7.1 G/DL (ref 6–8.4)
RBC # BLD AUTO: 3.67 M/UL (ref 4.6–6.2)
SODIUM SERPL-SCNC: 134 MMOL/L (ref 136–145)
WBC # BLD AUTO: 4.22 K/UL (ref 3.9–12.7)

## 2021-05-27 PROCEDURE — 11000001 HC ACUTE MED/SURG PRIVATE ROOM

## 2021-05-27 PROCEDURE — 94761 N-INVAS EAR/PLS OXIMETRY MLT: CPT

## 2021-05-27 PROCEDURE — 80053 COMPREHEN METABOLIC PANEL: CPT | Performed by: NURSE PRACTITIONER

## 2021-05-27 PROCEDURE — 94760 N-INVAS EAR/PLS OXIMETRY 1: CPT

## 2021-05-27 PROCEDURE — 25000003 PHARM REV CODE 250: Performed by: HOSPITALIST

## 2021-05-27 PROCEDURE — 25000003 PHARM REV CODE 250: Performed by: NURSE PRACTITIONER

## 2021-05-27 PROCEDURE — 63600175 PHARM REV CODE 636 W HCPCS: Performed by: HOSPITALIST

## 2021-05-27 PROCEDURE — 85025 COMPLETE CBC W/AUTO DIFF WBC: CPT | Performed by: HOSPITALIST

## 2021-05-27 PROCEDURE — 63600175 PHARM REV CODE 636 W HCPCS: Performed by: NURSE PRACTITIONER

## 2021-05-27 PROCEDURE — 99900035 HC TECH TIME PER 15 MIN (STAT)

## 2021-05-27 PROCEDURE — S4991 NICOTINE PATCH NONLEGEND: HCPCS | Performed by: HOSPITALIST

## 2021-05-27 PROCEDURE — 36415 COLL VENOUS BLD VENIPUNCTURE: CPT | Performed by: NURSE PRACTITIONER

## 2021-05-27 RX ORDER — DIAZEPAM 5 MG/1
5 TABLET ORAL EVERY 8 HOURS
Status: DISCONTINUED | OUTPATIENT
Start: 2021-05-27 | End: 2021-05-28 | Stop reason: HOSPADM

## 2021-05-27 RX ORDER — ACETAMINOPHEN 325 MG/1
650 TABLET ORAL EVERY 6 HOURS PRN
Status: DISCONTINUED | OUTPATIENT
Start: 2021-05-27 | End: 2021-05-28 | Stop reason: HOSPADM

## 2021-05-27 RX ADMIN — AMLODIPINE BESYLATE 10 MG: 5 TABLET ORAL at 08:05

## 2021-05-27 RX ADMIN — DIAZEPAM 5 MG: 5 TABLET ORAL at 09:05

## 2021-05-27 RX ADMIN — LORAZEPAM 2 MG: 2 INJECTION INTRAMUSCULAR; INTRAVENOUS at 05:05

## 2021-05-27 RX ADMIN — PANTOPRAZOLE SODIUM 40 MG: 40 TABLET, DELAYED RELEASE ORAL at 08:05

## 2021-05-27 RX ADMIN — SODIUM CHLORIDE, SODIUM LACTATE, POTASSIUM CHLORIDE, AND CALCIUM CHLORIDE: .6; .31; .03; .02 INJECTION, SOLUTION INTRAVENOUS at 08:05

## 2021-05-27 RX ADMIN — LISINOPRIL 40 MG: 20 TABLET ORAL at 08:05

## 2021-05-27 RX ADMIN — ATORVASTATIN CALCIUM 20 MG: 20 TABLET, FILM COATED ORAL at 08:05

## 2021-05-27 RX ADMIN — DIAZEPAM 10 MG: 10 INJECTION, SOLUTION INTRAMUSCULAR; INTRAVENOUS at 05:05

## 2021-05-27 RX ADMIN — Medication 100 MG: at 08:05

## 2021-05-27 RX ADMIN — Medication 1 PATCH: at 08:05

## 2021-05-27 RX ADMIN — CARVEDILOL 6.25 MG: 6.25 TABLET, FILM COATED ORAL at 08:05

## 2021-05-27 RX ADMIN — FOLIC ACID 1 MG: 1 TABLET ORAL at 08:05

## 2021-05-27 RX ADMIN — DIAZEPAM 10 MG: 10 INJECTION, SOLUTION INTRAMUSCULAR; INTRAVENOUS at 02:05

## 2021-05-27 RX ADMIN — FERROUS GLUCONATE TAB 324 MG (37.5 MG ELEMENTAL IRON) 324 MG: 324 (37.5 FE) TAB at 08:05

## 2021-05-28 VITALS
OXYGEN SATURATION: 97 % | BODY MASS INDEX: 31.18 KG/M2 | WEIGHT: 222.69 LBS | TEMPERATURE: 98 F | HEIGHT: 71 IN | HEART RATE: 71 BPM | SYSTOLIC BLOOD PRESSURE: 140 MMHG | DIASTOLIC BLOOD PRESSURE: 91 MMHG | RESPIRATION RATE: 20 BRPM

## 2021-05-28 PROBLEM — R20.0 LEFT FACIAL NUMBNESS: Status: ACTIVE | Noted: 2021-05-25

## 2021-05-28 PROBLEM — G47.33 OBSTRUCTIVE SLEEP APNEA: Status: ACTIVE | Noted: 2021-05-28

## 2021-05-28 LAB
POCT GLUCOSE: 133 MG/DL (ref 70–110)
POCT GLUCOSE: 95 MG/DL (ref 70–110)

## 2021-05-28 PROCEDURE — 25000003 PHARM REV CODE 250: Performed by: HOSPITALIST

## 2021-05-28 PROCEDURE — 99232 PR SUBSEQUENT HOSPITAL CARE,LEVL II: ICD-10-PCS | Mod: 95,,, | Performed by: NURSE PRACTITIONER

## 2021-05-28 PROCEDURE — S4991 NICOTINE PATCH NONLEGEND: HCPCS | Performed by: HOSPITALIST

## 2021-05-28 PROCEDURE — 99232 SBSQ HOSP IP/OBS MODERATE 35: CPT | Mod: 95,,, | Performed by: NURSE PRACTITIONER

## 2021-05-28 PROCEDURE — 63600175 PHARM REV CODE 636 W HCPCS: Performed by: NURSE PRACTITIONER

## 2021-05-28 PROCEDURE — 25000003 PHARM REV CODE 250: Performed by: NURSE PRACTITIONER

## 2021-05-28 PROCEDURE — 94761 N-INVAS EAR/PLS OXIMETRY MLT: CPT

## 2021-05-28 RX ORDER — PANTOPRAZOLE SODIUM 40 MG/1
40 TABLET, DELAYED RELEASE ORAL 2 TIMES DAILY
Qty: 60 TABLET | Refills: 1 | Status: SHIPPED | OUTPATIENT
Start: 2021-05-28 | End: 2023-04-14

## 2021-05-28 RX ORDER — NAPROXEN SODIUM 220 MG/1
81 TABLET, FILM COATED ORAL DAILY
Status: DISCONTINUED | OUTPATIENT
Start: 2021-05-28 | End: 2021-05-28 | Stop reason: HOSPADM

## 2021-05-28 RX ORDER — LANOLIN ALCOHOL/MO/W.PET/CERES
100 CREAM (GRAM) TOPICAL DAILY
Qty: 30 TABLET | Refills: 0 | Status: SHIPPED | OUTPATIENT
Start: 2021-05-29 | End: 2022-04-26

## 2021-05-28 RX ORDER — FOLIC ACID 1 MG/1
1 TABLET ORAL DAILY
Qty: 30 TABLET | Refills: 0 | Status: CANCELLED | OUTPATIENT
Start: 2021-05-29 | End: 2022-05-29

## 2021-05-28 RX ORDER — FOLIC ACID 1 MG/1
1 TABLET ORAL DAILY
Qty: 30 TABLET | Refills: 0 | Status: SHIPPED | OUTPATIENT
Start: 2021-05-29 | End: 2021-07-21

## 2021-05-28 RX ORDER — LANOLIN ALCOHOL/MO/W.PET/CERES
100 CREAM (GRAM) TOPICAL DAILY
Qty: 30 TABLET | Refills: 0 | Status: CANCELLED | OUTPATIENT
Start: 2021-05-29 | End: 2021-06-28

## 2021-05-28 RX ADMIN — DIAZEPAM 5 MG: 5 TABLET ORAL at 02:05

## 2021-05-28 RX ADMIN — DIAZEPAM 5 MG: 5 TABLET ORAL at 06:05

## 2021-05-28 RX ADMIN — FERROUS GLUCONATE TAB 324 MG (37.5 MG ELEMENTAL IRON) 324 MG: 324 (37.5 FE) TAB at 06:05

## 2021-05-28 RX ADMIN — LISINOPRIL 40 MG: 20 TABLET ORAL at 08:05

## 2021-05-28 RX ADMIN — ASPIRIN 81 MG: 81 TABLET, CHEWABLE ORAL at 02:05

## 2021-05-28 RX ADMIN — AMLODIPINE BESYLATE 10 MG: 5 TABLET ORAL at 08:05

## 2021-05-28 RX ADMIN — PANTOPRAZOLE SODIUM 40 MG: 40 TABLET, DELAYED RELEASE ORAL at 08:05

## 2021-05-28 RX ADMIN — SODIUM CHLORIDE, SODIUM LACTATE, POTASSIUM CHLORIDE, AND CALCIUM CHLORIDE: .6; .31; .03; .02 INJECTION, SOLUTION INTRAVENOUS at 06:05

## 2021-05-28 RX ADMIN — Medication 1 PATCH: at 08:05

## 2021-05-28 RX ADMIN — CARVEDILOL 6.25 MG: 6.25 TABLET, FILM COATED ORAL at 08:05

## 2021-05-28 RX ADMIN — FOLIC ACID 1 MG: 1 TABLET ORAL at 08:05

## 2021-05-28 RX ADMIN — Medication 100 MG: at 08:05

## 2021-06-01 ENCOUNTER — PATIENT OUTREACH (OUTPATIENT)
Dept: ADMINISTRATIVE | Facility: CLINIC | Age: 51
End: 2021-06-01

## 2021-06-01 ENCOUNTER — TELEPHONE (OUTPATIENT)
Dept: SMOKING CESSATION | Facility: CLINIC | Age: 51
End: 2021-06-01

## 2021-07-20 ENCOUNTER — OFFICE VISIT (OUTPATIENT)
Dept: NEUROLOGY | Facility: CLINIC | Age: 51
End: 2021-07-20
Payer: MEDICAID

## 2021-07-20 VITALS
HEART RATE: 80 BPM | BODY MASS INDEX: 30.67 KG/M2 | HEIGHT: 72 IN | WEIGHT: 226.44 LBS | DIASTOLIC BLOOD PRESSURE: 80 MMHG | SYSTOLIC BLOOD PRESSURE: 140 MMHG | RESPIRATION RATE: 16 BRPM

## 2021-07-20 DIAGNOSIS — F10.930 ALCOHOL WITHDRAWAL SEIZURE WITHOUT COMPLICATION: ICD-10-CM

## 2021-07-20 DIAGNOSIS — R41.0 CONFUSION AND DISORIENTATION: ICD-10-CM

## 2021-07-20 DIAGNOSIS — R56.9 ALCOHOL WITHDRAWAL SEIZURE WITHOUT COMPLICATION: ICD-10-CM

## 2021-07-20 DIAGNOSIS — R40.4 TRANSIENT ALTERATION OF AWARENESS: Primary | ICD-10-CM

## 2021-07-20 PROCEDURE — 99999 PR PBB SHADOW E&M-EST. PATIENT-LVL IV: ICD-10-PCS | Mod: PBBFAC,,, | Performed by: PHYSICIAN ASSISTANT

## 2021-07-20 PROCEDURE — 99999 PR PBB SHADOW E&M-EST. PATIENT-LVL IV: CPT | Mod: PBBFAC,,, | Performed by: PHYSICIAN ASSISTANT

## 2021-07-20 PROCEDURE — 99214 OFFICE O/P EST MOD 30 MIN: CPT | Mod: PBBFAC | Performed by: PHYSICIAN ASSISTANT

## 2021-07-20 PROCEDURE — 99204 OFFICE O/P NEW MOD 45 MIN: CPT | Mod: S$PBB,,, | Performed by: PHYSICIAN ASSISTANT

## 2021-07-20 PROCEDURE — 99204 PR OFFICE/OUTPT VISIT, NEW, LEVL IV, 45-59 MIN: ICD-10-PCS | Mod: S$PBB,,, | Performed by: PHYSICIAN ASSISTANT

## 2021-07-20 RX ORDER — HYDROCORTISONE 25 MG/G
CREAM TOPICAL
Status: ON HOLD | COMMUNITY
Start: 2021-05-04 | End: 2022-04-29 | Stop reason: HOSPADM

## 2021-07-20 RX ORDER — LINACLOTIDE 145 UG/1
145 CAPSULE, GELATIN COATED ORAL DAILY
Status: ON HOLD | COMMUNITY
Start: 2021-05-21 | End: 2022-04-29 | Stop reason: HOSPADM

## 2021-07-20 RX ORDER — BUPROPION HYDROCHLORIDE 150 MG/1
150 TABLET, FILM COATED, EXTENDED RELEASE ORAL DAILY
COMMUNITY
Start: 2021-05-31 | End: 2022-04-29

## 2021-07-20 RX ORDER — FINASTERIDE 5 MG/1
5 TABLET, FILM COATED ORAL DAILY
COMMUNITY
Start: 2021-06-06 | End: 2022-04-29

## 2021-07-20 RX ORDER — MULTIVITAMIN
1 TABLET ORAL DAILY
Status: ON HOLD | COMMUNITY
End: 2022-04-29 | Stop reason: SDUPTHER

## 2021-07-21 PROBLEM — R56.9 ALCOHOL WITHDRAWAL SEIZURE: Status: ACTIVE | Noted: 2021-07-21

## 2021-07-21 PROBLEM — R40.4 TRANSIENT ALTERATION OF AWARENESS: Status: ACTIVE | Noted: 2021-07-21

## 2021-07-21 PROBLEM — F10.939 ALCOHOL WITHDRAWAL SEIZURE: Status: ACTIVE | Noted: 2021-07-21

## 2021-07-23 ENCOUNTER — HOSPITAL ENCOUNTER (EMERGENCY)
Facility: HOSPITAL | Age: 51
Discharge: HOME | End: 2021-07-23
Attending: EMERGENCY MEDICINE
Payer: MEDICAID

## 2021-07-23 VITALS
TEMPERATURE: 99 F | HEART RATE: 80 BPM | OXYGEN SATURATION: 100 % | RESPIRATION RATE: 20 BRPM | DIASTOLIC BLOOD PRESSURE: 65 MMHG | SYSTOLIC BLOOD PRESSURE: 121 MMHG

## 2021-07-23 DIAGNOSIS — J30.2 SEASONAL ALLERGIES: Primary | ICD-10-CM

## 2021-07-23 LAB
CTP QC/QA: YES
SARS-COV-2 RDRP RESP QL NAA+PROBE: NEGATIVE

## 2021-07-23 PROCEDURE — U0002 COVID-19 LAB TEST NON-CDC: HCPCS | Performed by: EMERGENCY MEDICINE

## 2021-07-23 PROCEDURE — 99282 EMERGENCY DEPT VISIT SF MDM: CPT | Mod: 25

## 2021-08-13 DIAGNOSIS — N52.01 ERECTILE DYSFUNCTION DUE TO ARTERIAL INSUFFICIENCY: ICD-10-CM

## 2021-08-14 RX ORDER — TADALAFIL 20 MG/1
20 TABLET ORAL
Qty: 30 TABLET | Refills: 11 | Status: ON HOLD | OUTPATIENT
Start: 2021-08-14 | End: 2022-04-29 | Stop reason: HOSPADM

## 2021-09-04 ENCOUNTER — HOSPITAL ENCOUNTER (EMERGENCY)
Facility: HOSPITAL | Age: 51
Discharge: HOME | End: 2021-09-04
Attending: EMERGENCY MEDICINE
Payer: MEDICAID

## 2021-09-04 VITALS
HEART RATE: 72 BPM | OXYGEN SATURATION: 95 % | WEIGHT: 227 LBS | SYSTOLIC BLOOD PRESSURE: 143 MMHG | DIASTOLIC BLOOD PRESSURE: 71 MMHG | TEMPERATURE: 98 F | BODY MASS INDEX: 30.75 KG/M2 | RESPIRATION RATE: 16 BRPM | HEIGHT: 72 IN

## 2021-09-04 DIAGNOSIS — E86.0 DEHYDRATION: ICD-10-CM

## 2021-09-04 DIAGNOSIS — N17.9 AKI (ACUTE KIDNEY INJURY): Primary | ICD-10-CM

## 2021-09-04 LAB
ALBUMIN SERPL BCP-MCNC: 4.7 G/DL (ref 3.5–5.2)
ALP SERPL-CCNC: 22 U/L (ref 38–126)
ALT SERPL W/O P-5'-P-CCNC: 65 U/L (ref 10–44)
ANION GAP SERPL CALC-SCNC: 10 MMOL/L (ref 8–16)
ANION GAP SERPL CALC-SCNC: 19 MMOL/L (ref 8–16)
AST SERPL-CCNC: 65 U/L (ref 15–46)
BASOPHILS # BLD AUTO: 0.03 K/UL (ref 0–0.2)
BASOPHILS NFR BLD: 0.7 % (ref 0–1.9)
BILIRUB SERPL-MCNC: 0.5 MG/DL (ref 0.1–1)
CALCIUM SERPL-MCNC: 8.4 MG/DL (ref 8.7–10.5)
CALCIUM SERPL-MCNC: 9 MG/DL (ref 8.7–10.5)
CHLORIDE SERPL-SCNC: 92 MMOL/L (ref 95–110)
CHLORIDE SERPL-SCNC: 98 MMOL/L (ref 95–110)
CK SERPL-CCNC: 88 U/L (ref 55–170)
CO2 SERPL-SCNC: 21 MMOL/L (ref 23–29)
CO2 SERPL-SCNC: 26 MMOL/L (ref 23–29)
CREAT SERPL-MCNC: 1.62 MG/DL (ref 0.5–1.4)
CREAT SERPL-MCNC: 2.29 MG/DL (ref 0.5–1.4)
DIFFERENTIAL METHOD: ABNORMAL
EOSINOPHIL # BLD AUTO: 0 K/UL (ref 0–0.5)
EOSINOPHIL NFR BLD: 0.5 % (ref 0–8)
ERYTHROCYTE [DISTWIDTH] IN BLOOD BY AUTOMATED COUNT: 13 % (ref 11.5–14.5)
EST. GFR  (AFRICAN AMERICAN): 37.1 ML/MIN/1.73 M^2
EST. GFR  (AFRICAN AMERICAN): 56.4 ML/MIN/1.73 M^2
EST. GFR  (NON AFRICAN AMERICAN): 32.1 ML/MIN/1.73 M^2
EST. GFR  (NON AFRICAN AMERICAN): 48.8 ML/MIN/1.73 M^2
GLUCOSE SERPL-MCNC: 156 MG/DL (ref 70–110)
GLUCOSE SERPL-MCNC: 98 MG/DL (ref 70–110)
HCT VFR BLD AUTO: 37.8 % (ref 40–54)
HGB BLD-MCNC: 13.1 G/DL (ref 14–18)
IMM GRANULOCYTES # BLD AUTO: 0.01 K/UL (ref 0–0.04)
IMM GRANULOCYTES NFR BLD AUTO: 0.2 % (ref 0–0.5)
LYMPHOCYTES # BLD AUTO: 1.3 K/UL (ref 1–4.8)
LYMPHOCYTES NFR BLD: 29.3 % (ref 18–48)
MCH RBC QN AUTO: 35.2 PG (ref 27–31)
MCHC RBC AUTO-ENTMCNC: 34.7 G/DL (ref 32–36)
MCV RBC AUTO: 102 FL (ref 82–98)
MONOCYTES # BLD AUTO: 0.8 K/UL (ref 0.3–1)
MONOCYTES NFR BLD: 18.8 % (ref 4–15)
NEUTROPHILS # BLD AUTO: 2.2 K/UL (ref 1.8–7.7)
NEUTROPHILS NFR BLD: 50.5 % (ref 38–73)
NRBC BLD-RTO: 0 /100 WBC
PLATELET # BLD AUTO: 87 K/UL (ref 150–450)
PMV BLD AUTO: 9.9 FL (ref 9.2–12.9)
POTASSIUM SERPL-SCNC: 4.2 MMOL/L (ref 3.5–5.1)
POTASSIUM SERPL-SCNC: 4.5 MMOL/L (ref 3.5–5.1)
PROT SERPL-MCNC: 7.9 G/DL (ref 6–8.4)
RBC # BLD AUTO: 3.72 M/UL (ref 4.6–6.2)
SODIUM SERPL-SCNC: 132 MMOL/L (ref 136–145)
SODIUM SERPL-SCNC: 134 MMOL/L (ref 136–145)
UUN UR-MCNC: 31 MG/DL (ref 2–20)
UUN UR-MCNC: 34 MG/DL (ref 2–20)
WBC # BLD AUTO: 4.3 K/UL (ref 3.9–12.7)

## 2021-09-04 PROCEDURE — 96375 TX/PRO/DX INJ NEW DRUG ADDON: CPT | Mod: ER

## 2021-09-04 PROCEDURE — 99284 EMERGENCY DEPT VISIT MOD MDM: CPT | Mod: 25,ER

## 2021-09-04 PROCEDURE — 96361 HYDRATE IV INFUSION ADD-ON: CPT | Mod: ER

## 2021-09-04 PROCEDURE — 63600175 PHARM REV CODE 636 W HCPCS: Mod: ER | Performed by: EMERGENCY MEDICINE

## 2021-09-04 PROCEDURE — 82550 ASSAY OF CK (CPK): CPT | Mod: ER | Performed by: EMERGENCY MEDICINE

## 2021-09-04 PROCEDURE — 25000003 PHARM REV CODE 250: Mod: ER | Performed by: EMERGENCY MEDICINE

## 2021-09-04 PROCEDURE — 96374 THER/PROPH/DIAG INJ IV PUSH: CPT | Mod: ER

## 2021-09-04 PROCEDURE — 85025 COMPLETE CBC W/AUTO DIFF WBC: CPT | Mod: ER | Performed by: EMERGENCY MEDICINE

## 2021-09-04 PROCEDURE — 80053 COMPREHEN METABOLIC PANEL: CPT | Mod: ER | Performed by: EMERGENCY MEDICINE

## 2021-09-04 PROCEDURE — 80048 BASIC METABOLIC PNL TOTAL CA: CPT | Mod: ER | Performed by: EMERGENCY MEDICINE

## 2021-09-04 RX ORDER — ONDANSETRON 2 MG/ML
8 INJECTION INTRAMUSCULAR; INTRAVENOUS
Status: COMPLETED | OUTPATIENT
Start: 2021-09-04 | End: 2021-09-04

## 2021-09-04 RX ORDER — DIAZEPAM 10 MG/2ML
10 INJECTION INTRAMUSCULAR
Status: COMPLETED | OUTPATIENT
Start: 2021-09-04 | End: 2021-09-04

## 2021-09-04 RX ORDER — CALCIUM CARBONATE 200(500)MG
1000 TABLET,CHEWABLE ORAL
Status: COMPLETED | OUTPATIENT
Start: 2021-09-04 | End: 2021-09-04

## 2021-09-04 RX ADMIN — ONDANSETRON 8 MG: 2 INJECTION INTRAMUSCULAR; INTRAVENOUS at 04:09

## 2021-09-04 RX ADMIN — SODIUM CHLORIDE 2000 ML: 0.9 INJECTION, SOLUTION INTRAVENOUS at 11:09

## 2021-09-04 RX ADMIN — DIAZEPAM 10 MG: 5 INJECTION, SOLUTION INTRAMUSCULAR; INTRAVENOUS at 04:09

## 2021-09-04 RX ADMIN — SODIUM CHLORIDE 1000 ML: 0.9 INJECTION, SOLUTION INTRAVENOUS at 01:09

## 2021-09-04 RX ADMIN — CALCIUM CARBONATE (ANTACID) CHEW TAB 500 MG 1000 MG: 500 CHEW TAB at 04:09

## 2021-09-18 ENCOUNTER — HOSPITAL ENCOUNTER (EMERGENCY)
Facility: HOSPITAL | Age: 51
Discharge: HOME | End: 2021-09-18
Attending: EMERGENCY MEDICINE
Payer: MEDICAID

## 2021-09-18 VITALS
WEIGHT: 227 LBS | TEMPERATURE: 100 F | OXYGEN SATURATION: 95 % | BODY MASS INDEX: 30.75 KG/M2 | HEART RATE: 73 BPM | HEIGHT: 72 IN | RESPIRATION RATE: 15 BRPM | DIASTOLIC BLOOD PRESSURE: 73 MMHG | SYSTOLIC BLOOD PRESSURE: 163 MMHG

## 2021-09-18 DIAGNOSIS — R10.9 ABDOMINAL PAIN: ICD-10-CM

## 2021-09-18 DIAGNOSIS — R10.84 GENERALIZED ABDOMINAL PAIN: ICD-10-CM

## 2021-09-18 DIAGNOSIS — K92.0 HEMATEMESIS WITH NAUSEA: Primary | ICD-10-CM

## 2021-09-18 LAB
ALBUMIN SERPL BCP-MCNC: 4.1 G/DL (ref 3.5–5.2)
ALP SERPL-CCNC: 25 U/L (ref 55–135)
ALT SERPL W/O P-5'-P-CCNC: 129 U/L (ref 10–44)
ANION GAP SERPL CALC-SCNC: 24 MMOL/L (ref 8–16)
AST SERPL-CCNC: 96 U/L (ref 10–40)
BACTERIA #/AREA URNS HPF: NORMAL /HPF
BASOPHILS # BLD AUTO: 0.07 K/UL (ref 0–0.2)
BASOPHILS NFR BLD: 0.9 % (ref 0–1.9)
BILIRUB SERPL-MCNC: 1.1 MG/DL (ref 0.1–1)
BILIRUB UR QL STRIP: NEGATIVE
BUN SERPL-MCNC: 21 MG/DL (ref 6–20)
CALCIUM SERPL-MCNC: 10 MG/DL (ref 8.7–10.5)
CHLORIDE SERPL-SCNC: 96 MMOL/L (ref 95–110)
CLARITY UR: CLEAR
CO2 SERPL-SCNC: 15 MMOL/L (ref 23–29)
COLOR UR: YELLOW
CREAT SERPL-MCNC: 0.9 MG/DL (ref 0.5–1.4)
DIFFERENTIAL METHOD: ABNORMAL
EOSINOPHIL # BLD AUTO: 0 K/UL (ref 0–0.5)
EOSINOPHIL NFR BLD: 0.1 % (ref 0–8)
ERYTHROCYTE [DISTWIDTH] IN BLOOD BY AUTOMATED COUNT: 13.2 % (ref 11.5–14.5)
EST. GFR  (AFRICAN AMERICAN): >60 ML/MIN/1.73 M^2
EST. GFR  (NON AFRICAN AMERICAN): >60 ML/MIN/1.73 M^2
ETHANOL SERPL-MCNC: 83 MG/DL
GLUCOSE SERPL-MCNC: 81 MG/DL (ref 70–110)
GLUCOSE UR QL STRIP: NEGATIVE
HCT VFR BLD AUTO: 39.1 % (ref 40–54)
HGB BLD-MCNC: 13.7 G/DL (ref 14–18)
HGB UR QL STRIP: NEGATIVE
HYALINE CASTS #/AREA URNS LPF: 0 /LPF
IMM GRANULOCYTES # BLD AUTO: 0.02 K/UL (ref 0–0.04)
IMM GRANULOCYTES NFR BLD AUTO: 0.3 % (ref 0–0.5)
KETONES UR QL STRIP: ABNORMAL
LEUKOCYTE ESTERASE UR QL STRIP: NEGATIVE
LIPASE SERPL-CCNC: 50 U/L (ref 4–60)
LYMPHOCYTES # BLD AUTO: 1 K/UL (ref 1–4.8)
LYMPHOCYTES NFR BLD: 13.5 % (ref 18–48)
MCH RBC QN AUTO: 34.9 PG (ref 27–31)
MCHC RBC AUTO-ENTMCNC: 35 G/DL (ref 32–36)
MCV RBC AUTO: 100 FL (ref 82–98)
MICROSCOPIC COMMENT: NORMAL
MONOCYTES # BLD AUTO: 0.5 K/UL (ref 0.3–1)
MONOCYTES NFR BLD: 6.1 % (ref 4–15)
NEUTROPHILS # BLD AUTO: 5.9 K/UL (ref 1.8–7.7)
NEUTROPHILS NFR BLD: 79.1 % (ref 38–73)
NITRITE UR QL STRIP: NEGATIVE
NRBC BLD-RTO: 0 /100 WBC
PH UR STRIP: 6 [PH] (ref 5–8)
PLATELET # BLD AUTO: 253 K/UL (ref 150–450)
PMV BLD AUTO: 8.8 FL (ref 9.2–12.9)
POTASSIUM SERPL-SCNC: 5 MMOL/L (ref 3.5–5.1)
PROT SERPL-MCNC: 7.9 G/DL (ref 6–8.4)
PROT UR QL STRIP: ABNORMAL
RBC # BLD AUTO: 3.92 M/UL (ref 4.6–6.2)
RBC #/AREA URNS HPF: 0 /HPF (ref 0–4)
SODIUM SERPL-SCNC: 135 MMOL/L (ref 136–145)
SP GR UR STRIP: 1.03 (ref 1–1.03)
URN SPEC COLLECT METH UR: ABNORMAL
UROBILINOGEN UR STRIP-ACNC: NEGATIVE EU/DL
WBC # BLD AUTO: 7.43 K/UL (ref 3.9–12.7)
WBC #/AREA URNS HPF: 0 /HPF (ref 0–5)
WBC CLUMPS URNS QL MICRO: NORMAL

## 2021-09-18 PROCEDURE — 80053 COMPREHEN METABOLIC PANEL: CPT | Performed by: EMERGENCY MEDICINE

## 2021-09-18 PROCEDURE — 96375 TX/PRO/DX INJ NEW DRUG ADDON: CPT

## 2021-09-18 PROCEDURE — 85025 COMPLETE CBC W/AUTO DIFF WBC: CPT | Performed by: EMERGENCY MEDICINE

## 2021-09-18 PROCEDURE — 81000 URINALYSIS NONAUTO W/SCOPE: CPT | Mod: 59 | Performed by: EMERGENCY MEDICINE

## 2021-09-18 PROCEDURE — 93010 EKG 12-LEAD: ICD-10-PCS | Mod: ,,, | Performed by: INTERNAL MEDICINE

## 2021-09-18 PROCEDURE — 83690 ASSAY OF LIPASE: CPT | Performed by: EMERGENCY MEDICINE

## 2021-09-18 PROCEDURE — C9113 INJ PANTOPRAZOLE SODIUM, VIA: HCPCS | Performed by: EMERGENCY MEDICINE

## 2021-09-18 PROCEDURE — 96374 THER/PROPH/DIAG INJ IV PUSH: CPT

## 2021-09-18 PROCEDURE — 99285 EMERGENCY DEPT VISIT HI MDM: CPT | Mod: 25

## 2021-09-18 PROCEDURE — 25000003 PHARM REV CODE 250: Performed by: EMERGENCY MEDICINE

## 2021-09-18 PROCEDURE — 63600175 PHARM REV CODE 636 W HCPCS: Performed by: EMERGENCY MEDICINE

## 2021-09-18 PROCEDURE — 93005 ELECTROCARDIOGRAM TRACING: CPT

## 2021-09-18 PROCEDURE — 93010 ELECTROCARDIOGRAM REPORT: CPT | Mod: ,,, | Performed by: INTERNAL MEDICINE

## 2021-09-18 PROCEDURE — 96361 HYDRATE IV INFUSION ADD-ON: CPT

## 2021-09-18 PROCEDURE — 82077 ASSAY SPEC XCP UR&BREATH IA: CPT | Performed by: EMERGENCY MEDICINE

## 2021-09-18 RX ORDER — PANTOPRAZOLE SODIUM 40 MG/10ML
40 INJECTION, POWDER, LYOPHILIZED, FOR SOLUTION INTRAVENOUS
Status: COMPLETED | OUTPATIENT
Start: 2021-09-18 | End: 2021-09-18

## 2021-09-18 RX ORDER — METOCLOPRAMIDE 10 MG/1
10 TABLET ORAL EVERY 6 HOURS PRN
Qty: 14 TABLET | Refills: 0 | Status: ON HOLD | OUTPATIENT
Start: 2021-09-18 | End: 2022-04-29 | Stop reason: HOSPADM

## 2021-09-18 RX ORDER — DICYCLOMINE HYDROCHLORIDE 20 MG/1
20 TABLET ORAL 3 TIMES DAILY PRN
Qty: 14 TABLET | Refills: 0 | Status: SHIPPED | OUTPATIENT
Start: 2021-09-18 | End: 2021-10-07 | Stop reason: SDUPTHER

## 2021-09-18 RX ORDER — PROCHLORPERAZINE EDISYLATE 5 MG/ML
10 INJECTION INTRAMUSCULAR; INTRAVENOUS
Status: COMPLETED | OUTPATIENT
Start: 2021-09-18 | End: 2021-09-18

## 2021-09-18 RX ADMIN — SODIUM CHLORIDE 1000 ML: 0.9 INJECTION, SOLUTION INTRAVENOUS at 09:09

## 2021-09-18 RX ADMIN — PANTOPRAZOLE SODIUM 40 MG: 40 INJECTION, POWDER, LYOPHILIZED, FOR SOLUTION INTRAVENOUS at 10:09

## 2021-09-18 RX ADMIN — PROCHLORPERAZINE EDISYLATE 10 MG: 5 INJECTION INTRAMUSCULAR; INTRAVENOUS at 09:09

## 2021-09-18 RX ADMIN — LIDOCAINE HYDROCHLORIDE: 20 SOLUTION ORAL; TOPICAL at 10:09

## 2021-10-07 ENCOUNTER — HOSPITAL ENCOUNTER (EMERGENCY)
Facility: HOSPITAL | Age: 51
Discharge: HOME | End: 2021-10-07
Attending: EMERGENCY MEDICINE
Payer: MEDICAID

## 2021-10-07 VITALS
TEMPERATURE: 98 F | DIASTOLIC BLOOD PRESSURE: 80 MMHG | RESPIRATION RATE: 17 BRPM | WEIGHT: 220 LBS | HEIGHT: 72 IN | HEART RATE: 85 BPM | BODY MASS INDEX: 29.8 KG/M2 | OXYGEN SATURATION: 95 % | SYSTOLIC BLOOD PRESSURE: 164 MMHG

## 2021-10-07 DIAGNOSIS — F10.10 CHRONIC ALCOHOL ABUSE: ICD-10-CM

## 2021-10-07 DIAGNOSIS — R74.01 TRANSAMINITIS: ICD-10-CM

## 2021-10-07 DIAGNOSIS — R10.84 GENERALIZED ABDOMINAL PAIN: ICD-10-CM

## 2021-10-07 DIAGNOSIS — K92.0 HEMATEMESIS WITH NAUSEA: Primary | ICD-10-CM

## 2021-10-07 LAB
ALBUMIN SERPL BCP-MCNC: 5.1 G/DL (ref 3.5–5.2)
ALP SERPL-CCNC: 37 U/L (ref 38–126)
ALT SERPL W/O P-5'-P-CCNC: 213 U/L (ref 10–44)
ANION GAP SERPL CALC-SCNC: 19 MMOL/L (ref 8–16)
ANION GAP SERPL CALC-SCNC: 29 MMOL/L (ref 8–16)
AST SERPL-CCNC: 260 U/L (ref 15–46)
BACTERIA #/AREA URNS AUTO: ABNORMAL /HPF
BASOPHILS # BLD AUTO: 0.07 K/UL (ref 0–0.2)
BASOPHILS NFR BLD: 1.5 % (ref 0–1.9)
BILIRUB SERPL-MCNC: 1.6 MG/DL (ref 0.1–1)
BILIRUB UR QL STRIP: NEGATIVE
CALCIUM SERPL-MCNC: 10.6 MG/DL (ref 8.7–10.5)
CALCIUM SERPL-MCNC: 9.5 MG/DL (ref 8.7–10.5)
CHLORIDE SERPL-SCNC: 90 MMOL/L (ref 95–110)
CHLORIDE SERPL-SCNC: 94 MMOL/L (ref 95–110)
CLARITY UR REFRACT.AUTO: CLEAR
CO2 SERPL-SCNC: 17 MMOL/L (ref 23–29)
CO2 SERPL-SCNC: 21 MMOL/L (ref 23–29)
COLOR UR AUTO: ABNORMAL
CREAT SERPL-MCNC: 0.76 MG/DL (ref 0.5–1.4)
CREAT SERPL-MCNC: 0.83 MG/DL (ref 0.5–1.4)
DIFFERENTIAL METHOD: ABNORMAL
EOSINOPHIL # BLD AUTO: 0 K/UL (ref 0–0.5)
EOSINOPHIL NFR BLD: 0.2 % (ref 0–8)
ERYTHROCYTE [DISTWIDTH] IN BLOOD BY AUTOMATED COUNT: 13.4 % (ref 11.5–14.5)
EST. GFR  (AFRICAN AMERICAN): >60 ML/MIN/1.73 M^2
EST. GFR  (AFRICAN AMERICAN): >60 ML/MIN/1.73 M^2
EST. GFR  (NON AFRICAN AMERICAN): >60 ML/MIN/1.73 M^2
EST. GFR  (NON AFRICAN AMERICAN): >60 ML/MIN/1.73 M^2
GLUCOSE SERPL-MCNC: 76 MG/DL (ref 70–110)
GLUCOSE SERPL-MCNC: 91 MG/DL (ref 70–110)
GLUCOSE UR QL STRIP: NEGATIVE
HCT VFR BLD AUTO: 42.2 % (ref 40–54)
HGB BLD-MCNC: 14.5 G/DL (ref 14–18)
HGB UR QL STRIP: NEGATIVE
HYALINE CASTS UR QL AUTO: 5 /LPF
IMM GRANULOCYTES # BLD AUTO: 0.01 K/UL (ref 0–0.04)
IMM GRANULOCYTES NFR BLD AUTO: 0.2 % (ref 0–0.5)
KETONES UR QL STRIP: ABNORMAL
LACTATE SERPL-SCNC: 1.1 MMOL/L (ref 0.5–2.2)
LACTATE SERPL-SCNC: 3.9 MMOL/L (ref 0.5–2.2)
LEUKOCYTE ESTERASE UR QL STRIP: NEGATIVE
LIPASE SERPL-CCNC: 205 U/L (ref 23–300)
LYMPHOCYTES # BLD AUTO: 0.8 K/UL (ref 1–4.8)
LYMPHOCYTES NFR BLD: 17.3 % (ref 18–48)
MAGNESIUM SERPL-MCNC: 1.5 MG/DL (ref 1.6–2.6)
MCH RBC QN AUTO: 35.7 PG (ref 27–31)
MCHC RBC AUTO-ENTMCNC: 34.4 G/DL (ref 32–36)
MCV RBC AUTO: 104 FL (ref 82–98)
MICROSCOPIC COMMENT: ABNORMAL
MONOCYTES # BLD AUTO: 0.5 K/UL (ref 0.3–1)
MONOCYTES NFR BLD: 11.2 % (ref 4–15)
NEUTROPHILS # BLD AUTO: 3.4 K/UL (ref 1.8–7.7)
NEUTROPHILS NFR BLD: 69.6 % (ref 38–73)
NITRITE UR QL STRIP: NEGATIVE
NRBC BLD-RTO: 0 /100 WBC
PH UR STRIP: 5 [PH] (ref 5–8)
PLATELET # BLD AUTO: 128 K/UL (ref 150–450)
PMV BLD AUTO: 10.2 FL (ref 9.2–12.9)
POTASSIUM SERPL-SCNC: 4.2 MMOL/L (ref 3.5–5.1)
POTASSIUM SERPL-SCNC: 4.6 MMOL/L (ref 3.5–5.1)
PROT SERPL-MCNC: 8.7 G/DL (ref 6–8.4)
PROT UR QL STRIP: ABNORMAL
RBC # BLD AUTO: 4.06 M/UL (ref 4.6–6.2)
RBC #/AREA URNS AUTO: 1 /HPF (ref 0–4)
SODIUM SERPL-SCNC: 134 MMOL/L (ref 136–145)
SODIUM SERPL-SCNC: 136 MMOL/L (ref 136–145)
SP GR UR STRIP: 1.03 (ref 1–1.03)
URN SPEC COLLECT METH UR: ABNORMAL
UROBILINOGEN UR STRIP-ACNC: 1 EU/DL
UUN UR-MCNC: 17 MG/DL (ref 2–20)
UUN UR-MCNC: 18 MG/DL (ref 2–20)
WBC # BLD AUTO: 4.81 K/UL (ref 3.9–12.7)
WBC #/AREA URNS AUTO: 2 /HPF (ref 0–5)

## 2021-10-07 PROCEDURE — 85025 COMPLETE CBC W/AUTO DIFF WBC: CPT | Mod: ER | Performed by: PHYSICIAN ASSISTANT

## 2021-10-07 PROCEDURE — 25000003 PHARM REV CODE 250: Mod: ER | Performed by: PHYSICIAN ASSISTANT

## 2021-10-07 PROCEDURE — 83605 ASSAY OF LACTIC ACID: CPT | Mod: 91,ER | Performed by: EMERGENCY MEDICINE

## 2021-10-07 PROCEDURE — 63600175 PHARM REV CODE 636 W HCPCS: Mod: ER | Performed by: PHYSICIAN ASSISTANT

## 2021-10-07 PROCEDURE — 96372 THER/PROPH/DIAG INJ SC/IM: CPT | Mod: 59,ER

## 2021-10-07 PROCEDURE — 80048 BASIC METABOLIC PNL TOTAL CA: CPT | Mod: ER | Performed by: PHYSICIAN ASSISTANT

## 2021-10-07 PROCEDURE — 80053 COMPREHEN METABOLIC PANEL: CPT | Mod: ER | Performed by: PHYSICIAN ASSISTANT

## 2021-10-07 PROCEDURE — 96375 TX/PRO/DX INJ NEW DRUG ADDON: CPT | Mod: ER

## 2021-10-07 PROCEDURE — 99285 EMERGENCY DEPT VISIT HI MDM: CPT | Mod: 25,ER

## 2021-10-07 PROCEDURE — C9113 INJ PANTOPRAZOLE SODIUM, VIA: HCPCS | Mod: ER | Performed by: PHYSICIAN ASSISTANT

## 2021-10-07 PROCEDURE — 81000 URINALYSIS NONAUTO W/SCOPE: CPT | Mod: ER | Performed by: PHYSICIAN ASSISTANT

## 2021-10-07 PROCEDURE — 96366 THER/PROPH/DIAG IV INF ADDON: CPT | Mod: ER

## 2021-10-07 PROCEDURE — 96361 HYDRATE IV INFUSION ADD-ON: CPT | Mod: ER

## 2021-10-07 PROCEDURE — 83605 ASSAY OF LACTIC ACID: CPT | Mod: ER | Performed by: PHYSICIAN ASSISTANT

## 2021-10-07 PROCEDURE — 94760 N-INVAS EAR/PLS OXIMETRY 1: CPT | Mod: ER

## 2021-10-07 PROCEDURE — 25500020 PHARM REV CODE 255: Mod: ER | Performed by: PHYSICIAN ASSISTANT

## 2021-10-07 PROCEDURE — 96365 THER/PROPH/DIAG IV INF INIT: CPT | Mod: 59,ER

## 2021-10-07 PROCEDURE — 83690 ASSAY OF LIPASE: CPT | Mod: ER | Performed by: PHYSICIAN ASSISTANT

## 2021-10-07 PROCEDURE — 83735 ASSAY OF MAGNESIUM: CPT | Mod: ER | Performed by: PHYSICIAN ASSISTANT

## 2021-10-07 RX ORDER — PANTOPRAZOLE SODIUM 40 MG/10ML
40 INJECTION, POWDER, LYOPHILIZED, FOR SOLUTION INTRAVENOUS
Status: COMPLETED | OUTPATIENT
Start: 2021-10-07 | End: 2021-10-07

## 2021-10-07 RX ORDER — ONDANSETRON 2 MG/ML
4 INJECTION INTRAMUSCULAR; INTRAVENOUS
Status: COMPLETED | OUTPATIENT
Start: 2021-10-07 | End: 2021-10-07

## 2021-10-07 RX ORDER — DICYCLOMINE HYDROCHLORIDE 20 MG/1
20 TABLET ORAL 3 TIMES DAILY PRN
Qty: 15 TABLET | Refills: 0 | Status: ON HOLD | OUTPATIENT
Start: 2021-10-07 | End: 2022-04-29 | Stop reason: HOSPADM

## 2021-10-07 RX ORDER — PROMETHAZINE HYDROCHLORIDE 25 MG/1
25 TABLET ORAL 3 TIMES DAILY PRN
Qty: 15 TABLET | Refills: 0 | Status: SHIPPED | OUTPATIENT
Start: 2021-10-07 | End: 2021-10-12

## 2021-10-07 RX ORDER — DICYCLOMINE HYDROCHLORIDE 10 MG/ML
20 INJECTION INTRAMUSCULAR
Status: COMPLETED | OUTPATIENT
Start: 2021-10-07 | End: 2021-10-07

## 2021-10-07 RX ADMIN — ONDANSETRON 4 MG: 2 INJECTION INTRAMUSCULAR; INTRAVENOUS at 07:10

## 2021-10-07 RX ADMIN — SODIUM CHLORIDE 1000 ML: 0.9 INJECTION, SOLUTION INTRAVENOUS at 05:10

## 2021-10-07 RX ADMIN — DICYCLOMINE HYDROCHLORIDE 20 MG: 20 INJECTION, SOLUTION INTRAMUSCULAR at 05:10

## 2021-10-07 RX ADMIN — LIDOCAINE HYDROCHLORIDE: 20 SOLUTION ORAL; TOPICAL at 06:10

## 2021-10-07 RX ADMIN — SODIUM CHLORIDE 1000 ML: 0.9 INJECTION, SOLUTION INTRAVENOUS at 07:10

## 2021-10-07 RX ADMIN — PROMETHAZINE HYDROCHLORIDE 12.5 MG: 25 INJECTION INTRAMUSCULAR; INTRAVENOUS at 05:10

## 2021-10-07 RX ADMIN — IOHEXOL 100 ML: 350 INJECTION, SOLUTION INTRAVENOUS at 06:10

## 2021-10-07 RX ADMIN — PROMETHAZINE HYDROCHLORIDE 12.5 MG: 25 INJECTION INTRAMUSCULAR; INTRAVENOUS at 09:10

## 2021-10-07 RX ADMIN — PANTOPRAZOLE SODIUM 40 MG: 40 INJECTION, POWDER, FOR SOLUTION INTRAVENOUS at 05:10

## 2022-01-12 ENCOUNTER — TELEPHONE (OUTPATIENT)
Dept: SMOKING CESSATION | Facility: CLINIC | Age: 52
End: 2022-01-12
Payer: MEDICAID

## 2022-01-12 NOTE — TELEPHONE ENCOUNTER
Unsuccessful contact with patient regarding tobacco cessation. Unable to leave a message due to the mailbox not being set up.

## 2022-01-26 ENCOUNTER — CLINICAL SUPPORT (OUTPATIENT)
Dept: SMOKING CESSATION | Facility: CLINIC | Age: 52
End: 2022-01-26
Payer: COMMERCIAL

## 2022-01-26 DIAGNOSIS — F17.200 NICOTINE DEPENDENCE: Primary | ICD-10-CM

## 2022-01-26 PROCEDURE — 99404 PREV MED CNSL INDIV APPRX 60: CPT | Mod: S$GLB,,,

## 2022-01-26 PROCEDURE — 99404 PR PREVENT COUNSEL,INDIV,60 MIN: ICD-10-PCS | Mod: S$GLB,,,

## 2022-01-26 RX ORDER — IBUPROFEN 200 MG
1 TABLET ORAL DAILY
Qty: 28 PATCH | Refills: 0 | Status: ON HOLD | OUTPATIENT
Start: 2022-01-26 | End: 2022-04-29 | Stop reason: HOSPADM

## 2022-01-26 RX ORDER — MICONAZOLE NITRATE 2 %
2 CREAM (GRAM) TOPICAL
Qty: 220 EACH | Refills: 0 | Status: ON HOLD | OUTPATIENT
Start: 2022-01-26 | End: 2022-04-29 | Stop reason: HOSPADM

## 2022-01-26 NOTE — Clinical Note
Pt currently smoke 1-1.5 packs of cigarettes per day and will begin bi-weekly tobacco cessation sessions. Pt agreed to take prescribed tobacco cessation medication regimen of 21 mg nicotine patches and 2 mg nicotine gum. Prescribed medications will be managed by physician and monitored by CTTS.

## 2022-02-17 ENCOUNTER — CLINICAL SUPPORT (OUTPATIENT)
Dept: SMOKING CESSATION | Facility: CLINIC | Age: 52
End: 2022-02-17
Payer: COMMERCIAL

## 2022-02-17 DIAGNOSIS — F17.200 NICOTINE DEPENDENCE: Primary | ICD-10-CM

## 2022-02-17 PROCEDURE — 99402 PR PREVENT COUNSEL,INDIV,30 MIN: ICD-10-PCS | Mod: S$GLB,,,

## 2022-02-17 PROCEDURE — 99402 PREV MED CNSL INDIV APPRX 30: CPT | Mod: S$GLB,,,

## 2022-02-17 NOTE — PROGRESS NOTES
Individual Follow-Up Form    2/17/2022    Quit Date: TBD    Clinical Status of Patient: Outpatient    Length of Service: 30 minutes    Continuing Medication: yes  Patches    Other Medications: none     Target Symptoms: Withdrawal and medication side effects. The following were  rated moderate (3) to severe (4) on TCRS:  · Moderate (3): none  · Severe (4): none    Comments: Telephone session with patient regarding tobacco cessation. Pt reports, he's 5 days tobacco free; down from one pack per day. Pt commended for the accomplishment thus far. Pt remain on prescribed tobacco cessation medication regimen of 21 mg nicotine patches without any negative side effects at this time. Pt denies any abnormal behavior or mental changes at this time. Pt will continue with therapy sessions and medication monitoring by CTTS. Prescribed medication management will be by physician.      Diagnosis: F17.200    Next Visit: 2 weeks

## 2022-03-23 ENCOUNTER — CLINICAL SUPPORT (OUTPATIENT)
Dept: SMOKING CESSATION | Facility: CLINIC | Age: 52
End: 2022-03-23
Payer: COMMERCIAL

## 2022-03-23 DIAGNOSIS — F17.200 NICOTINE DEPENDENCE: Primary | ICD-10-CM

## 2022-03-23 PROCEDURE — 99407 PR TOBACCO USE CESSATION INTENSIVE >10 MINUTES: ICD-10-PCS | Mod: S$GLB,,,

## 2022-03-23 PROCEDURE — 99407 BEHAV CHNG SMOKING > 10 MIN: CPT | Mod: S$GLB,,,

## 2022-03-23 NOTE — PROGRESS NOTES
Spoke with patient today in regard to smoking cessation progress for 12 month phone follow up on Quit 3. Patient not tobacco free at this time but stated that he is working on a new Quit.  Commended patient for his progress thus far.  Patient currently enrolled in program for Quit 4 and has and will contact his CTTS to schedule his next appointment.  Patient states he is currently extremely busy with work.   Informed patient of benefit period, future follow ups, and contact information if any further help or support is needed.  Will complete smart form for 6 and 12 month follow up and resolve Quit attempt # 3.  Will complete 3 month f/u on Quit 4.

## 2022-04-26 ENCOUNTER — HOSPITAL ENCOUNTER (OUTPATIENT)
Facility: HOSPITAL | Age: 52
Discharge: HOME | End: 2022-04-29
Attending: EMERGENCY MEDICINE | Admitting: INTERNAL MEDICINE
Payer: MEDICAID

## 2022-04-26 DIAGNOSIS — D64.9 ANEMIA, UNSPECIFIED TYPE: Chronic | ICD-10-CM

## 2022-04-26 DIAGNOSIS — E83.42 HYPOMAGNESEMIA: ICD-10-CM

## 2022-04-26 DIAGNOSIS — F10.10 ETOH ABUSE: ICD-10-CM

## 2022-04-26 DIAGNOSIS — Z72.0 TOBACCO ABUSE: Chronic | ICD-10-CM

## 2022-04-26 DIAGNOSIS — R74.8 ELEVATED LIVER ENZYMES: ICD-10-CM

## 2022-04-26 DIAGNOSIS — Z87.19 HISTORY OF GI BLEED: ICD-10-CM

## 2022-04-26 DIAGNOSIS — R20.2 PARESTHESIAS: ICD-10-CM

## 2022-04-26 DIAGNOSIS — K85.20 ALCOHOL-INDUCED ACUTE PANCREATITIS, UNSPECIFIED COMPLICATION STATUS: ICD-10-CM

## 2022-04-26 DIAGNOSIS — K85.20 ALCOHOL-INDUCED ACUTE PANCREATITIS WITHOUT INFECTION OR NECROSIS: Primary | ICD-10-CM

## 2022-04-26 DIAGNOSIS — E87.5 HYPERKALEMIA: ICD-10-CM

## 2022-04-26 DIAGNOSIS — E86.0 DEHYDRATION: ICD-10-CM

## 2022-04-26 DIAGNOSIS — E87.1 HYPONATREMIA: ICD-10-CM

## 2022-04-26 DIAGNOSIS — K82.8 GALLBLADDER SLUDGE: ICD-10-CM

## 2022-04-26 DIAGNOSIS — E83.39 HYPOPHOSPHATEMIA: ICD-10-CM

## 2022-04-26 DIAGNOSIS — E11.9 TYPE 2 DIABETES MELLITUS WITHOUT COMPLICATION, WITHOUT LONG-TERM CURRENT USE OF INSULIN: Chronic | ICD-10-CM

## 2022-04-26 DIAGNOSIS — I10 ESSENTIAL HYPERTENSION: Chronic | ICD-10-CM

## 2022-04-26 LAB
ALBUMIN SERPL BCP-MCNC: 4.8 G/DL (ref 3.5–5.2)
ALP SERPL-CCNC: 49 U/L (ref 38–126)
ALT SERPL W/O P-5'-P-CCNC: 83 U/L (ref 10–44)
AMPHET+METHAMPHET UR QL: NEGATIVE
ANION GAP SERPL CALC-SCNC: 18 MMOL/L (ref 8–16)
ANION GAP SERPL CALC-SCNC: 22 MMOL/L (ref 8–16)
AST SERPL-CCNC: 145 U/L (ref 15–46)
BACTERIA #/AREA URNS AUTO: ABNORMAL /HPF
BARBITURATES UR QL SCN>200 NG/ML: NEGATIVE
BASOPHILS # BLD AUTO: 0.02 K/UL (ref 0–0.2)
BASOPHILS NFR BLD: 0.4 % (ref 0–1.9)
BENZODIAZ UR QL SCN>200 NG/ML: NEGATIVE
BILIRUB SERPL-MCNC: 2.9 MG/DL (ref 0.1–1)
BILIRUB UR QL STRIP: ABNORMAL
BUN SERPL-MCNC: 7 MG/DL (ref 6–20)
BZE UR QL SCN: NEGATIVE
CALCIUM SERPL-MCNC: 9 MG/DL (ref 8.7–10.5)
CALCIUM SERPL-MCNC: 9.3 MG/DL (ref 8.7–10.5)
CANNABINOIDS UR QL SCN: NEGATIVE
CHLORIDE SERPL-SCNC: 93 MMOL/L (ref 95–110)
CHLORIDE SERPL-SCNC: 98 MMOL/L (ref 95–110)
CHOLEST SERPL-MCNC: 192 MG/DL (ref 120–199)
CHOLEST/HDLC SERPL: 3.6 {RATIO} (ref 2–5)
CLARITY UR REFRACT.AUTO: CLEAR
CO2 SERPL-SCNC: 17 MMOL/L (ref 23–29)
CO2 SERPL-SCNC: 18 MMOL/L (ref 23–29)
COLOR UR AUTO: ABNORMAL
CREAT SERPL-MCNC: 0.74 MG/DL (ref 0.5–1.4)
CREAT SERPL-MCNC: 1 MG/DL (ref 0.5–1.4)
CREAT UR-MCNC: 144.2 MG/DL (ref 23–375)
DIFFERENTIAL METHOD: ABNORMAL
EOSINOPHIL # BLD AUTO: 0 K/UL (ref 0–0.5)
EOSINOPHIL NFR BLD: 0.2 % (ref 0–8)
ERYTHROCYTE [DISTWIDTH] IN BLOOD BY AUTOMATED COUNT: 12.4 % (ref 11.5–14.5)
EST. GFR  (AFRICAN AMERICAN): >60 ML/MIN/1.73 M^2
EST. GFR  (AFRICAN AMERICAN): >60 ML/MIN/1.73 M^2
EST. GFR  (NON AFRICAN AMERICAN): >60 ML/MIN/1.73 M^2
EST. GFR  (NON AFRICAN AMERICAN): >60 ML/MIN/1.73 M^2
ESTIMATED AVG GLUCOSE: 126 MG/DL (ref 68–131)
ETHANOL SERPL-MCNC: <10 MG/DL
FERRITIN SERPL-MCNC: 590 NG/ML (ref 20–300)
GLUCOSE SERPL-MCNC: 131 MG/DL (ref 70–110)
GLUCOSE SERPL-MCNC: 156 MG/DL (ref 70–110)
GLUCOSE UR QL STRIP: NEGATIVE
HBA1C MFR BLD: 6 % (ref 4–5.6)
HCT VFR BLD AUTO: 38.6 % (ref 40–54)
HDLC SERPL-MCNC: 53 MG/DL (ref 40–75)
HDLC SERPL: 27.6 % (ref 20–50)
HGB BLD-MCNC: 13.3 G/DL (ref 14–18)
HGB UR QL STRIP: ABNORMAL
HYALINE CASTS UR QL AUTO: 1 /LPF
IMM GRANULOCYTES # BLD AUTO: 0.02 K/UL (ref 0–0.04)
IMM GRANULOCYTES NFR BLD AUTO: 0.4 % (ref 0–0.5)
KETONES UR QL STRIP: ABNORMAL
LDLC SERPL CALC-MCNC: 115.6 MG/DL (ref 63–159)
LEUKOCYTE ESTERASE UR QL STRIP: ABNORMAL
LIPASE SERPL-CCNC: >4000 U/L (ref 23–300)
LYMPHOCYTES # BLD AUTO: 0.4 K/UL (ref 1–4.8)
LYMPHOCYTES NFR BLD: 8.2 % (ref 18–48)
MCH RBC QN AUTO: 34.5 PG (ref 27–31)
MCHC RBC AUTO-ENTMCNC: 34.5 G/DL (ref 32–36)
MCV RBC AUTO: 100 FL (ref 82–98)
METHADONE UR QL SCN>300 NG/ML: NEGATIVE
MICROSCOPIC COMMENT: ABNORMAL
MONOCYTES # BLD AUTO: 0.6 K/UL (ref 0.3–1)
MONOCYTES NFR BLD: 12.4 % (ref 4–15)
NEUTROPHILS # BLD AUTO: 3.9 K/UL (ref 1.8–7.7)
NEUTROPHILS NFR BLD: 78.4 % (ref 38–73)
NITRITE UR QL STRIP: NEGATIVE
NONHDLC SERPL-MCNC: 139 MG/DL
NRBC BLD-RTO: 0 /100 WBC
OPIATES UR QL SCN: NEGATIVE
PCP UR QL SCN>25 NG/ML: NEGATIVE
PH UR STRIP: 6 [PH] (ref 5–8)
PLATELET # BLD AUTO: 106 K/UL (ref 150–450)
PMV BLD AUTO: 11.1 FL (ref 9.2–12.9)
POCT GLUCOSE: 139 MG/DL (ref 70–110)
POCT GLUCOSE: 166 MG/DL (ref 70–110)
POTASSIUM SERPL-SCNC: 3.8 MMOL/L (ref 3.5–5.1)
POTASSIUM SERPL-SCNC: 5.8 MMOL/L (ref 3.5–5.1)
PROT SERPL-MCNC: 9.3 G/DL (ref 6–8.4)
PROT UR QL STRIP: ABNORMAL
RBC # BLD AUTO: 3.86 M/UL (ref 4.6–6.2)
RBC #/AREA URNS AUTO: 1 /HPF (ref 0–4)
SARS-COV-2 RDRP RESP QL NAA+PROBE: NEGATIVE
SODIUM SERPL-SCNC: 132 MMOL/L (ref 136–145)
SODIUM SERPL-SCNC: 134 MMOL/L (ref 136–145)
SP GR UR STRIP: 1.02 (ref 1–1.03)
TOXICOLOGY INFORMATION: NORMAL
TRIGL SERPL-MCNC: 117 MG/DL (ref 30–150)
URN SPEC COLLECT METH UR: ABNORMAL
UROBILINOGEN UR STRIP-ACNC: >=8 EU/DL
UUN UR-MCNC: 11 MG/DL (ref 2–20)
WBC # BLD AUTO: 5.01 K/UL (ref 3.9–12.7)
WBC #/AREA URNS AUTO: 2 /HPF (ref 0–5)

## 2022-04-26 PROCEDURE — 96365 THER/PROPH/DIAG IV INF INIT: CPT | Mod: ER

## 2022-04-26 PROCEDURE — 25000003 PHARM REV CODE 250: Performed by: STUDENT IN AN ORGANIZED HEALTH CARE EDUCATION/TRAINING PROGRAM

## 2022-04-26 PROCEDURE — 82607 VITAMIN B-12: CPT | Performed by: STUDENT IN AN ORGANIZED HEALTH CARE EDUCATION/TRAINING PROGRAM

## 2022-04-26 PROCEDURE — 96361 HYDRATE IV INFUSION ADD-ON: CPT | Mod: ER

## 2022-04-26 PROCEDURE — 85025 COMPLETE CBC W/AUTO DIFF WBC: CPT | Mod: ER | Performed by: EMERGENCY MEDICINE

## 2022-04-26 PROCEDURE — 93010 ELECTROCARDIOGRAM REPORT: CPT | Mod: ,,, | Performed by: INTERNAL MEDICINE

## 2022-04-26 PROCEDURE — 96375 TX/PRO/DX INJ NEW DRUG ADDON: CPT | Mod: ER

## 2022-04-26 PROCEDURE — G0378 HOSPITAL OBSERVATION PER HR: HCPCS

## 2022-04-26 PROCEDURE — 63600175 PHARM REV CODE 636 W HCPCS: Mod: ER | Performed by: EMERGENCY MEDICINE

## 2022-04-26 PROCEDURE — 83690 ASSAY OF LIPASE: CPT | Mod: ER | Performed by: EMERGENCY MEDICINE

## 2022-04-26 PROCEDURE — 83036 HEMOGLOBIN GLYCOSYLATED A1C: CPT | Performed by: STUDENT IN AN ORGANIZED HEALTH CARE EDUCATION/TRAINING PROGRAM

## 2022-04-26 PROCEDURE — 82728 ASSAY OF FERRITIN: CPT | Performed by: STUDENT IN AN ORGANIZED HEALTH CARE EDUCATION/TRAINING PROGRAM

## 2022-04-26 PROCEDURE — 80048 BASIC METABOLIC PNL TOTAL CA: CPT | Mod: XB | Performed by: STUDENT IN AN ORGANIZED HEALTH CARE EDUCATION/TRAINING PROGRAM

## 2022-04-26 PROCEDURE — 96376 TX/PRO/DX INJ SAME DRUG ADON: CPT

## 2022-04-26 PROCEDURE — 80061 LIPID PANEL: CPT | Performed by: STUDENT IN AN ORGANIZED HEALTH CARE EDUCATION/TRAINING PROGRAM

## 2022-04-26 PROCEDURE — 99285 EMERGENCY DEPT VISIT HI MDM: CPT | Mod: 25,ER

## 2022-04-26 PROCEDURE — 25000003 PHARM REV CODE 250: Mod: ER | Performed by: EMERGENCY MEDICINE

## 2022-04-26 PROCEDURE — S4991 NICOTINE PATCH NONLEGEND: HCPCS | Performed by: STUDENT IN AN ORGANIZED HEALTH CARE EDUCATION/TRAINING PROGRAM

## 2022-04-26 PROCEDURE — 96361 HYDRATE IV INFUSION ADD-ON: CPT

## 2022-04-26 PROCEDURE — 82077 ASSAY SPEC XCP UR&BREATH IA: CPT | Mod: ER | Performed by: EMERGENCY MEDICINE

## 2022-04-26 PROCEDURE — U0002 COVID-19 LAB TEST NON-CDC: HCPCS | Mod: ER | Performed by: EMERGENCY MEDICINE

## 2022-04-26 PROCEDURE — 93005 ELECTROCARDIOGRAM TRACING: CPT | Mod: ER

## 2022-04-26 PROCEDURE — 36415 COLL VENOUS BLD VENIPUNCTURE: CPT | Performed by: STUDENT IN AN ORGANIZED HEALTH CARE EDUCATION/TRAINING PROGRAM

## 2022-04-26 PROCEDURE — 82746 ASSAY OF FOLIC ACID SERUM: CPT | Performed by: STUDENT IN AN ORGANIZED HEALTH CARE EDUCATION/TRAINING PROGRAM

## 2022-04-26 PROCEDURE — 63600175 PHARM REV CODE 636 W HCPCS: Performed by: STUDENT IN AN ORGANIZED HEALTH CARE EDUCATION/TRAINING PROGRAM

## 2022-04-26 PROCEDURE — 96374 THER/PROPH/DIAG INJ IV PUSH: CPT | Mod: 59,ER

## 2022-04-26 PROCEDURE — 80307 DRUG TEST PRSMV CHEM ANLYZR: CPT | Mod: ER | Performed by: EMERGENCY MEDICINE

## 2022-04-26 PROCEDURE — 84466 ASSAY OF TRANSFERRIN: CPT | Performed by: STUDENT IN AN ORGANIZED HEALTH CARE EDUCATION/TRAINING PROGRAM

## 2022-04-26 PROCEDURE — 94640 AIRWAY INHALATION TREATMENT: CPT | Mod: ER

## 2022-04-26 PROCEDURE — 25000003 PHARM REV CODE 250

## 2022-04-26 PROCEDURE — 93010 EKG 12-LEAD: ICD-10-PCS | Mod: ,,, | Performed by: INTERNAL MEDICINE

## 2022-04-26 PROCEDURE — 25000242 PHARM REV CODE 250 ALT 637 W/ HCPCS: Mod: ER | Performed by: EMERGENCY MEDICINE

## 2022-04-26 PROCEDURE — 96376 TX/PRO/DX INJ SAME DRUG ADON: CPT | Mod: ER

## 2022-04-26 PROCEDURE — 82962 GLUCOSE BLOOD TEST: CPT | Mod: ER

## 2022-04-26 PROCEDURE — 80053 COMPREHEN METABOLIC PANEL: CPT | Mod: ER | Performed by: EMERGENCY MEDICINE

## 2022-04-26 PROCEDURE — C9113 INJ PANTOPRAZOLE SODIUM, VIA: HCPCS | Mod: ER | Performed by: EMERGENCY MEDICINE

## 2022-04-26 PROCEDURE — 81000 URINALYSIS NONAUTO W/SCOPE: CPT | Mod: ER,59 | Performed by: EMERGENCY MEDICINE

## 2022-04-26 RX ORDER — DEXTROSE 50 % IN WATER (D50W) INTRAVENOUS SYRINGE
25
Status: COMPLETED | OUTPATIENT
Start: 2022-04-26 | End: 2022-04-26

## 2022-04-26 RX ORDER — INSULIN ASPART 100 [IU]/ML
0-5 INJECTION, SOLUTION INTRAVENOUS; SUBCUTANEOUS
Status: DISCONTINUED | OUTPATIENT
Start: 2022-04-26 | End: 2022-04-29 | Stop reason: HOSPADM

## 2022-04-26 RX ORDER — PANTOPRAZOLE SODIUM 40 MG/10ML
80 INJECTION, POWDER, LYOPHILIZED, FOR SOLUTION INTRAVENOUS
Status: COMPLETED | OUTPATIENT
Start: 2022-04-26 | End: 2022-04-26

## 2022-04-26 RX ORDER — HYDROMORPHONE HYDROCHLORIDE 1 MG/ML
1 INJECTION, SOLUTION INTRAMUSCULAR; INTRAVENOUS; SUBCUTANEOUS EVERY 6 HOURS PRN
Status: DISCONTINUED | OUTPATIENT
Start: 2022-04-26 | End: 2022-04-29

## 2022-04-26 RX ORDER — LIDOCAINE HYDROCHLORIDE 20 MG/ML
10 SOLUTION OROPHARYNGEAL ONCE
Status: COMPLETED | OUTPATIENT
Start: 2022-04-26 | End: 2022-04-26

## 2022-04-26 RX ORDER — LORATADINE 10 MG/1
10 TABLET ORAL DAILY
COMMUNITY
Start: 2022-03-15 | End: 2022-04-29

## 2022-04-26 RX ORDER — AMLODIPINE BESYLATE 10 MG/1
10 TABLET ORAL DAILY
Status: ON HOLD | COMMUNITY
Start: 2022-01-10 | End: 2022-04-29 | Stop reason: SDUPTHER

## 2022-04-26 RX ORDER — CALCIUM GLUCONATE 20 MG/ML
1 INJECTION, SOLUTION INTRAVENOUS ONCE
Status: COMPLETED | OUTPATIENT
Start: 2022-04-26 | End: 2022-04-26

## 2022-04-26 RX ORDER — IBUPROFEN 200 MG
16 TABLET ORAL
Status: DISCONTINUED | OUTPATIENT
Start: 2022-04-26 | End: 2022-04-29 | Stop reason: HOSPADM

## 2022-04-26 RX ORDER — FOLIC ACID 1 MG/1
1 TABLET ORAL DAILY
Status: DISCONTINUED | OUTPATIENT
Start: 2022-04-27 | End: 2022-04-29 | Stop reason: HOSPADM

## 2022-04-26 RX ORDER — HYDROCODONE BITARTRATE AND ACETAMINOPHEN 10; 325 MG/1; MG/1
1 TABLET ORAL EVERY 6 HOURS PRN
Status: DISCONTINUED | OUTPATIENT
Start: 2022-04-26 | End: 2022-04-29 | Stop reason: HOSPADM

## 2022-04-26 RX ORDER — ALBUTEROL SULFATE 2.5 MG/.5ML
10 SOLUTION RESPIRATORY (INHALATION) ONCE
Status: COMPLETED | OUTPATIENT
Start: 2022-04-26 | End: 2022-04-26

## 2022-04-26 RX ORDER — LISINOPRIL 20 MG/1
40 TABLET ORAL DAILY
Status: DISCONTINUED | OUTPATIENT
Start: 2022-04-26 | End: 2022-04-29 | Stop reason: HOSPADM

## 2022-04-26 RX ORDER — FERROUS GLUCONATE 324(37.5)
324 TABLET ORAL
Status: DISCONTINUED | OUTPATIENT
Start: 2022-04-27 | End: 2022-04-29 | Stop reason: HOSPADM

## 2022-04-26 RX ORDER — PANTOPRAZOLE SODIUM 40 MG/1
40 TABLET, DELAYED RELEASE ORAL DAILY
Status: DISCONTINUED | OUTPATIENT
Start: 2022-04-27 | End: 2022-04-29 | Stop reason: HOSPADM

## 2022-04-26 RX ORDER — SODIUM CHLORIDE, SODIUM LACTATE, POTASSIUM CHLORIDE, CALCIUM CHLORIDE 600; 310; 30; 20 MG/100ML; MG/100ML; MG/100ML; MG/100ML
INJECTION, SOLUTION INTRAVENOUS CONTINUOUS
Status: DISCONTINUED | OUTPATIENT
Start: 2022-04-26 | End: 2022-04-26

## 2022-04-26 RX ORDER — SODIUM CHLORIDE, SODIUM LACTATE, POTASSIUM CHLORIDE, CALCIUM CHLORIDE 600; 310; 30; 20 MG/100ML; MG/100ML; MG/100ML; MG/100ML
INJECTION, SOLUTION INTRAVENOUS CONTINUOUS
Status: ACTIVE | OUTPATIENT
Start: 2022-04-26 | End: 2022-04-27

## 2022-04-26 RX ORDER — IBUPROFEN 200 MG
1 TABLET ORAL DAILY
Status: DISCONTINUED | OUTPATIENT
Start: 2022-04-26 | End: 2022-04-29 | Stop reason: HOSPADM

## 2022-04-26 RX ORDER — IBUPROFEN 200 MG
24 TABLET ORAL
Status: DISCONTINUED | OUTPATIENT
Start: 2022-04-26 | End: 2022-04-29 | Stop reason: HOSPADM

## 2022-04-26 RX ORDER — MAG HYDROX/ALUMINUM HYD/SIMETH 200-200-20
30 SUSPENSION, ORAL (FINAL DOSE FORM) ORAL ONCE
Status: COMPLETED | OUTPATIENT
Start: 2022-04-26 | End: 2022-04-26

## 2022-04-26 RX ORDER — NALOXONE HCL 0.4 MG/ML
0.4 VIAL (ML) INJECTION
Status: DISCONTINUED | OUTPATIENT
Start: 2022-04-26 | End: 2022-04-29 | Stop reason: HOSPADM

## 2022-04-26 RX ORDER — GLUCAGON 1 MG
1 KIT INJECTION
Status: DISCONTINUED | OUTPATIENT
Start: 2022-04-26 | End: 2022-04-29 | Stop reason: HOSPADM

## 2022-04-26 RX ORDER — HYDROMORPHONE HYDROCHLORIDE 1 MG/ML
1 INJECTION, SOLUTION INTRAMUSCULAR; INTRAVENOUS; SUBCUTANEOUS
Status: COMPLETED | OUTPATIENT
Start: 2022-04-26 | End: 2022-04-26

## 2022-04-26 RX ORDER — ONDANSETRON 8 MG/1
8 TABLET, ORALLY DISINTEGRATING ORAL EVERY 6 HOURS PRN
Status: DISCONTINUED | OUTPATIENT
Start: 2022-04-26 | End: 2022-04-29 | Stop reason: HOSPADM

## 2022-04-26 RX ORDER — FAMOTIDINE 10 MG/ML
20 INJECTION INTRAVENOUS
Status: COMPLETED | OUTPATIENT
Start: 2022-04-26 | End: 2022-04-26

## 2022-04-26 RX ORDER — ONDANSETRON 2 MG/ML
8 INJECTION INTRAMUSCULAR; INTRAVENOUS
Status: COMPLETED | OUTPATIENT
Start: 2022-04-26 | End: 2022-04-26

## 2022-04-26 RX ORDER — LORAZEPAM 1 MG/1
2 TABLET ORAL EVERY 4 HOURS PRN
Status: DISCONTINUED | OUTPATIENT
Start: 2022-04-26 | End: 2022-04-29 | Stop reason: HOSPADM

## 2022-04-26 RX ADMIN — HYDROMORPHONE HYDROCHLORIDE 1 MG: 1 INJECTION, SOLUTION INTRAMUSCULAR; INTRAVENOUS; SUBCUTANEOUS at 10:04

## 2022-04-26 RX ADMIN — ONDANSETRON 8 MG: 2 INJECTION INTRAMUSCULAR; INTRAVENOUS at 06:04

## 2022-04-26 RX ADMIN — HYDROCODONE BITARTRATE AND ACETAMINOPHEN 1 TABLET: 10; 325 TABLET ORAL at 07:04

## 2022-04-26 RX ADMIN — SODIUM CHLORIDE 1000 ML: 0.9 INJECTION, SOLUTION INTRAVENOUS at 08:04

## 2022-04-26 RX ADMIN — ALUMINUM HYDROXIDE, MAGNESIUM HYDROXIDE, AND SIMETHICONE 30 ML: 200; 200; 20 SUSPENSION ORAL at 07:04

## 2022-04-26 RX ADMIN — FAMOTIDINE 20 MG: 10 INJECTION, SOLUTION INTRAVENOUS at 06:04

## 2022-04-26 RX ADMIN — ALBUTEROL SULFATE 10 MG: 2.5 SOLUTION RESPIRATORY (INHALATION) at 08:04

## 2022-04-26 RX ADMIN — HYDROMORPHONE HYDROCHLORIDE 1 MG: 1 INJECTION, SOLUTION INTRAMUSCULAR; INTRAVENOUS; SUBCUTANEOUS at 04:04

## 2022-04-26 RX ADMIN — ALUMINUM HYDROXIDE, MAGNESIUM HYDROXIDE, AND SIMETHICONE 30 ML: 200; 200; 20 SUSPENSION ORAL at 10:04

## 2022-04-26 RX ADMIN — LISINOPRIL 40 MG: 20 TABLET ORAL at 06:04

## 2022-04-26 RX ADMIN — HYDROMORPHONE HYDROCHLORIDE 1 MG: 1 INJECTION, SOLUTION INTRAMUSCULAR; INTRAVENOUS; SUBCUTANEOUS at 08:04

## 2022-04-26 RX ADMIN — CALCIUM GLUCONATE 1 G: 20 INJECTION, SOLUTION INTRAVENOUS at 08:04

## 2022-04-26 RX ADMIN — SODIUM CHLORIDE, SODIUM LACTATE, POTASSIUM CHLORIDE, AND CALCIUM CHLORIDE: .6; .31; .03; .02 INJECTION, SOLUTION INTRAVENOUS at 07:04

## 2022-04-26 RX ADMIN — NICOTINE 1 PATCH: 21 PATCH, EXTENDED RELEASE TRANSDERMAL at 06:04

## 2022-04-26 RX ADMIN — ONDANSETRON 8 MG: 8 TABLET, ORALLY DISINTEGRATING ORAL at 04:04

## 2022-04-26 RX ADMIN — SODIUM CHLORIDE, SODIUM LACTATE, POTASSIUM CHLORIDE, AND CALCIUM CHLORIDE: .6; .31; .03; .02 INJECTION, SOLUTION INTRAVENOUS at 11:04

## 2022-04-26 RX ADMIN — LIDOCAINE HYDROCHLORIDE 10 ML: 20 SOLUTION ORAL; TOPICAL at 07:04

## 2022-04-26 RX ADMIN — PANTOPRAZOLE SODIUM 80 MG: 40 INJECTION, POWDER, FOR SOLUTION INTRAVENOUS at 06:04

## 2022-04-26 RX ADMIN — INSULIN HUMAN 10 UNITS: 100 INJECTION, SOLUTION PARENTERAL at 08:04

## 2022-04-26 RX ADMIN — SODIUM CHLORIDE 1000 ML: 0.9 INJECTION, SOLUTION INTRAVENOUS at 10:04

## 2022-04-26 RX ADMIN — LIDOCAINE HYDROCHLORIDE 10 ML: 20 SOLUTION ORAL; TOPICAL at 10:04

## 2022-04-26 RX ADMIN — DEXTROSE MONOHYDRATE 25 G: 500 INJECTION PARENTERAL at 08:04

## 2022-04-26 RX ADMIN — SODIUM CHLORIDE 1000 ML: 0.9 INJECTION, SOLUTION INTRAVENOUS at 06:04

## 2022-04-26 NOTE — H&P
[facility] Medicine H&P Note     Admitting Team: [facility] Hospitalist Team A  Attending Physician: Taylor Del Valle MD  Resident: Dr. Pearl  Intern: Dr. Mendes    Date of Admit: 4/26/2022    Chief Complaint     Abdominal pain for 1 day    Subjective:      History of Present Illness:  Xavier Sawyer is 51 y.o. male with past medical history with alcohol use disorder, COPD, GERD, DMT2, HTN, Peyronie's disease, Erectile dysfunction, arthritis coming to the [facility] ED with complaints of severe epigastric abdominal pain, nausea and vomiting for 1 day. 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 He reports using alcohol since he was 15 years old, but has never experienced any similar pain before. He reports drinking 1.5 pint of vodka per day. Last time drink was on Sunday. He says, he went into the withdrawal in the ED and experienced palpitations, anxiety and tremor already. Also, complains of some paresthesias of his right feet, concerning for vitamin deficiencies. Mentions having a loose black stool at [facility] ED yesterday. Currently denies any symptoms suggestive of withdrawal, but abdominal pain still persists and asks something for pain. No headache, dizziness, tremor or palpitations. No chest pain. No fever. No diarrhea or constipation.    In the ED, the patient received 3L of 0.9% saline bolus, 2 mg of hydromorphone IV.     Past Medical History:  Past Medical History:   Diagnosis Date    Alcohol withdrawal seizure 7/21/2021    Alcoholic     Arthritis     COPD (chronic obstructive pulmonary disease)     Diabetes mellitus     Erectile dysfunction     GERD (gastroesophageal reflux disease)     Hypertension     ABHILASH on CPAP     Peyronie's disease     Stroke     vs TIA 1/14/2020    Toxic effect of contact with stingray 11/2018    right wrist     Past Surgical History:  Past Surgical History:   Procedure  Laterality Date    LASIK      ISRAEL PLICATION N/A 11/27/2018    Procedure: PLICATION, PENIS;  Surgeon: Ralph Wilcox MD;  Location: [facility];  Service: Urology;  Laterality: N/A;    VASECTOMY       Allergies:  Review of patient's allergies indicates:   Allergen Reactions    Codeine Nausea And Vomiting    Pcn [penicillins] Other (See Comments)     Unknown reaction    Betadine [povidone-iodine] Swelling and Other (See Comments)     redness     Home Medications:  Prior to Admission medications    Medication Sig Start Date End Date Taking? Authorizing Provider   amLODIPine (NORVASC) 10 MG tablet Take 10 mg by mouth once daily. 1/10/22  Yes Historical Provider   dicyclomine (BENTYL) 20 mg tablet Take 1 tablet (20 mg total) by mouth 3 (three) times daily as needed (for abdominal cramping). 10/7/21  Yes Jany Sanchez PA-C   diphenhydrAMINE (BENADRYL) 25 mg capsule Take 1 each (25 mg total) by mouth every 6 (six) hours as needed for Itching. 11/12/18  Yes Patricia Ovalle PA-C   ferrous gluconate (FERGON) 324 MG tablet Take 324 mg by mouth daily with breakfast.   Yes Historical Provider   hydrocortisone 2.5 % cream Apply a small amount into rectum twice a day 5/4/21  Yes Historical Provider   loratadine (CLARITIN) 10 mg tablet Take 10 mg by mouth once daily. 3/15/22  Yes Historical Provider   multivitamin (THERAGRAN) per tablet Take 1 tablet by mouth once daily.   Yes Historical Provider   nicotine (NICODERM CQ) 21 mg/24 hr Place 1 patch onto the skin once daily. 1/26/22  Yes Lorie Farrar MD   ondansetron (ZOFRAN) 4 MG tablet Take 1 tablet (4 mg total) by mouth every 12 (twelve) hours as needed for Nausea. 2/3/21  Yes Marlene Cai PA-C   pantoprazole (PROTONIX) 40 MG tablet Take 1 tablet (40 mg total) by mouth 2 (two) times daily. 5/28/21 5/28/22 Yes Ruth Waddell MD   tadalafiL (CIALIS) 20 MG Tab Take 1 tablet (20 mg total) by mouth every 72 hours as needed. 8/14/21 8/14/22 Yes Ralph GONZALES  MD Brenden   atorvastatin (LIPITOR) 20 MG tablet Take 1 tablet (20 mg total) by mouth every evening.  Patient not taking: Reported on 4/26/2022 1/15/20 7/20/21  Marisa Ford MD   buPROPion HCL, smoking deter, (ZYBAN) 150 mg TBSR 12 hr tablet Take 150 mg by mouth once daily.  5/31/21   Historical Provider   carvedilol (COREG ORAL) Take by mouth 2 (two) times a day. Patient unknown of dosage    Historical Provider   finasteride (PROSCAR) 5 mg tablet Take 5 mg by mouth once daily. 6/6/21   Historical Provider   LINZESS 145 mcg Cap capsule Take 145 mcg by mouth once daily. 5/21/21   Historical Provider   lisinopril (PRINIVIL,ZESTRIL) 20 MG tablet Take 40 mg by mouth once daily.     Historical Provider   metFORMIN (GLUCOPHAGE) 500 MG tablet Take 500 mg by mouth once daily.     Historical Provider   metoclopramide HCl (REGLAN) 10 MG tablet Take 1 tablet (10 mg total) by mouth every 6 (six) hours as needed (Nausea).  Patient not taking: Reported on 4/26/2022 9/18/21   Mervin Clark MD   nicotine, polacrilex, (NICORETTE) 2 mg Gum Take 1 each (2 mg total) by mouth as needed (1-2 per hour in the place of a cigarette (5-16).  Patient not taking: Reported on 4/26/2022 1/26/22   Lorie Farrar MD   amlodipine (NORVASC) 5 MG tablet Take 2 tablets (10 mg total) by mouth once daily. 8/10/17 4/26/22  KATHERYN Delgado   aspirin 81 MG Chew Take 1 tablet (81 mg total) by mouth once daily. 1/16/20 4/26/22  Marisa Ford MD   tadalafiL (CIALIS) 20 MG Tab TAKE 1 TABLET (20 MG TOTAL) BY MOUTH EVERY 72 HOURS AS NEEDED. 8/16/21 4/26/22  Graciela Christianson NP   thiamine 100 MG tablet Take 1 tablet (100 mg total) by mouth once daily.  Patient not taking: Reported on 1/26/202226/2022 5/29/21 4/26/22  Ruth Waddell MD     Family History:  Family History   Problem Relation Age of Onset    Clotting disorder Father     Heart disease Father     Hypertension Father     Heart disease Mother     Hypertension  Mother     Heart disease Maternal Aunt     Heart disease Maternal Uncle     Heart disease Paternal Aunt     Heart disease Paternal Uncle     Hypertension Sister     Hypertension Brother     Prostate cancer Brother      Social History:  Social History     Tobacco Use    Smoking status: Current Some Day Smoker     Packs/day: 1.50     Years: 39.00     Pack years: 58.50     Types: Cigarettes     Start date:      Last attempt to quit: 2020     Years since quittin.2    Smokeless tobacco: Never Used    Tobacco comment: Pt enrolled in [facility] on 1/15/20 ([facility] Member ID # [id number])  Ambulatory referral to Smoking Cessation program.    Substance Use Topics    Alcohol use: Yes     Comment: heavy etoh use daily;5th of vodka a day    Drug use: No     Review of Systems:  Negative except as per above.    Health Care Maintenance :   Primary Care Physician: Deloris Bah MD   Immunizations:     COVID-19 Vaccine (1) 1975       Pneumococcal Vaccine: Pediatric (0 to 5 Years) and At Risk Patients (6 to 64 Years) (1 of 2 - PPSV23) 1976       Tetanus-Diphtheria-Pertusis (DTap-Tdap-Td) (1 - Tdap) 1987       Cologuard Screening 2015       Colon Cancer Screening 5 Year Sigmoidoscopy 2015       Colon Cancer Screening Annual FOBT 2015       Influenza Vaccine (#1) 2021       Colon Cancer Screening Colonoscopy 2029     Colorectal Cancer Screening 2029       HPV Vaccine Aged Out   No longer eligible based on patient's age to complete this topic   Meningococcal Vaccine Aged Out          Objective:   Last 24 Hour Vital Signs:  BP  Min: 149/76  Max: 183/89  Temp  Av.2 °F (36.8 °C)  Min: 97.9 °F (36.6 °C)  Max: 98.6 °F (37 °C)  Pulse  Av.1  Min: 78  Max: 96  Resp  Av.7  Min: 14  Max: 21  SpO2  Av.3 %  Min: 96 %  Max: 99 %  Height  Av' (182.9 cm)  Min: 6' (182.9 cm)  Max: 6' (182.9 cm)  Weight  Av.3 kg (229 lb 13.8 oz)  Min:  104.2 kg (229 lb 11.5 oz)  Max: 104.3 kg (230 lb)  Body mass index is 31.16 kg/m².  No intake/output data recorded.      Physical Examination:    Vital signs: BP (!) 163/74 (BP Location: Right arm, Patient Position: Sitting)   Pulse 87   Temp 97.9 °F (36.6 °C) (Oral)   Resp 16   Ht 6' (1.829 m)   Wt 104.3 kg (230 lb)   SpO2 96%   BMI 31.19 kg/m²   Constitutional: No acute distress.  Well developed, alert, oriented and appropriate.  HENT: Normocephalic, atraumatic. Normal ear, nose, and throat.  Eyes: PERRL, EOMI, normal conjunctiva.  Neck: Normal range of motion, no tenderness; supple.  Respiratory: Nonlabored breathing with normal breath sounds.  Cardiovascular: RRR with no pulse deficit.  GI: Soft, nontender, no rebound or guarding.  Musculoskeletal: Normal ROM, no tenderness, injury, or edema.  Skin: Warm, dry skin without infection or injury.  Neurologic: Normal motor, sensation with no new focal deficit.  Psychiatric: Affect normal, judgement normal, mood normal.  No SI, HI, and not gravely disabled.    Laboratory:  Most Recent Data:  CBC:   Lab Results   Component Value Date    WBC 5.01 04/26/2022    HGB 13.3 (L) 04/26/2022    HCT 38.6 (L) 04/26/2022     (L) 04/26/2022     (H) 04/26/2022    RDW 12.4 04/26/2022     WBC Differential:   BMP:   Lab Results   Component Value Date     (L) 04/26/2022    K 5.8 (H) 04/26/2022    CL 93 (L) 04/26/2022    CO2 17 (L) 04/26/2022    BUN 11 04/26/2022    CREATININE 0.74 04/26/2022     (H) 04/26/2022    CALCIUM 9.3 04/26/2022    MG 1.5 (L) 10/07/2021    PHOS 4.9 (H) 05/26/2021     LFTs:   Lab Results   Component Value Date    PROT 9.3 (H) 04/26/2022    ALBUMIN 4.8 04/26/2022    BILITOT 2.9 (H) 04/26/2022     (H) 04/26/2022    ALKPHOS 49 04/26/2022    ALT 83 (H) 04/26/2022     Coags:   Lab Results   Component Value Date    INR 1.0 05/26/2021     FLP:   Lab Results   Component Value Date    CHOL 228 (H) 05/25/2021    HDL 89 (H) 05/25/2021     LDLCALC 105.2 05/25/2021    TRIG 169 (H) 05/25/2021    CHOLHDL 39.0 05/25/2021     DM:   Lab Results   Component Value Date    HGBA1C 4.9 05/25/2021    HGBA1C 6.6 (H) 01/14/2020    HGBA1C 6.0 (H) 11/10/2018    LDLCALC 105.2 05/25/2021    CREATININE 0.74 04/26/2022     Thyroid:   Lab Results   Component Value Date    TSH 0.395 (L) 05/25/2021    FREET4 0.99 05/25/2021     Anemia: No results found for: IRON, TIBC, FERRITIN, OFFRMURH71, FOLATE  Cardiac:   Lab Results   Component Value Date    TROPONINI <0.006 05/26/2021    BNP 14 04/22/2020     Urinalysis:   Lab Results   Component Value Date    COLORU Kathi 04/26/2022    CLARITYU Clear 07/24/2020    SPECGRAV 1.020 04/26/2022    NITRITE Negative 04/26/2022    KETONESU 3+ (A) 04/26/2022    UROBILINOGEN >=8.0 (A) 04/26/2022    WBCUA 2 04/26/2022     Trended Lab Data:  Recent Labs   Lab 04/26/22  0647   WBC 5.01   HGB 13.3*   HCT 38.6*   *   *   RDW 12.4   *   K 5.8*   CL 93*   CO2 17*   BUN 11   CREATININE 0.74   *   PROT 9.3*   ALBUMIN 4.8   BILITOT 2.9*   *   ALKPHOS 49   ALT 83*     Trended Cardiac Data:  No results for input(s): TROPONINI, CKTOTAL, CKMB, BNP in the last 168 hours.  Microbiology Data:  Microbiology Results (last 7 days)     ** No results found for the last 168 hours. **           Other Results:  EKG (my interpretation):  ECG Results          EKG 12-lead (Final result)  Result time 04/26/22 12:21:32    Final result by Interface, Lab In [facility] (04/26/22 12:21:32)                 Narrative:    Test Reason : E87.5,    Vent. Rate : 087 BPM     Atrial Rate : 087 BPM     P-R Int : 148 ms          QRS Dur : 130 ms      QT Int : 420 ms       P-R-T Axes : 050 -14 022 degrees     QTc Int : 505 ms    Normal sinus rhythm  Right bundle branch block  Abnormal ECG  When compared with ECG of 18-SEP-2021 20:54,  No significant change was found  Confirmed by Veto Barkley MD ([id number]) on 4/26/2022 12:21:22 PM    Referred By:  [email or web address]   SELF           Confirmed By:Veto Barkley MD                               Radiology:  X-Ray Abdomen Flat And Erect    Result Date: 4/26/2022  EXAMINATION: XR ABDOMEN FLAT AND ERECT CLINICAL HISTORY: Abdominal Pain; COMPARISON: 10/07/2021 FINDINGS: Nonobstructive bowel gas pattern is noted. No radiopaque kidney calculus is identified.  There are multiple calcifications in the pelvis most consistent with phleboliths.  No evidence of organomegaly.  The bones demonstrate mild degenerative changes within the lower lumbar region.  The bones are otherwise intact.     Nonobstructive bowel gas pattern. Electronically signed by: Minesh Steele MD Date:    04/26/2022 Time:    08:04    CT Abdomen Pelvis  Without Contrast    Result Date: 4/26/2022  EXAMINATION: CT ABDOMEN PELVIS WITHOUT CONTRAST CLINICAL HISTORY: Abdominal pain, acute, nonlocalized; TECHNIQUE: Low dose axial images, sagittal and coronal reformations were obtained from the lung bases to the pubic symphysis.  Oral contrast was not administered. COMPARISON: 04/26/2022 FINDINGS: Heart: Normal size. No effusion. Lung Bases: Clear.  Benign granuloma right middle lobe. Liver: Slightly nodular configuration liver suggest underlying cirrhosis.  Hepatomegaly and decreased attenuation consistent with diffuse hepatic steatosis. Gallbladder: Mild gallbladder distension with sludge suspected.  Tiny layering stones also suspected. Bile Ducts: No dilatation. Pancreas: Diffuse peripancreatic inflammatory stranding concerning for pancreatitis.  Extensive nodes are seen throughout the peripancreatic region epigastric region likely reactive.  No mass identified.  No ductal dilatation. Spleen: Normal. Adrenals: Normal. Kidneys/Ureters: No mass, hydroureteronephrosis, or nephroureterolithiasis. Bladder: No wall thickening. Reproductive organs: Normal. GI Tract/Mesentery: Diffuse gastric wall thickening concerning for underlying gastritis but infiltrative process not  excluded.  Thickening of the duodenum may be related to secondary inflammation from pancreatitis or primary duodenitis.  Recommend follow-up endoscopy.  No evidence of bowel obstruction or inflammation. Peritoneal Space: No ascites or free air. Retroperitoneum: No significant adenopathy. Abdominal wall: Small fat containing left inguinal hernia. Vasculature: No aneurysm. Aorta demonstrates atherosclerotic disease. Bones: No acute fracture. No suspicious lytic or sclerotic lesions.     Findings consistent with acute uncomplicated pancreatitis.  Evaluation limited without contrast. Diffuse gastric wall thickening concerning for underlying gastritis but infiltrative process not excluded.  Thickening of the duodenum may be related to secondary inflammation from pancreatitis or primary duodenitis.  Recommend follow-up endoscopy. Mild gallbladder distension with sludge suspected. Tiny layering stones also suspected. All CT scans at this facility use dose modulation, iterative reconstruction, and/or weight based dosing when appropriate to reduce radiation dose to as low as reasonable achievable. Electronically signed by: Minesh Steele MD Date:    04/26/2022 Time:    09:15       Assessment:     Veto Park is a 51 y.o. male with PMHx:    Patient Active Problem List    Diagnosis Date Noted    Alcohol-induced acute pancreatitis 04/26/2022    Transient alteration of awareness 07/21/2021    Alcohol withdrawal seizure 07/21/2021    Obstructive sleep apnea 05/28/2021    Metabolic encephalopathy 05/26/2021    Anemia 05/26/2021    Tobacco abuse 05/26/2021    Coffee ground emesis 05/26/2021    Left facial numbness 05/25/2021    Alcohol withdrawal 05/25/2021    GI bleed 05/25/2021    Facial twitching 01/14/2020    Stroke 01/14/2020    Weakness 01/14/2020    ETOH abuse 01/14/2020    Morbid obesity 01/14/2020    Hyperlipidemia 01/14/2020    Peyronie disease 11/27/2018    Cellulitis of right upper  extremity 11/10/2018    Elevated liver enzymes 11/10/2018    Essential hypertension 11/10/2018    GERD (gastroesophageal reflux disease) 11/10/2018    Type 2 diabetes mellitus, without long-term current use of insulin 11/10/2018        Plan:     Acute Pancreatitis 2/2 Alcohol Abuse:  - recent hx of binge drinking; Severe epigastric abdominal pain radiating to back;   - nausea/vomiting at ED  - Lipase > 4000  - Electrolyte abnormalities: Hyponatremia, Hyperkalemia  - 3L NS at [facility] ED;   PLAN:  - US abdomen   -  m/hr   - Hydrocodone-acetaminophen for -6/10 pain and Hydromorphone 7-10/10  - NPO  - Ondansetron     Alcohol Use Disorder:  - hx of drinking 1.5 Pint of vodka for at least 35 years  - periods of on/off; strong intention to cut down drinking after recent event of pancreatitis  - consulted on the importance of quitting drinking in order to prevent pancreatitis and other complications in the future    Tobacco Abuse:  - 70 pack year smoking hx; active smoker  - educated on the importance of quitting smoking    Recent History of Alcohol Withdrawal:  - reports tremors, tachycardia, anxiety at [facility] ED yesterday  - PRN Ativan 2 mg q4hr    Hx of GI bleed:  - reports recent hx of loose black stools at [facility] ED  - denies any hematemesis and BRBPR  - Will hold chemical anticoagulation at this time;   - Started on SCD     Paresthesias:  - Complains of tingling of his right feet - think it might be due to alcohol induced vitamin deficiencies (B1/Folate/B12)  - B1/Folate/B12 pending    Diabetes Mellitus Type II:  -  A1C 4.9; A1c pending  - POCT glucose 139  - [facility] inpatient    Hyperlipidemia:  - Cholesterol 228; T; .2  A year ago  - repeat lipid panel pending  - on statin at home;    Anemia:  - H/H: 13.3/38.6;   - ferrous gluconate 324 mg    GERD:  - pantoprazole 40 mg PO    HTN:  - 181/90 on admit  - continue home amlodipine 5 mg    Hyponatremia:  - 132  - CTM      Diet:  NPO  Ppx: SCD   Code: Full    Disposition: admit to inpatient for the management of acute pancreatitis    Jamey Mendes MD  [facility] Internal Medicine HO-I    [facility] Medicine Hospitalist Pager numbers:   [facility] Hospitalist Medicine Team A (Marium/Ivon): [phone]  [facility] Hospitalist Medicine Team B (Guerita/Maria Elena):  [phone]

## 2022-04-26 NOTE — PROGRESS NOTES
[facility]           Pharmacy        Current Drug Shortage     Due to national backorder and [facility] is critically low on inventory of Dextrose 50% (D50) Syringes and Vials, pharmacy has automatically switched from D50% to D10% IVPB at the equivalent dose until resolution of the shortage per P&T approved protocol.               Chalo Julian, PharmD  [phone]

## 2022-04-26 NOTE — PHARMACY MED REC
"[facility]           Pharmacy  Admission Medication History     The home medication history was taken by Lauren Cosme.      Medication history obtained from Medications listed below were obtained from: Patient/family    Based on information gathered for medication list, you may go to "Admission" then "Reconcile Home Medications" tabs to review and/or act upon those items.      The home medication list has been updated by the Pharmacy department.    Please read ALL comments highlighted in yellow.    Please address this information as you see fit.     Feel free to contact us if you have any questions or require assistance.    The medications listed below were removed from the home medication list.  Please reorder if appropriate:     Patient reports NOT TAKING the following medication(s):  o Aspirin 81mg chew  o Thiamine 100mg      Current Facility-Administered Medications on File Prior to Encounter   Medication Dose Route Frequency Provider Last Rate Last Admin    alprostadil injection 20 mcg  20 mcg Intracavitary Once Ralph Wilcox MD         Current Outpatient Medications on File Prior to Encounter   Medication Sig Dispense Refill    amLODIPine (NORVASC) 10 MG tablet Take 10 mg by mouth once daily.      dicyclomine (BENTYL) 20 mg tablet Take 1 tablet (20 mg total) by mouth 3 (three) times daily as needed (for abdominal cramping). 15 tablet 0    diphenhydrAMINE (BENADRYL) 25 mg capsule Take 1 each (25 mg total) by mouth every 6 (six) hours as needed for Itching.  0    ferrous gluconate (FERGON) 324 MG tablet Take 324 mg by mouth daily with breakfast.      hydrocortisone 2.5 % cream Apply a small amount into rectum twice a day      loratadine (CLARITIN) 10 mg tablet Take 10 mg by mouth once daily.      multivitamin (THERAGRAN) per tablet Take 1 tablet by mouth once daily.      nicotine (NICODERM CQ) 21 mg/24 hr Place 1 patch onto the skin once daily. 28 patch 0    " ondansetron (ZOFRAN) 4 MG tablet Take 1 tablet (4 mg total) by mouth every 12 (twelve) hours as needed for Nausea. 12 tablet 0    pantoprazole (PROTONIX) 40 MG tablet Take 1 tablet (40 mg total) by mouth 2 (two) times daily. 60 tablet 1    tadalafiL (CIALIS) 20 MG Tab Take 1 tablet (20 mg total) by mouth every 72 hours as needed. 30 tablet 11    atorvastatin (LIPITOR) 20 MG tablet Take 1 tablet (20 mg total) by mouth every evening. (Patient not taking: Reported on 4/26/2022) 90 tablet 3    buPROPion HCL, smoking deter, (ZYBAN) 150 mg TBSR 12 hr tablet Take 150 mg by mouth once daily.       carvedilol (COREG ORAL) Take by mouth 2 (two) times a day. Patient unknown of dosage      finasteride (PROSCAR) 5 mg tablet Take 5 mg by mouth once daily.      LINZESS 145 mcg Cap capsule Take 145 mcg by mouth once daily.      lisinopril (PRINIVIL,ZESTRIL) 20 MG tablet Take 40 mg by mouth once daily.       metFORMIN (GLUCOPHAGE) 500 MG tablet Take 500 mg by mouth once daily.       metoclopramide HCl (REGLAN) 10 MG tablet Take 1 tablet (10 mg total) by mouth every 6 (six) hours as needed (Nausea). (Patient not taking: Reported on 4/26/2022) 14 tablet 0    nicotine, polacrilex, (NICORETTE) 2 mg Gum Take 1 each (2 mg total) by mouth as needed (1-2 per hour in the place of a cigarette (5-16). (Patient not taking: Reported on 4/26/2022) 220 each 0    [DISCONTINUED] amlodipine (NORVASC) 5 MG tablet Take 2 tablets (10 mg total) by mouth once daily. 30 tablet 0    [DISCONTINUED] aspirin 81 MG Chew Take 1 tablet (81 mg total) by mouth once daily. 30 tablet 0    [DISCONTINUED] tadalafiL (CIALIS) 20 MG Tab TAKE 1 TABLET (20 MG TOTAL) BY MOUTH EVERY 72 HOURS AS NEEDED. 30 tablet 11    [DISCONTINUED] thiamine 100 MG tablet Take 1 tablet (100 mg total) by mouth once daily. (Patient not taking: Reported on 1/26/2022) 30 tablet 0       Please address this information as you see fit.  Feel free to contact us if you have any  questions or require assistance.    Lauren Cosme  [phone]                  .

## 2022-04-26 NOTE — PLAN OF CARE
Pt up to floor, awake and alert. NPO per orders. IV fluids infusing. Blood glucose monitored. Pt with complaints of pain with relief from PRN dilaudid. Nausea with relief from PRN SL zofran. Safety maintained. Bed locked and in lowest position. Call light and personal items within reach. Advised pt to call for assistance, pt verbalized understanding. Will continue to monitor.       Problem: Adult Inpatient Plan of Care  Goal: Plan of Care Review  Outcome: Ongoing, Progressing  Goal: Patient-Specific Goal (Individualized)  Outcome: Ongoing, Progressing  Goal: Absence of Hospital-Acquired Illness or Injury  Outcome: Ongoing, Progressing  Goal: Optimal Comfort and Wellbeing  Outcome: Ongoing, Progressing  Goal: Readiness for Transition of Care  Outcome: Ongoing, Progressing

## 2022-04-26 NOTE — ED PROVIDER NOTES
Chief Complaint  Chief Complaint   Patient presents with    Abdominal Pain     C/O Epigastric pain that radiates around to right side of back since 8pm last night. States pain is sharp with intermittent pressure. Emesis and diarrhea every 30 min since 12 am. No medication taken for relief.        HPI  Veto Park is a 51 y.o. male who presents with abdominal pain.  Patient reports epigastric abdominal pain.  He also reports nausea vomiting.  He reports weakness that is generalized and reports some lightheadedness with standing.  He denies any fever.  He reports daily alcohol use.  His symptoms are exacerbated by eating and drinking and relieved by nothing.    Past medical history  Past Medical History:   Diagnosis Date    Alcohol withdrawal seizure 7/21/2021    Alcoholic     Arthritis     COPD (chronic obstructive pulmonary disease)     Diabetes mellitus     Erectile dysfunction     GERD (gastroesophageal reflux disease)     Hypertension     ABHILASH on CPAP     Peyronie's disease     Stroke     vs TIA 1/14/2020    Toxic effect of contact with stingray 11/2018    right wrist       Current Medications    Current Facility-Administered Medications:     iohexoL (OMNIPAQUE 350) injection 100 mL, 100 mL, Intravenous, ONCE PRN, Uziel Odonnell MD    sodium chloride 0.9% bolus 1,000 mL, 1,000 mL, Intravenous, ED 1 Time, Uziel Odonnell MD, Last Rate: 999 mL/hr at 04/26/22 1004, 1,000 mL at 04/26/22 1004    Current Outpatient Medications:     amLODIPine (NORVASC) 10 MG tablet, Take 10 mg by mouth once daily., Disp: , Rfl:     pantoprazole (PROTONIX) 40 MG tablet, Take 1 tablet (40 mg total) by mouth 2 (two) times daily., Disp: 60 tablet, Rfl: 1    aspirin 81 MG Chew, Take 1 tablet (81 mg total) by mouth once daily., Disp: 30 tablet, Rfl: 0    atorvastatin (LIPITOR) 20 MG tablet, Take 1 tablet (20 mg total) by mouth every evening., Disp: 90 tablet, Rfl: 3    buPROPion HCL, smoking deter,  (ZYBAN) 150 mg TBSR 12 hr tablet, Take 150 mg by mouth once daily. , Disp: , Rfl:     carvedilol (COREG ORAL), Take by mouth 2 (two) times a day. Patient unknown of dosage, Disp: , Rfl:     dicyclomine (BENTYL) 20 mg tablet, Take 1 tablet (20 mg total) by mouth 3 (three) times daily as needed (for abdominal cramping)., Disp: 15 tablet, Rfl: 0    diphenhydrAMINE (BENADRYL) 25 mg capsule, Take 1 each (25 mg total) by mouth every 6 (six) hours as needed for Itching., Disp: , Rfl: 0    ferrous gluconate (FERGON) 324 MG tablet, Take 324 mg by mouth daily with breakfast., Disp: , Rfl:     finasteride (PROSCAR) 5 mg tablet, Take 5 mg by mouth once daily., Disp: , Rfl:     hydrocortisone 2.5 % cream, Apply a small amount into rectum twice a day, Disp: , Rfl:     LINZESS 145 mcg Cap capsule, Take 145 mcg by mouth once daily., Disp: , Rfl:     lisinopril (PRINIVIL,ZESTRIL) 20 MG tablet, Take 40 mg by mouth once daily. , Disp: , Rfl:     metFORMIN (GLUCOPHAGE) 500 MG tablet, Take 500 mg by mouth once daily. , Disp: , Rfl:     metoclopramide HCl (REGLAN) 10 MG tablet, Take 1 tablet (10 mg total) by mouth every 6 (six) hours as needed (Nausea)., Disp: 14 tablet, Rfl: 0    multivitamin (THERAGRAN) per tablet, Take 1 tablet by mouth once daily., Disp: , Rfl:     nicotine (NICODERM CQ) 21 mg/24 hr, Place 1 patch onto the skin once daily., Disp: 28 patch, Rfl: 0    nicotine, polacrilex, (NICORETTE) 2 mg Gum, Take 1 each (2 mg total) by mouth as needed (1-2 per hour in the place of a cigarette (5-16)., Disp: 220 each, Rfl: 0    ondansetron (ZOFRAN) 4 MG tablet, Take 1 tablet (4 mg total) by mouth every 12 (twelve) hours as needed for Nausea., Disp: 12 tablet, Rfl: 0    tadalafiL (CIALIS) 20 MG Tab, Take 1 tablet (20 mg total) by mouth every 72 hours as needed., Disp: 30 tablet, Rfl: 11    tadalafiL (CIALIS) 20 MG Tab, TAKE 1 TABLET (20 MG TOTAL) BY MOUTH EVERY 72 HOURS AS NEEDED., Disp: 30 tablet, Rfl: 11    thiamine  100 MG tablet, Take 1 tablet (100 mg total) by mouth once daily. (Patient not taking: Reported on 2022), Disp: 30 tablet, Rfl: 0    Facility-Administered Medications Ordered in Other Encounters:     alprostadil injection 20 mcg, 20 mcg, Intracavitary, Once, Ralph Wilcox MD    Allergies  Review of patient's allergies indicates:   Allergen Reactions    Codeine Nausea And Vomiting    Pcn [penicillins] Other (See Comments)     Unknown reaction    Betadine [povidone-iodine] Swelling and Other (See Comments)     redness       Surgical history  Past Surgical History:   Procedure Laterality Date    LASIK      ISRAEL PLICATION N/A 2018    Procedure: PLICATION, PENIS;  Surgeon: Ralph Wilcox MD;  Location: [facility];  Service: Urology;  Laterality: N/A;    VASECTOMY         Social history  Social History     Socioeconomic History    Marital status:    Tobacco Use    Smoking status: Current Some Day Smoker     Packs/day: 1.50     Years: 39.00     Pack years: 58.50     Types: Cigarettes     Start date:      Last attempt to quit: 2020     Years since quittin.2    Smokeless tobacco: Never Used    Tobacco comment: Pt enrolled in [employer] on 1/15/20 ([employer] Member ID # [id number])  Ambulatory referral to Smoking Cessation program.    Substance and Sexual Activity    Alcohol use: Yes     Comment: heavy etoh use daily;5th of vodka a day    Drug use: No    Sexual activity: Not Currently       Family History  Family History   Problem Relation Age of Onset    Clotting disorder Father     Heart disease Father     Hypertension Father     Heart disease Mother     Hypertension Mother     Heart disease Maternal Aunt     Heart disease Maternal Uncle     Heart disease Paternal Aunt     Heart disease Paternal Uncle     Hypertension Sister     Hypertension Brother     Prostate cancer Brother        Review of systems  Musculoskeletal: No injury; full range of motion.  Skin: No rash,  abscess, or laceration.  Neurologic: No new focal weakness or sensory changes.  All systems otherwise negative except as noted in ROS and HPI    Physical Exam  Vital signs: BP (!) 163/74 (BP Location: Right arm, Patient Position: Sitting)   Pulse 87   Temp 97.9 °F (36.6 °C) (Oral)   Resp 16   Ht 6' (1.829 m)   Wt 104.3 kg (230 lb)   SpO2 96%   BMI 31.19 kg/m²   Constitutional: No acute distress.  Well developed, alert, oriented and appropriate.  HENT: Normocephalic, atraumatic. Normal ear, nose, and throat.  Eyes: PERRL, EOMI, normal conjunctiva.  Neck: Normal range of motion, no tenderness; supple.  Respiratory: Nonlabored breathing with normal breath sounds.  Cardiovascular: RRR with no pulse deficit.  GI: Soft, nontender, no rebound or guarding.  Musculoskeletal: Normal ROM, no tenderness, injury, or edema.  Skin: Warm, dry skin without infection or injury.  Neurologic: Normal motor, sensation with no new focal deficit.  Psychiatric: Affect normal, judgement normal, mood normal.  No SI, HI, and not gravely disabled.    Labs  Pertinent labs reviewed (see chart for details)  Labs Reviewed   CBC W/ AUTO DIFFERENTIAL - Abnormal; Notable for the following components:       Result Value    RBC 3.86 (*)     Hemoglobin 13.3 (*)     Hematocrit 38.6 (*)      (*)     MCH 34.5 (*)     Platelets 106 (*)     Lymph # 0.4 (*)     Gran % 78.4 (*)     Lymph % 8.2 (*)     All other components within normal limits   COMPREHENSIVE METABOLIC PANEL - Abnormal; Notable for the following components:    Sodium 132 (*)     Potassium 5.8 (*)     Chloride 93 (*)     CO2 17 (*)     Glucose 156 (*)     Total Protein 9.3 (*)     Total Bilirubin 2.9 (*)      (*)     ALT 83 (*)     Anion Gap 22 (*)     All other components within normal limits   LIPASE - Abnormal; Notable for the following components:    Lipase Result >4000 (*)     All other components within normal limits   URINALYSIS, REFLEX TO URINE CULTURE - Abnormal;  Notable for the following components:    Protein, UA 1+ (*)     Ketones, UA 3+ (*)     Bilirubin (UA) 2+ (*)     Occult Blood UA Trace (*)     Urobilinogen, UA >=8.0 (*)     Leukocytes, UA 1+ (*)     All other components within normal limits    Narrative:     Preferred Collection Type->Urine, Clean Catch                  Specimen Source->Urine                  Collection Type->Urine, Clean Catch   URINALYSIS MICROSCOPIC - Abnormal; Notable for the following components:    Bacteria Few (*)     All other components within normal limits    Narrative:     Preferred Collection Type->Urine, Clean Catch                  Specimen Source->Urine                  Collection Type->Urine, Clean Catch   POCT GLUCOSE - Abnormal; Notable for the following components:    POCT Glucose 166 (*)     All other components within normal limits   POCT GLUCOSE - Abnormal; Notable for the following components:    POCT Glucose 139 (*)     All other components within normal limits   ALCOHOL,MEDICAL (ETHANOL)   DRUG SCREEN PANEL, URINE EMERGENCY    Narrative:     Preferred Collection Type->Urine, Clean Catch                  Specimen Source->Urine                  Collection Type->Urine, Clean Catch   SARS-COV-2 RNA AMPLIFICATION, QUAL   POCT GLUCOSE MONITORING CONTINUOUS       ECG  Results for orders placed or performed during the hospital encounter of 09/18/21   EKG 12-lead    Collection Time: 09/18/21  8:54 PM    Narrative    Test Reason : R10.9,    Vent. Rate : 077 BPM     Atrial Rate : 077 BPM     P-R Int : 152 ms          QRS Dur : 134 ms      QT Int : 420 ms       P-R-T Axes : 028 024 034 degrees     QTc Int : 475 ms    Normal sinus rhythm  Right bundle branch block  Abnormal ECG  When compared with ECG of 25-MAY-2021 16:22,  No significant change was found  Confirmed by Veto Barkley MD [id number]) on 9/21/2021 3:43:55 PM    Referred By: [email or web address]   SELF           Confirmed By:Veto Barkley MD     ECG interpreted by ED  MD    Radiology  CT Abdomen Pelvis  Without Contrast   Final Result      Findings consistent with acute uncomplicated pancreatitis.  Evaluation limited without contrast.      Diffuse gastric wall thickening concerning for underlying gastritis but infiltrative process not excluded.  Thickening of the duodenum may be related to secondary inflammation from pancreatitis or primary duodenitis.  Recommend follow-up endoscopy.      Mild gallbladder distension with sludge suspected. Tiny layering stones also suspected.      All CT scans at this facility use dose modulation, iterative reconstruction, and/or weight based dosing when appropriate to reduce radiation dose to as low as reasonable achievable.         Electronically signed by: Minesh Steele MD   Date:    04/26/2022   Time:    09:15      X-Ray Abdomen Flat And Erect   Final Result      Nonobstructive bowel gas pattern.         Electronically signed by: Minesh Steele MD   Date:    04/26/2022   Time:    08:04          Procedures    Procedures    Medications   iohexoL (OMNIPAQUE 350) injection 100 mL (has no administration in time range)   sodium chloride 0.9% bolus 1,000 mL (1,000 mLs Intravenous New Bag 4/26/22 1004)   sodium chloride 0.9% bolus 1,000 mL (0 mLs Intravenous Stopped 4/26/22 0909)   ondansetron injection 8 mg (8 mg Intravenous Given 4/26/22 0648)   famotidine (PF) injection 20 mg (20 mg Intravenous Given 4/26/22 0648)   pantoprazole injection 80 mg (80 mg Intravenous Given 4/26/22 0649)   aluminum-magnesium hydroxide-simethicone 200-200-20 mg/5 mL suspension 30 mL (30 mLs Oral Given 4/26/22 0731)     And   LIDOcaine HCl 2% oral solution 10 mL (10 mLs Oral Given 4/26/22 0731)   sodium chloride 0.9% bolus 1,000 mL (0 mLs Intravenous Stopped 4/26/22 1005)   HYDROmorphone injection 1 mg (1 mg Intravenous Given 4/26/22 0842)   albuterol sulfate nebulizer solution 10 mg (10 mg Nebulization Given 4/26/22 0822)   insulin regular injection 10 Units (10 Units  Intravenous Given 4/26/22 0837)   dextrose 50 % in water (D50W) injection 25 g (25 g Intravenous Given 4/26/22 0837)   calcium gluconat 1 g in NS IVPB (premixed) (0 g Intravenous Stopped 4/26/22 0936)   HYDROmorphone injection 1 mg (1 mg Intravenous Given 4/26/22 1001)       ED course    ED Course as of 04/26/22 1019   Tue Apr 26, 2022   0806 EKG shows normal sinus rhythm rate of 87 beats per minute with no ST elevation MI.  QTC of 505 milliseconds [MB]      ED Course User Index  [MB] Uziel Odonnell MD         ED management:  Patient will benefit from admission given his electrolyte abnormalities and dehydration.  Discussed with Hospital Medicine and they agree.  Patient happy and comfortable with plan      Disposition    Patient admitted in stable condition      Final impression  1. Alcohol-induced acute pancreatitis without infection or necrosis    2. Hyperkalemia    3. Dehydration        Critical care time spent with this patient was 0 minutes excluding the procedure time.              Uziel Odonnell MD  04/26/22 1021

## 2022-04-26 NOTE — PROGRESS NOTES
VN cued into room to complete admit assessment. VIP model introduced; VN working alongside bedside treatment team.  Plan of care reviewed with patient. Patient informed of fall risk, fall precautions, call light within reach, side rails x2 elevated. Patient notified to ask staff for assistance. Patient verbalized complete understanding. Time allowed for questions. Will continue to monitor and intervene as needed.          04/26/22 1707   Admission   Initial VN Admission Questions Complete   Communication Issues? None   Shift   Virtual Nurse - Patient Verbalized Approval Of Camera Use   Safety/Activity   Patient Rounds bed in low position;call light in patient/parent reach;visualized patient;clutter free environment maintained   Safety Promotion/Fall Prevention side rails raised x 2   Positioning   Body Position supine   Head of Bed (HOB) Positioning HOB at 30-45 degrees

## 2022-04-26 NOTE — NURSING
Pt up to floor; VS taken and recorded. Pt oriented to room, call bell and personal items within reach. Pt with complaints of 10/10 pain to abdomen.

## 2022-04-27 ENCOUNTER — CLINICAL SUPPORT (OUTPATIENT)
Dept: SMOKING CESSATION | Facility: CLINIC | Age: 52
End: 2022-04-27
Payer: COMMERCIAL

## 2022-04-27 DIAGNOSIS — F17.210 CIGARETTE SMOKER: Primary | ICD-10-CM

## 2022-04-27 LAB
ALBUMIN SERPL BCP-MCNC: 3.1 G/DL (ref 3.5–5.2)
ALP SERPL-CCNC: 37 U/L (ref 55–135)
ALT SERPL W/O P-5'-P-CCNC: 50 U/L (ref 10–44)
ANION GAP SERPL CALC-SCNC: 14 MMOL/L (ref 8–16)
APTT BLDCRRT: 26.8 SEC (ref 21–32)
AST SERPL-CCNC: 82 U/L (ref 10–40)
BASOPHILS # BLD AUTO: 0.01 K/UL (ref 0–0.2)
BASOPHILS NFR BLD: 0.2 % (ref 0–1.9)
BILIRUB SERPL-MCNC: 1.7 MG/DL (ref 0.1–1)
BUN SERPL-MCNC: 5 MG/DL (ref 6–20)
CALCIUM SERPL-MCNC: 8.9 MG/DL (ref 8.7–10.5)
CHLORIDE SERPL-SCNC: 95 MMOL/L (ref 95–110)
CO2 SERPL-SCNC: 23 MMOL/L (ref 23–29)
CREAT SERPL-MCNC: 0.9 MG/DL (ref 0.5–1.4)
DIFFERENTIAL METHOD: ABNORMAL
EOSINOPHIL # BLD AUTO: 0 K/UL (ref 0–0.5)
EOSINOPHIL NFR BLD: 0.8 % (ref 0–8)
ERYTHROCYTE [DISTWIDTH] IN BLOOD BY AUTOMATED COUNT: 12.4 % (ref 11.5–14.5)
EST. GFR  (AFRICAN AMERICAN): >60 ML/MIN/1.73 M^2
EST. GFR  (NON AFRICAN AMERICAN): >60 ML/MIN/1.73 M^2
FOLATE SERPL-MCNC: 9.1 NG/ML (ref 4–24)
GLUCOSE SERPL-MCNC: 107 MG/DL (ref 70–110)
HCT VFR BLD AUTO: 38.4 % (ref 40–54)
HGB BLD-MCNC: 12.9 G/DL (ref 14–18)
IMM GRANULOCYTES # BLD AUTO: 0.02 K/UL (ref 0–0.04)
IMM GRANULOCYTES NFR BLD AUTO: 0.4 % (ref 0–0.5)
INR PPP: 1.1 (ref 0.8–1.2)
IRON SERPL-MCNC: 30 UG/DL (ref 45–160)
LYMPHOCYTES # BLD AUTO: 0.5 K/UL (ref 1–4.8)
LYMPHOCYTES NFR BLD: 10.6 % (ref 18–48)
MAGNESIUM SERPL-MCNC: 1.7 MG/DL (ref 1.6–2.6)
MCH RBC QN AUTO: 34.4 PG (ref 27–31)
MCHC RBC AUTO-ENTMCNC: 33.6 G/DL (ref 32–36)
MCV RBC AUTO: 102 FL (ref 82–98)
MONOCYTES # BLD AUTO: 0.6 K/UL (ref 0.3–1)
MONOCYTES NFR BLD: 12.1 % (ref 4–15)
NEUTROPHILS # BLD AUTO: 3.6 K/UL (ref 1.8–7.7)
NEUTROPHILS NFR BLD: 75.9 % (ref 38–73)
NRBC BLD-RTO: 0 /100 WBC
PHOSPHATE SERPL-MCNC: 1.9 MG/DL (ref 2.7–4.5)
PHOSPHATE SERPL-MCNC: 2.6 MG/DL (ref 2.7–4.5)
PLATELET # BLD AUTO: 78 K/UL (ref 150–450)
PLATELET BLD QL SMEAR: ABNORMAL
PMV BLD AUTO: 10.9 FL (ref 9.2–12.9)
POCT GLUCOSE: 110 MG/DL (ref 70–110)
POCT GLUCOSE: 112 MG/DL (ref 70–110)
POTASSIUM SERPL-SCNC: 3.8 MMOL/L (ref 3.5–5.1)
PROT SERPL-MCNC: 7.1 G/DL (ref 6–8.4)
PROTHROMBIN TIME: 11.8 SEC (ref 9–12.5)
RBC # BLD AUTO: 3.75 M/UL (ref 4.6–6.2)
SATURATED IRON: 10 % (ref 20–50)
SODIUM SERPL-SCNC: 132 MMOL/L (ref 136–145)
TOTAL IRON BINDING CAPACITY: 309 UG/DL (ref 250–450)
TRANSFERRIN SERPL-MCNC: 209 MG/DL (ref 200–375)
VIT B12 SERPL-MCNC: 840 PG/ML (ref 210–950)
WBC # BLD AUTO: 4.73 K/UL (ref 3.9–12.7)

## 2022-04-27 PROCEDURE — 85730 THROMBOPLASTIN TIME PARTIAL: CPT | Performed by: STUDENT IN AN ORGANIZED HEALTH CARE EDUCATION/TRAINING PROGRAM

## 2022-04-27 PROCEDURE — 99407 PR TOBACCO USE CESSATION INTENSIVE >10 MINUTES: ICD-10-PCS | Mod: S$GLB,,,

## 2022-04-27 PROCEDURE — 83735 ASSAY OF MAGNESIUM: CPT | Performed by: STUDENT IN AN ORGANIZED HEALTH CARE EDUCATION/TRAINING PROGRAM

## 2022-04-27 PROCEDURE — 36415 COLL VENOUS BLD VENIPUNCTURE: CPT | Performed by: STUDENT IN AN ORGANIZED HEALTH CARE EDUCATION/TRAINING PROGRAM

## 2022-04-27 PROCEDURE — 63600175 PHARM REV CODE 636 W HCPCS: Performed by: STUDENT IN AN ORGANIZED HEALTH CARE EDUCATION/TRAINING PROGRAM

## 2022-04-27 PROCEDURE — G0378 HOSPITAL OBSERVATION PER HR: HCPCS

## 2022-04-27 PROCEDURE — 25000003 PHARM REV CODE 250: Performed by: STUDENT IN AN ORGANIZED HEALTH CARE EDUCATION/TRAINING PROGRAM

## 2022-04-27 PROCEDURE — 80053 COMPREHEN METABOLIC PANEL: CPT | Performed by: STUDENT IN AN ORGANIZED HEALTH CARE EDUCATION/TRAINING PROGRAM

## 2022-04-27 PROCEDURE — 99204 PR OFFICE/OUTPT VISIT, NEW, LEVL IV, 45-59 MIN: ICD-10-PCS | Mod: ,,, | Performed by: STUDENT IN AN ORGANIZED HEALTH CARE EDUCATION/TRAINING PROGRAM

## 2022-04-27 PROCEDURE — 99999 PR PBB SHADOW E&M-EST. PATIENT-LVL I: CPT | Mod: PBBFAC,,,

## 2022-04-27 PROCEDURE — 84100 ASSAY OF PHOSPHORUS: CPT | Mod: 91 | Performed by: STUDENT IN AN ORGANIZED HEALTH CARE EDUCATION/TRAINING PROGRAM

## 2022-04-27 PROCEDURE — S4991 NICOTINE PATCH NONLEGEND: HCPCS | Performed by: STUDENT IN AN ORGANIZED HEALTH CARE EDUCATION/TRAINING PROGRAM

## 2022-04-27 PROCEDURE — 87389 HIV-1 AG W/HIV-1&-2 AB AG IA: CPT

## 2022-04-27 PROCEDURE — 96376 TX/PRO/DX INJ SAME DRUG ADON: CPT

## 2022-04-27 PROCEDURE — 84425 ASSAY OF VITAMIN B-1: CPT | Performed by: STUDENT IN AN ORGANIZED HEALTH CARE EDUCATION/TRAINING PROGRAM

## 2022-04-27 PROCEDURE — 96367 TX/PROPH/DG ADDL SEQ IV INF: CPT

## 2022-04-27 PROCEDURE — 85025 COMPLETE CBC W/AUTO DIFF WBC: CPT | Performed by: STUDENT IN AN ORGANIZED HEALTH CARE EDUCATION/TRAINING PROGRAM

## 2022-04-27 PROCEDURE — 85610 PROTHROMBIN TIME: CPT | Performed by: STUDENT IN AN ORGANIZED HEALTH CARE EDUCATION/TRAINING PROGRAM

## 2022-04-27 PROCEDURE — 99407 BEHAV CHNG SMOKING > 10 MIN: CPT | Mod: S$GLB,,,

## 2022-04-27 PROCEDURE — 99999 PR PBB SHADOW E&M-EST. PATIENT-LVL I: ICD-10-PCS | Mod: PBBFAC,,,

## 2022-04-27 PROCEDURE — 96366 THER/PROPH/DIAG IV INF ADDON: CPT

## 2022-04-27 PROCEDURE — 96361 HYDRATE IV INFUSION ADD-ON: CPT

## 2022-04-27 PROCEDURE — 99204 OFFICE O/P NEW MOD 45 MIN: CPT | Mod: ,,, | Performed by: STUDENT IN AN ORGANIZED HEALTH CARE EDUCATION/TRAINING PROGRAM

## 2022-04-27 RX ORDER — MAG HYDROX/ALUMINUM HYD/SIMETH 200-200-20
30 SUSPENSION, ORAL (FINAL DOSE FORM) ORAL EVERY 8 HOURS PRN
Status: DISCONTINUED | OUTPATIENT
Start: 2022-04-27 | End: 2022-04-29 | Stop reason: HOSPADM

## 2022-04-27 RX ORDER — MAGNESIUM SULFATE HEPTAHYDRATE 40 MG/ML
2 INJECTION, SOLUTION INTRAVENOUS ONCE
Status: COMPLETED | OUTPATIENT
Start: 2022-04-27 | End: 2022-04-27

## 2022-04-27 RX ORDER — GABAPENTIN 300 MG/1
300 CAPSULE ORAL 3 TIMES DAILY
Status: DISCONTINUED | OUTPATIENT
Start: 2022-04-27 | End: 2022-04-29 | Stop reason: HOSPADM

## 2022-04-27 RX ADMIN — NICOTINE 1 PATCH: 21 PATCH, EXTENDED RELEASE TRANSDERMAL at 08:04

## 2022-04-27 RX ADMIN — FOLIC ACID 1 MG: 1 TABLET ORAL at 08:04

## 2022-04-27 RX ADMIN — HYDROCODONE BITARTRATE AND ACETAMINOPHEN 1 TABLET: 10; 325 TABLET ORAL at 08:04

## 2022-04-27 RX ADMIN — THERA TABS 1 TABLET: TAB at 08:04

## 2022-04-27 RX ADMIN — POTASSIUM PHOSPHATE, MONOBASIC AND POTASSIUM PHOSPHATE, DIBASIC 30 MMOL: 224; 236 INJECTION, SOLUTION, CONCENTRATE INTRAVENOUS at 11:04

## 2022-04-27 RX ADMIN — HYDROCODONE BITARTRATE AND ACETAMINOPHEN 1 TABLET: 10; 325 TABLET ORAL at 02:04

## 2022-04-27 RX ADMIN — SODIUM CHLORIDE, SODIUM LACTATE, POTASSIUM CHLORIDE, AND CALCIUM CHLORIDE: .6; .31; .03; .02 INJECTION, SOLUTION INTRAVENOUS at 05:04

## 2022-04-27 RX ADMIN — GABAPENTIN 300 MG: 300 CAPSULE ORAL at 08:04

## 2022-04-27 RX ADMIN — MAGNESIUM SULFATE 2 G: 2 INJECTION INTRAVENOUS at 11:04

## 2022-04-27 RX ADMIN — HYDROCODONE BITARTRATE AND ACETAMINOPHEN 1 TABLET: 10; 325 TABLET ORAL at 01:04

## 2022-04-27 RX ADMIN — ALUMINUM HYDROXIDE, MAGNESIUM HYDROXIDE, AND SIMETHICONE 30 ML: 200; 200; 20 SUSPENSION ORAL at 04:04

## 2022-04-27 RX ADMIN — ONDANSETRON 8 MG: 8 TABLET, ORALLY DISINTEGRATING ORAL at 05:04

## 2022-04-27 RX ADMIN — HYDROMORPHONE HYDROCHLORIDE 1 MG: 1 INJECTION, SOLUTION INTRAMUSCULAR; INTRAVENOUS; SUBCUTANEOUS at 05:04

## 2022-04-27 RX ADMIN — PANTOPRAZOLE SODIUM 40 MG: 40 TABLET, DELAYED RELEASE ORAL at 08:04

## 2022-04-27 RX ADMIN — LISINOPRIL 40 MG: 20 TABLET ORAL at 08:04

## 2022-04-27 RX ADMIN — HYDROMORPHONE HYDROCHLORIDE 1 MG: 1 INJECTION, SOLUTION INTRAMUSCULAR; INTRAVENOUS; SUBCUTANEOUS at 11:04

## 2022-04-27 RX ADMIN — GABAPENTIN 300 MG: 300 CAPSULE ORAL at 02:04

## 2022-04-27 NOTE — PLAN OF CARE
Recommendation:   1. Initiate Clear Liquid diet when medically acceptable.   2. Monitor weight/labs.   3. RD to follow to monitor nutrition status    Goals:   Diet will be started by RD follow up  Nutrition Goal Status: new

## 2022-04-27 NOTE — PROGRESS NOTES
[facility] Medicine Progress Note    Primary Team: [facility] Hospitalist Team A  Attending Physician: Taylor Del Valle MD  Resident: Dr. Pearl  Intern: Dr. Mendes    Subjective:      NAEON  Today patient feeling better abdominal pain decreasing.  Denies HA, fever, chills, chest pain, SOB, abdominal pain, N/V/D.      Objective:     Last 24 Hour Vital Signs:  BP  Min: 147/79  Max: 183/89  Temp  Av.3 °F (36.8 °C)  Min: 97.9 °F (36.6 °C)  Max: 98.6 °F (37 °C)  Pulse  Av.2  Min: 76  Max: 96  Resp  Av.9  Min: 14  Max: 21  SpO2  Av.1 %  Min: 93 %  Max: 99 %  Height  Av' (182.9 cm)  Min: 6' (182.9 cm)  Max: 6' (182.9 cm)  Weight  Av.2 kg (229 lb 11.5 oz)  Min: 104.2 kg (229 lb 11.5 oz)  Max: 104.2 kg (229 lb 11.5 oz)    Intake/Output Summary (Last 24 hours) at 2022 0736  Last data filed at 2022 1157  Gross per 24 hour   Intake 3050 ml   Output --   Net 3050 ml      Physical Examination:  Constitutional: No acute distress.  Well developed, alert, oriented and appropriate.  HENT: Normocephalic, atraumatic. Normal ear, nose, and throat.  Eyes: PERRL, EOMI, normal conjunctiva.  Neck: Normal range of motion, no tenderness; supple.  Respiratory: Nonlabored breathing with normal breath sounds.  Cardiovascular: RRR with no pulse deficit.  GI: Soft, nontender, no rebound or guarding.  Musculoskeletal: Normal ROM, no tenderness, injury, or edema.  Skin: Warm, dry skin without infection or injury.  Neurologic: Normal motor, sensation with no new focal deficit.  Psychiatric: Affect normal, judgement normal, mood normal.  No SI, HI, and not gravely disabled.     Laboratory:  Laboratory Data Reviewed: yes  Pertinent Findings:  Recent Labs   Lab 22  0647 22  1750 22  0440 22  0442   WBC 5.01  --   --  4.73   HGB 13.3*  --   --  12.9*   HCT 38.6*  --   --  38.4*   *  --   --  78*   *  --   --  102*   RDW 12.4  --   --  12.4   * 134* 132*  --    K 5.8* 3.8  3.8  --    CL 93* 98 95  --    CO2 17* 18* 23  --    BUN 11 7 5*  --    CREATININE 0.74 1.0 0.9  --    * 131* 107  --    PROT 9.3*  --  7.1  --    ALBUMIN 4.8  --  3.1*  --    BILITOT 2.9*  --  1.7*  --    *  --  82*  --    ALKPHOS 49  --  37*  --    ALT 83*  --  50*  --      Microbiology Data Reviewed: yes  Pertinent Findings:  Microbiology Results (last 7 days)     ** No results found for the last 168 hours. **         Other Results:  EKG (my interpretation):     ECG Results          EKG 12-lead (Final result)  Result time 04/26/22 12:21:32    Final result by Interface, Lab In [facility] (04/26/22 12:21:32)                 Narrative:    Test Reason : E87.5,    Vent. Rate : 087 BPM     Atrial Rate : 087 BPM     P-R Int : 148 ms          QRS Dur : 130 ms      QT Int : 420 ms       P-R-T Axes : 050 -14 022 degrees     QTc Int : 505 ms    Normal sinus rhythm  Right bundle branch block  Abnormal ECG  When compared with ECG of 18-SEP-2021 20:54,  No significant change was found  Confirmed by Veto Barkley MD ([id number]) on 4/26/2022 12:21:22 PM    Referred By: ROSARIO   SELF           Confirmed By:Veto Barkley MD                             Results for orders placed or performed during the hospital encounter of 04/26/22   EKG 12-lead    Collection Time: 04/26/22  8:00 AM    Narrative    Test Reason : E87.5,    Vent. Rate : 087 BPM     Atrial Rate : 087 BPM     P-R Int : 148 ms          QRS Dur : 130 ms      QT Int : 420 ms       P-R-T Axes : 050 -14 022 degrees     QTc Int : 505 ms    Normal sinus rhythm  Right bundle branch block  Abnormal ECG  When compared with ECG of 18-SEP-2021 20:54,  No significant change was found  Confirmed by Veto Barkley MD ([id number]) on 4/26/2022 12:21:22 PM    Referred By: ROSARIO   SELF           Confirmed By:Veto Barkley MD      Radiology Data Reviewed: yes  Pertinent Findings:  X-Ray Abdomen Flat And Erect    Result Date: 4/26/2022  EXAMINATION: XR ABDOMEN FLAT AND  ERECT CLINICAL HISTORY: Abdominal Pain; COMPARISON: 10/07/2021 FINDINGS: Nonobstructive bowel gas pattern is noted. No radiopaque kidney calculus is identified.  There are multiple calcifications in the pelvis most consistent with phleboliths.  No evidence of organomegaly.  The bones demonstrate mild degenerative changes within the lower lumbar region.  The bones are otherwise intact.     Nonobstructive bowel gas pattern. Electronically signed by: Minesh Steele MD Date:    04/26/2022 Time:    08:04    US Abdomen Limited    Result Date: 4/26/2022  EXAMINATION: US ABDOMEN LIMITED CLINICAL HISTORY: Patient w/ pancreatitis and CT scan concerning for distended gallbladder; Alcohol induced acute pancreatitis without necrosis or infection TECHNIQUE: Limited ultrasound of the right upper quadrant of the abdomen (including pancreas, liver, gallbladder, common bile duct, and spleen) was performed. COMPARISON: Ultrasound abdomen limited January 11, 2017, CT examination of the abdomen and pelvis April 26, 2022 FINDINGS: The pancreas is not well visualized on this examination, appearing predominantly obscured by bowel gas shadowing.  There is limited visualization of the IVC. The gallbladder is identified, there is mild to moderate gallbladder distention noted.  Echogenic material within the gallbladder likely relates to sludge.  There is an echogenic focus measuring approximately 7.2 mm in size, this appears nonmobile, and although this could represent a small gallstone, the fact that is not mobile suggests that this represents a gallbladder polyp.  There is no additional sonographic evidence for cholelithiasis.  There is no abnormal gallbladder wall thickening and the technologist indicates a negative sonographic Zavala's sign.  There is a small area of crescentic hypoechoic character adjacent to the gallbladder wall, this may relate to a minimal amount of pericholecystic fluid associated with the pancreatitis, as is suggested  on the supine transverse imaging of the gallbladder.  On the left lateral decubitus gallbladder image there is an area of hypoechoic character adjacent to the gallbladder that could relate to the aforementioned however is thought likely representative of a separate area representing focal fatty sparing in the liver adjacent to the gallbladder fossa. There is no abnormal biliary dilatation, the bile duct measures approximately 4 mm. The liver appears enlarged measuring approximately 23.7 cm in length.  There is irregular contour of the liver, correlation for chronic hepatic disease is needed.  There is also increased echogenicity of the liver, this may relate to diffuse fatty infiltrate.  There is no sonographic evidence for focal hepatic mass lesion.  Limited imaging of the right kidney demonstrates no evidence for hydronephrosis.  The spleen measures approximately 13.6 cm in length appearing enlarged.  There is no focal splenic mass lesion seen.  Limited imaging of the left kidney demonstrates no evidence for hydronephrosis.     Sludge of the gallbladder is noted, there is a small echogenic focus that could relate to a small gallstone however since it is nonmobile it may relate to a gallbladder polyp, follow-up is recommended, there is no additional sonographic evidence for cholelithiasis. There is no abnormal gallbladder wall thickening and the technologist indicates a negative sonographic Zavala's sign, there is a minimal amount of pericholecystic fluid, however this may relate to the pancreatitis, there is no additional evidence for acute cholecystitis. The liver appears enlarged with irregular contour, there is increased echogenicity consistent with diffuse fatty infiltrate, correlation for chronic hepatic disease. Suspected area of focal fatty sparing adjacent to the gallbladder fossa. Electronically signed by: Trae Valero Date:    04/26/2022 Time:    21:29    CT Abdomen Pelvis  Without Contrast    Result  Date: 4/26/2022  EXAMINATION: CT ABDOMEN PELVIS WITHOUT CONTRAST CLINICAL HISTORY: Abdominal pain, acute, nonlocalized; TECHNIQUE: Low dose axial images, sagittal and coronal reformations were obtained from the lung bases to the pubic symphysis.  Oral contrast was not administered. COMPARISON: 04/26/2022 FINDINGS: Heart: Normal size. No effusion. Lung Bases: Clear.  Benign granuloma right middle lobe. Liver: Slightly nodular configuration liver suggest underlying cirrhosis.  Hepatomegaly and decreased attenuation consistent with diffuse hepatic steatosis. Gallbladder: Mild gallbladder distension with sludge suspected.  Tiny layering stones also suspected. Bile Ducts: No dilatation. Pancreas: Diffuse peripancreatic inflammatory stranding concerning for pancreatitis.  Extensive nodes are seen throughout the peripancreatic region epigastric region likely reactive.  No mass identified.  No ductal dilatation. Spleen: Normal. Adrenals: Normal. Kidneys/Ureters: No mass, hydroureteronephrosis, or nephroureterolithiasis. Bladder: No wall thickening. Reproductive organs: Normal. GI Tract/Mesentery: Diffuse gastric wall thickening concerning for underlying gastritis but infiltrative process not excluded.  Thickening of the duodenum may be related to secondary inflammation from pancreatitis or primary duodenitis.  Recommend follow-up endoscopy.  No evidence of bowel obstruction or inflammation. Peritoneal Space: No ascites or free air. Retroperitoneum: No significant adenopathy. Abdominal wall: Small fat containing left inguinal hernia. Vasculature: No aneurysm. Aorta demonstrates atherosclerotic disease. Bones: No acute fracture. No suspicious lytic or sclerotic lesions.     Findings consistent with acute uncomplicated pancreatitis.  Evaluation limited without contrast. Diffuse gastric wall thickening concerning for underlying gastritis but infiltrative process not excluded.  Thickening of the duodenum may be related to  secondary inflammation from pancreatitis or primary duodenitis.  Recommend follow-up endoscopy. Mild gallbladder distension with sludge suspected. Tiny layering stones also suspected. All CT scans at this facility use dose modulation, iterative reconstruction, and/or weight based dosing when appropriate to reduce radiation dose to as low as reasonable achievable. Electronically signed by: Minesh Steele MD Date:    04/26/2022 Time:    09:15       Current Medications:     Scheduled:   ferrous gluconate  324 mg Oral Daily with breakfast    folic acid  1 mg Oral Daily    lisinopriL  40 mg Oral Daily    multivitamin  1 tablet Oral Daily    nicotine  1 patch Transdermal Daily    pantoprazole  40 mg Oral Daily         Infusions:   lactated ringers 275 mL/hr at 04/27/22 0531        PRN:  dextrose 10%, dextrose 10%, glucagon (human recombinant), glucose, glucose, HYDROcodone-acetaminophen, HYDROmorphone, insulin aspart U-100, iohexoL, LORazepam, naloxone, ondansetron  Antibiotics and Day Number of Therapy:  Antibiotics (From admission, onward)            None           Lines and Day Number of Therapy:      Assessment:     Veto Park is a 51 y.o. male with a PMHx of   Patient Active Problem List    Diagnosis Date Noted    Alcohol-induced acute pancreatitis 04/26/2022    History of GI bleed 04/26/2022    Transient alteration of awareness 07/21/2021    Alcohol withdrawal seizure 07/21/2021    Obstructive sleep apnea 05/28/2021    Metabolic encephalopathy 05/26/2021    Anemia 05/26/2021    Tobacco abuse 05/26/2021    Coffee ground emesis 05/26/2021    Left facial numbness 05/25/2021    Alcohol withdrawal 05/25/2021    GI bleed 05/25/2021    Facial twitching 01/14/2020    Stroke 01/14/2020    Weakness 01/14/2020    ETOH abuse 01/14/2020    Morbid obesity 01/14/2020    Hyperlipidemia 01/14/2020    Peyronie disease 11/27/2018    Cellulitis of right upper extremity 11/10/2018    Elevated  liver enzymes 11/10/2018    Essential hypertension 11/10/2018    GERD (gastroesophageal reflux disease) 11/10/2018    Type 2 diabetes mellitus, without long-term current use of insulin 11/10/2018       Plan:        Acute Pancreatitis 2/2 Alcohol Abuse:  - recent hx of binge drinking; Severe epigastric abdominal pain radiating to back;   - nausea/vomiting at ED  - Lipase > 4000  - Electrolyte abnormalities: Hyponatremia, Hyperkalemia  - 3L NS at [facility] ED;   - US abdomen: Biliary Sludge; no evidence for cholelithiasis;   - CT Abdomen Pelvis wo contrast: Findings consistent with acute uncomplicated pancreatitis  PLAN:  -  m/hr   - Hydrocodone-acetaminophen for -6/10 pain and Hydromorphone -10/10  - NPO  - Ondansetron      Alcohol Use Disorder:  - hx of drinking 1.5 Pint of vodka for at least 35 years  - periods of on/off; strong intention to cut down drinking after recent event of pancreatitis  - consulted on the importance of quitting drinking in order to prevent pancreatitis and other complications in the future     Tobacco Abuse:  - 70 pack year smoking hx; active smoker  - educated on the importance of quitting smoking     Recent History of Alcohol Withdrawal:  - reports tremors, tachycardia, anxiety at [facility] ED yesterday  - PRN Ativan 2 mg q4hr     Hx of GI bleed:  - reports recent hx of loose black stools at [facility] ED  - denies any hematemesis and BRBPR  - Will hold chemical anticoagulation at this time;   - Started on SCD      Paresthesias:  - Complains of tingling of his right feet - think it might be due to alcohol induced vitamin deficiencies (B1/Folate/B12)  - B12, Folate wnl     Diabetes Mellitus Type II:  -  A1C 4.9; A1c 6.0  - POCT glucose 139  - SSI inpatient     Hyperlipidemia:  - Cholesterol 228; T; .2  A year ago  - repeat lipid panel .6; TGs 117  - on statin at home;     Anemia of chronic disease:  - H/H: 13.3/38.6;   - today: 12.9;   -  ferritin 590; Iron 30; Sat Iron 10  - ferrous gluconate 324 mg     GERD:  - pantoprazole 40 mg PO     HTN:  - 181/90 on admit  - continue home amlodipine 5 mg     Hyponatremia:  - 132  - CTM        Diet: NPO with sips of water  Ppx: SCD   Code: Full     Disposition: admit to inpatient for the management of acute pancreatitis     Jamey Mendes MD  [facility] Internal Medicine HO-I     [facility] Medicine Hospitalist Pager numbers:   [facility] Hospitalist Medicine Team A (Marium/Ivon):          [phone]  [facility] Hospitalist Medicine Team B (Guerita/Maria Elena):        [phone]

## 2022-04-27 NOTE — CONSULTS
[facility] Surg  General Surgery  Consult Note    Patient Name: Veto Park  MRN: [id number]  Code Status: Prior  Admission Date: 4/26/2022  Hospital Length of Stay: 0 days  Attending Physician: Taylor Del Valle MD  Primary Care Provider: Deloris Bah MD    Patient information was obtained from patient and past medical records.     Inpatient consult to General Surgery  Consult performed by: Theodore Best MD  Consult ordered by: Jamey Mendes MD        Subjective:     Principal Problem: Alcohol-induced acute pancreatitis    History of Present Illness: Veto Park is a 51yoM with a past medical history significant of HTN, DMII, COPD, and alcohol use disorder who presented to an outside hospital with severe abdominal pain. The patient states that this began three or four days prior to presentation. The patient also endorsed associated nausea and vomiting. His work-up at outside ED was significant for elevated lipase, t bili, AST,and ALT. Of note, the patient endorses daily alcohol use-- stating he consumes a pint to a fifth of vodka daily. He does note that prior to the onset of his abdominal pain, he had been consuming more alcohol than usual. He was transferred to [facility] for further care. An US was obtained which demonstrated biliary sludge, questionable stones. General surgery consulted for evaluation.     On my exam, the patient is resting comfortably in bed. He states that his abdominal pain is present, albeit improved from his initial presentation. He notes continued bowel function. Denies an appetite at this time. Imaging findings discussed with the patient. He denies any bouts of biliary colic in the past. He has no new complaints.     PMH: as above  SH: daily alcohol use as detailed above  SxH: no previous abdominal surgeries      Current Facility-Administered Medications on File Prior to Encounter   Medication    alprostadil injection 20 mcg     Current Outpatient Medications on File  Prior to Encounter   Medication Sig    amLODIPine (NORVASC) 10 MG tablet Take 10 mg by mouth once daily.    dicyclomine (BENTYL) 20 mg tablet Take 1 tablet (20 mg total) by mouth 3 (three) times daily as needed (for abdominal cramping).    diphenhydrAMINE (BENADRYL) 25 mg capsule Take 1 each (25 mg total) by mouth every 6 (six) hours as needed for Itching.    ferrous gluconate (FERGON) 324 MG tablet Take 324 mg by mouth daily with breakfast.    hydrocortisone 2.5 % cream Apply a small amount into rectum twice a day    loratadine (CLARITIN) 10 mg tablet Take 10 mg by mouth once daily.    multivitamin (THERAGRAN) per tablet Take 1 tablet by mouth once daily.    nicotine (NICODERM CQ) 21 mg/24 hr Place 1 patch onto the skin once daily.    ondansetron (ZOFRAN) 4 MG tablet Take 1 tablet (4 mg total) by mouth every 12 (twelve) hours as needed for Nausea.    pantoprazole (PROTONIX) 40 MG tablet Take 1 tablet (40 mg total) by mouth 2 (two) times daily.    tadalafiL (CIALIS) 20 MG Tab Take 1 tablet (20 mg total) by mouth every 72 hours as needed.    atorvastatin (LIPITOR) 20 MG tablet Take 1 tablet (20 mg total) by mouth every evening. (Patient not taking: Reported on 4/26/2022)    buPROPion HCL, smoking deter, (ZYBAN) 150 mg TBSR 12 hr tablet Take 150 mg by mouth once daily.     carvedilol (COREG ORAL) Take by mouth 2 (two) times a day. Patient unknown of dosage    finasteride (PROSCAR) 5 mg tablet Take 5 mg by mouth once daily.    LINZESS 145 mcg Cap capsule Take 145 mcg by mouth once daily.    lisinopril (PRINIVIL,ZESTRIL) 20 MG tablet Take 40 mg by mouth once daily.     metFORMIN (GLUCOPHAGE) 500 MG tablet Take 500 mg by mouth once daily.     metoclopramide HCl (REGLAN) 10 MG tablet Take 1 tablet (10 mg total) by mouth every 6 (six) hours as needed (Nausea). (Patient not taking: Reported on 4/26/2022)    nicotine, polacrilex, (NICORETTE) 2 mg Gum Take 1 each (2 mg total) by mouth as needed (1-2 per  hour in the place of a cigarette (5-16). (Patient not taking: Reported on 2022)       Review of patient's allergies indicates:   Allergen Reactions    Codeine Nausea And Vomiting    Pcn [penicillins] Other (See Comments)     Unknown reaction    Betadine [povidone-iodine] Swelling and Other (See Comments)     redness       Past Medical History:   Diagnosis Date    Alcohol withdrawal seizure 2021    Alcoholic     Arthritis     COPD (chronic obstructive pulmonary disease)     Diabetes mellitus     Erectile dysfunction     GERD (gastroesophageal reflux disease)     Hypertension     ABHILASH on CPAP     Peyronie's disease     Stroke     vs TIA 2020    Toxic effect of contact with stingray 2018    right wrist     Past Surgical History:   Procedure Laterality Date    LASIK      ISRAEL PLICATION N/A 2018    Procedure: PLICATION, PENIS;  Surgeon: Ralph Wilcox MD;  Location: [facility];  Service: Urology;  Laterality: N/A;    VASECTOMY       Family History       Problem Relation (Age of Onset)    Clotting disorder Father    Heart disease Father, Mother, Maternal Aunt, Maternal Uncle, Paternal Aunt, Paternal Uncle    Hypertension Father, Mother, Sister, Brother    Prostate cancer Brother          Tobacco Use    Smoking status: Current Some Day Smoker     Packs/day: 1.50     Years: 40.00     Pack years: 60.00     Types: Cigarettes     Start date:      Last attempt to quit: 2020     Years since quittin.2    Smokeless tobacco: Never Used    Tobacco comment: Pt enrolled in [employer] on 1/15/20 ([employer] Member ID # [id number])  Ambulatory referral to Smoking Cessation program.    Substance and Sexual Activity    Alcohol use: Yes     Comment: heavy etoh use daily;5th of vodka a day    Drug use: No    Sexual activity: Not Currently     Review of Systems   Constitutional:  Positive for activity change, appetite change and fatigue. Negative for chills, diaphoresis and fever.    HENT: Negative.     Eyes: Negative.    Respiratory:  Negative for cough, chest tightness and shortness of breath.    Cardiovascular:  Negative for chest pain.   Gastrointestinal:  Positive for abdominal distention, abdominal pain, nausea and vomiting. Negative for constipation and diarrhea.   Endocrine: Negative.    Genitourinary: Negative.    Musculoskeletal: Negative.    Skin: Negative.    Objective:     Vital Signs (Most Recent):  Temp: 97 °F (36.1 °C) (04/27/22 1144)  Pulse: 73 (04/27/22 1144)  Resp: 18 (04/27/22 1144)  BP: (!) 173/89 (04/27/22 1144)  SpO2: (!) 94 % (04/27/22 0735)   Vital Signs (24h Range):  Temp:  [97 °F (36.1 °C)-98.5 °F (36.9 °C)] 97 °F (36.1 °C)  Pulse:  [73-88] 73  Resp:  [16-20] 18  SpO2:  [93 %-97 %] 94 %  BP: (147-183)/(76-93) 173/89     Weight: 104.2 kg (229 lb 11.5 oz)  Body mass index is 31.16 kg/m².    Physical Exam  Vitals and nursing note reviewed.   Constitutional:       General: He is not in acute distress.     Appearance: He is not ill-appearing.   HENT:      Head: Normocephalic and atraumatic.      Nose: Nose normal.      Mouth/Throat:      Mouth: Mucous membranes are moist.   Cardiovascular:      Rate and Rhythm: Normal rate and regular rhythm.      Pulses: Normal pulses.   Pulmonary:      Effort: Pulmonary effort is normal. No respiratory distress.   Abdominal:      Comments: Abdomen soft, moderately distended; tender to light palpation across upper abdomen, endorses pain radiating to back; no evidence of peritonitis   Musculoskeletal:         General: Normal range of motion.   Skin:     General: Skin is warm and dry.   Neurological:      General: No focal deficit present.      Mental Status: He is alert.       Significant Labs:  I have reviewed all pertinent lab results within the past 24 hours.  CBC:   Recent Labs   Lab 04/27/22  0442   WBC 4.73   RBC 3.75*   HGB 12.9*   HCT 38.4*   PLT 78*   *   MCH 34.4*   MCHC 33.6     CMP:   Recent Labs   Lab 04/27/22  2300       CALCIUM 8.9   ALBUMIN 3.1*   PROT 7.1   *   K 3.8   CO2 23   CL 95   BUN 5*   CREATININE 0.9   ALKPHOS 37*   ALT 50*   AST 82*   BILITOT 1.7*       Significant Diagnostics:  I have reviewed all pertinent imaging results/findings within the past 24 hours.  US images and findings reviewed      Assessment/Plan:     * Alcohol-induced acute pancreatitis  Veto Park is a 51yoM who is currently admitted to the hospital with pancreatitis.     - patient seen and examined  - labs and imaging reviewed  - US demonstrating biliary sludge, no definitive stones  - given his alcohol use and increased intake over the past several days prior to symptom onset, suspect this bout of pancreatitis is related to alcohol use rather than biliary issue  - defer surgical intervention at this time  - general surgery to follow along  - rest of care per primary team      VTE Risk Mitigation (From admission, onward)         Ordered     Place sequential compression device  Until discontinued         04/26/22 1611                Thank you for your consult. I will follow-up with patient. Please contact us if you have any additional questions.    Theodore Best MD  General Surgery  [place] [facility] Surg

## 2022-04-27 NOTE — ASSESSMENT & PLAN NOTE
Contributing Nutrition Diagnosis  Altered GI Function    Related to (etiology):   pancreatitis    Signs and Symptoms (as evidenced by):   Nausea/abd pain, NPO    Interventions:  Collaboration with other providers    Nutrition Diagnosis Status:   New

## 2022-04-27 NOTE — PROGRESS NOTES
Individual Follow-Up Form    4/27/2022    Quit Date: To be determined    Clinical Status of Patient: Inpatient    Length of Service: 30 minutes    Comments: Smoking cessation education note: Pt is a 1.5 pk/day cigarette smoker x 40 yrs. No reported nicotine withdrawa symptoms with patch in use. Order for 21 mg nicotine patch Q day requested upon pt discharge. Pt is currently participating in [facility] [place] [place] Cessation Program.    Diagnosis: F17.210

## 2022-04-27 NOTE — ASSESSMENT & PLAN NOTE
Veto Park is a 51yoM who is currently admitted to the hospital with pancreatitis.     - patient seen and examined  - labs and imaging reviewed  - US demonstrating biliary sludge, no definitive stones  - given his alcohol use and increased intake over the past several days prior to symptom onset, suspect this bout of pancreatitis is related to alcohol use rather than biliary issue  - defer surgical intervention at this time  - general surgery to follow along  - rest of care per primary team
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
## 2022-04-27 NOTE — HPI
Veto Park is a 51yoM with a past medical history significant of HTN, DMII, COPD, and alcohol use disorder who presented to an outside hospital with severe abdominal pain. The patient states that this began three or four days prior to presentation. The patient also endorsed associated nausea and vomiting. His work-up at outside ED was significant for elevated lipase, t bili, AST,and ALT. Of note, the patient endorses daily alcohol use-- stating he consumes a pint to a fifth of vodka daily. He does note that prior to the onset of his abdominal pain, he had been consuming more alcohol than usual. He was transferred to [facility] for further care. An US was obtained which demonstrated biliary sludge, questionable stones. General surgery consulted for evaluation.     On my exam, the patient is resting comfortably in bed. He states that his abdominal pain is present, albeit improved from his initial presentation. He notes continued bowel function. Denies an appetite at this time. Imaging findings discussed with the patient. He denies any bouts of biliary colic in the past. He has no new complaints.     PMH: as above  SH: daily alcohol use as detailed above  SxH: no previous abdominal surgeries

## 2022-04-27 NOTE — PROGRESS NOTES
[facility] - Med Surg  Adult Nutrition  Progress Note    SUMMARY       Recommendations    Recommendation:   1. Initiate Clear Liquid diet when medically acceptable.   2. Monitor weight/labs.   3. RD to follow to monitor nutrition status    Goals:   Diet will be started by RD follow up  Nutrition Goal Status: new  Communication of RD Recs: reviewed with RN    Assessment and Plan  * Alcohol-induced acute pancreatitis  Contributing Nutrition Diagnosis  Altered GI Function    Related to (etiology):   pancreatitis    Signs and Symptoms (as evidenced by):   Nausea/abd pain, NPO    Interventions:  Collaboration with other providers    Nutrition Diagnosis Status:   New    Malnutrition Assessment  Subcutaneous Fat Loss (Final Summary): well nourished  Muscle Loss Evaluation (Final Summary): well nourished       Reason for Assessment  Reason For Assessment: identified at risk by screening criteria (MST)  Diagnosis:  (alcohol induced acute pancreatitis)  Relevant Medical History: GERD, HTN, DM, Peyronie's disease, ABHILASH on CPAP, arthritis, COPD, stroke, alcohol abuse, vasectomy  General Information Comments: Pt NPO at this time. Pt with abd pain/nausea. Pt was asleep at visit-unable to assess NFPE but appears nourished. Ady 19-skin intact. No weight hx per chart.  Nutrition Discharge Planning: d/c diet to be determined    Nutrition Risk Screen  Nutrition Risk Screen: no indicators present    Nutrition/Diet History  Food Preferences: no Roman [employer] or cultural food prefs identified  Factors Affecting Nutritional Intake: NPO    Anthropometrics  Temp: 97 °F (36.1 °C)  Height Method: Stated  Height: 6' (182.9 cm)  Height (inches): 72 in  Weight Method: Bed Scale  Weight: 104.2 kg (229 lb 11.5 oz)  Weight (lb): 229.72 lb  Ideal Body Weight (IBW), Male: 178 lb  % Ideal Body Weight, Male (lb): 129.06 %  BMI (Calculated): 31.1  BMI Grade: 30 - 34.9- obesity - grade I     Lab/Procedures/Meds  Pertinent Labs Reviewed: reviewed  Pertinent  Labs Comments: Na 132L, BUN 5L, Phos 1.9L, Alb 3.1L  Pertinent Medications Reviewed: reviewed  Pertinent Medications Comments: folic acid, lisinopril, MVI, pantoprazole, lactated ringers    Estimated/Assessed Needs  Weight Used For Calorie Calculations: 104.2 kg (229 lb 11.5 oz)  Energy Calorie Requirements (kcal): 2322  Energy Need Method: Hartley-St Jeor (x1.2)  Protein Requirements: 83g (0.8g/kg)  Weight Used For Protein Calculations: 104.2 kg (229 lb 11.5 oz)  Estimated Fluid Requirement Method: RDA Method  RDA Method (mL): 2322  CHO Requirement: 250g    Nutrition Prescription Ordered  Current Diet Order: NPO    Evaluation of Received Nutrient/Fluid Intake  I/O: 3050/0  Energy Calories Required: not meeting needs  Protein Required: not meeting needs  Fluid Required: not meeting needs  Comments: LBM 4/26  % Intake of Estimated Energy Needs: Other: NPO  % Meal Intake: NPO    Nutrition Risk  Level of Risk/Frequency of Follow-up:  (2xweekly)     Monitor and Evaluation  Food and Nutrient Intake: food and beverage intake  Food and Nutrient Adminstration: diet order  Physical Activity and Function: nutrition-related ADLs and IADLs  Anthropometric Measurements: weight  Biochemical Data, Medical Tests and Procedures: electrolyte and renal panel  Nutrition-Focused Physical Findings: overall appearance     Nutrition Follow-Up  RD Follow-up?: Yes

## 2022-04-28 LAB
ALBUMIN SERPL BCP-MCNC: 3 G/DL (ref 3.5–5.2)
ALP SERPL-CCNC: 37 U/L (ref 55–135)
ALT SERPL W/O P-5'-P-CCNC: 47 U/L (ref 10–44)
ANION GAP SERPL CALC-SCNC: 13 MMOL/L (ref 8–16)
AST SERPL-CCNC: 74 U/L (ref 10–40)
BASOPHILS # BLD AUTO: 0.03 K/UL (ref 0–0.2)
BASOPHILS NFR BLD: 0.6 % (ref 0–1.9)
BILIRUB SERPL-MCNC: 1.8 MG/DL (ref 0.1–1)
BUN SERPL-MCNC: 4 MG/DL (ref 6–20)
CALCIUM SERPL-MCNC: 8.8 MG/DL (ref 8.7–10.5)
CHLORIDE SERPL-SCNC: 89 MMOL/L (ref 95–110)
CO2 SERPL-SCNC: 26 MMOL/L (ref 23–29)
CREAT SERPL-MCNC: 0.8 MG/DL (ref 0.5–1.4)
DIFFERENTIAL METHOD: ABNORMAL
EOSINOPHIL # BLD AUTO: 0.1 K/UL (ref 0–0.5)
EOSINOPHIL NFR BLD: 1.3 % (ref 0–8)
ERYTHROCYTE [DISTWIDTH] IN BLOOD BY AUTOMATED COUNT: 12.4 % (ref 11.5–14.5)
EST. GFR  (AFRICAN AMERICAN): >60 ML/MIN/1.73 M^2
EST. GFR  (NON AFRICAN AMERICAN): >60 ML/MIN/1.73 M^2
GLUCOSE SERPL-MCNC: 124 MG/DL (ref 70–110)
HCT VFR BLD AUTO: 37.4 % (ref 40–54)
HGB BLD-MCNC: 13.1 G/DL (ref 14–18)
IMM GRANULOCYTES # BLD AUTO: 0.04 K/UL (ref 0–0.04)
IMM GRANULOCYTES NFR BLD AUTO: 0.8 % (ref 0–0.5)
LYMPHOCYTES # BLD AUTO: 0.6 K/UL (ref 1–4.8)
LYMPHOCYTES NFR BLD: 11.6 % (ref 18–48)
MAGNESIUM SERPL-MCNC: 1.9 MG/DL (ref 1.6–2.6)
MCH RBC QN AUTO: 34.3 PG (ref 27–31)
MCHC RBC AUTO-ENTMCNC: 35 G/DL (ref 32–36)
MCV RBC AUTO: 98 FL (ref 82–98)
MONOCYTES # BLD AUTO: 0.6 K/UL (ref 0.3–1)
MONOCYTES NFR BLD: 12.2 % (ref 4–15)
NEUTROPHILS # BLD AUTO: 3.5 K/UL (ref 1.8–7.7)
NEUTROPHILS NFR BLD: 73.5 % (ref 38–73)
NRBC BLD-RTO: 0 /100 WBC
PHOSPHATE SERPL-MCNC: 2.3 MG/DL (ref 2.7–4.5)
PLATELET # BLD AUTO: 78 K/UL (ref 150–450)
PMV BLD AUTO: 11.2 FL (ref 9.2–12.9)
POCT GLUCOSE: 135 MG/DL (ref 70–110)
POCT GLUCOSE: 136 MG/DL (ref 70–110)
POCT GLUCOSE: 183 MG/DL (ref 70–110)
POTASSIUM SERPL-SCNC: 3.7 MMOL/L (ref 3.5–5.1)
PROT SERPL-MCNC: 7 G/DL (ref 6–8.4)
RBC # BLD AUTO: 3.82 M/UL (ref 4.6–6.2)
SODIUM SERPL-SCNC: 128 MMOL/L (ref 136–145)
WBC # BLD AUTO: 4.75 K/UL (ref 3.9–12.7)

## 2022-04-28 PROCEDURE — S4991 NICOTINE PATCH NONLEGEND: HCPCS | Performed by: STUDENT IN AN ORGANIZED HEALTH CARE EDUCATION/TRAINING PROGRAM

## 2022-04-28 PROCEDURE — 96367 TX/PROPH/DG ADDL SEQ IV INF: CPT

## 2022-04-28 PROCEDURE — 63600175 PHARM REV CODE 636 W HCPCS: Performed by: STUDENT IN AN ORGANIZED HEALTH CARE EDUCATION/TRAINING PROGRAM

## 2022-04-28 PROCEDURE — 84100 ASSAY OF PHOSPHORUS: CPT | Performed by: STUDENT IN AN ORGANIZED HEALTH CARE EDUCATION/TRAINING PROGRAM

## 2022-04-28 PROCEDURE — G0378 HOSPITAL OBSERVATION PER HR: HCPCS

## 2022-04-28 PROCEDURE — 36415 COLL VENOUS BLD VENIPUNCTURE: CPT | Performed by: STUDENT IN AN ORGANIZED HEALTH CARE EDUCATION/TRAINING PROGRAM

## 2022-04-28 PROCEDURE — 96366 THER/PROPH/DIAG IV INF ADDON: CPT

## 2022-04-28 PROCEDURE — 83735 ASSAY OF MAGNESIUM: CPT | Performed by: STUDENT IN AN ORGANIZED HEALTH CARE EDUCATION/TRAINING PROGRAM

## 2022-04-28 PROCEDURE — 96376 TX/PRO/DX INJ SAME DRUG ADON: CPT

## 2022-04-28 PROCEDURE — 85025 COMPLETE CBC W/AUTO DIFF WBC: CPT | Performed by: STUDENT IN AN ORGANIZED HEALTH CARE EDUCATION/TRAINING PROGRAM

## 2022-04-28 PROCEDURE — 25000003 PHARM REV CODE 250

## 2022-04-28 PROCEDURE — 80053 COMPREHEN METABOLIC PANEL: CPT | Performed by: STUDENT IN AN ORGANIZED HEALTH CARE EDUCATION/TRAINING PROGRAM

## 2022-04-28 PROCEDURE — 25000003 PHARM REV CODE 250: Performed by: STUDENT IN AN ORGANIZED HEALTH CARE EDUCATION/TRAINING PROGRAM

## 2022-04-28 RX ADMIN — LORAZEPAM 2 MG: 1 TABLET ORAL at 08:04

## 2022-04-28 RX ADMIN — POTASSIUM PHOSPHATE, MONOBASIC AND POTASSIUM PHOSPHATE, DIBASIC 20 MMOL: 224; 236 INJECTION, SOLUTION INTRAVENOUS at 12:04

## 2022-04-28 RX ADMIN — FERROUS GLUCONATE 324 MG: 324 TABLET ORAL at 10:04

## 2022-04-28 RX ADMIN — HYDROMORPHONE HYDROCHLORIDE 1 MG: 1 INJECTION, SOLUTION INTRAMUSCULAR; INTRAVENOUS; SUBCUTANEOUS at 08:04

## 2022-04-28 RX ADMIN — LISINOPRIL 40 MG: 20 TABLET ORAL at 10:04

## 2022-04-28 RX ADMIN — THERA TABS 1 TABLET: TAB at 09:04

## 2022-04-28 RX ADMIN — HYDROCODONE BITARTRATE AND ACETAMINOPHEN 1 TABLET: 10; 325 TABLET ORAL at 04:04

## 2022-04-28 RX ADMIN — GABAPENTIN 300 MG: 300 CAPSULE ORAL at 08:04

## 2022-04-28 RX ADMIN — GABAPENTIN 300 MG: 300 CAPSULE ORAL at 09:04

## 2022-04-28 RX ADMIN — SODIUM PHOSPHATE, MONOBASIC, MONOHYDRATE 30 MMOL: 276; 142 INJECTION, SOLUTION INTRAVENOUS at 09:04

## 2022-04-28 RX ADMIN — PANTOPRAZOLE SODIUM 40 MG: 40 TABLET, DELAYED RELEASE ORAL at 09:04

## 2022-04-28 RX ADMIN — NICOTINE 1 PATCH: 21 PATCH, EXTENDED RELEASE TRANSDERMAL at 10:04

## 2022-04-28 RX ADMIN — FOLIC ACID 1 MG: 1 TABLET ORAL at 09:04

## 2022-04-28 RX ADMIN — ALUMINUM HYDROXIDE, MAGNESIUM HYDROXIDE, AND SIMETHICONE 30 ML: 200; 200; 20 SUSPENSION ORAL at 04:04

## 2022-04-28 RX ADMIN — HYDROMORPHONE HYDROCHLORIDE 1 MG: 1 INJECTION, SOLUTION INTRAMUSCULAR; INTRAVENOUS; SUBCUTANEOUS at 09:04

## 2022-04-28 RX ADMIN — HYDROMORPHONE HYDROCHLORIDE 1 MG: 1 INJECTION, SOLUTION INTRAMUSCULAR; INTRAVENOUS; SUBCUTANEOUS at 12:04

## 2022-04-28 NOTE — PLAN OF CARE
Pt oriented. DCA done at bedside with patient. Demographics/Ins/NextofKin/PCP verified with patient/family and updated as needed. Denies any DME needs at present from pt/family. Patient/family plans to return home with family. Educated on bedside RX. Patient consents for [place] to discuss discharge plan with next of kin. Preferences appointment times and location obtained. Patient reports they will have transportation home upon discharge.    No future appointments.    /86 (Patient Position: Lying)   Pulse 82   Temp 98.9 °F (37.2 °C) (Oral)   Resp 18   Ht 6' (1.829 m)   Wt 110.2 kg (242 lb 15.2 oz)   SpO2 (!) 93%   BMI 32.95 kg/m²      ferrous gluconate  324 mg Oral Daily with breakfast    folic acid  1 mg Oral Daily    gabapentin  300 mg Oral TID    lisinopriL  40 mg Oral Daily    multivitamin  1 tablet Oral Daily    nicotine  1 patch Transdermal Daily    pantoprazole  40 mg Oral Daily    sodium phosphate IVPB  30 mmol Intravenous Once        04/28/22 1346   Discharge Planning   Assessment Type Discharge Planning Brief Assessment   Resource/Environmental Concerns none   Support Systems Spouse/significant other   Equipment Currently Used at Home CPAP  (Delta)   Current Living Arrangements home/apartment/condo   Patient/Family Anticipates Transition to home;home with family   Patient/Family Anticipated Services at Transition none   DME Needed Upon Discharge  none   Discharge Plan A Home;Home with family

## 2022-04-28 NOTE — PROGRESS NOTES
[facility] Medicine Progress Note    Primary Team: [facility] Hospitalist Team A  Attending Physician: Taylor Del Valle MD  Resident: Dr. Pearl  Intern: Dr. Mendes    Subjective:      Mr. Park states that he is doing well this morning. He notes that his abdominal pain has significantly improved from when he first presented. He states that he would currently rate it as a 6/10. He endorses wanting to eat food. He denies any chest pain, SOB or diarrhea.     Objective:     Last 24 Hour Vital Signs:  BP  Min: 154/83  Max: 173/89  Temp  Av.9 °F (36.6 °C)  Min: 97 °F (36.1 °C)  Max: 98.7 °F (37.1 °C)  Pulse  Av.4  Min: 73  Max: 88  Resp  Av.4  Min: 15  Max: 18  SpO2  Av.3 %  Min: 93 %  Max: 94 %  Height  Av' (182.9 cm)  Min: 6' (182.9 cm)  Max: 6' (182.9 cm)  Weight  Av.2 kg (236 lb 5.3 oz)  Min: 104.2 kg (229 lb 11.5 oz)  Max: 110.2 kg (242 lb 15.2 oz)  No intake or output data in the 24 hours ending 22 0736   Physical Examination:  Constitutional: No acute distress.  Well developed, alert, oriented and appropriate.  HENT: Normocephalic, atraumatic. Normal ear, nose, and throat.  Eyes: PERRL, EOMI, normal conjunctiva.  Neck: Normal range of motion, no tenderness; supple.  Respiratory: Nonlabored breathing with normal breath sounds.  Cardiovascular: RRR with no pulse deficit.  GI: Obese, Tender w/ deep palpation, NBS, No rebound tenderness  Musculoskeletal: Normal ROM, no tenderness, injury, or edema.  Skin: Warm, dry skin without infection or injury.  Neurologic: Normal motor, sensation with no new focal deficit.  Psychiatric: Affect normal, judgement normal, mood normal.  No SI, HI, and not gravely disabled.     Laboratory:  Laboratory Data Reviewed: yes  Pertinent Findings:  Recent Labs   Lab 22  0647 22  1750 22  0440 22  0442 22  0458   WBC 5.01  --   --  4.73 4.75   HGB 13.3*  --   --  12.9* 13.1*   HCT 38.6*  --   --  38.4* 37.4*   *  --   --   78* 78*   *  --   --  102* 98   RDW 12.4  --   --  12.4 12.4   * 134* 132*  --  128*   K 5.8* 3.8 3.8  --  3.7   CL 93* 98 95  --  89*   CO2 17* 18* 23  --  26   BUN 11 7 5*  --  4*   CREATININE 0.74 1.0 0.9  --  0.8   * 131* 107  --  124*   PROT 9.3*  --  7.1  --  7.0   ALBUMIN 4.8  --  3.1*  --  3.0*   BILITOT 2.9*  --  1.7*  --  1.8*   *  --  82*  --  74*   ALKPHOS 49  --  37*  --  37*   ALT 83*  --  50*  --  47*     Microbiology Data Reviewed: yes  Pertinent Findings:  Microbiology Results (last 7 days)     ** No results found for the last 168 hours. **         Other Results:  EKG (my interpretation):     ECG Results          EKG 12-lead (Final result)  Result time 04/26/22 12:21:32    Final result by Interface, Lab In [facility] (04/26/22 12:21:32)                 Narrative:    Test Reason : E87.5,    Vent. Rate : 087 BPM     Atrial Rate : 087 BPM     P-R Int : 148 ms          QRS Dur : 130 ms      QT Int : 420 ms       P-R-T Axes : 050 -14 022 degrees     QTc Int : 505 ms    Normal sinus rhythm  Right bundle branch block  Abnormal ECG  When compared with ECG of 18-SEP-2021 20:54,  No significant change was found  Confirmed by Veto Barkley MD ([id number]) on 4/26/2022 12:21:22 PM    Referred By: [email or web address]   SELF           Confirmed By:Veto Barkley MD                             Results for orders placed or performed during the hospital encounter of 04/26/22   EKG 12-lead    Collection Time: 04/26/22  8:00 AM    Narrative    Test Reason : E87.5,    Vent. Rate : 087 BPM     Atrial Rate : 087 BPM     P-R Int : 148 ms          QRS Dur : 130 ms      QT Int : 420 ms       P-R-T Axes : 050 -14 022 degrees     QTc Int : 505 ms    Normal sinus rhythm  Right bundle branch block  Abnormal ECG  When compared with ECG of 18-SEP-2021 20:54,  No significant change was found  Confirmed by Veto Barkley MD ([id number]) on 4/26/2022 12:21:22 PM    Referred By: ROSARIO   SELF           Confirmed  By:Veto Barkley MD      Radiology Data Reviewed: yes  Pertinent Findings:  X-Ray Abdomen Flat And Erect    Result Date: 4/26/2022  EXAMINATION: XR ABDOMEN FLAT AND ERECT CLINICAL HISTORY: Abdominal Pain; COMPARISON: 10/07/2021 FINDINGS: Nonobstructive bowel gas pattern is noted. No radiopaque kidney calculus is identified.  There are multiple calcifications in the pelvis most consistent with phleboliths.  No evidence of organomegaly.  The bones demonstrate mild degenerative changes within the lower lumbar region.  The bones are otherwise intact.     Nonobstructive bowel gas pattern. Electronically signed by: Minesh Steele MD Date:    04/26/2022 Time:    08:04    US Abdomen Limited    Result Date: 4/26/2022  EXAMINATION: US ABDOMEN LIMITED CLINICAL HISTORY: Patient w/ pancreatitis and CT scan concerning for distended gallbladder; Alcohol induced acute pancreatitis without necrosis or infection TECHNIQUE: Limited ultrasound of the right upper quadrant of the abdomen (including pancreas, liver, gallbladder, common bile duct, and spleen) was performed. COMPARISON: Ultrasound abdomen limited January 11, 2017, CT examination of the abdomen and pelvis April 26, 2022 FINDINGS: The pancreas is not well visualized on this examination, appearing predominantly obscured by bowel gas shadowing.  There is limited visualization of the IVC. The gallbladder is identified, there is mild to moderate gallbladder distention noted.  Echogenic material within the gallbladder likely relates to sludge.  There is an echogenic focus measuring approximately 7.2 mm in size, this appears nonmobile, and although this could represent a small gallstone, the fact that is not mobile suggests that this represents a gallbladder polyp.  There is no additional sonographic evidence for cholelithiasis.  There is no abnormal gallbladder wall thickening and the technologist indicates a negative sonographic Zavala's sign.  There is a small area of  crescentic hypoechoic character adjacent to the gallbladder wall, this may relate to a minimal amount of pericholecystic fluid associated with the pancreatitis, as is suggested on the supine transverse imaging of the gallbladder.  On the left lateral decubitus gallbladder image there is an area of hypoechoic character adjacent to the gallbladder that could relate to the aforementioned however is thought likely representative of a separate area representing focal fatty sparing in the liver adjacent to the gallbladder fossa. There is no abnormal biliary dilatation, the bile duct measures approximately 4 mm. The liver appears enlarged measuring approximately 23.7 cm in length.  There is irregular contour of the liver, correlation for chronic hepatic disease is needed.  There is also increased echogenicity of the liver, this may relate to diffuse fatty infiltrate.  There is no sonographic evidence for focal hepatic mass lesion.  Limited imaging of the right kidney demonstrates no evidence for hydronephrosis.  The spleen measures approximately 13.6 cm in length appearing enlarged.  There is no focal splenic mass lesion seen.  Limited imaging of the left kidney demonstrates no evidence for hydronephrosis.     Sludge of the gallbladder is noted, there is a small echogenic focus that could relate to a small gallstone however since it is nonmobile it may relate to a gallbladder polyp, follow-up is recommended, there is no additional sonographic evidence for cholelithiasis. There is no abnormal gallbladder wall thickening and the technologist indicates a negative sonographic Zavlaa's sign, there is a minimal amount of pericholecystic fluid, however this may relate to the pancreatitis, there is no additional evidence for acute cholecystitis. The liver appears enlarged with irregular contour, there is increased echogenicity consistent with diffuse fatty infiltrate, correlation for chronic hepatic disease. Suspected area of  focal fatty sparing adjacent to the gallbladder fossa. Electronically signed by: Trae Valero Date:    04/26/2022 Time:    21:29    CT Abdomen Pelvis  Without Contrast    Result Date: 4/26/2022  EXAMINATION: CT ABDOMEN PELVIS WITHOUT CONTRAST CLINICAL HISTORY: Abdominal pain, acute, nonlocalized; TECHNIQUE: Low dose axial images, sagittal and coronal reformations were obtained from the lung bases to the pubic symphysis.  Oral contrast was not administered. COMPARISON: 04/26/2022 FINDINGS: Heart: Normal size. No effusion. Lung Bases: Clear.  Benign granuloma right middle lobe. Liver: Slightly nodular configuration liver suggest underlying cirrhosis.  Hepatomegaly and decreased attenuation consistent with diffuse hepatic steatosis. Gallbladder: Mild gallbladder distension with sludge suspected.  Tiny layering stones also suspected. Bile Ducts: No dilatation. Pancreas: Diffuse peripancreatic inflammatory stranding concerning for pancreatitis.  Extensive nodes are seen throughout the peripancreatic region epigastric region likely reactive.  No mass identified.  No ductal dilatation. Spleen: Normal. Adrenals: Normal. Kidneys/Ureters: No mass, hydroureteronephrosis, or nephroureterolithiasis. Bladder: No wall thickening. Reproductive organs: Normal. GI Tract/Mesentery: Diffuse gastric wall thickening concerning for underlying gastritis but infiltrative process not excluded.  Thickening of the duodenum may be related to secondary inflammation from pancreatitis or primary duodenitis.  Recommend follow-up endoscopy.  No evidence of bowel obstruction or inflammation. Peritoneal Space: No ascites or free air. Retroperitoneum: No significant adenopathy. Abdominal wall: Small fat containing left inguinal hernia. Vasculature: No aneurysm. Aorta demonstrates atherosclerotic disease. Bones: No acute fracture. No suspicious lytic or sclerotic lesions.     Findings consistent with acute uncomplicated pancreatitis.  Evaluation  limited without contrast. Diffuse gastric wall thickening concerning for underlying gastritis but infiltrative process not excluded.  Thickening of the duodenum may be related to secondary inflammation from pancreatitis or primary duodenitis.  Recommend follow-up endoscopy. Mild gallbladder distension with sludge suspected. Tiny layering stones also suspected. All CT scans at this facility use dose modulation, iterative reconstruction, and/or weight based dosing when appropriate to reduce radiation dose to as low as reasonable achievable. Electronically signed by: Minesh Steele MD Date:    04/26/2022 Time:    09:15       Current Medications:     Scheduled:   ferrous gluconate  324 mg Oral Daily with breakfast    folic acid  1 mg Oral Daily    gabapentin  300 mg Oral TID    lisinopriL  40 mg Oral Daily    multivitamin  1 tablet Oral Daily    nicotine  1 patch Transdermal Daily    pantoprazole  40 mg Oral Daily         Infusions:       PRN:  aluminum-magnesium hydroxide-simethicone, dextrose 10%, dextrose 10%, glucagon (human recombinant), glucose, glucose, HYDROcodone-acetaminophen, HYDROmorphone, insulin aspart U-100, iohexoL, LORazepam, naloxone, ondansetron  Antibiotics and Day Number of Therapy:  Antibiotics (From admission, onward)            None         Assessment & Plan:     Acute Pancreatitis 2/2 Alcohol Abuse:  - recent hx of binge drinking; Severe epigastric abdominal pain radiating to back;   - nausea/vomiting at ED  - Lipase > 4000  - 3L NS at [facility] ED;   - US abdomen: Biliary Sludge; no evidence for cholelithiasis;   - CT Abdomen Pelvis wo contrast: Findings consistent with acute uncomplicated pancreatitis  - Surgery consulted; Appreciate assistance  - Previously on ; Now off fluids and taking PO  - Replete electrolytes PRN  - Wean pain medications as tolerated     Alcohol Use Disorder:  - hx of drinking 1.5 Pint of vodka for at least 35 years  - periods of on/off; strong intention  to cut down drinking after recent event of pancreatitis   - discussed the importance of quitting drinking in order to prevent pancreatitis and other complications in the future     Tobacco Abuse:  - 70 pack year smoking hx; active smoker  - educated on the importance of quitting smoking     Recent History of Alcohol Withdrawal:  - No signs of withdrawal throughout admission; PRN Ativan 2 mg q4hr     Hx of GI bleed:  - reports recent hx of loose black stools at [facility] ED  - denies any hematemesis and BRBPR  - Will hold chemical anticoagulation at this time;   - Started on SCD      Paresthesias:  - Complains of tingling of his right feet - think it might be due to alcohol induced vitamin deficiencies (B1/Folate/B12)  - B12, Folate wnl     Diabetes Mellitus Type II:  - A1C 4.9; A1c 6.0  - POCT glucose 139  - SSI inpatient     Hyperlipidemia:  - Cholesterol 228; T; .2  A year ago  - repeat lipid panel .6; TGs 117  - on statin at home;     Anemia of chronic disease:  - H/H: 13.3/38.6;   - today: 12.9;   - ferritin 590; Iron 30; Sat Iron 10  - ferrous gluconate 324 mg     GERD:  - pantoprazole 40 mg PO     HTN:  - 181/90 on admit  - continue home amlodipine 5 mg     Hyponatremia:  - 128 on   - Continue to monitor now that patient is off fluids and taking PO     Diet: Regular diet  Ppx: SCD   Code: Full     Disposition: Pending adequate pain control and patient tolerating PO     Kevin Pearl IV, MD  [facility] Internal Medicine HO-III  7:36 AM 2022     [facility] Medicine Hospitalist Pager numbers:   [facility] Hospitalist Medicine Team A (Marium/Ivon):          [phone]  [facility] Hospitalist Medicine Team B (Guerita/Maria Elena):        [phone]

## 2022-04-29 VITALS
WEIGHT: 239.19 LBS | BODY MASS INDEX: 32.4 KG/M2 | RESPIRATION RATE: 18 BRPM | HEIGHT: 72 IN | TEMPERATURE: 99 F | DIASTOLIC BLOOD PRESSURE: 81 MMHG | HEART RATE: 76 BPM | SYSTOLIC BLOOD PRESSURE: 144 MMHG | OXYGEN SATURATION: 94 %

## 2022-04-29 LAB
ALBUMIN SERPL BCP-MCNC: 2.6 G/DL (ref 3.5–5.2)
ALP SERPL-CCNC: 51 U/L (ref 55–135)
ALT SERPL W/O P-5'-P-CCNC: 57 U/L (ref 10–44)
ANION GAP SERPL CALC-SCNC: 11 MMOL/L (ref 8–16)
AST SERPL-CCNC: 110 U/L (ref 10–40)
BASOPHILS # BLD AUTO: 0.02 K/UL (ref 0–0.2)
BASOPHILS NFR BLD: 0.5 % (ref 0–1.9)
BILIRUB SERPL-MCNC: 1.8 MG/DL (ref 0.1–1)
BUN SERPL-MCNC: 8 MG/DL (ref 6–20)
CALCIUM SERPL-MCNC: 8.7 MG/DL (ref 8.7–10.5)
CHLORIDE SERPL-SCNC: 91 MMOL/L (ref 95–110)
CO2 SERPL-SCNC: 28 MMOL/L (ref 23–29)
CREAT SERPL-MCNC: 0.7 MG/DL (ref 0.5–1.4)
DIFFERENTIAL METHOD: ABNORMAL
EOSINOPHIL # BLD AUTO: 0.1 K/UL (ref 0–0.5)
EOSINOPHIL NFR BLD: 2.1 % (ref 0–8)
ERYTHROCYTE [DISTWIDTH] IN BLOOD BY AUTOMATED COUNT: 12.4 % (ref 11.5–14.5)
EST. GFR  (AFRICAN AMERICAN): >60 ML/MIN/1.73 M^2
EST. GFR  (NON AFRICAN AMERICAN): >60 ML/MIN/1.73 M^2
GLUCOSE SERPL-MCNC: 134 MG/DL (ref 70–110)
HCT VFR BLD AUTO: 33.7 % (ref 40–54)
HGB BLD-MCNC: 12 G/DL (ref 14–18)
IMM GRANULOCYTES # BLD AUTO: 0.02 K/UL (ref 0–0.04)
IMM GRANULOCYTES NFR BLD AUTO: 0.5 % (ref 0–0.5)
LYMPHOCYTES # BLD AUTO: 0.5 K/UL (ref 1–4.8)
LYMPHOCYTES NFR BLD: 13.5 % (ref 18–48)
MAGNESIUM SERPL-MCNC: 1.9 MG/DL (ref 1.6–2.6)
MCH RBC QN AUTO: 35 PG (ref 27–31)
MCHC RBC AUTO-ENTMCNC: 35.6 G/DL (ref 32–36)
MCV RBC AUTO: 98 FL (ref 82–98)
MONOCYTES # BLD AUTO: 0.7 K/UL (ref 0.3–1)
MONOCYTES NFR BLD: 18.9 % (ref 4–15)
NEUTROPHILS # BLD AUTO: 2.5 K/UL (ref 1.8–7.7)
NEUTROPHILS NFR BLD: 64.5 % (ref 38–73)
NRBC BLD-RTO: 0 /100 WBC
PHOSPHATE SERPL-MCNC: 2.5 MG/DL (ref 2.7–4.5)
PLATELET # BLD AUTO: 91 K/UL (ref 150–450)
PMV BLD AUTO: 11.1 FL (ref 9.2–12.9)
POCT GLUCOSE: 153 MG/DL (ref 70–110)
POCT GLUCOSE: 162 MG/DL (ref 70–110)
POTASSIUM SERPL-SCNC: 3.3 MMOL/L (ref 3.5–5.1)
PROT SERPL-MCNC: 6.6 G/DL (ref 6–8.4)
RBC # BLD AUTO: 3.43 M/UL (ref 4.6–6.2)
SODIUM SERPL-SCNC: 130 MMOL/L (ref 136–145)
WBC # BLD AUTO: 3.86 K/UL (ref 3.9–12.7)

## 2022-04-29 PROCEDURE — G0378 HOSPITAL OBSERVATION PER HR: HCPCS

## 2022-04-29 PROCEDURE — 85025 COMPLETE CBC W/AUTO DIFF WBC: CPT | Performed by: STUDENT IN AN ORGANIZED HEALTH CARE EDUCATION/TRAINING PROGRAM

## 2022-04-29 PROCEDURE — 36415 COLL VENOUS BLD VENIPUNCTURE: CPT | Performed by: STUDENT IN AN ORGANIZED HEALTH CARE EDUCATION/TRAINING PROGRAM

## 2022-04-29 PROCEDURE — 84100 ASSAY OF PHOSPHORUS: CPT | Performed by: STUDENT IN AN ORGANIZED HEALTH CARE EDUCATION/TRAINING PROGRAM

## 2022-04-29 PROCEDURE — 80053 COMPREHEN METABOLIC PANEL: CPT | Performed by: STUDENT IN AN ORGANIZED HEALTH CARE EDUCATION/TRAINING PROGRAM

## 2022-04-29 PROCEDURE — S4991 NICOTINE PATCH NONLEGEND: HCPCS | Performed by: STUDENT IN AN ORGANIZED HEALTH CARE EDUCATION/TRAINING PROGRAM

## 2022-04-29 PROCEDURE — 25000003 PHARM REV CODE 250: Performed by: STUDENT IN AN ORGANIZED HEALTH CARE EDUCATION/TRAINING PROGRAM

## 2022-04-29 PROCEDURE — 25000003 PHARM REV CODE 250

## 2022-04-29 PROCEDURE — 83735 ASSAY OF MAGNESIUM: CPT | Performed by: STUDENT IN AN ORGANIZED HEALTH CARE EDUCATION/TRAINING PROGRAM

## 2022-04-29 RX ORDER — POTASSIUM CHLORIDE 20 MEQ/1
40 TABLET, EXTENDED RELEASE ORAL EVERY 4 HOURS
Status: DISCONTINUED | OUTPATIENT
Start: 2022-04-29 | End: 2022-04-29 | Stop reason: HOSPADM

## 2022-04-29 RX ORDER — AMLODIPINE BESYLATE 10 MG/1
10 TABLET ORAL DAILY
Qty: 30 TABLET | Refills: 3 | Status: SHIPPED | OUTPATIENT
Start: 2022-04-29

## 2022-04-29 RX ORDER — CARVEDILOL 12.5 MG/1
12.5 TABLET ORAL 2 TIMES DAILY
Status: DISCONTINUED | OUTPATIENT
Start: 2022-04-29 | End: 2022-04-29 | Stop reason: HOSPADM

## 2022-04-29 RX ORDER — CARVEDILOL 12.5 MG/1
12.5 TABLET ORAL 2 TIMES DAILY
Qty: 60 TABLET | Refills: 3 | Status: SHIPPED | OUTPATIENT
Start: 2022-04-29 | End: 2022-08-27

## 2022-04-29 RX ORDER — MULTIVIT,CALC,MINS/IRON/FOLIC 9MG-400MCG
1 TABLET ORAL DAILY
Qty: 90 TABLET | Refills: 3 | Status: SHIPPED | OUTPATIENT
Start: 2022-04-29

## 2022-04-29 RX ORDER — AMLODIPINE BESYLATE 5 MG/1
10 TABLET ORAL DAILY
Status: DISCONTINUED | OUTPATIENT
Start: 2022-04-29 | End: 2022-04-29

## 2022-04-29 RX ORDER — LISINOPRIL 40 MG/1
40 TABLET ORAL DAILY
Qty: 30 TABLET | Refills: 3 | Status: SHIPPED | OUTPATIENT
Start: 2022-04-29

## 2022-04-29 RX ORDER — AMLODIPINE BESYLATE 5 MG/1
10 TABLET ORAL DAILY
Status: DISCONTINUED | OUTPATIENT
Start: 2022-04-29 | End: 2022-04-29 | Stop reason: HOSPADM

## 2022-04-29 RX ADMIN — HYDROCODONE BITARTRATE AND ACETAMINOPHEN 1 TABLET: 10; 325 TABLET ORAL at 01:04

## 2022-04-29 RX ADMIN — GABAPENTIN 300 MG: 300 CAPSULE ORAL at 09:04

## 2022-04-29 RX ADMIN — CARVEDILOL 12.5 MG: 12.5 TABLET, FILM COATED ORAL at 09:04

## 2022-04-29 RX ADMIN — PANTOPRAZOLE SODIUM 40 MG: 40 TABLET, DELAYED RELEASE ORAL at 09:04

## 2022-04-29 RX ADMIN — FERROUS GLUCONATE 324 MG: 324 TABLET ORAL at 09:04

## 2022-04-29 RX ADMIN — AMLODIPINE BESYLATE 10 MG: 5 TABLET ORAL at 09:04

## 2022-04-29 RX ADMIN — POTASSIUM CHLORIDE 40 MEQ: 20 TABLET, EXTENDED RELEASE ORAL at 09:04

## 2022-04-29 RX ADMIN — CARVEDILOL 12.5 MG: 12.5 TABLET, FILM COATED ORAL at 01:04

## 2022-04-29 RX ADMIN — THERA TABS 1 TABLET: TAB at 09:04

## 2022-04-29 RX ADMIN — LISINOPRIL 40 MG: 20 TABLET ORAL at 09:04

## 2022-04-29 RX ADMIN — FOLIC ACID 1 MG: 1 TABLET ORAL at 09:04

## 2022-04-29 RX ADMIN — NICOTINE 1 PATCH: 21 PATCH, EXTENDED RELEASE TRANSDERMAL at 09:04

## 2022-04-29 NOTE — PLAN OF CARE
VN note: VN completed AVS and attachments and notified bedside nurse, Juan Carlos. Will cont to be available and intervene prn.

## 2022-04-29 NOTE — PLAN OF CARE
Pt vitals were not maintained, pt BP was elevated during night shift, pt c/o ABD pain after eating, pt stomach is also distended and tender to touch. Pt was given prn meds< but MD was still later notified because pt BP elevated higher, pt stated he take BP meds daily, so MD reordered BP meds. Pt BG was in range. CIWAA was negative. Wc   Problem: Adult Inpatient Plan of Care  Goal: Optimal Comfort and Wellbeing  4/29/2022 0246 by Bryanna Roman LPN  Outcome: Ongoing, Progressing  4/29/2022 0242 by Bryanna Roman LPN  Outcome: Ongoing, Progressing     Problem: Behavior Regulation Impairment (Excessive Substance Use)  Goal: Improved Behavioral Control (Excessive Substance Use)  Outcome: Ongoing, Progressing     Problem: Alcohol Withdrawal  Goal: Alcohol Withdrawal Symptom Control  Outcome: Ongoing, Progressing     Problem: Substance Misuse (Alcohol Withdrawal)  Goal: Readiness for Change Identified  Outcome: Ongoing, Progressing     Problem: Hypertension Comorbidity  Goal: Blood Pressure in Desired Range  4/29/2022 0246 by Bryanna Roman LPN  Outcome: Ongoing, Not Progressing  4/29/2022 0242 by Bryanna Roman LPN  Outcome: Ongoing, Not Progressing     Problem: Pain Chronic (Persistent) (Comorbidity Management)  Goal: Acceptable Pain Control and Functional Ability  Outcome: Ongoing, Not Progressing     Problem: Pain (Pancreatitis)  Goal: Acceptable Pain Control  Outcome: Ongoing, Not Progressing

## 2022-04-29 NOTE — PLAN OF CARE
[facility] - Med Surg  Discharge Final Note    Primary Care Provider: Deloris Bah MD    Expected Discharge Date: 4/29/2022    Final Discharge Note (most recent)       Final Note - 04/29/22 1229          Final Note    Assessment Type Final Discharge Note (P)      Anticipated Discharge Disposition Home or Self Care (P)      Hospital Resources/Appts/Education Provided Appointments scheduled and added to AVS (P)         Post-Acute Status    Post-Acute Authorization Other (P)      Other Status Community Services (P)    Residential / Outpatient Substance Abuse Treatment list provided to patient.    Discharge Delays None known at this time (P)                    BP (!) 144/81 (Patient Position: Lying)   Pulse 76   Temp 98.5 °F (36.9 °C) (Oral)   Resp 18   Ht 6' (1.829 m)   Wt 108.5 kg (239 lb 3.2 oz)   SpO2 (!) 94%   BMI 32.44 kg/m²       Contact Info       Deloris Bah MD   Specialty: Family Medicine   Relationship: PCP - General    [place] [id number]  [place] [id number]   Phone: [phone]       Next Steps: Go on 5/9/2022    Instructions: AT 1015AM; FOLLOW UP WITH PCP    [place] [address]   Phone: [phone]       Next Steps: Call today    Instructions: For assistance with Substance abuse treatment; THIS IS MAIN OFFICE. OFFICE IN [facility] FROM THE [facility]   Specialty: Behavioral Health, Psychiatry    [facility] [place] [address]   Phone: [phone]       Next Steps: Call today    Instructions: PT/FAMILY WILL HAVE TO CALL AND SET UP PSYCH APPT, For assistance with Substance abuse treatment             Medication List        START taking these medications      THEREMS-M 9 mg iron-400 mcg Tab tablet  Generic drug: multivit-iron-FA-calcium-mins  Take 1 tablet by mouth once daily.  Replaces: multivitamin per tablet            CHANGE how  you take these medications      carvediloL 12.5 MG tablet  Commonly known as: COREG  Take 1 tablet (12.5 mg total) by mouth 2 (two) times a day. Patient unknown of dosage  What changed:   medication strength  how much to take     lisinopriL 40 MG tablet  Commonly known as: PRINIVIL,ZESTRIL  Take 1 tablet (40 mg total) by mouth once daily.  What changed: medication strength            CONTINUE taking these medications      amLODIPine 10 MG tablet  Commonly known as: NORVASC  Take 1 tablet (10 mg total) by mouth once daily.     pantoprazole 40 MG tablet  Commonly known as: PROTONIX  Take 1 tablet (40 mg total) by mouth 2 (two) times daily.            STOP taking these medications      atorvastatin 20 MG tablet  Commonly known as: LIPITOR     buPROPion HCL (smoking deter) 150 mg TBSR 12 hr tablet  Commonly known as: ZYBAN     dicyclomine 20 mg tablet  Commonly known as: BENTYL     diphenhydrAMINE 25 mg capsule  Commonly known as: BENADRYL     ferrous gluconate 324 MG tablet  Commonly known as: FERGON     finasteride 5 mg tablet  Commonly known as: PROSCAR     hydrocortisone 2.5 % cream     LINZESS 145 mcg Cap capsule  Generic drug: linaCLOtide     loratadine 10 mg tablet  Commonly known as: CLARITIN     metFORMIN 500 MG tablet  Commonly known as: GLUCOPHAGE     metoclopramide HCl 10 MG tablet  Commonly known as: REGLAN     multivitamin per tablet  Commonly known as: THERAGRAN  Replaced by: THEREMS-M 9 mg iron-400 mcg Tab tablet     nicotine (polacrilex) 2 mg Gum  Commonly known as: NICORETTE     nicotine 21 mg/24 hr  Commonly known as: NICODERM CQ     ondansetron 4 MG tablet  Commonly known as: ZOFRAN     tadalafiL 20 MG Tab  Commonly known as: CIALIS               Where to Get Your Medications        These medications were sent to [facility]  [address], [place] [address]      Hours: Mon-Fri, 8a-5:30p Phone: [phone]   amLODIPine 10 MG tablet  carvediloL 12.5 MG tablet  lisinopriL 40 MG  tablet  THEREMS-M 9 mg iron-400 mcg Tab tablet

## 2022-04-29 NOTE — PROGRESS NOTES
[facility]                    Pharmacy       Discharge Medication Education    Patient ACCEPTED medication education. Pharmacy has provided education on the name, indication, and possible side effects of the medication(s) prescribed, using teach-back method.     The following medications have also been discussed, during this admission.        Medication List        START taking these medications      THEREMS-M 9 mg iron-400 mcg Tab tablet  Generic drug: multivit-iron-FA-calcium-mins  Take 1 tablet by mouth once daily.  Replaces: multivitamin per tablet            CHANGE how you take these medications      carvediloL 12.5 MG tablet  Commonly known as: COREG  Take 1 tablet (12.5 mg total) by mouth 2 (two) times a day. Patient unknown of dosage  What changed:   medication strength  how much to take     lisinopriL 40 MG tablet  Commonly known as: PRINIVIL,ZESTRIL  Take 1 tablet (40 mg total) by mouth once daily.  What changed: medication strength            CONTINUE taking these medications      amLODIPine 10 MG tablet  Commonly known as: NORVASC  Take 1 tablet (10 mg total) by mouth once daily.     pantoprazole 40 MG tablet  Commonly known as: PROTONIX  Take 1 tablet (40 mg total) by mouth 2 (two) times daily.            STOP taking these medications      atorvastatin 20 MG tablet  Commonly known as: LIPITOR     buPROPion HCL (smoking deter) 150 mg TBSR 12 hr tablet  Commonly known as: ZYBAN     dicyclomine 20 mg tablet  Commonly known as: BENTYL     diphenhydrAMINE 25 mg capsule  Commonly known as: BENADRYL     ferrous gluconate 324 MG tablet  Commonly known as: FERGON     finasteride 5 mg tablet  Commonly known as: PROSCAR     hydrocortisone 2.5 % cream     LINZESS 145 mcg Cap capsule  Generic drug: linaCLOtide     loratadine 10 mg tablet  Commonly known as: CLARITIN     metFORMIN 500 MG tablet  Commonly known as: GLUCOPHAGE     metoclopramide HCl 10 MG tablet  Commonly known as: REGLAN      multivitamin per tablet  Commonly known as: THERAGRAN  Replaced by: THEREMS-M 9 mg iron-400 mcg Tab tablet     nicotine (polacrilex) 2 mg Gum  Commonly known as: NICORETTE     nicotine 21 mg/24 hr  Commonly known as: NICODERM CQ     ondansetron 4 MG tablet  Commonly known as: ZOFRAN     tadalafiL 20 MG Tab  Commonly known as: CIALIS               Where to Get Your Medications        These medications were sent to [facility] [address], [place] [address]      Hours: Mon-Fri, 8a-5:30p Phone: [phone]   amLODIPine 10 MG tablet  carvediloL 12.5 MG tablet  lisinopriL 40 MG tablet  THEREMS-M 9 mg iron-400 mcg Tab tablet          Thank you  Lyla Womack, PharmD  [phone]

## 2022-04-29 NOTE — PROGRESS NOTES
[facility] Medicine Progress Note    Primary Team: [facility] Hospitalist Team A  Attending Physician: Taylor Del Valle MD  Resident: Dr. Pearl  Intern: Dr. Mendes    Subjective:      Mr. Park states that he is doing well this morning. He notes that his abdominal pain has significantly improved from when he first presented. He is ready to go home. He denies any chest pain, SOB or diarrhea.    H/H - 12/33.7; MCV 98; RDW 12.4  Na - 128, 130;  K - 3,7, 3.3 repleting now  Phos - 2,3, 2.5  AST/ALT - 110/57      Objective:     Last 24 Hour Vital Signs:  BP  Min: 130/73  Max: 187/95  Temp  Av.6 °F (37 °C)  Min: 98.3 °F (36.8 °C)  Max: 98.9 °F (37.2 °C)  Pulse  Av.5  Min: 75  Max: 112  Resp  Av.8  Min: 14  Max: 21  SpO2  Av.5 %  Min: 92 %  Max: 95 %  Weight  Av.5 kg (239 lb 3.2 oz)  Min: 108.5 kg (239 lb 3.2 oz)  Max: 108.5 kg (239 lb 3.2 oz)    Intake/Output Summary (Last 24 hours) at 2022 0838  Last data filed at 2022 0600  Gross per 24 hour   Intake 285.17 ml   Output 700 ml   Net -414.83 ml      Physical Examination:  Constitutional: No acute distress.  Well developed, alert, oriented and appropriate.  HENT: Normocephalic, atraumatic. Normal ear, nose, and throat.  Eyes: PERRL, EOMI, normal conjunctiva.  Neck: Normal range of motion, no tenderness; supple.  Respiratory: Nonlabored breathing with normal breath sounds.  Cardiovascular: RRR with no pulse deficit.  GI: Obese, tenderness with deep palpation, NBS, No rebound tenderness  Musculoskeletal: Normal ROM, no tenderness, injury, or edema.  Skin: Warm, dry skin without infection or injury.  Neurologic: Normal motor, sensation with no new focal deficit.  Psychiatric: Affect normal, judgement normal, mood normal.  No SI, HI, and not gravely disabled.     Laboratory:  Laboratory Data Reviewed: yes  Pertinent Findings:  Recent Labs   Lab 22  0440 22  0442 22  0458 22  0528   WBC  --  4.73 4.75 3.86*   HGB  --   12.9* 13.1* 12.0*   HCT  --  38.4* 37.4* 33.7*   PLT  --  78* 78* 91*   MCV  --  102* 98 98   RDW  --  12.4 12.4 12.4   *  --  128* 130*   K 3.8  --  3.7 3.3*   CL 95  --  89* 91*   CO2 23  --  26 28   BUN 5*  --  4* 8   CREATININE 0.9  --  0.8 0.7     --  124* 134*   PROT 7.1  --  7.0 6.6   ALBUMIN 3.1*  --  3.0* 2.6*   BILITOT 1.7*  --  1.8* 1.8*   AST 82*  --  74* 110*   ALKPHOS 37*  --  37* 51*   ALT 50*  --  47* 57*     Microbiology Data Reviewed: yes  Pertinent Findings:  Microbiology Results (last 7 days)     ** No results found for the last 168 hours. **         Other Results:  EKG (my interpretation):     ECG Results          EKG 12-lead (Final result)  Result time 04/26/22 12:21:32    Final result by Interface, Lab In [facility] (04/26/22 12:21:32)                 Narrative:    Test Reason : E87.5,    Vent. Rate : 087 BPM     Atrial Rate : 087 BPM     P-R Int : 148 ms          QRS Dur : 130 ms      QT Int : 420 ms       P-R-T Axes : 050 -14 022 degrees     QTc Int : 505 ms    Normal sinus rhythm  Right bundle branch block  Abnormal ECG  When compared with ECG of 18-SEP-2021 20:54,  No significant change was found  Confirmed by Veto Barkley MD ([id number]) on 4/26/2022 12:21:22 PM    Referred By: [email or web address]   SELF           Confirmed By:Veto Barkley MD                             Results for orders placed or performed during the hospital encounter of 04/26/22   EKG 12-lead    Collection Time: 04/26/22  8:00 AM    Narrative    Test Reason : E87.5,    Vent. Rate : 087 BPM     Atrial Rate : 087 BPM     P-R Int : 148 ms          QRS Dur : 130 ms      QT Int : 420 ms       P-R-T Axes : 050 -14 022 degrees     QTc Int : 505 ms    Normal sinus rhythm  Right bundle branch block  Abnormal ECG  When compared with ECG of 18-SEP-2021 20:54,  No significant change was found  Confirmed by Veto Barkley MD ([id number]) on 4/26/2022 12:21:22 PM    Referred By: ROSARIO   SELF           Confirmed By:Veto PALOMINO  Filippo KESSLER      Radiology Data Reviewed: yes  Pertinent Findings:  X-Ray Abdomen Flat And Erect    Result Date: 4/26/2022  EXAMINATION: XR ABDOMEN FLAT AND ERECT CLINICAL HISTORY: Abdominal Pain; COMPARISON: 10/07/2021 FINDINGS: Nonobstructive bowel gas pattern is noted. No radiopaque kidney calculus is identified.  There are multiple calcifications in the pelvis most consistent with phleboliths.  No evidence of organomegaly.  The bones demonstrate mild degenerative changes within the lower lumbar region.  The bones are otherwise intact.     Nonobstructive bowel gas pattern. Electronically signed by: Minesh Steele MD Date:    04/26/2022 Time:    08:04    US Abdomen Limited    Result Date: 4/26/2022  EXAMINATION: US ABDOMEN LIMITED CLINICAL HISTORY: Patient w/ pancreatitis and CT scan concerning for distended gallbladder; Alcohol induced acute pancreatitis without necrosis or infection TECHNIQUE: Limited ultrasound of the right upper quadrant of the abdomen (including pancreas, liver, gallbladder, common bile duct, and spleen) was performed. COMPARISON: Ultrasound abdomen limited January 11, 2017, CT examination of the abdomen and pelvis April 26, 2022 FINDINGS: The pancreas is not well visualized on this examination, appearing predominantly obscured by bowel gas shadowing.  There is limited visualization of the IVC. The gallbladder is identified, there is mild to moderate gallbladder distention noted.  Echogenic material within the gallbladder likely relates to sludge.  There is an echogenic focus measuring approximately 7.2 mm in size, this appears nonmobile, and although this could represent a small gallstone, the fact that is not mobile suggests that this represents a gallbladder polyp.  There is no additional sonographic evidence for cholelithiasis.  There is no abnormal gallbladder wall thickening and the technologist indicates a negative sonographic Zavala's sign.  There is a small area of crescentic hypoechoic  character adjacent to the gallbladder wall, this may relate to a minimal amount of pericholecystic fluid associated with the pancreatitis, as is suggested on the supine transverse imaging of the gallbladder.  On the left lateral decubitus gallbladder image there is an area of hypoechoic character adjacent to the gallbladder that could relate to the aforementioned however is thought likely representative of a separate area representing focal fatty sparing in the liver adjacent to the gallbladder fossa. There is no abnormal biliary dilatation, the bile duct measures approximately 4 mm. The liver appears enlarged measuring approximately 23.7 cm in length.  There is irregular contour of the liver, correlation for chronic hepatic disease is needed.  There is also increased echogenicity of the liver, this may relate to diffuse fatty infiltrate.  There is no sonographic evidence for focal hepatic mass lesion.  Limited imaging of the right kidney demonstrates no evidence for hydronephrosis.  The spleen measures approximately 13.6 cm in length appearing enlarged.  There is no focal splenic mass lesion seen.  Limited imaging of the left kidney demonstrates no evidence for hydronephrosis.     Sludge of the gallbladder is noted, there is a small echogenic focus that could relate to a small gallstone however since it is nonmobile it may relate to a gallbladder polyp, follow-up is recommended, there is no additional sonographic evidence for cholelithiasis. There is no abnormal gallbladder wall thickening and the technologist indicates a negative sonographic Zavala's sign, there is a minimal amount of pericholecystic fluid, however this may relate to the pancreatitis, there is no additional evidence for acute cholecystitis. The liver appears enlarged with irregular contour, there is increased echogenicity consistent with diffuse fatty infiltrate, correlation for chronic hepatic disease. Suspected area of focal fatty sparing  adjacent to the gallbladder fossa. Electronically signed by: Trae Valero Date:    04/26/2022 Time:    21:29    CT Abdomen Pelvis  Without Contrast    Result Date: 4/26/2022  EXAMINATION: CT ABDOMEN PELVIS WITHOUT CONTRAST CLINICAL HISTORY: Abdominal pain, acute, nonlocalized; TECHNIQUE: Low dose axial images, sagittal and coronal reformations were obtained from the lung bases to the pubic symphysis.  Oral contrast was not administered. COMPARISON: 04/26/2022 FINDINGS: Heart: Normal size. No effusion. Lung Bases: Clear.  Benign granuloma right middle lobe. Liver: Slightly nodular configuration liver suggest underlying cirrhosis.  Hepatomegaly and decreased attenuation consistent with diffuse hepatic steatosis. Gallbladder: Mild gallbladder distension with sludge suspected.  Tiny layering stones also suspected. Bile Ducts: No dilatation. Pancreas: Diffuse peripancreatic inflammatory stranding concerning for pancreatitis.  Extensive nodes are seen throughout the peripancreatic region epigastric region likely reactive.  No mass identified.  No ductal dilatation. Spleen: Normal. Adrenals: Normal. Kidneys/Ureters: No mass, hydroureteronephrosis, or nephroureterolithiasis. Bladder: No wall thickening. Reproductive organs: Normal. GI Tract/Mesentery: Diffuse gastric wall thickening concerning for underlying gastritis but infiltrative process not excluded.  Thickening of the duodenum may be related to secondary inflammation from pancreatitis or primary duodenitis.  Recommend follow-up endoscopy.  No evidence of bowel obstruction or inflammation. Peritoneal Space: No ascites or free air. Retroperitoneum: No significant adenopathy. Abdominal wall: Small fat containing left inguinal hernia. Vasculature: No aneurysm. Aorta demonstrates atherosclerotic disease. Bones: No acute fracture. No suspicious lytic or sclerotic lesions.     Findings consistent with acute uncomplicated pancreatitis.  Evaluation limited without contrast.  Diffuse gastric wall thickening concerning for underlying gastritis but infiltrative process not excluded.  Thickening of the duodenum may be related to secondary inflammation from pancreatitis or primary duodenitis.  Recommend follow-up endoscopy. Mild gallbladder distension with sludge suspected. Tiny layering stones also suspected. All CT scans at this facility use dose modulation, iterative reconstruction, and/or weight based dosing when appropriate to reduce radiation dose to as low as reasonable achievable. Electronically signed by: Minesh Steele MD Date:    04/26/2022 Time:    09:15       Current Medications:     Scheduled:   amLODIPine  10 mg Oral Daily    carvediloL  12.5 mg Oral BID    ferrous gluconate  324 mg Oral Daily with breakfast    folic acid  1 mg Oral Daily    gabapentin  300 mg Oral TID    lisinopriL  40 mg Oral Daily    multivitamin  1 tablet Oral Daily    nicotine  1 patch Transdermal Daily    pantoprazole  40 mg Oral Daily    potassium chloride  40 mEq Oral Q4H         Infusions:       PRN:  aluminum-magnesium hydroxide-simethicone, dextrose 10%, dextrose 10%, glucagon (human recombinant), glucose, glucose, HYDROcodone-acetaminophen, HYDROmorphone, insulin aspart U-100, iohexoL, LORazepam, naloxone, ondansetron  Antibiotics and Day Number of Therapy:  Antibiotics (From admission, onward)            None         Assessment & Plan:     Acute Pancreatitis 2/2 Alcohol Abuse:  - recent hx of binge drinking; Severe epigastric abdominal pain radiating to back;   - nausea/vomiting at ED  - Lipase > 4000  - 3L NS at [facility] ED;   - US abdomen: Biliary Sludge; no evidence for cholelithiasis;   - CT Abdomen Pelvis wo contrast: Findings consistent with acute uncomplicated pancreatitis  - Surgery consulted; Appreciate assistance  - Previously on ; Now off fluids and taking PO  - Replete electrolytes PRN  - Wean pain medications as tolerated    Hypokalemia:  - 3.3 today  - replete  prn     Alcohol Use Disorder:  - hx of drinking 1.5 Pint of vodka for at least 35 years  - periods of on/off; strong intention to cut down drinking after recent event of pancreatitis   - discussed the importance of quitting drinking in order to prevent pancreatitis and other complications in the future     Tobacco Abuse:  - 70 pack year smoking hx; active smoker  - educated on the importance of quitting smoking     Recent History of Alcohol Withdrawal:  - No signs of withdrawal throughout admission; PRN Ativan 2 mg q4hr     Hx of GI bleed:  - reports recent hx of loose black stools at [facility] ED  - denies any hematemesis and BRBPR  - Will hold chemical anticoagulation at this time;   - Started on SCD      Paresthesias:  - Complains of tingling of his right feet - think it might be due to alcohol induced vitamin deficiencies (B1/Folate/B12)  - B12, Folate wnl     Diabetes Mellitus Type II:  - A1C 4.9; A1c 6.0  - POCT glucose 139  - SSI inpatient     Hyperlipidemia:  - Cholesterol 228; T; .2  A year ago  - repeat lipid panel .6; TGs 117  - on statin at home;     Anemia of chronic disease:  - H/H: 13.3/38.6;   - today: 12.9;   - ferritin 590; Iron 30; Sat Iron 10  - ferrous gluconate 324 mg     GERD:  - pantoprazole 40 mg PO     HTN:  - 181/90 on admit  - continue home amlodipine 5 mg, carvedilol 12.5, lisinopril 40 mg     Hyponatremia:  - 130 today  - Continue to monitor now that patient is off fluids and taking PO     Diet: Regular diet  Ppx: SCD   Code: Full     Disposition: Discharge       Jamey Mendes MD  [facility] Internal Medicine HO-I  7:36 AM 2022     [facility] Medicine Hospitalist Pager numbers:   [facility] Hospitalist Medicine Team A (Marium/Ivon):          [phone]  [facility] Hospitalist Medicine Team B (Guerita/Maria Elena):        [phone]

## 2022-04-29 NOTE — PLAN OF CARE
AVS and educational attachments shared with patient  via [employer]. Discussed thoroughly. Patient verbalized clear understanding using teach back method. Notified bedside nurse of completion of education. At present no distress noted. Patient to be discharged via w/c with escort service and family with all of patient's belonings. Will cont to be available to patient and family and intervene prn.

## 2022-04-29 NOTE — DISCHARGE SUMMARY
[facility] Medicine Discharge Summary    Primary Team: [facility] Hospitalist Team A  Attending Physician: Taylor Del Valle MD  Resident: Dae  Intern: Vaughn    Date of Admit: 4/26/2022  Date of Discharge: 4/29/2022    Discharge to: home  Condition: stable    Discharge Diagnoses     Patient Active Problem List   Diagnosis    Cellulitis of right upper extremity    Elevated liver enzymes    Essential hypertension    GERD (gastroesophageal reflux disease)    Type 2 diabetes mellitus, without long-term current use of insulin    Peyronie disease    Facial twitching    Stroke    Weakness    ETOH abuse    Morbid obesity    Hyperlipidemia    Left facial numbness    Alcohol withdrawal    GI bleed    Metabolic encephalopathy    Anemia    Tobacco abuse    Coffee ground emesis    Obstructive sleep apnea    Transient alteration of awareness    Alcohol withdrawal seizure    Alcohol-induced acute pancreatitis    History of GI bleed    Gallbladder sludge    Hypophosphatemia    Hypomagnesemia    Hyponatremia    Paresthesias       Consultants and Procedures     Consultants:  General Surgery    Procedures:     Imaging:  US Abdomen; CT Abdomen pelvis wo contrast; XR Abdomen    Brief History of Present Illness      Xavier Sawyer is 51 y.o. male with past medical history with alcohol use disorder, COPD, GERD, DMT2, HTN, Peyronie's disease, Erectile dysfunction, arthritis came to the [facility] ED with complaints of severe epigastric abdominal pain, nausea and vomiting for 1 day. 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 He reported using alcohol since he was 15 years old, but has never experienced any similar pain before. He reported drinking 1.5 pint of vodka per day. Last time drink was on Sunday. He said, he went into the withdrawal in the ED and experienced palpitations, anxiety and tremor already. Also,  complained of some paresthesias of his right feet, concerning for vitamin deficiencies. Mentioned having a loose black stool at [facility] ED yesterday. Currently, denied any symptoms suggestive of withdrawal, but abdominal pain still persisted and asked something for pain. No headache, dizziness, tremor or palpitations. No chest pain. No fever. No diarrhea or constipation.     In the ED, the patient received 3L of 0.9% saline bolus, 2 mg of hydromorphone IV.     For the full HPI please refer to the History & Physical from this admission.    Hospital Course By Problem with Pertinent Findings     Acute Pancreatitis 2/2 Alcohol Abuse: Resolved  - recent hx of binge drinking; Severe epigastric abdominal pain radiating to back;   - nausea/vomiting at ED  - Lipase > 4000 on admit;   - 3L NS at [facility] ED;   - US abdomen: Biliary Sludge; no evidence for cholelithiasis;   - CT Abdomen Pelvis wo contrast: Findings consistent with acute uncomplicated pancreatitis  - Surgery consulted; Appreciate assistance  - Previously on ; Now off fluids and taking fluids PO  - Start multivitamins     Hypokalemia: resolved  - 3.3 today  - replete prn     Alcohol Use Disorder:  - hx of drinking 1.5 Pint of vodka for at least 35 years  - periods of on/off; strong intention to cut down drinking after recent event of pancreatitis   - discussed the importance of quitting drinking in order to prevent pancreatitis and other complications in the future     Tobacco Abuse:  - 70 pack year smoking hx; active smoker  - educated on the importance of quitting smoking     Recent History of Alcohol Withdrawal:  - No signs of withdrawal throughout admission; PRN Ativan 2 mg q4hr     Hx of GI bleed:  - reports recent hx of loose black stools at [facility] ED  - denies any hematemesis and BRBPR  - Will hold chemical anticoagulation at this time;   - Started on SCD      Paresthesias: resolved  - Complains of tingling of his right feet - think  it might be due to alcohol induced vitamin deficiencies (B1/Folate/B12)  - B12, Folate wnl     Hx of Diabetes Mellitus Type II:   - A1C 4.9; A1c 6.0  - POCT glucose 162  - stop metformin    Hyperlipidemia:  - Cholesterol 228; T; .2  A year ago  - repeat lipid panel .6; TGs 117  - d/c atorvastatin     HTN:  - 181/90 on admit  - 144/81 today  - continue home amlodipine 10 mg, carvedilol 12.5 BID, lisinopril 40 mg    Anemia of chronic disease:  - H/H: 13.3/38.6;   - today: 12.0; 33.7;  MCV 98  - ferritin 590; Iron 30; Sat Iron 10     GERD:  - pantoprazole 40 mg PO BID     Hyponatremia:  - 130 today  - Continue to monitor now that patient is off fluids and taking PO    Discharge Medications        Medication List      START taking these medications    THEREMS-M 9 mg iron-400 mcg Tab tablet  Generic drug: multivit-iron-FA-calcium-mins  Take 1 tablet by mouth once daily.  Replaces: multivitamin per tablet        CHANGE how you take these medications    carvediloL 12.5 MG tablet  Commonly known as: COREG  Take 1 tablet (12.5 mg total) by mouth 2 (two) times a day. Patient unknown of dosage  What changed:   · medication strength  · how much to take     lisinopriL 40 MG tablet  Commonly known as: PRINIVIL,ZESTRIL  Take 1 tablet (40 mg total) by mouth once daily.  What changed: medication strength        CONTINUE taking these medications    amLODIPine 10 MG tablet  Commonly known as: NORVASC  Take 1 tablet (10 mg total) by mouth once daily.     pantoprazole 40 MG tablet  Commonly known as: PROTONIX  Take 1 tablet (40 mg total) by mouth 2 (two) times daily.        STOP taking these medications    atorvastatin 20 MG tablet  Commonly known as: LIPITOR     buPROPion HCL (smoking deter) 150 mg TBSR 12 hr tablet  Commonly known as: ZYBAN     dicyclomine 20 mg tablet  Commonly known as: BENTYL     diphenhydrAMINE 25 mg capsule  Commonly known as: BENADRYL     ferrous gluconate 324 MG tablet  Commonly known  as: FERGON     finasteride 5 mg tablet  Commonly known as: PROSCAR     hydrocortisone 2.5 % cream     LINZESS 145 mcg Cap capsule  Generic drug: linaCLOtide     loratadine 10 mg tablet  Commonly known as: CLARITIN     metFORMIN 500 MG tablet  Commonly known as: GLUCOPHAGE     metoclopramide HCl 10 MG tablet  Commonly known as: REGLAN     multivitamin per tablet  Commonly known as: THERAGRAN  Replaced by: THEREMS-M 9 mg iron-400 mcg Tab tablet     nicotine (polacrilex) 2 mg Gum  Commonly known as: NICORETTE     nicotine 21 mg/24 hr  Commonly known as: NICODERM CQ     ondansetron 4 MG tablet  Commonly known as: ZOFRAN     tadalafiL 20 MG Tab  Commonly known as: CIALIS           Where to Get Your Medications      These medications were sent to [facility] [address], [place] [address]    Hours: Mon-Fri, 8a-5:30p Phone: [phone]   · amLODIPine 10 MG tablet  · carvediloL 12.5 MG tablet  · lisinopriL 40 MG tablet  · THEREMS-M 9 mg iron-400 mcg Tab tablet         Discharge Information:     Diet:  Regular    Physical Activity:  As tolerated             Instructions:  1. Take all medications as prescribed  2. Keep all follow-up appointments  3. Return to the hospital or call your primary care physicians if any worsening symptoms such as fever, chest pain, shortness of breath, return of symptoms, or any other concerns.    Follow-Up Appointments:  PCP    Jamey Mendes MD  [facility] Internal Medicine, -I

## 2022-05-02 LAB — HIV 1+2 AB+HIV1 P24 AG SERPL QL IA: NEGATIVE

## 2022-05-03 LAB
VIT B1 BLD-MCNC: 39 UG/L (ref 38–122)
VIT B1 BLD-MCNC: 39 UG/L (ref 38–122)

## 2022-05-09 LAB
POCT GLUCOSE: 125 MG/DL (ref 70–110)
POCT GLUCOSE: 130 MG/DL (ref 70–110)
POCT GLUCOSE: 197 MG/DL (ref 70–110)

## 2022-06-16 ENCOUNTER — CLINICAL SUPPORT (OUTPATIENT)
Dept: SMOKING CESSATION | Facility: CLINIC | Age: 52
End: 2022-06-16
Payer: COMMERCIAL

## 2022-06-16 DIAGNOSIS — F17.200 NICOTINE DEPENDENCE: Primary | ICD-10-CM

## 2022-06-16 PROCEDURE — 99406 PR TOBACCO USE CESSATION INTERMEDIATE 3-10 MINUTES: ICD-10-PCS | Mod: S$GLB,,,

## 2022-06-16 PROCEDURE — 99999 PR PBB SHADOW E&M-EST. PATIENT-LVL I: ICD-10-PCS | Mod: PBBFAC,,,

## 2022-06-16 PROCEDURE — 99999 PR PBB SHADOW E&M-EST. PATIENT-LVL I: CPT | Mod: PBBFAC,,,

## 2022-06-16 PROCEDURE — 99406 BEHAV CHNG SMOKING 3-10 MIN: CPT | Mod: S$GLB,,,

## 2022-06-16 NOTE — PROGRESS NOTES
Called pt to f/u on his 6 month smoking cessation quit status. Pt stated he is still smoking. Informed him he has benefits available and is able to rejoin. Pt not ready to make appointment. He will call back when ready. Informed him of benefit period, phone follow ups, and contact information. Will complete smart form for 6 months and will continue to follow up on quit #4 episode.         Called wife Mónica with update. Attending Only

## 2022-10-12 NOTE — DISCHARGE INSTRUCTIONS
Stop taking the Lisinopril/HCTZ.  Increase your water intake.  Follow up as directed.  Return to the ED if condition changes, progresses, or if you have any concerns.    DISCHARGE

## 2023-01-26 ENCOUNTER — CLINICAL SUPPORT (OUTPATIENT)
Dept: SMOKING CESSATION | Facility: CLINIC | Age: 53
End: 2023-01-26
Payer: COMMERCIAL

## 2023-01-26 DIAGNOSIS — F17.200 NICOTINE DEPENDENCE: Primary | ICD-10-CM

## 2023-01-26 PROCEDURE — 99407 PR TOBACCO USE CESSATION INTENSIVE >10 MINUTES: ICD-10-PCS | Mod: S$GLB,,,

## 2023-01-26 PROCEDURE — 99407 BEHAV CHNG SMOKING > 10 MIN: CPT | Mod: S$GLB,,,

## 2023-01-26 NOTE — PROGRESS NOTES
Spoke with patient today in regard to smoking cessation progress for 12 month telephone follow up, he states not tobacco free. Patient states he interested in returning to the program.  I began to schedule the patient and then he stopped me and said he would call his CTTS and he had her number.  Patient states he will contact her at a later time to schedule an appointment. Informed patient of benefit period. Will complete smart form for 12 month follow up and resolve Quit attempt #4.

## 2023-04-05 DIAGNOSIS — N52.01 ERECTILE DYSFUNCTION DUE TO ARTERIAL INSUFFICIENCY: ICD-10-CM

## 2023-04-05 RX ORDER — TADALAFIL 20 MG/1
20 TABLET ORAL
Qty: 30 TABLET | Refills: 11 | Status: CANCELLED | OUTPATIENT
Start: 2023-04-05 | End: 2024-04-04

## 2023-04-14 ENCOUNTER — OFFICE VISIT (OUTPATIENT)
Dept: UROLOGY | Facility: CLINIC | Age: 53
End: 2023-04-14
Payer: MEDICAID

## 2023-04-14 VITALS — HEART RATE: 68 BPM | SYSTOLIC BLOOD PRESSURE: 157 MMHG | DIASTOLIC BLOOD PRESSURE: 79 MMHG

## 2023-04-14 DIAGNOSIS — N52.01 ERECTILE DYSFUNCTION DUE TO ARTERIAL INSUFFICIENCY: Primary | ICD-10-CM

## 2023-04-14 DIAGNOSIS — N48.6 PEYRONIE DISEASE: ICD-10-CM

## 2023-04-14 PROCEDURE — 3077F PR MOST RECENT SYSTOLIC BLOOD PRESSURE >= 140 MM HG: ICD-10-PCS | Mod: CPTII,,, | Performed by: UROLOGY

## 2023-04-14 PROCEDURE — 99214 PR OFFICE/OUTPT VISIT, EST, LEVL IV, 30-39 MIN: ICD-10-PCS | Mod: S$PBB,,, | Performed by: UROLOGY

## 2023-04-14 PROCEDURE — 4010F ACE/ARB THERAPY RXD/TAKEN: CPT | Mod: CPTII,,, | Performed by: UROLOGY

## 2023-04-14 PROCEDURE — 4010F PR ACE/ARB THEARPY RXD/TAKEN: ICD-10-PCS | Mod: CPTII,,, | Performed by: UROLOGY

## 2023-04-14 PROCEDURE — 3078F DIAST BP <80 MM HG: CPT | Mod: CPTII,,, | Performed by: UROLOGY

## 2023-04-14 PROCEDURE — 1160F RVW MEDS BY RX/DR IN RCRD: CPT | Mod: CPTII,,, | Performed by: UROLOGY

## 2023-04-14 PROCEDURE — 3078F PR MOST RECENT DIASTOLIC BLOOD PRESSURE < 80 MM HG: ICD-10-PCS | Mod: CPTII,,, | Performed by: UROLOGY

## 2023-04-14 PROCEDURE — 99999 PR PBB SHADOW E&M-EST. PATIENT-LVL III: ICD-10-PCS | Mod: PBBFAC,,, | Performed by: UROLOGY

## 2023-04-14 PROCEDURE — 1159F MED LIST DOCD IN RCRD: CPT | Mod: CPTII,,, | Performed by: UROLOGY

## 2023-04-14 PROCEDURE — 1160F PR REVIEW ALL MEDS BY PRESCRIBER/CLIN PHARMACIST DOCUMENTED: ICD-10-PCS | Mod: CPTII,,, | Performed by: UROLOGY

## 2023-04-14 PROCEDURE — 99999 PR PBB SHADOW E&M-EST. PATIENT-LVL III: CPT | Mod: PBBFAC,,, | Performed by: UROLOGY

## 2023-04-14 PROCEDURE — 99213 OFFICE O/P EST LOW 20 MIN: CPT | Mod: PBBFAC,PN | Performed by: UROLOGY

## 2023-04-14 PROCEDURE — 1159F PR MEDICATION LIST DOCUMENTED IN MEDICAL RECORD: ICD-10-PCS | Mod: CPTII,,, | Performed by: UROLOGY

## 2023-04-14 PROCEDURE — 99214 OFFICE O/P EST MOD 30 MIN: CPT | Mod: S$PBB,,, | Performed by: UROLOGY

## 2023-04-14 PROCEDURE — 3077F SYST BP >= 140 MM HG: CPT | Mod: CPTII,,, | Performed by: UROLOGY

## 2023-04-14 RX ORDER — TADALAFIL 20 MG/1
20 TABLET ORAL
Qty: 15 TABLET | Refills: 11 | Status: SHIPPED | OUTPATIENT
Start: 2023-04-14 | End: 2024-04-13

## 2023-04-14 NOTE — PROGRESS NOTES
Subjective:      Veto Park is a 52 y.o. male who returns today regarding his peyronie's disease and LUTS.    He is s/p penile plication on 11/29/2018. Prior to surgery he had 80-90 degree dorsal curvature and was unable to have intercourse for almost 2 years.     There is only a mild persistent curve and he is able to again have intercourse. Without medication he does have trouble achieving full erection, but can with cialis. Considering shock wave treatment.    He reports minimal LUTS today. Takes finasteride, prescribed elsewhere.    The following portions of the patient's history were reviewed and updated as appropriate: allergies, current medications, past family history, past medical history, past social history, past surgical history and problem list.    Review of Systems  A comprehensive multipoint review of systems was negative except as otherwise stated in the HPI.     Objective:   Vitals: BP (!) 157/79   Pulse 68     Physical Exam   General: alert and oriented, no acute distress  Respiratory: Symmetric expansion, non-labored breathing  Neuro: no gross deficits  Psych: normal judgment and insight, normal mood/affect and non-anxious    Lab Review     Lab Results   Component Value Date    WBC 3.86 (L) 04/29/2022    HGB 12.0 (L) 04/29/2022    HCT 33.7 (L) 04/29/2022    MCV 98 04/29/2022    PLT 91 (L) 04/29/2022     Lab Results   Component Value Date    CREATININE 0.7 04/29/2022    BUN 8 04/29/2022         Assessment and Plan:   1. Peyronie's disease  -- Doing well s/p plication    2. Erectile dysfunction due to arterial insufficiency  -- Cialis PRN    3. LUTS  -- Low bother - OK to DC finasteride    FU 12 mos

## 2023-06-21 ENCOUNTER — HOSPITAL ENCOUNTER (EMERGENCY)
Facility: HOSPITAL | Age: 53
Discharge: HOME | End: 2023-06-21
Attending: EMERGENCY MEDICINE
Payer: MEDICAID

## 2023-06-21 VITALS
OXYGEN SATURATION: 100 % | SYSTOLIC BLOOD PRESSURE: 148 MMHG | TEMPERATURE: 98 F | HEIGHT: 72 IN | RESPIRATION RATE: 18 BRPM | HEART RATE: 61 BPM | DIASTOLIC BLOOD PRESSURE: 67 MMHG | WEIGHT: 243 LBS | BODY MASS INDEX: 32.91 KG/M2

## 2023-06-21 DIAGNOSIS — R10.13 EPIGASTRIC ABDOMINAL PAIN: ICD-10-CM

## 2023-06-21 DIAGNOSIS — K29.70 GASTRITIS, PRESENCE OF BLEEDING UNSPECIFIED, UNSPECIFIED CHRONICITY, UNSPECIFIED GASTRITIS TYPE: Primary | ICD-10-CM

## 2023-06-21 LAB
ALBUMIN SERPL BCP-MCNC: 4.1 G/DL (ref 3.5–5.2)
ALP SERPL-CCNC: 24 U/L (ref 55–135)
ALT SERPL W/O P-5'-P-CCNC: 25 U/L (ref 10–44)
ANION GAP SERPL CALC-SCNC: 10 MMOL/L (ref 8–16)
AST SERPL-CCNC: 21 U/L (ref 10–40)
BASOPHILS # BLD AUTO: 0.05 K/UL (ref 0–0.2)
BASOPHILS NFR BLD: 0.5 % (ref 0–1.9)
BILIRUB SERPL-MCNC: 0.9 MG/DL (ref 0.1–1)
BILIRUB UR QL STRIP: NEGATIVE
BUN SERPL-MCNC: 26 MG/DL (ref 6–20)
CALCIUM SERPL-MCNC: 9.4 MG/DL (ref 8.7–10.5)
CHLORIDE SERPL-SCNC: 104 MMOL/L (ref 95–110)
CLARITY UR: CLEAR
CO2 SERPL-SCNC: 23 MMOL/L (ref 23–29)
COLOR UR: YELLOW
CREAT SERPL-MCNC: 1.2 MG/DL (ref 0.5–1.4)
DIFFERENTIAL METHOD: ABNORMAL
EOSINOPHIL # BLD AUTO: 0.4 K/UL (ref 0–0.5)
EOSINOPHIL NFR BLD: 3.6 % (ref 0–8)
ERYTHROCYTE [DISTWIDTH] IN BLOOD BY AUTOMATED COUNT: 12.3 % (ref 11.5–14.5)
EST. GFR  (NO RACE VARIABLE): >60 ML/MIN/1.73 M^2
GLUCOSE SERPL-MCNC: 130 MG/DL (ref 70–110)
GLUCOSE UR QL STRIP: NEGATIVE
HCT VFR BLD AUTO: 41.7 % (ref 40–54)
HGB BLD-MCNC: 14.5 G/DL (ref 14–18)
HGB UR QL STRIP: NEGATIVE
IMM GRANULOCYTES # BLD AUTO: 0.02 K/UL (ref 0–0.04)
IMM GRANULOCYTES NFR BLD AUTO: 0.2 % (ref 0–0.5)
KETONES UR QL STRIP: NEGATIVE
LACTATE SERPL-SCNC: 0.7 MMOL/L (ref 0.5–2.2)
LEUKOCYTE ESTERASE UR QL STRIP: NEGATIVE
LIPASE SERPL-CCNC: 39 U/L (ref 4–60)
LYMPHOCYTES # BLD AUTO: 1.6 K/UL (ref 1–4.8)
LYMPHOCYTES NFR BLD: 14.4 % (ref 18–48)
MCH RBC QN AUTO: 34.7 PG (ref 27–31)
MCHC RBC AUTO-ENTMCNC: 34.8 G/DL (ref 32–36)
MCV RBC AUTO: 100 FL (ref 82–98)
MONOCYTES # BLD AUTO: 1 K/UL (ref 0.3–1)
MONOCYTES NFR BLD: 9.3 % (ref 4–15)
NEUTROPHILS # BLD AUTO: 7.9 K/UL (ref 1.8–7.7)
NEUTROPHILS NFR BLD: 72 % (ref 38–73)
NITRITE UR QL STRIP: NEGATIVE
NRBC BLD-RTO: 0 /100 WBC
PH UR STRIP: 6 [PH] (ref 5–8)
PLATELET # BLD AUTO: 133 K/UL (ref 150–450)
PMV BLD AUTO: 11.2 FL (ref 9.2–12.9)
POCT GLUCOSE: 116 MG/DL (ref 70–110)
POTASSIUM SERPL-SCNC: 4.7 MMOL/L (ref 3.5–5.1)
PROT SERPL-MCNC: 7.3 G/DL (ref 6–8.4)
PROT UR QL STRIP: NEGATIVE
RBC # BLD AUTO: 4.18 M/UL (ref 4.6–6.2)
SODIUM SERPL-SCNC: 137 MMOL/L (ref 136–145)
SP GR UR STRIP: 1.03 (ref 1–1.03)
TROPONIN I SERPL DL<=0.01 NG/ML-MCNC: <0.006 NG/ML (ref 0–0.03)
URN SPEC COLLECT METH UR: NORMAL
UROBILINOGEN UR STRIP-ACNC: NEGATIVE EU/DL
WBC # BLD AUTO: 10.96 K/UL (ref 3.9–12.7)

## 2023-06-21 PROCEDURE — 96375 TX/PRO/DX INJ NEW DRUG ADDON: CPT

## 2023-06-21 PROCEDURE — 25500020 PHARM REV CODE 255: Performed by: PHYSICIAN ASSISTANT

## 2023-06-21 PROCEDURE — 81003 URINALYSIS AUTO W/O SCOPE: CPT | Performed by: NURSE PRACTITIONER

## 2023-06-21 PROCEDURE — 93010 EKG 12-LEAD: ICD-10-PCS | Mod: ,,, | Performed by: INTERNAL MEDICINE

## 2023-06-21 PROCEDURE — 99285 EMERGENCY DEPT VISIT HI MDM: CPT | Mod: 25

## 2023-06-21 PROCEDURE — 83690 ASSAY OF LIPASE: CPT | Performed by: PHYSICIAN ASSISTANT

## 2023-06-21 PROCEDURE — 96374 THER/PROPH/DIAG INJ IV PUSH: CPT | Mod: 59

## 2023-06-21 PROCEDURE — 80053 COMPREHEN METABOLIC PANEL: CPT | Performed by: PHYSICIAN ASSISTANT

## 2023-06-21 PROCEDURE — 63600175 PHARM REV CODE 636 W HCPCS: Performed by: PHYSICIAN ASSISTANT

## 2023-06-21 PROCEDURE — 25000003 PHARM REV CODE 250: Performed by: PHYSICIAN ASSISTANT

## 2023-06-21 PROCEDURE — 84484 ASSAY OF TROPONIN QUANT: CPT | Performed by: PHYSICIAN ASSISTANT

## 2023-06-21 PROCEDURE — 93010 ELECTROCARDIOGRAM REPORT: CPT | Mod: ,,, | Performed by: INTERNAL MEDICINE

## 2023-06-21 PROCEDURE — 93005 ELECTROCARDIOGRAM TRACING: CPT

## 2023-06-21 PROCEDURE — 85025 COMPLETE CBC W/AUTO DIFF WBC: CPT | Performed by: NURSE PRACTITIONER

## 2023-06-21 PROCEDURE — 96361 HYDRATE IV INFUSION ADD-ON: CPT

## 2023-06-21 PROCEDURE — 83605 ASSAY OF LACTIC ACID: CPT | Performed by: PHYSICIAN ASSISTANT

## 2023-06-21 PROCEDURE — 82962 GLUCOSE BLOOD TEST: CPT

## 2023-06-21 RX ORDER — ONDANSETRON 2 MG/ML
4 INJECTION INTRAMUSCULAR; INTRAVENOUS
Status: COMPLETED | OUTPATIENT
Start: 2023-06-21 | End: 2023-06-21

## 2023-06-21 RX ORDER — MORPHINE SULFATE 2 MG/ML
2 INJECTION, SOLUTION INTRAMUSCULAR; INTRAVENOUS
Status: COMPLETED | OUTPATIENT
Start: 2023-06-21 | End: 2023-06-21

## 2023-06-21 RX ORDER — FAMOTIDINE 20 MG/1
20 TABLET, FILM COATED ORAL 2 TIMES DAILY
Qty: 60 TABLET | Refills: 11 | Status: SHIPPED | OUTPATIENT
Start: 2023-06-21 | End: 2024-06-20

## 2023-06-21 RX ORDER — ONDANSETRON 4 MG/1
8 TABLET, ORALLY DISINTEGRATING ORAL EVERY 8 HOURS PRN
Qty: 10 TABLET | Refills: 0 | Status: SHIPPED | OUTPATIENT
Start: 2023-06-21

## 2023-06-21 RX ADMIN — SODIUM CHLORIDE 1000 ML: 9 INJECTION, SOLUTION INTRAVENOUS at 12:06

## 2023-06-21 RX ADMIN — ONDANSETRON 4 MG: 2 INJECTION INTRAMUSCULAR; INTRAVENOUS at 12:06

## 2023-06-21 RX ADMIN — MORPHINE SULFATE 2 MG: 2 INJECTION, SOLUTION INTRAMUSCULAR; INTRAVENOUS at 12:06

## 2023-06-21 RX ADMIN — IOHEXOL 100 ML: 350 INJECTION, SOLUTION INTRAVENOUS at 02:06

## 2023-06-21 NOTE — FIRST PROVIDER EVALUATION
Emergency Department TeleTriage Encounter Note      CHIEF COMPLAINT    Chief Complaint   Patient presents with    Abdominal Pain     Upper right quadrant abdominal pain that started around 0900. Became nauseous and having diarrhea.        VITAL SIGNS   Initial Vitals [06/21/23 1212]   BP Pulse Resp Temp SpO2   139/65 68 16 97.9 °F (36.6 °C) 97 %      MAP       --            ALLERGIES    Review of patient's allergies indicates:   Allergen Reactions    Codeine Nausea And Vomiting    Pcn [penicillins] Other (See Comments)     Unknown reaction    Betadine [povidone-iodine] Swelling and Other (See Comments)     redness       PROVIDER TRIAGE NOTE  This is a teletriage evaluation of a 52 y.o. male presenting to the ED with c/o upper right abdominal pain since 9a this morning. Hx of pancreatitis but this feels different. Gallbaldder removed. Limited physical exam via telehealth: The patient is awake, alert, answering questions appropriately and is not in respiratory distress. Initial orders will be placed and care will be transferred to an alternate provider when patient is roomed for a full evaluation. Any additional orders and the final disposition will be determined by that provider.         ORDERS  Labs Reviewed   CBC W/ AUTO DIFFERENTIAL   COMPREHENSIVE METABOLIC PANEL   LIPASE   URINALYSIS, REFLEX TO URINE CULTURE       ED Orders (720h ago, onward)      Start Ordered     Status Ordering Provider    06/21/23 1218 06/21/23 1217  Vital signs  Every 2 hours         Ordered LENY BONNER    06/21/23 1217 06/21/23 1217  Diet NPO  Diet effective now         Ordered LENY BONNER    06/21/23 1217 06/21/23 1217  Insert peripheral IV  Once         Ordered LENY BONNER    06/21/23 1217 06/21/23 1217  CBC W/ AUTO DIFFERENTIAL  STAT         Ordered LENY BONNER    06/21/23 1217 06/21/23 1217  Comp. Metabolic Panel  STAT         Ordered LENY BONNER    06/21/23 1217 06/21/23 1217  Lipase  STAT         Ordered  LENY BONNER.    06/21/23 1217 06/21/23 1217  Urinalysis, Reflex to Urine Culture Urine, Clean Catch  STAT         Ordered LENY BONNER              Virtual Visit Note: The provider triage portion of this emergency department evaluation and documentation was performed via [employer], a HIPAA-compliant telemedicine application, in concert with a tele-presenter in the room. A face to face patient evaluation with one of my colleagues will occur once the patient is placed in an emergency department room.      DISCLAIMER: This note was prepared with [employer] voice recognition transcription software. Garbled syntax, mangled pronouns, and other bizarre constructions may be attributed to that software system.

## 2023-06-21 NOTE — ED PROVIDER NOTES
Encounter Date: 6/21/2023       History     Chief Complaint   Patient presents with    Abdominal Pain     Upper right quadrant abdominal pain that started around 0900. Became nauseous and having diarrhea.      This is a 52-year-old white male with significant past medical history DM type 2, COPD, alcohol abuse, GERD, hypertension, stroke, and pancreatitis that presents to ED with complaint of acute onset epigastric and right upper abdominal pain that began around 9:00 a.m. this morning.  He states the pain has been constant with waves of a sharp pain but the pain is generally aching and throbbing and does not radiate.  He denies any association with meals.  Associated symptoms include nausea without vomiting, diarrhea (3 episodes of watery, loose stool), shortness of breath (resolved).  He states his last alcohol consumption was roughly 2-3 months ago.  This is also the timeline for his last bout of pancreatitis for which she did not go to the hospital or be evaluated.  He is taken no medications for his pain.  His last meal was this morning for breakfast where he had two eggs and tolerated it without vomiting.  He denies any fever, chest pain, cough, current shortness of breath, left arm pain, jaw pain, sick contacts, or recent cold symptoms.  He does state he has been working outside the last few days and is concerned he may be dehydrated.    Review of patient's allergies indicates:   Allergen Reactions    Codeine Nausea And Vomiting    Pcn [penicillins] Other (See Comments)     Unknown reaction    Betadine [povidone-iodine] Swelling and Other (See Comments)     redness     Past Medical History:   Diagnosis Date    Alcohol withdrawal seizure 7/21/2021    Alcoholic     Arthritis     COPD (chronic obstructive pulmonary disease)     Diabetes mellitus     Erectile dysfunction     GERD (gastroesophageal reflux disease)     Hypertension     ABHILASH on CPAP     Peyronie's disease     Stroke     vs TIA 1/14/2020    Toxic  effect of contact with stingray 11/2018    right wrist     Past Surgical History:   Procedure Laterality Date    LASIK      ISRAEL PLICATION N/A 11/27/2018    Procedure: PLICATION, PENIS;  Surgeon: Ralph Wilcox MD;  Location: [facility];  Service: Urology;  Laterality: N/A;    VASECTOMY       Family History   Problem Relation Age of Onset    Clotting disorder Father     Heart disease Father     Hypertension Father     Heart disease Mother     Hypertension Mother     Heart disease Maternal Aunt     Heart disease Maternal Uncle     Heart disease Paternal Aunt     Heart disease Paternal Uncle     Hypertension Sister     Hypertension Brother     Prostate cancer Brother      Social History     Tobacco Use    Smoking status: Some Days     Packs/day: 1.50     Years: 40.00     Pack years: 60.00     Types: Cigarettes     Start date: 1982     Last attempt to quit: 1/30/2020     Years since quitting: 3.3    Smokeless tobacco: Never    Tobacco comments:     Pt enrolled in [employer] on 1/15/20 ([employer] Member ID # [id number])  Ambulatory referral to Smoking Cessation program.    Substance Use Topics    Alcohol use: Yes     Comment: heavy etoh use daily;5th of vodka a day    Drug use: No     Review of Systems   Constitutional:  Negative for appetite change, fatigue and fever.   HENT:  Negative for congestion, rhinorrhea and sore throat.    Eyes:  Negative for photophobia.   Respiratory:  Positive for shortness of breath. Negative for cough.    Cardiovascular:  Negative for chest pain and palpitations.   Gastrointestinal:  Positive for abdominal pain, diarrhea and nausea. Negative for vomiting.   Genitourinary:  Negative for frequency, hematuria and urgency.   Musculoskeletal:  Positive for neck pain. Negative for back pain.   Neurological:  Negative for dizziness and headaches.   Psychiatric/Behavioral:  Negative for behavioral problems and confusion.      Physical Exam     Initial Vitals [06/21/23 1212]   BP Pulse Resp Temp SpO2    139/65 68 16 97.9 °F (36.6 °C) 97 %      MAP       --         Physical Exam    Constitutional: He appears well-developed and well-nourished.   HENT:   Head: Normocephalic and atraumatic.   Cardiovascular:  Normal rate, regular rhythm and normal heart sounds.           Pulmonary/Chest: Breath sounds normal. He has no wheezes.   Abdominal: Abdomen is soft. Bowel sounds are normal. He exhibits distension. There is abdominal tenderness in the right upper quadrant, right lower quadrant and epigastric area.     Neurological: He is alert and oriented to person, place, and time.   Skin: Skin is warm and dry.   Psychiatric: He has a normal mood and affect. Thought content normal.       ED Course   Procedures  Labs Reviewed   CBC W/ AUTO DIFFERENTIAL - Abnormal; Notable for the following components:       Result Value    RBC 4.18 (*)      (*)     MCH 34.7 (*)     Platelets 133 (*)     Gran # (ANC) 7.9 (*)     Lymph % 14.4 (*)     All other components within normal limits   COMPREHENSIVE METABOLIC PANEL - Abnormal; Notable for the following components:    Glucose 130 (*)     BUN 26 (*)     Alkaline Phosphatase 24 (*)     All other components within normal limits   POCT GLUCOSE - Abnormal; Notable for the following components:    POCT Glucose 116 (*)     All other components within normal limits   URINALYSIS, REFLEX TO URINE CULTURE    Narrative:     Specimen Source->Urine   LIPASE   TROPONIN I   LACTIC ACID, PLASMA          Imaging Results              CT Abdomen Pelvis With Contrast (Final result)  Result time 06/21/23 15:40:09      Final result by Axel Bo III, MD (06/21/23 15:40:09)                   Impression:      No acute process seen.  There is no evidence of acute pancreatitis.    Hepatomegaly, fatty change of the liver, prominent portal vein, and collaterals suggesting cirrhosis and portal hypertension.    Gastric fold thickening involving the body and fundus of the stomach.  This could be gastritis.   Zollinger-Galarza syndrome cannot be excluded.  An infiltrative process such as neoplasm is less likely due to the lack of change since 04/26/2022.      Electronically signed by: Axel Bo MD  Date:    06/21/2023  Time:    15:40               Narrative:    EXAMINATION:  CT ABDOMEN PELVIS WITH CONTRAST    CLINICAL HISTORY:  Abdominal pain, acute, nonlocalized;hx of pancreatitis, RUQ and epigastric abdominal pain;    FINDINGS:  Comparison is 04/26/2022.    Patient was administered 100 cc of Omnipaque 350 intravenously.    Lung bases are clear.  There are 2 tiny liver lesions too small to characterize.  There is fatty change of the liver.  The gallbladder has been removed.  The spleen is unremarkable.  There is gastric fold thickening.  The duodenum, and pancreas demonstrate nothing unusual.  The adrenal glands are not enlarged.  The kidneys enhance normally.  Vessels enhance normally.  There is a recanalized periumbilical vein.  There are left upper quadrant splenic hilar collaterals.  There is hepatomegaly.  No para-aortic, retroperitoneal, or mesenteric adenopathy is seen.  No obstruction, ileus, perforation, or ascites is seen.  No ascites is seen.  The bowel loops demonstrate nothing unusual.  The appendix is normal.  No abscess or inflammation seen.  No adenopathy seen.  Portal vein is prominent.                                       X-Ray Chest AP Portable (Final result)  Result time 06/21/23 13:28:57      Final result by Axel Bo III, MD (06/21/23 13:28:57)                   Impression:      No acute process seen.      Electronically signed by: Axel Bo MD  Date:    06/21/2023  Time:    13:28               Narrative:    EXAMINATION:  XR CHEST AP PORTABLE    CLINICAL HISTORY:  sob;    FINDINGS:  Heart size is normal.  Lungs are clear.  The bones show nothing unusual.                                       Medications   sodium chloride 0.9% bolus 1,000 mL 1,000 mL (0 mLs Intravenous Stopped  6/21/23 1355)   ondansetron injection 4 mg (4 mg Intravenous Given 6/21/23 1257)   morphine injection 2 mg (2 mg Intravenous Given 6/21/23 1258)   iohexoL (OMNIPAQUE 350) injection 100 mL (100 mLs Intravenous Given 6/21/23 1442)     Medical Decision Making:   Initial Assessment:   Is a 52-year-old white male who presents the ED with complaint of nausea without vomiting, diarrhea, epigastric and right upper quadrant abdominal pain.  Differential Diagnosis:   Pancreatitis, gastritis, GERD, appendicitis, obstruction, diverticulitis  ED Management:  This is a 52-year-old white male with history of alcohol abuse and pancreatitis presents ED with complaint of right upper quadrant and epigastric abdominal pain, nausea without vomiting, and diarrhea that began around 9:00 a.m. this morning.  On exam the patient is not ill-appearing and is able to talk and communicate without any noticeable discomfort.  Abdominal exam shows soft slightly distended abdomen with normal bowel sounds.  However, on palpation patient is significantly tender in the epigastric and right upper quadrant region.  There is no rebound tenderness. There is no guarding, mass.  Heart is regular rate and rhythm and lungs are clear to auscultation bilaterally.  Rest of physical exam unremarkable.  Cbc is unremarkable.  CMP shows slightly elevated BUN and indicating possible dehydration when considered with my history.  Also mildly elevated alkaline phosphatase.  Lactic acid normal.  UA negative.  Lipase, troponin within normal limits.  Glucose on arrival 116.  CT is negative for acute appendicitis but does show some hepatomegaly, fatty change in liver with possible cirrhosis.  This is not an acute finding.  CT also shows some gastric fold thickening which could be gastritis.  The patient was given IV morphine and IV Zofran along with IV fluids in the ED and is pain and nausea completely resolved.  I do not suspect any emergent or life-threatening  intra-abdominal disease process such as appendicitis, pancreatitis, obstruction, diverticulitis.  I believe this patient likely has mild gastritis and he will be discharged home with a prescription for Zantac and Zofran.  He has been instructed to follow with his PCP in the next 2-3 days.  He is to return to the ED for worsening and was given strict return precautions.  He verbalized understanding was agreeable to treatment plan.           ED Course as of 06/21/23 1559   Wed Jun 21, 2023   1337 Checked on Patient and his pain has drastically improved and nausea has completely resolved. [NS]   1520 Pain  and nausea completely resolved [NS]      ED Course User Index  [NS] Alirio Quiñones PA-C                   Clinical Impression:   Final diagnoses:  [R10.13] Epigastric abdominal pain  [K29.70] Gastritis, presence of bleeding unspecified, unspecified chronicity, unspecified gastritis type (Primary)        ED Disposition Condition    Discharge Good          ED Prescriptions       Medication Sig Dispense Start Date End Date Auth. Provider    famotidine (PEPCID) 20 MG tablet Take 1 tablet (20 mg total) by mouth 2 (two) times daily. 60 tablet 6/21/2023 6/20/2024 Alirio Quiñones PA-C    ondansetron (ZOFRAN-ODT) 4 MG TbDL Take 2 tablets (8 mg total) by mouth every 8 (eight) hours as needed. 10 tablet 6/21/2023 -- Alirio Quiñones PA-C          Follow-up Information       Follow up With Specialties Details Why Contact Info    Deloris Bah MD Family Medicine In 2 days  [address]  [address]  [facility] [address] [phone]      [place] - Emergency Dept Emergency Medicine  If symptoms worsen [address] [facility] [facility] [address] [phone]             Alirio Quiñones PA-C  06/21/23 2088

## 2024-04-08 ENCOUNTER — HOSPITAL ENCOUNTER (INPATIENT)
Facility: HOSPITAL | Age: 54
LOS: 4 days | Discharge: HOME | DRG: 393 | End: 2024-04-12
Attending: STUDENT IN AN ORGANIZED HEALTH CARE EDUCATION/TRAINING PROGRAM | Admitting: INTERNAL MEDICINE
Payer: MEDICAID

## 2024-04-08 DIAGNOSIS — F17.200 TOBACCO DEPENDENCY: ICD-10-CM

## 2024-04-08 DIAGNOSIS — E87.20 ACIDOSIS: ICD-10-CM

## 2024-04-08 DIAGNOSIS — K92.0 HEMATEMESIS WITH NAUSEA: Primary | ICD-10-CM

## 2024-04-08 DIAGNOSIS — K92.0 COFFEE GROUND EMESIS: ICD-10-CM

## 2024-04-08 DIAGNOSIS — I48.91 ATRIAL FIBRILLATION, UNSPECIFIED TYPE: ICD-10-CM

## 2024-04-08 DIAGNOSIS — E11.9 DIABETES MELLITUS WITHOUT COMPLICATION: ICD-10-CM

## 2024-04-08 DIAGNOSIS — Z78.9 ALCOHOL USE: ICD-10-CM

## 2024-04-08 DIAGNOSIS — K31.89 PORTAL HYPERTENSIVE GASTROPATHY: ICD-10-CM

## 2024-04-08 DIAGNOSIS — E11.9 TYPE 2 DIABETES MELLITUS WITHOUT COMPLICATION, WITHOUT LONG-TERM CURRENT USE OF INSULIN: Chronic | ICD-10-CM

## 2024-04-08 DIAGNOSIS — N48.6 PEYRONIE DISEASE: ICD-10-CM

## 2024-04-08 DIAGNOSIS — R74.8 ELEVATED LIVER ENZYMES: ICD-10-CM

## 2024-04-08 DIAGNOSIS — I85.11 SECONDARY ESOPHAGEAL VARICES WITH BLEEDING: ICD-10-CM

## 2024-04-08 DIAGNOSIS — R20.2 PARESTHESIAS: ICD-10-CM

## 2024-04-08 DIAGNOSIS — R53.1 WEAKNESS: ICD-10-CM

## 2024-04-08 DIAGNOSIS — R00.0 TACHYCARDIA: ICD-10-CM

## 2024-04-08 DIAGNOSIS — F10.939 ALCOHOL WITHDRAWAL: ICD-10-CM

## 2024-04-08 DIAGNOSIS — K76.6 PORTAL HYPERTENSIVE GASTROPATHY: ICD-10-CM

## 2024-04-08 LAB
ALBUMIN SERPL BCP-MCNC: 4.9 G/DL (ref 3.5–5.2)
ALP SERPL-CCNC: 33 U/L (ref 38–126)
ALT SERPL W/O P-5'-P-CCNC: 104 U/L (ref 10–44)
ANION GAP SERPL CALC-SCNC: 27 MMOL/L (ref 8–16)
AST SERPL-CCNC: 99 U/L (ref 15–46)
BASOPHILS # BLD AUTO: 0.05 K/UL (ref 0–0.2)
BASOPHILS NFR BLD: 0.7 % (ref 0–1.9)
BILIRUB SERPL-MCNC: 1.1 MG/DL (ref 0.1–1)
CALCIUM SERPL-MCNC: 8.8 MG/DL (ref 8.7–10.5)
CHLORIDE SERPL-SCNC: 95 MMOL/L (ref 95–110)
CO2 SERPL-SCNC: 18 MMOL/L (ref 23–29)
CREAT SERPL-MCNC: 1 MG/DL (ref 0.5–1.4)
DIFFERENTIAL METHOD BLD: ABNORMAL
EOSINOPHIL # BLD AUTO: 0 K/UL (ref 0–0.5)
EOSINOPHIL NFR BLD: 0 % (ref 0–8)
ERYTHROCYTE [DISTWIDTH] IN BLOOD BY AUTOMATED COUNT: 12.4 % (ref 11.5–14.5)
EST. GFR  (NO RACE VARIABLE): >60 ML/MIN/1.73 M^2
ETHANOL SERPL-MCNC: 196 MG/DL
GLUCOSE SERPL-MCNC: 218 MG/DL (ref 70–110)
HCT VFR BLD AUTO: 43.4 % (ref 40–54)
HGB BLD-MCNC: 13.3 G/DL (ref 14–18)
HGB BLD-MCNC: 15 G/DL (ref 14–18)
IMM GRANULOCYTES # BLD AUTO: 0.02 K/UL (ref 0–0.04)
IMM GRANULOCYTES NFR BLD AUTO: 0.3 % (ref 0–0.5)
INR PPP: 1.1 (ref 0.8–1.2)
LIPASE SERPL-CCNC: 142 U/L (ref 23–300)
LYMPHOCYTES # BLD AUTO: 0.8 K/UL (ref 1–4.8)
LYMPHOCYTES NFR BLD: 12 % (ref 18–48)
MCH RBC QN AUTO: 34.3 PG (ref 27–31)
MCHC RBC AUTO-ENTMCNC: 34.6 G/DL (ref 32–36)
MCV RBC AUTO: 99 FL (ref 82–98)
MONOCYTES # BLD AUTO: 0.4 K/UL (ref 0.3–1)
MONOCYTES NFR BLD: 5.5 % (ref 4–15)
NEUTROPHILS # BLD AUTO: 5.7 K/UL (ref 1.8–7.7)
NEUTROPHILS NFR BLD: 81.5 % (ref 38–73)
NRBC BLD-RTO: 0 /100 WBC
PLATELET # BLD AUTO: 159 K/UL (ref 150–450)
PMV BLD AUTO: 9.4 FL (ref 9.2–12.9)
POTASSIUM SERPL-SCNC: 4.3 MMOL/L (ref 3.5–5.1)
PROT SERPL-MCNC: 8.8 G/DL (ref 6–8.4)
PROTHROMBIN TIME: 11.5 SEC (ref 9–12.5)
RBC # BLD AUTO: 4.37 M/UL (ref 4.6–6.2)
SODIUM SERPL-SCNC: 140 MMOL/L (ref 136–145)
TROPONIN I SERPL-MCNC: <0.012 NG/ML (ref 0.01–0.03)
UUN UR-MCNC: 27 MG/DL (ref 2–20)
WBC # BLD AUTO: 6.93 K/UL (ref 3.9–12.7)

## 2024-04-08 PROCEDURE — 63600175 PHARM REV CODE 636 W HCPCS

## 2024-04-08 PROCEDURE — 25000003 PHARM REV CODE 250: Mod: ER | Performed by: STUDENT IN AN ORGANIZED HEALTH CARE EDUCATION/TRAINING PROGRAM

## 2024-04-08 PROCEDURE — 85610 PROTHROMBIN TIME: CPT | Mod: ER | Performed by: STUDENT IN AN ORGANIZED HEALTH CARE EDUCATION/TRAINING PROGRAM

## 2024-04-08 PROCEDURE — 63600175 PHARM REV CODE 636 W HCPCS: Mod: ER | Performed by: EMERGENCY MEDICINE

## 2024-04-08 PROCEDURE — 99285 EMERGENCY DEPT VISIT HI MDM: CPT | Mod: 25,ER

## 2024-04-08 PROCEDURE — C9113 INJ PANTOPRAZOLE SODIUM, VIA: HCPCS

## 2024-04-08 PROCEDURE — 93010 ELECTROCARDIOGRAM REPORT: CPT | Mod: ,,, | Performed by: STUDENT IN AN ORGANIZED HEALTH CARE EDUCATION/TRAINING PROGRAM

## 2024-04-08 PROCEDURE — 84484 ASSAY OF TROPONIN QUANT: CPT | Mod: ER | Performed by: STUDENT IN AN ORGANIZED HEALTH CARE EDUCATION/TRAINING PROGRAM

## 2024-04-08 PROCEDURE — 96365 THER/PROPH/DIAG IV INF INIT: CPT | Mod: ER

## 2024-04-08 PROCEDURE — 83690 ASSAY OF LIPASE: CPT | Mod: ER | Performed by: STUDENT IN AN ORGANIZED HEALTH CARE EDUCATION/TRAINING PROGRAM

## 2024-04-08 PROCEDURE — 63600175 PHARM REV CODE 636 W HCPCS: Mod: ER | Performed by: STUDENT IN AN ORGANIZED HEALTH CARE EDUCATION/TRAINING PROGRAM

## 2024-04-08 PROCEDURE — C9113 INJ PANTOPRAZOLE SODIUM, VIA: HCPCS | Mod: ER | Performed by: STUDENT IN AN ORGANIZED HEALTH CARE EDUCATION/TRAINING PROGRAM

## 2024-04-08 PROCEDURE — 96375 TX/PRO/DX INJ NEW DRUG ADDON: CPT | Mod: ER

## 2024-04-08 PROCEDURE — 82077 ASSAY SPEC XCP UR&BREATH IA: CPT | Mod: ER | Performed by: STUDENT IN AN ORGANIZED HEALTH CARE EDUCATION/TRAINING PROGRAM

## 2024-04-08 PROCEDURE — 11000001 HC ACUTE MED/SURG PRIVATE ROOM

## 2024-04-08 PROCEDURE — 25000003 PHARM REV CODE 250

## 2024-04-08 PROCEDURE — 85018 HEMOGLOBIN: CPT

## 2024-04-08 PROCEDURE — 36415 COLL VENOUS BLD VENIPUNCTURE: CPT

## 2024-04-08 PROCEDURE — 93005 ELECTROCARDIOGRAM TRACING: CPT | Mod: ER

## 2024-04-08 PROCEDURE — 80053 COMPREHEN METABOLIC PANEL: CPT | Mod: ER | Performed by: STUDENT IN AN ORGANIZED HEALTH CARE EDUCATION/TRAINING PROGRAM

## 2024-04-08 PROCEDURE — 85025 COMPLETE CBC W/AUTO DIFF WBC: CPT | Mod: ER | Performed by: STUDENT IN AN ORGANIZED HEALTH CARE EDUCATION/TRAINING PROGRAM

## 2024-04-08 RX ORDER — ONDANSETRON HYDROCHLORIDE 2 MG/ML
4 INJECTION, SOLUTION INTRAVENOUS
Status: COMPLETED | OUTPATIENT
Start: 2024-04-08 | End: 2024-04-08

## 2024-04-08 RX ORDER — PANTOPRAZOLE SODIUM 40 MG/10ML
40 INJECTION, POWDER, LYOPHILIZED, FOR SOLUTION INTRAVENOUS
Status: COMPLETED | OUTPATIENT
Start: 2024-04-08 | End: 2024-04-08

## 2024-04-08 RX ORDER — SODIUM CHLORIDE 0.9 % (FLUSH) 0.9 %
10 SYRINGE (ML) INJECTION EVERY 12 HOURS PRN
Status: DISCONTINUED | OUTPATIENT
Start: 2024-04-08 | End: 2024-04-12 | Stop reason: HOSPADM

## 2024-04-08 RX ORDER — PANTOPRAZOLE SODIUM 40 MG/10ML
80 INJECTION, POWDER, LYOPHILIZED, FOR SOLUTION INTRAVENOUS ONCE
Status: DISCONTINUED | OUTPATIENT
Start: 2024-04-08 | End: 2024-04-08

## 2024-04-08 RX ORDER — NALOXONE HCL 0.4 MG/ML
0.02 VIAL (ML) INJECTION
Status: DISCONTINUED | OUTPATIENT
Start: 2024-04-08 | End: 2024-04-12 | Stop reason: HOSPADM

## 2024-04-08 RX ORDER — SODIUM CHLORIDE 9 MG/ML
INJECTION, SOLUTION INTRAVENOUS CONTINUOUS
Status: DISCONTINUED | OUTPATIENT
Start: 2024-04-08 | End: 2024-04-09

## 2024-04-08 RX ORDER — DIAZEPAM 10 MG/2ML
5 INJECTION INTRAMUSCULAR EVERY 4 HOURS PRN
Status: DISCONTINUED | OUTPATIENT
Start: 2024-04-08 | End: 2024-04-12

## 2024-04-08 RX ORDER — IBUPROFEN 200 MG
24 TABLET ORAL
Status: DISCONTINUED | OUTPATIENT
Start: 2024-04-08 | End: 2024-04-12 | Stop reason: HOSPADM

## 2024-04-08 RX ORDER — DIAZEPAM 5 MG/1
10 TABLET ORAL
Status: DISCONTINUED | OUTPATIENT
Start: 2024-04-08 | End: 2024-04-08

## 2024-04-08 RX ORDER — LORAZEPAM 2 MG/ML
2 INJECTION INTRAMUSCULAR ONCE
Status: COMPLETED | OUTPATIENT
Start: 2024-04-08 | End: 2024-04-09

## 2024-04-08 RX ORDER — GLUCAGON 1 MG
1 KIT INJECTION
Status: DISCONTINUED | OUTPATIENT
Start: 2024-04-08 | End: 2024-04-09

## 2024-04-08 RX ORDER — PANTOPRAZOLE SODIUM 40 MG/10ML
40 INJECTION, POWDER, LYOPHILIZED, FOR SOLUTION INTRAVENOUS 2 TIMES DAILY
Status: DISCONTINUED | OUTPATIENT
Start: 2024-04-08 | End: 2024-04-10

## 2024-04-08 RX ORDER — IBUPROFEN 200 MG
16 TABLET ORAL
Status: DISCONTINUED | OUTPATIENT
Start: 2024-04-08 | End: 2024-04-12 | Stop reason: HOSPADM

## 2024-04-08 RX ORDER — ONDANSETRON HYDROCHLORIDE 2 MG/ML
4 INJECTION, SOLUTION INTRAVENOUS EVERY 8 HOURS PRN
Status: DISCONTINUED | OUTPATIENT
Start: 2024-04-08 | End: 2024-04-12 | Stop reason: HOSPADM

## 2024-04-08 RX ORDER — DIAZEPAM 10 MG/2ML
5 INJECTION INTRAMUSCULAR
Status: COMPLETED | OUTPATIENT
Start: 2024-04-08 | End: 2024-04-08

## 2024-04-08 RX ADMIN — DIAZEPAM 5 MG: 10 INJECTION, SOLUTION INTRAMUSCULAR; INTRAVENOUS at 06:04

## 2024-04-08 RX ADMIN — ONDANSETRON 4 MG: 2 INJECTION INTRAMUSCULAR; INTRAVENOUS at 05:04

## 2024-04-08 RX ADMIN — PANTOPRAZOLE SODIUM 40 MG: 40 INJECTION, POWDER, LYOPHILIZED, FOR SOLUTION INTRAVENOUS at 04:04

## 2024-04-08 RX ADMIN — PANTOPRAZOLE SODIUM 40 MG: 40 INJECTION, POWDER, LYOPHILIZED, FOR SOLUTION INTRAVENOUS at 10:04

## 2024-04-08 RX ADMIN — DIAZEPAM 5 MG: 10 INJECTION, SOLUTION INTRAMUSCULAR; INTRAVENOUS at 11:04

## 2024-04-08 RX ADMIN — ONDANSETRON 4 MG: 2 INJECTION INTRAMUSCULAR; INTRAVENOUS at 08:04

## 2024-04-08 RX ADMIN — SODIUM CHLORIDE: 9 INJECTION, SOLUTION INTRAVENOUS at 10:04

## 2024-04-08 RX ADMIN — CEFTRIAXONE SODIUM 1 G: 1 INJECTION, POWDER, FOR SOLUTION INTRAMUSCULAR; INTRAVENOUS at 04:04

## 2024-04-08 NOTE — ED PROVIDER NOTES
NAME:  Veto Park  CSN:     [id number]  MRN:    [id number]  ADMIT DATE: 4/8/2024        eMERGENCY dEPARTMENT eNCOUnter    CHIEF COMPLAINT    Chief Complaint   Patient presents with    Hematemesis     Vomiting blood x 3 days, no alcohol in 2 days, no meds in long time        HPI    Veto Park is a 53 y.o. male with a past medical history of  has a past medical history of Alcohol withdrawal seizure (7/21/2021), Alcoholic, Arthritis, COPD (chronic obstructive pulmonary disease), Diabetes mellitus, Erectile dysfunction, GERD (gastroesophageal reflux disease), Hypertension, ABHILASH on CPAP, Peyronie's disease, Stroke, and Toxic effect of contact with stingray (11/2018).     he presents to the ED due to hematemesis.  States he has been vomiting dark red for the last 3 days.  Believes he is vomited at least 2 L today.  Brother at bedside corroborates this.  Patient states he has not drank since Saturday and has been seizing at home secondary to this.  Notes he typically drinks vodka.  But states he has not had any recently.  Notes generalized upper abdominal pain.  No chest pain or shortness of breath.  States he has not had a bowel movement in 5 days and has not had any rectal bleeding or melena.  Insists he has had EGD done at [facility] in the past the last time when he had hematemesis.  Is not taking any medication daily.  HPI       PAST MEDICAL HISTORY  Past Medical History:   Diagnosis Date    Alcohol withdrawal seizure 7/21/2021    Alcoholic     Arthritis     COPD (chronic obstructive pulmonary disease)     Diabetes mellitus     Erectile dysfunction     GERD (gastroesophageal reflux disease)     Hypertension     ABHILASH on CPAP     Peyronie's disease     Stroke     vs TIA 1/14/2020    Toxic effect of contact with stingray 11/2018    right wrist       SURGICAL HISTORY    Past Surgical History:   Procedure Laterality Date    LASIK      ISRAEL PLICATION N/A 11/27/2018    Procedure: PLICATION, PENIS;  Surgeon:  Ralph Wilcox MD;  Location: [facility] OR;  Service: Urology;  Laterality: N/A;    VASECTOMY         FAMILY HISTORY    Family History   Problem Relation Age of Onset    Clotting disorder Father     Heart disease Father     Hypertension Father     Heart disease Mother     Hypertension Mother     Heart disease Maternal Aunt     Heart disease Maternal Uncle     Heart disease Paternal Aunt     Heart disease Paternal Uncle     Hypertension Sister     Hypertension Brother     Prostate cancer Brother        SOCIAL HISTORY    Social History     Socioeconomic History    Marital status:    Tobacco Use    Smoking status: Some Days     Current packs/day: 0.00     Average packs/day: 1.5 packs/day for 40.0 years (60.0 ttl pk-yrs)     Types: Cigarettes     Start date:      Last attempt to quit: 2020     Years since quittin.1    Smokeless tobacco: Never    Tobacco comments:     Pt enrolled in [employer] on 1/15/20 (SCT Member ID # [id number])  Ambulatory referral to Smoking Cessation program.    Substance and Sexual Activity    Alcohol use: Yes     Comment: heavy etoh use daily;5th of vodka a day    Drug use: No    Sexual activity: Not Currently       MEDICATIONS  Current Outpatient Medications   Medication Instructions    amLODIPine (NORVASC) 10 mg, Oral, Daily    carvediloL (COREG) 12.5 mg, Oral, 2 times daily, Patient unknown of dosage    famotidine (PEPCID) 20 mg, Oral, 2 times daily    lisinopriL (PRINIVIL,ZESTRIL) 40 mg, Oral, Daily    multivit-iron-FA-calcium-mins (THEREMS-M) 9 mg iron-400 mcg Tab tablet 1 tablet, Oral, Daily    ondansetron (ZOFRAN-ODT) 8 mg, Oral, Every 8 hours PRN    pantoprazole (PROTONIX) 40 mg, Oral, 2 times daily    tadalafiL (CIALIS) 20 mg, Oral, Every 72 hours PRN       ALLERGIES    Review of patient's allergies indicates:   Allergen Reactions    Codeine Nausea And Vomiting    Pcn [penicillins] Other (See Comments)     Unknown reaction    Betadine [povidone-iodine] Swelling and  Other (See Comments)     redness         REVIEW OF SYSTEMS   Review of Systems       PHYSICAL EXAM    Reviewed Triage Note    VITAL SIGNS:   ED Triage Vitals [04/08/24 1607]   Enc Vitals Group      BP (!) 178/82      Pulse (!) 115      Resp 16      Temp 98.3 °F (36.8 °C)      Temp src       SpO2 98 %      Weight 240 lb      Height 6'      Head Circumference       Peak Flow       Pain Score       Pain Loc       Pain Edu?       Excl. in GC?        Patient Vitals for the past 24 hrs:   BP Temp Pulse Resp SpO2 Height Weight   04/08/24 1607 (!) 178/82 98.3 °F (36.8 °C) (!) 115 16 98 % 6' (1.829 m) 108.9 kg (240 lb)       Physical Exam    Nursing note and vitals reviewed.  Constitutional: He appears well-developed and well-nourished.   Dried brownish vomit over his overalls   HENT:   Head: Normocephalic and atraumatic.   Eyes: EOM are normal. Pupils are equal, round, and reactive to light.   Neck: Neck supple.   Normal range of motion.  Cardiovascular:  Regular rhythm and normal heart sounds.   Tachycardia present.         Pulmonary/Chest: Breath sounds normal. No respiratory distress.   Abdominal: Abdomen is soft. There is abdominal tenderness (epigastric, RUQ, LUQ).   Musculoskeletal:         General: Normal range of motion.      Cervical back: Normal range of motion and neck supple.     Neurological: He is alert and oriented to person, place, and time. He has normal strength. GCS score is 15. GCS eye subscore is 4. GCS verbal subscore is 5. GCS motor subscore is 6.   Trace resting tremor noted   Skin: Skin is warm and dry.   Psychiatric: He has a normal mood and affect.          EKG     Interpreted by EM physician if performed:   Sinus tachycardia at a rate of 102.  Right bundle-branch block.  When compared to previous, right bundle-branch block remains.  Tachycardia is new.  There is some baseline motion artifact on this EKG but there is no STEMI equivalent noted.            LABS  Pertinent labs reviewed. (See chart  for details)   Labs Reviewed   CBC W/ AUTO DIFFERENTIAL - Abnormal; Notable for the following components:       Result Value    RBC 4.37 (*)     MCV 99 (*)     MCH 34.3 (*)     Lymph # 0.8 (*)     Gran % 81.5 (*)     Lymph % 12.0 (*)     All other components within normal limits   COMPREHENSIVE METABOLIC PANEL - Abnormal; Notable for the following components:    CO2 18 (*)     Glucose 218 (*)     BUN 27 (*)     Total Protein 8.8 (*)     Total Bilirubin 1.1 (*)     Alkaline Phosphatase 33 (*)     AST 99 (*)      (*)     Anion Gap 27 (*)     All other components within normal limits   ALCOHOL,MEDICAL (ETHANOL) - Abnormal; Notable for the following components:    Alcohol, Serum 196 (*)     All other components within normal limits   LIPASE   TROPONIN I   URINALYSIS, REFLEX TO URINE CULTURE   DRUG SCREEN PANEL, URINE EMERGENCY   PROTIME-INR         RADIOLOGY          Imaging Results              CT Abdomen Pelvis  Without Contrast (Final result)  Result time 04/08/24 17:27:02      Final result by Fidencio Anderson MD (04/08/24 17:27:02)                   Impression:      Findings favoring cirrhosis.    Extensive gastric wall thickening of unclear significance. Suggest evaluation with endoscopy on an outpatient basis or sooner if clinically necessary.    Complete findings as above.    All CT scans at this facility are performed  using dose modulation techniques as appropriate to performed exam including the following:  automated exposure control; adjustment of mA and/or kV according to the patients size (this includes techniques or standardized protocols for targeted exams where dose is matched to indication/reason for exam: i.e. extremities or head);  iterative reconstruction technique.      Electronically signed by: Fidencio Anderson  Date:    04/08/2024  Time:    17:27               Narrative:    EXAMINATION:  CT ABDOMEN PELVIS WITHOUT CONTRAST    CLINICAL HISTORY:  Epigastric pain;    TECHNIQUE:  Low dose axial  images, sagittal and coronal reformations were obtained from the lung bases to the pubic symphysis.  Contrast was not administered.    COMPARISON:  Multiple priors    FINDINGS:  Heart: Normal in size. No pericardial effusion.    Lung Bases: Well aerated, without consolidation or pleural fluid.    Liver: Diffuse hepatic steatosis.  Nodular margin the liver concerning for cirrhosis.    Gallbladder: Surgically absent.    Bile Ducts: No evidence of dilated ducts.    Pancreas: No mass or peripancreatic fat stranding.    Spleen: Unremarkable.    Adrenals: Unremarkable.    Kidneys/ Ureters: Unremarkable.    Bladder: No evidence of wall thickening.    Reproductive organs: Unremarkable.    GI Tract/Mesentery: No evidence of bowel obstruction or inflammation.  No evidence of appendicitis.  Extensive gastric wall thickening of unclear significance.  Suggest evaluation with endoscopy on an outpatient basis or sooner if clinically necessary.    Peritoneal Space: No ascites. No free air.    Retroperitoneum: No significant adenopathy.    Abdominal wall: Unremarkable.    Vasculature: Moderate aortoiliac atherosclerosis.  No aneurysm.    Bones: No acute fracture.                                        PROCEDURES    Critical Care    Date/Time: 4/8/2024 6:24 PM    Performed by: Emani Wilhelm DO  Authorized by: Emnai Wilhelm DO  Direct patient critical care time: 15 minutes  Additional history critical care time: 5 minutes  Ordering / reviewing critical care time: 10 minutes  Documentation critical care time: 10 minutes  Consulting other physicians critical care time: 10 minutes  Total critical care time (exclusive of procedural time) : 50 minutes  Critical care was necessary to treat or prevent imminent or life-threatening deterioration of the following conditions: GI bleed, alcohol withdrawal.            ED COURSE & MEDICAL DECISION MAKING    Pertinent Labs & Imaging studies reviewed. (See chart for details and specific  orders.)          Summary of review of records:   Previous hospitalization in 2022 for alcohol-induced pancreatitis.  History of GI bleed.  Status post cholecystectomy.  Only see colonoscopy at outside hospital done in 2019 with polyp removal at that time.  Do not see evidence of EGD in the past.    Medical Decision Making  Amount and/or Complexity of Data Reviewed  Labs: ordered. Decision-making details documented in ED Course.  Radiology: ordered. Decision-making details documented in ED Course.    Risk  Prescription drug management.  Decision regarding hospitalization.      Veto Park is a 53 y.o. male who presents for evaluation of hematemesis, persistent vomiting and generalized abdominal pain.      Differential includes but is not limited to variceal bleed, gastritis, pancreatitis acute alcohol withdrawal, alcoholic ketosis          Medications   ondansetron injection 4 mg (has no administration in time range)   diazePAM injection 5 mg (has no administration in time range)   sodium chloride 0.9% bolus 1,000 mL 1,000 mL (0 mLs Intravenous Stopped 4/8/24 1743)   pantoprazole injection 40 mg (40 mg Intravenous Given 4/8/24 1646)   cefTRIAXone (Rocephin) 1 g in dextrose 5 % in water (D5W) 100 mL IVPB (MB+) (0 g Intravenous Stopped 4/8/24 1740)       ED Course as of 04/08/24 1757   Mon Apr 08, 2024   1711 Alcohol, Serum(!): 196 [HL]   1711 Lipase Result: 142  within normal limits  [HL]   1711 Hemoglobin: 15.0  Stable.  Not consistent with history of vomiting at least 2 L of dark red blood [HL]   1711 Anion Gap(!): 27  Likely secondary to alcohol use and possible ketosis. [HL]   1712 CO2(!): 18 [HL]   1735 Troponin I: <0.012 [HL]   1735 CT Abdomen Pelvis  Without Contrast     Findings favoring cirrhosis.     Extensive gastric wall thickening of unclear significance. Suggest evaluation with endoscopy on an outpatient basis or sooner if clinically necessary.   [HL]      ED Course User Index  [HL] Choco  Emani SANTAMARIA DO     Discussed findings with patient at bedside.  Agreeable with plan of hospitalization for ongoing monitoring of reported hematemesis.  [facility] accepts patient for hospitalization under their care for ongoing monitoring and management of his alcohol withdrawal symptoms as well as possible upper GI bleed.      Reported to me that he has not drank at all since Saturday however alcohol level is positive here at 196.  When confronted with this he states he may have drank last night and when pushed further he states he was able to drank last night and throughout the weekend.  He states he has continued to vomit blood over the last 3 days.      FINAL IMPRESSION    Final diagnoses:  [R00.0] Tachycardia  [F10.939] Alcohol withdrawal  [K92.0] Hematemesis with nausea (Primary)       DISPOSITION  Patient   Admitted in stable condition             DISCLAIMER: This note was prepared with [employer] voice recognition transcription software. Garbled syntax, mangled pronouns, and other bizarre constructions may be attributed to that software system.             Emani Wilhelm DO  04/08/24 1823

## 2024-04-08 NOTE — ED NOTES
"Attempted to call report at this time  per floor  " the room isnt cleaned and has not been assigned yet."   "

## 2024-04-08 NOTE — Clinical Note
Diagnosis: Alcohol withdrawal [291.81.ICD-9-CM]   Future Attending Provider: RUFINA BANDA [[id number]]   Reason for IP Medical Treatment  (Clinical interventions that can only be accomplished in the IP setting? ) :: upper GI bleed, EtOH withdrawal   I certify that Inpatient services for greater than or equal to 2 midnights are medically necessary:: Yes   Plans for Post-Acute care--if anticipated (pick the single best option):: A. No post acute care anticipated at this time

## 2024-04-08 NOTE — ED NOTES
Patient is an everyday drinker states he's been vomiting for about 2 days he is presenting with withdrawal type symptoms such as n/v, tremors, headache, agitation. He states he normally starts with Dts and or seizures ~ 2 -3 days after last drink. Per patient last drink was 2 days ago. He states he has had bloody emesis x 1 day denies Diarrhea.

## 2024-04-09 ENCOUNTER — CLINICAL SUPPORT (OUTPATIENT)
Dept: SMOKING CESSATION | Facility: CLINIC | Age: 54
End: 2024-04-09
Payer: MEDICAID

## 2024-04-09 ENCOUNTER — TELEPHONE (OUTPATIENT)
Dept: GASTROENTEROLOGY | Facility: CLINIC | Age: 54
End: 2024-04-09

## 2024-04-09 ENCOUNTER — ANESTHESIA EVENT (OUTPATIENT)
Dept: ENDOSCOPY | Facility: HOSPITAL | Age: 54
DRG: 393 | End: 2024-04-09
Payer: MEDICAID

## 2024-04-09 ENCOUNTER — ANESTHESIA (OUTPATIENT)
Dept: ENDOSCOPY | Facility: HOSPITAL | Age: 54
DRG: 393 | End: 2024-04-09
Payer: MEDICAID

## 2024-04-09 DIAGNOSIS — F17.210 CIGARETTE SMOKER: Primary | ICD-10-CM

## 2024-04-09 LAB
ABO GROUP BLD: NORMAL
ALBUMIN SERPL BCP-MCNC: 3.7 G/DL (ref 3.5–5.2)
ALP SERPL-CCNC: 22 U/L (ref 55–135)
ALT SERPL W/O P-5'-P-CCNC: 79 U/L (ref 10–44)
AMPHET+METHAMPHET UR QL: NEGATIVE
ANION GAP SERPL CALC-SCNC: 17 MMOL/L (ref 8–16)
ANION GAP SERPL CALC-SCNC: 20 MMOL/L (ref 8–16)
AST SERPL-CCNC: 69 U/L (ref 10–40)
AV INDEX (PROSTH): 0.73
AV MEAN GRADIENT: 4 MMHG
AV PEAK GRADIENT: 7 MMHG
AV VALVE AREA BY VELOCITY RATIO: 2.38 CM²
AV VALVE AREA: 2.22 CM²
AV VELOCITY RATIO: 0.78
B-OH-BUTYR BLD STRIP-SCNC: 6.3 MMOL/L (ref 0–0.5)
BARBITURATES UR QL SCN>200 NG/ML: NEGATIVE
BASOPHILS # BLD AUTO: 0.04 K/UL (ref 0–0.2)
BASOPHILS # BLD AUTO: 0.05 K/UL (ref 0–0.2)
BASOPHILS # BLD AUTO: 0.05 K/UL (ref 0–0.2)
BASOPHILS NFR BLD: 0.5 % (ref 0–1.9)
BASOPHILS NFR BLD: 0.6 % (ref 0–1.9)
BASOPHILS NFR BLD: 0.6 % (ref 0–1.9)
BENZODIAZ UR QL SCN>200 NG/ML: NEGATIVE
BILIRUB SERPL-MCNC: 1.2 MG/DL (ref 0.1–1)
BLD GP AB SCN CELLS X3 SERPL QL: NORMAL
BSA FOR ECHO PROCEDURE: 2.37 M2
BUN SERPL-MCNC: 17 MG/DL (ref 6–20)
BUN SERPL-MCNC: 21 MG/DL (ref 6–20)
BZE UR QL SCN: NEGATIVE
CALCIUM SERPL-MCNC: 8.1 MG/DL (ref 8.7–10.5)
CALCIUM SERPL-MCNC: 8.3 MG/DL (ref 8.7–10.5)
CANNABINOIDS UR QL SCN: NEGATIVE
CHLORIDE SERPL-SCNC: 100 MMOL/L (ref 95–110)
CHLORIDE SERPL-SCNC: 100 MMOL/L (ref 95–110)
CO2 SERPL-SCNC: 19 MMOL/L (ref 23–29)
CO2 SERPL-SCNC: 21 MMOL/L (ref 23–29)
CREAT SERPL-MCNC: 1.1 MG/DL (ref 0.5–1.4)
CREAT SERPL-MCNC: 1.2 MG/DL (ref 0.5–1.4)
CREAT UR-MCNC: 132.7 MG/DL (ref 23–375)
CV ECHO LV RWT: 0.67 CM
DIFFERENTIAL METHOD BLD: ABNORMAL
DOP CALC AO PEAK VEL: 1.32 M/S
DOP CALC AO VTI: 17.3 CM
DOP CALC LVOT AREA: 3 CM2
DOP CALC LVOT DIAMETER: 1.97 CM
DOP CALC LVOT PEAK VEL: 1.03 M/S
DOP CALC LVOT STROKE VOLUME: 38.39 CM3
DOP CALCLVOT PEAK VEL VTI: 12.6 CM
ECHO LV POSTERIOR WALL: 1.51 CM (ref 0.6–1.1)
EOSINOPHIL # BLD AUTO: 0 K/UL (ref 0–0.5)
EOSINOPHIL # BLD AUTO: 0.1 K/UL (ref 0–0.5)
EOSINOPHIL # BLD AUTO: 0.1 K/UL (ref 0–0.5)
EOSINOPHIL NFR BLD: 0.5 % (ref 0–8)
EOSINOPHIL NFR BLD: 0.7 % (ref 0–8)
EOSINOPHIL NFR BLD: 1.2 % (ref 0–8)
ERYTHROCYTE [DISTWIDTH] IN BLOOD BY AUTOMATED COUNT: 12.7 % (ref 11.5–14.5)
ERYTHROCYTE [DISTWIDTH] IN BLOOD BY AUTOMATED COUNT: 12.8 % (ref 11.5–14.5)
ERYTHROCYTE [DISTWIDTH] IN BLOOD BY AUTOMATED COUNT: 12.9 % (ref 11.5–14.5)
EST. GFR  (NO RACE VARIABLE): >60 ML/MIN/1.73 M^2
EST. GFR  (NO RACE VARIABLE): >60 ML/MIN/1.73 M^2
ESTIMATED AVG GLUCOSE: 166 MG/DL (ref 68–131)
FRACTIONAL SHORTENING: 33 % (ref 28–44)
GLUCOSE SERPL-MCNC: 173 MG/DL (ref 70–110)
GLUCOSE SERPL-MCNC: 198 MG/DL (ref 70–110)
HAV IGM SERPL QL IA: NORMAL
HBA1C MFR BLD: 7.4 % (ref 4–5.6)
HBV CORE IGM SERPL QL IA: NORMAL
HBV SURFACE AG SERPL QL IA: NORMAL
HCT VFR BLD AUTO: 37.2 % (ref 40–54)
HCT VFR BLD AUTO: 37.2 % (ref 40–54)
HCT VFR BLD AUTO: 37.8 % (ref 40–54)
HCV AB SERPL QL IA: NORMAL
HGB BLD-MCNC: 12.7 G/DL (ref 14–18)
HGB BLD-MCNC: 12.9 G/DL (ref 14–18)
HGB BLD-MCNC: 12.9 G/DL (ref 14–18)
IMM GRANULOCYTES # BLD AUTO: 0.02 K/UL (ref 0–0.04)
IMM GRANULOCYTES NFR BLD AUTO: 0.2 % (ref 0–0.5)
IMM GRANULOCYTES NFR BLD AUTO: 0.2 % (ref 0–0.5)
IMM GRANULOCYTES NFR BLD AUTO: 0.3 % (ref 0–0.5)
INR PPP: 1.1 (ref 0.8–1.2)
INTERVENTRICULAR SEPTUM: 1.39 CM (ref 0.6–1.1)
LA MAJOR: 5.23 CM
LA MINOR: 4.55 CM
LA WIDTH: 3.7 CM
LACTATE SERPL-SCNC: 2.1 MMOL/L (ref 0.5–2.2)
LEFT ATRIUM VOLUME INDEX MOD: 18.7 ML/M2
LEFT ATRIUM VOLUME MOD: 42.34 CM3
LEFT INTERNAL DIMENSION IN SYSTOLE: 3.05 CM (ref 2.1–4)
LEFT VENTRICLE DIASTOLIC VOLUME INDEX: 41.65 ML/M2
LEFT VENTRICLE DIASTOLIC VOLUME: 94.54 ML
LEFT VENTRICLE MASS INDEX: 117 G/M2
LEFT VENTRICLE SYSTOLIC VOLUME INDEX: 16 ML/M2
LEFT VENTRICLE SYSTOLIC VOLUME: 36.35 ML
LEFT VENTRICULAR INTERNAL DIMENSION IN DIASTOLE: 4.54 CM (ref 3.5–6)
LEFT VENTRICULAR MASS: 265.39 G
LVOT MG: 2.05 MMHG
LVOT MV: 0.68 CM/S
LYMPHOCYTES # BLD AUTO: 1 K/UL (ref 1–4.8)
LYMPHOCYTES # BLD AUTO: 1.2 K/UL (ref 1–4.8)
LYMPHOCYTES # BLD AUTO: 1.3 K/UL (ref 1–4.8)
LYMPHOCYTES NFR BLD: 11.9 % (ref 18–48)
LYMPHOCYTES NFR BLD: 14.6 % (ref 18–48)
LYMPHOCYTES NFR BLD: 15.3 % (ref 18–48)
MAGNESIUM SERPL-MCNC: 1.8 MG/DL (ref 1.6–2.6)
MCH RBC QN AUTO: 33.6 PG (ref 27–31)
MCH RBC QN AUTO: 34.4 PG (ref 27–31)
MCH RBC QN AUTO: 34.4 PG (ref 27–31)
MCHC RBC AUTO-ENTMCNC: 34.1 G/DL (ref 32–36)
MCHC RBC AUTO-ENTMCNC: 34.1 G/DL (ref 32–36)
MCHC RBC AUTO-ENTMCNC: 34.7 G/DL (ref 32–36)
MCV RBC AUTO: 101 FL (ref 82–98)
MCV RBC AUTO: 98 FL (ref 82–98)
MCV RBC AUTO: 99 FL (ref 82–98)
METHADONE UR QL SCN>300 NG/ML: NEGATIVE
MONOCYTES # BLD AUTO: 0.5 K/UL (ref 0.3–1)
MONOCYTES # BLD AUTO: 0.6 K/UL (ref 0.3–1)
MONOCYTES # BLD AUTO: 0.7 K/UL (ref 0.3–1)
MONOCYTES NFR BLD: 6.6 % (ref 4–15)
MONOCYTES NFR BLD: 6.9 % (ref 4–15)
MONOCYTES NFR BLD: 8.1 % (ref 4–15)
NEUTROPHILS # BLD AUTO: 5.8 K/UL (ref 1.8–7.7)
NEUTROPHILS # BLD AUTO: 6.4 K/UL (ref 1.8–7.7)
NEUTROPHILS # BLD AUTO: 6.5 K/UL (ref 1.8–7.7)
NEUTROPHILS NFR BLD: 76 % (ref 38–73)
NEUTROPHILS NFR BLD: 76.1 % (ref 38–73)
NEUTROPHILS NFR BLD: 79.7 % (ref 38–73)
NRBC BLD-RTO: 0 /100 WBC
OHS LV EJECTION FRACTION SIMPSONS BIPLANE MOD: 70 %
OHS QRS DURATION: 124 MS
OHS QRS DURATION: 130 MS
OHS QRS DURATION: 146 MS
OHS QTC CALCULATION: 498 MS
OHS QTC CALCULATION: 531 MS
OHS QTC CALCULATION: 567 MS
OPIATES UR QL SCN: NEGATIVE
PCP UR QL SCN>25 NG/ML: NEGATIVE
PHOSPHATE SERPL-MCNC: 1.4 MG/DL (ref 2.7–4.5)
PHOSPHATE SERPL-MCNC: 1.6 MG/DL (ref 2.7–4.5)
PLATELET # BLD AUTO: 100 K/UL (ref 150–450)
PLATELET # BLD AUTO: 115 K/UL (ref 150–450)
PLATELET # BLD AUTO: 98 K/UL (ref 150–450)
PMV BLD AUTO: 9.4 FL (ref 9.2–12.9)
PMV BLD AUTO: 9.5 FL (ref 9.2–12.9)
PMV BLD AUTO: 9.6 FL (ref 9.2–12.9)
POCT GLUCOSE: 170 MG/DL (ref 70–110)
POCT GLUCOSE: 172 MG/DL (ref 70–110)
POCT GLUCOSE: 245 MG/DL (ref 70–110)
POCT GLUCOSE: 246 MG/DL (ref 70–110)
POTASSIUM SERPL-SCNC: 3.8 MMOL/L (ref 3.5–5.1)
POTASSIUM SERPL-SCNC: 3.9 MMOL/L (ref 3.5–5.1)
PROT SERPL-MCNC: 7 G/DL (ref 6–8.4)
PROTHROMBIN TIME: 12.1 SEC (ref 9–12.5)
RA MAJOR: 5.26 CM
RA WIDTH: 4.03 CM
RBC # BLD AUTO: 3.75 M/UL (ref 4.6–6.2)
RBC # BLD AUTO: 3.75 M/UL (ref 4.6–6.2)
RBC # BLD AUTO: 3.78 M/UL (ref 4.6–6.2)
RH BLD: NORMAL
RIGHT VENTRICULAR END-DIASTOLIC DIMENSION: 2.87 CM
RV TISSUE DOPPLER FREE WALL SYSTOLIC VELOCITY 1 (APICAL 4 CHAMBER VIEW): 13.58 CM/S
SINUS: 3.3 CM
SODIUM SERPL-SCNC: 138 MMOL/L (ref 136–145)
SODIUM SERPL-SCNC: 139 MMOL/L (ref 136–145)
STJ: 2.86 CM
TOXICOLOGY INFORMATION: NORMAL
TRICUSPID ANNULAR PLANE SYSTOLIC EXCURSION: 1.8 CM
WBC # BLD AUTO: 7.59 K/UL (ref 3.9–12.7)
WBC # BLD AUTO: 8.06 K/UL (ref 3.9–12.7)
WBC # BLD AUTO: 8.57 K/UL (ref 3.9–12.7)
Z-SCORE OF LEFT VENTRICULAR DIMENSION IN END DIASTOLE: -6.2
Z-SCORE OF LEFT VENTRICULAR DIMENSION IN END SYSTOLE: -4.07

## 2024-04-09 PROCEDURE — 36415 COLL VENOUS BLD VENIPUNCTURE: CPT | Mod: XB

## 2024-04-09 PROCEDURE — 93005 ELECTROCARDIOGRAM TRACING: CPT | Mod: ER

## 2024-04-09 PROCEDURE — 80053 COMPREHEN METABOLIC PANEL: CPT

## 2024-04-09 PROCEDURE — 25000003 PHARM REV CODE 250: Performed by: STUDENT IN AN ORGANIZED HEALTH CARE EDUCATION/TRAINING PROGRAM

## 2024-04-09 PROCEDURE — 85025 COMPLETE CBC W/AUTO DIFF WBC: CPT

## 2024-04-09 PROCEDURE — 99900035 HC TECH TIME PER 15 MIN (STAT)

## 2024-04-09 PROCEDURE — 83036 HEMOGLOBIN GLYCOSYLATED A1C: CPT

## 2024-04-09 PROCEDURE — 99223 1ST HOSP IP/OBS HIGH 75: CPT | Mod: ,,, | Performed by: INTERNAL MEDICINE

## 2024-04-09 PROCEDURE — 25000003 PHARM REV CODE 250

## 2024-04-09 PROCEDURE — 82010 KETONE BODYS QUAN: CPT

## 2024-04-09 PROCEDURE — 83605 ASSAY OF LACTIC ACID: CPT

## 2024-04-09 PROCEDURE — 63600175 PHARM REV CODE 636 W HCPCS

## 2024-04-09 PROCEDURE — 11000001 HC ACUTE MED/SURG PRIVATE ROOM

## 2024-04-09 PROCEDURE — 83735 ASSAY OF MAGNESIUM: CPT

## 2024-04-09 PROCEDURE — C9113 INJ PANTOPRAZOLE SODIUM, VIA: HCPCS

## 2024-04-09 PROCEDURE — 85610 PROTHROMBIN TIME: CPT

## 2024-04-09 PROCEDURE — 84100 ASSAY OF PHOSPHORUS: CPT | Mod: 91

## 2024-04-09 PROCEDURE — 80048 BASIC METABOLIC PNL TOTAL CA: CPT | Mod: XB

## 2024-04-09 PROCEDURE — 84100 ASSAY OF PHOSPHORUS: CPT

## 2024-04-09 PROCEDURE — 63600175 PHARM REV CODE 636 W HCPCS: Mod: JA | Performed by: STUDENT IN AN ORGANIZED HEALTH CARE EDUCATION/TRAINING PROGRAM

## 2024-04-09 PROCEDURE — 93010 ELECTROCARDIOGRAM REPORT: CPT | Mod: 76,,, | Performed by: INTERNAL MEDICINE

## 2024-04-09 PROCEDURE — 99406 BEHAV CHNG SMOKING 3-10 MIN: CPT | Mod: S$PBB,,,

## 2024-04-09 PROCEDURE — 86850 RBC ANTIBODY SCREEN: CPT

## 2024-04-09 PROCEDURE — 94761 N-INVAS EAR/PLS OXIMETRY MLT: CPT

## 2024-04-09 PROCEDURE — 80074 ACUTE HEPATITIS PANEL: CPT

## 2024-04-09 PROCEDURE — 80307 DRUG TEST PRSMV CHEM ANLYZR: CPT | Performed by: STUDENT IN AN ORGANIZED HEALTH CARE EDUCATION/TRAINING PROGRAM

## 2024-04-09 RX ORDER — DIAZEPAM 5 MG/1
10 TABLET ORAL EVERY 6 HOURS
Status: COMPLETED | OUTPATIENT
Start: 2024-04-09 | End: 2024-04-10

## 2024-04-09 RX ORDER — METOPROLOL TARTRATE 1 MG/ML
5 INJECTION, SOLUTION INTRAVENOUS EVERY 5 MIN PRN
Status: COMPLETED | OUTPATIENT
Start: 2024-04-09 | End: 2024-04-09

## 2024-04-09 RX ORDER — DIAZEPAM 5 MG/1
5 TABLET ORAL ONCE
Status: DISCONTINUED | OUTPATIENT
Start: 2024-04-13 | End: 2024-04-11

## 2024-04-09 RX ORDER — METOPROLOL TARTRATE 1 MG/ML
5 INJECTION, SOLUTION INTRAVENOUS ONCE
Status: DISCONTINUED | OUTPATIENT
Start: 2024-04-09 | End: 2024-04-09

## 2024-04-09 RX ORDER — INSULIN ASPART 100 [IU]/ML
0-5 INJECTION, SOLUTION INTRAVENOUS; SUBCUTANEOUS EVERY 6 HOURS PRN
Status: DISCONTINUED | OUTPATIENT
Start: 2024-04-09 | End: 2024-04-12 | Stop reason: HOSPADM

## 2024-04-09 RX ORDER — DIAZEPAM 10 MG/2ML
10 INJECTION INTRAMUSCULAR ONCE
Status: COMPLETED | OUTPATIENT
Start: 2024-04-09 | End: 2024-04-09

## 2024-04-09 RX ORDER — METOPROLOL TARTRATE 1 MG/ML
5 INJECTION, SOLUTION INTRAVENOUS ONCE
Status: COMPLETED | OUTPATIENT
Start: 2024-04-09 | End: 2024-04-09

## 2024-04-09 RX ORDER — GLUCAGON 1 MG
1 KIT INJECTION
Status: DISCONTINUED | OUTPATIENT
Start: 2024-04-09 | End: 2024-04-12 | Stop reason: HOSPADM

## 2024-04-09 RX ORDER — LOPERAMIDE HYDROCHLORIDE 2 MG/1
2 CAPSULE ORAL ONCE AS NEEDED
Status: DISCONTINUED | OUTPATIENT
Start: 2024-04-09 | End: 2024-04-09

## 2024-04-09 RX ORDER — SODIUM CHLORIDE 9 MG/ML
INJECTION, SOLUTION INTRAVENOUS CONTINUOUS
Status: ACTIVE | OUTPATIENT
Start: 2024-04-09 | End: 2024-04-09

## 2024-04-09 RX ORDER — DIAZEPAM 5 MG/1
10 TABLET ORAL EVERY 8 HOURS
Status: DISPENSED | OUTPATIENT
Start: 2024-04-10 | End: 2024-04-11

## 2024-04-09 RX ORDER — LOPERAMIDE HYDROCHLORIDE 2 MG/1
2 CAPSULE ORAL 4 TIMES DAILY PRN
Status: DISCONTINUED | OUTPATIENT
Start: 2024-04-09 | End: 2024-04-09

## 2024-04-09 RX ORDER — MAGNESIUM SULFATE HEPTAHYDRATE 40 MG/ML
2 INJECTION, SOLUTION INTRAVENOUS ONCE
Status: COMPLETED | OUTPATIENT
Start: 2024-04-09 | End: 2024-04-09

## 2024-04-09 RX ORDER — METOPROLOL TARTRATE 25 MG/1
25 TABLET, FILM COATED ORAL 2 TIMES DAILY
Status: DISCONTINUED | OUTPATIENT
Start: 2024-04-09 | End: 2024-04-09

## 2024-04-09 RX ORDER — DIAZEPAM 5 MG/1
5 TABLET ORAL EVERY 12 HOURS
Status: DISCONTINUED | OUTPATIENT
Start: 2024-04-12 | End: 2024-04-11

## 2024-04-09 RX ORDER — MAGNESIUM SULFATE HEPTAHYDRATE 40 MG/ML
2 INJECTION, SOLUTION INTRAVENOUS ONCE
Status: DISCONTINUED | OUTPATIENT
Start: 2024-04-09 | End: 2024-04-09

## 2024-04-09 RX ORDER — LORAZEPAM 2 MG/ML
2 INJECTION INTRAMUSCULAR ONCE
Status: COMPLETED | OUTPATIENT
Start: 2024-04-09 | End: 2024-04-09

## 2024-04-09 RX ORDER — METOPROLOL TARTRATE 25 MG/1
25 TABLET, FILM COATED ORAL 2 TIMES DAILY
Status: DISCONTINUED | OUTPATIENT
Start: 2024-04-10 | End: 2024-04-10

## 2024-04-09 RX ORDER — DIAZEPAM 5 MG/1
10 TABLET ORAL EVERY 12 HOURS
Status: DISCONTINUED | OUTPATIENT
Start: 2024-04-11 | End: 2024-04-11

## 2024-04-09 RX ADMIN — PANTOPRAZOLE SODIUM 40 MG: 40 INJECTION, POWDER, LYOPHILIZED, FOR SOLUTION INTRAVENOUS at 10:04

## 2024-04-09 RX ADMIN — POTASSIUM PHOSPHATE, MONOBASIC AND POTASSIUM PHOSPHATE, DIBASIC 30 MMOL: 224; 236 INJECTION, SOLUTION, CONCENTRATE INTRAVENOUS at 10:04

## 2024-04-09 RX ADMIN — LORAZEPAM 2 MG: 2 INJECTION INTRAMUSCULAR; INTRAVENOUS at 02:04

## 2024-04-09 RX ADMIN — POTASSIUM PHOSPHATE, MONOBASIC AND POTASSIUM PHOSPHATE, DIBASIC 30 MMOL: 224; 236 INJECTION, SOLUTION, CONCENTRATE INTRAVENOUS at 09:04

## 2024-04-09 RX ADMIN — METOPROLOL TARTRATE 5 MG: 1 INJECTION, SOLUTION INTRAVENOUS at 10:04

## 2024-04-09 RX ADMIN — LORAZEPAM 2 MG: 2 INJECTION INTRAMUSCULAR; INTRAVENOUS at 05:04

## 2024-04-09 RX ADMIN — SODIUM CHLORIDE: 9 INJECTION, SOLUTION INTRAVENOUS at 05:04

## 2024-04-09 RX ADMIN — DIAZEPAM 5 MG: 10 INJECTION, SOLUTION INTRAMUSCULAR; INTRAVENOUS at 03:04

## 2024-04-09 RX ADMIN — ONDANSETRON 4 MG: 2 INJECTION INTRAMUSCULAR; INTRAVENOUS at 02:04

## 2024-04-09 RX ADMIN — METOPROLOL TARTRATE 5 MG: 1 INJECTION, SOLUTION INTRAVENOUS at 02:04

## 2024-04-09 RX ADMIN — INSULIN ASPART 1 UNITS: 100 INJECTION, SOLUTION INTRAVENOUS; SUBCUTANEOUS at 09:04

## 2024-04-09 RX ADMIN — DIAZEPAM 10 MG: 5 INJECTION, SOLUTION INTRAMUSCULAR; INTRAVENOUS at 06:04

## 2024-04-09 RX ADMIN — FOLIC ACID 1 MG: 5 INJECTION, SOLUTION INTRAMUSCULAR; INTRAVENOUS; SUBCUTANEOUS at 09:04

## 2024-04-09 RX ADMIN — METOPROLOL TARTRATE 5 MG: 1 INJECTION, SOLUTION INTRAVENOUS at 09:04

## 2024-04-09 RX ADMIN — PANTOPRAZOLE SODIUM 40 MG: 40 INJECTION, POWDER, LYOPHILIZED, FOR SOLUTION INTRAVENOUS at 09:04

## 2024-04-09 RX ADMIN — OCTREOTIDE ACETATE 50 MCG/HR: 200 INJECTION, SOLUTION INTRAVENOUS; SUBCUTANEOUS at 01:04

## 2024-04-09 RX ADMIN — MAGNESIUM SULFATE HEPTAHYDRATE 2 G: 40 INJECTION, SOLUTION INTRAVENOUS at 05:04

## 2024-04-09 RX ADMIN — THIAMINE HYDROCHLORIDE 100 MG: 100 INJECTION, SOLUTION INTRAMUSCULAR; INTRAVENOUS at 10:04

## 2024-04-09 RX ADMIN — DIAZEPAM 10 MG: 5 TABLET ORAL at 05:04

## 2024-04-09 RX ADMIN — DIAZEPAM 5 MG: 10 INJECTION, SOLUTION INTRAMUSCULAR; INTRAVENOUS at 09:04

## 2024-04-09 RX ADMIN — DIAZEPAM 10 MG: 5 TABLET ORAL at 12:04

## 2024-04-09 RX ADMIN — METOPROLOL TARTRATE 25 MG: 25 TABLET, FILM COATED ORAL at 02:04

## 2024-04-09 RX ADMIN — SODIUM CHLORIDE, POTASSIUM CHLORIDE, SODIUM LACTATE AND CALCIUM CHLORIDE 500 ML: 600; 310; 30; 20 INJECTION, SOLUTION INTRAVENOUS at 05:04

## 2024-04-09 NOTE — PROGRESS NOTES
Smoking cessation education note: Pt sleeping soundly. Pt's brother is present, and he states that pt has not mentioned cravings for a cigarette.  Will follow up at a later time.

## 2024-04-09 NOTE — NURSING
Pt in bed complaining of nausea and pain to abdomen. Pt HR was in the 190's. MD Rowell came to the bedside. IV ativan and Zofran was administered. Pt denies any chest pain or discomfort to chest. Will continue with care.

## 2024-04-09 NOTE — PROGRESS NOTES
[facility] Medicine Progress Note    Primary Team: [facility] Hospitalist Team B  Attending Physician: Gurpreet Dexter MD  Resident: Tejinder  Intern: Alonso Bradshaw MD    Date of Admit: 2024     Chief Complaint:   Chief Complaint   Patient presents with    Hematemesis     Vomiting blood x 3 days, no alcohol in 2 days, no meds in long time        Subjective/Interval Events:      Overnight patient with continued emesis. Reportedly clear with streaks of troy blood. Additionally, patient tachycardic. Has remained sinus with known RBBB. Bolused LR, repleting Mg, Phos. Patient remains NPO for poss EGD today. Patient continues to complain of diffuse abdominal pain which he states is worse than previous. Does report mild improvement in nausea with zofran. Otherwise no complaints.     Objective    Objective   Last 24 Hour Vital Signs:  BP  Min: 150/77  Max: 185/84  Temp  Av.3 °F (36.8 °C)  Min: 97.8 °F (36.6 °C)  Max: 98.9 °F (37.2 °C)  Pulse  Av.9  Min: 87  Max: 190  Resp  Av.8  Min: 16  Max: 20  SpO2  Av.7 %  Min: 89 %  Max: 98 %  Height  Av' (182.9 cm)  Min: 6' (182.9 cm)  Max: 6' (182.9 cm)  Weight  Av.1 kg (236 lb 2.9 oz)  Min: 105.4 kg (232 lb 5.8 oz)  Max: 108.9 kg (240 lb)    Intake/Output Summary (Last 24 hours) at 2024 0729  Last data filed at 2024 0550  Gross per 24 hour   Intake 1099.14 ml   Output 1000 ml   Net 99.14 ml       Physical Examination:  General: No acute distress. Appropriate behavior. Obese.   Head: normocephalic, atraumatic  Eyes: PERRL. EOMI. No conjunctival injection. No scleral icterus.  ENT: Dry mucus membranes   Neck: No lymphadenopathy. No accessory muscle use.   Cardiac: Tachycardic. Regular rhythm. No murmurs appreciated.  Pulmonary/Chest: CTAB. Normal work of breathing.   Abdomen: Soft, diffuse upper ABD tenderness, no appreciable ascites, normoactive bowel sounds.   Extremities: atraumatic, no deformities, no edema.  Skin: dry, warm, intact. No  bruising or rashes.  Neuro: Alert. Oriented to person, place, time. Moving all extremities spontaneously. Normal  strength bilaterally. No asterixis    Laboratory:  Recent Labs   Lab 04/08/24  1645 04/08/24  2225 04/09/24  0059 04/09/24  0437   WBC 6.93  --  8.57 8.06   HGB 15.0 13.3* 12.7* 12.9*   HCT 43.4  --  37.2* 37.8*     --  115* 98*   MCV 99*  --  98 101*   RDW 12.4  --  12.7 12.9     --   --  139   K 4.3  --   --  3.9   CL 95  --   --  100   CO2 18*  --   --  19*   BUN 27*  --   --  21*   CREATININE 1.00  --   --  1.2   *  --   --  173*   PROT 8.8*  --   --  7.0   ALBUMIN 4.9  --   --  3.7   BILITOT 1.1*  --   --  1.2*   AST 99*  --   --  69*   ALKPHOS 33*  --   --  22*   *  --   --  79*       Microbiology:  Microbiology Results (last 7 days)       ** No results found for the last 168 hours. **             Imaging:  X-Ray Chest AP Portable   Final Result      No acute abnormality.         Electronically signed by: Ramiro Gauthier   Date:    04/08/2024   Time:    22:53      CT Abdomen Pelvis  Without Contrast   Final Result      Findings favoring cirrhosis.      Extensive gastric wall thickening of unclear significance. Suggest evaluation with endoscopy on an outpatient basis or sooner if clinically necessary.      Complete findings as above.      All CT scans at this facility are performed  using dose modulation techniques as appropriate to performed exam including the following:  automated exposure control; adjustment of mA and/or kV according to the patients size (this includes techniques or standardized protocols for targeted exams where dose is matched to indication/reason for exam: i.e. extremities or head);  iterative reconstruction technique.         Electronically signed by: Fidencio Anderson   Date:    04/08/2024   Time:    17:27      X-Ray Chest AP Portable    (Results Pending)   US Abdomen Limited    (Results Pending)        Current Medications:     Scheduled:   folic  acid (FOLVITE) IVPB  1 mg Intravenous Daily    pantoprazole  40 mg Intravenous BID    potassium phosphate IVPB  30 mmol Intravenous Once    thiamine (B-1) 100 mg in dextrose 5 % (D5W) 100 mL IVPB  100 mg Intravenous Daily         Infusions:   sodium chloride 0.9% 150 mL/hr at 04/09/24 0523        PRN:  dextrose 10%, dextrose 10%, dextrose 10%, dextrose 10%, diazePAM, glucagon (human recombinant), glucose, glucose, insulin aspart U-100, naloxone, ondansetron, sodium chloride 0.9%       Assessment:     Veto Park is a 53 y.o. male with a PMHx of alcohol use disorder (complicated by DT's/Seizures), COPD, T2DM, ABHILASH who presents with hematemesis for 3 days. Patient reports numerous episodes of emesis, some with blood some without. In the ED patient tachycardic but HDS. Notably tremulous. Daily EtOH drinker x2 pints liqour daily. Last drink 2 days ago. CT abdomen with gastric wall thickening with concern for gastritis. No varices appreciable on CT. Patient is admitted to [facility] medicine for workup of hematemesis with concern for UGIB in known cirrhotic in addition to alcohol withdrawal.     Plan:     Hematemesis concerning for UGIB  - ddx includes gastritis, esophageal mucosal tear, variceal bleed  - Hb 15 >> 13.3 >> 12.9  - 1x episode of hematemesis 4/9 with bloody streaks in clear fluid  - INR 1.1  - IV PPI 40 mg x 2 in ED, will continue PPI BID  - continue trending CBC q 4 hr and transfuse for HDS, type and screen ordered  - daily PT/INR  - GI consulted for potential EGD  - Patient currently NPO, no anticoagulation     ETOH use disorder   High risk ETOH withdrawal with Hx of withdrawal Sz  Sinus Tachycardia   - 1-2 pints per day during binge episodes, has been drinking 40 years  - ETOH level 196  - Hx of ETOH seizure  - Sinus tachy to 180s overnight 4/9. Pressures stable. Mg/P low - repleting. Suspect 2/2 withdrawal in addition to lyte abnormalities 2/2 emesis. Treating for both.   - Will schedule  benzo's today with PRNs available for breakthrough   symptoms: Ativan not available at present.   Valium taper:  Day 1: Valium 10 mg PO q6  Day 2: Valium 10 mg PO q8  Day 3: Valium 10 mg PO q12  Day 4: Valium 5 mg PO q12  Day 5: Valium 5 mg PO once and then discontinue taper  - daily IV thiamine and folate   - Replete Mg, P as appropriate   - fall and seizure precautions, q4 vital signs      Upper ABD pain   Nausea vomiting  - lipase 142, Tbili 1.1   - CT A/P with gastritis and cirrhosis, unremarkable pancreas, no ascites noted  - continue PPI as above  - ordered CXR, ABD US  - zofran PRN for nausea     Elevated Transaminates  - AST 99, . INR 1.1  - suspect in setting of recent ETOH use and imaging with cirrhosis  - ALP and Tbili WNL  - acute hep panel ordered   - trend CMP, INR daily  - continue supportive care with IVFs     AGMA  - AG 27, bicarb 18  - suspect in setting of N/V, ETOH use  - check BHB, UA, lactate   - continue supportive care     HTN  - -170s  - not on home regimen  - hold off anti-HTN in setting of concern for UGIB  - consider coreg 6.25 BID after further evaluation     T2DM  - glucose 218 on arrival  - not on home regimen  - A1c 6% 2023  - repeat A1c, add low dose SSI if elevated     Macrocytosis  - MCV 99  - suspect 2/2 heavy ETOH use  - currently treating with IV thiamine and folate  - follow up with PCP outpatient         Code Status: full  DVT Prophylaxis: none  Diet: NPO @ [facility] for GI consult  Disposition: pending workup     Alonso Bradshaw MD   [facility] PGY-1  [facility] Team A    [facility] Medicine Hospitalist Pager numbers:   [facility] Hospitalist Medicine Team A (Marium/Ivon): [phone]  [facility] Hospitalist Medicine Team B (Guerita/Maria Elena):  [phone]

## 2024-04-09 NOTE — PLAN OF CARE
Notified of patient nausea/vomiting and tachycardia. Patient vomited approx 250 cc of clear liquid with mild tinged blood streaking. No large volume hematemesis or clots noted. Given zofran and ativan for sxs. On my exam pt's HR approx 80-90s. Continue CIWA protocol with BZDs PRN. Will continue IVFs at this time.     Zeyad Rowell MD  [facility] PGY2
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
## 2024-04-09 NOTE — TELEPHONE ENCOUNTER
----- Message from Mami Recinos sent at 4/9/2024  2:10 PM CDT -----  Regarding: HFU  Patient will be discharging from [facility] and a HFU was requested with Gastro by DC provider.  Please schedule and message me back so DC can relay appointment information to patient prior to discharge. A referral has been entered.      Patient is scheduled for a procedure today w/Dr Link    DX: Hematemesis with nausea    Laya Black  Physician Referral Specialist/Discharge

## 2024-04-09 NOTE — PLAN OF CARE
[facility] - Telemetry  Initial Discharge Assessment       Primary Care Provider: No, Primary Doctor    Admission Diagnosis: Alcohol withdrawal [F10.939]  Tachycardia [R00.0]  Hematemesis with nausea [K92.0]    Admission Date: 4/8/2024  Expected Discharge Date:     Pt oriented. DCA done at bedside with patient. Demographics/Ins/NextofKin/PCP verified with patient/family and updated as needed. Denies any DME needs at present from pt/family. Patient/family plans to return home with family. Educated on bedside RX. Patient consents for [place] to discuss discharge plan with next of kin. Preferences appointment times and location obtained. Patient reports they will have transportation home upon discharge.    [facility] [employer] alerted to self pay status. Pt listed as self employed at present. [employer] at [place].    Transition of Care Barriers: (P) None, Substance Abuse    Payor: /     Extended Emergency Contact Information  Primary Emergency Contact: Waqar Park  Mobile Phone: [phone]  Relation: Brother    Discharge Plan A: (P) Home         [facility] - [place], [place] - [address]  [address] [facility] [place] [address]  Phone: [phone] Fax: [phone]    [facility] - [place], [place] - [address]  [address]  [place] [address]  Phone: [phone] Fax: [phone]    [facility] ([place] - [place] - [address] [place] [address]  [address] [place] [address]  [place] [address]  Phone: [phone] Fax: [phone]      Initial Assessment (most recent)       Adult Discharge Assessment - 04/09/24 1139          Discharge Assessment    Assessment Type Discharge Planning Assessment (P)      Confirmed/corrected address, phone number and insurance Yes (P)      Confirmed Demographics Correct on Facesheet (P)      Source of Information patient (P)      Communicated CAMI with patient/caregiver Yes (P)      People in Home alone (P)      Do you expect to return to your current living situation? Yes (P)       Do you have help at home or someone to help you manage your care at home? No (P)      Prior to hospitilization cognitive status: No Deficits;Alert/Oriented (P)      Current cognitive status: Alert/Oriented;No Deficits (P)      Walking or Climbing Stairs Difficulty no (P)      Dressing/Bathing Difficulty no (P)      Equipment Currently Used at Home none (P)      Readmission within 30 days? No (P)      Do you currently have service(s) that help you manage your care at home? No (P)      Do you take prescription medications? No (P)      Do you have prescription coverage? No (P)      Do you have any problems affording any of your prescribed medications? No (P)      Who is going to help you get home at discharge? Family to transport home at [place] (P)      How do you get to doctors appointments? car, drives self;family or friend will provide (P)      Are you on dialysis? No (P)      Do you take coumadin? No (P)      Discharge Plan A Home (P)      DME Needed Upon Discharge  none (P)      Discharge Plan discussed with: Patient (P)      Transition of Care Barriers None;Substance Abuse (P)         Financial Resource Strain    How hard is it for you to pay for the very basics like food, housing, medical care, and heating? Not hard at all (P)         Housing Stability    In the last 12 months, was there a time when you were not able to pay the mortgage or rent on time? No (P)      In the last 12 months, was there a time when you did not have a steady place to sleep or slept in a shelter (including now)? No (P)         Transportation Needs    In the past 12 months, has lack of transportation kept you from medical appointments or from getting medications? No (P)      In the past 12 months, has lack of transportation kept you from meetings, work, or from getting things needed for daily living? No (P)         Food Insecurity    Within the past 12 months, you worried that your food would run out before you got the money to buy more.  Never true (P)      Within the past 12 months, the food you bought just didn't last and you didn't have money to get more. Never true (P)         Social Connections    In a typical week, how many times do you talk on the phone with family, friends, or neighbors? Three times a week (P)      How often do you get together with friends or relatives? Three times a week (P)                       No future appointments.    No orders of the defined types were placed in this encounter.       Follow-up Information       [facility]. Go in 1 week(s).    Why: OFFICE TO CALL PATIENT/FAMILY TO SET UP APPT TIME, DISCHARGE FOLLOW UP WITH COMMUNITY PCP, FOLLOW UP WITH PSYCHIATRY, For assistance with Substance abuse treatment, Contact Addictive Behavior Unit for outpt therapy  Contact information:  [address]  [place] [facility] [address]  [phone]               [facility] ([facility]). Call today.    Specialty: Psychiatry  Why: As needed, For assistance with Substance abuse treatment  Contact information:  [address]  [facility] [address] [phone]             [facility] - Gastroenterology. Go in 3 week(s).    Specialty: Gastroenterology  Why: FOLLOW UP WITH GASTROENTEROLOGY  Contact information:  [address]  [address]  [facility] [address] [phone]  Additional information:  Please park in surface lot and check in at [address]

## 2024-04-09 NOTE — PROGRESS NOTES
[facility] - Telemetry  Adult Nutrition  Progress Note    SUMMARY       Recommendations    Recommendation:  1. Initiate Clear Liquid diet when medically acceptable.   2. Monitor weight/labs.   3. RD to follow to monitor nutrition status    Goals:  Diet will be started by RD follow up  Nutrition Goal Status: new  Communication of RD Recs: reviewed with RN    Assessment and Plan  Nutrition Problem  Altered GI Function    Related to (etiology):   Alcohol abuse/hematemesis    Signs and Symptoms (as evidenced by):    NPO    Interventions:  Collaboration with other providers    Nutrition Diagnosis Status:   New      Malnutrition Assessment  Unable to assess NFPE 2/2 pt asleep at visit      Weight Loss (Malnutrition):  (4% x 10 months)       Reason for Assessment  Reason For Assessment: identified at risk by screening criteria ([facility])  Diagnosis:  (hematemesis with nausea)  Relevant Medical History: GERD, HTN, DM, ABHILASH on CPAP, alcoholic, arthritis, COPD, stroke, alcohol withdrawal seizure  General Information Comments: Pt NPO. Awaiting EGD today. Admitted with hematemesis and nausea 2/2 alcohol abuse. Noted 12lb weight loss x 10 months. Ady 23-skin intact. Unable to assess NFPE 2/2 pt asleep at visit  Nutrition Discharge Planning: d/c needs to be determined    Nutrition Risk Screen  Nutrition Risk Screen: no indicators present    Nutrition/Diet History  Food Preferences: no Hoahaoism or cultural food prefs identified  Factors Affecting Nutritional Intake: None identified at this time, excessive alcohol intake    Anthropometrics  Temp: 97.7 °F (36.5 °C)  Height: 6' (182.9 cm)  Height (inches): 72 in  Weight Method: Bed Scale  Weight: 105.4 kg (232 lb 5.8 oz)  Weight (lb): 232.37 lb  Ideal Body Weight (IBW), Male: 178 lb  % Ideal Body Weight, Male (lb): 130.54 %  BMI (Calculated): 31.5  BMI Grade: 30 - 34.9- obesity - grade I  Usual Body Weight (UBW), k.2 kg ()  % Usual Body Weight: 95.84  % Weight Change From Usual  Weight: -4.36 %     Lab/Procedures/Meds  Pertinent Labs Reviewed: reviewed  Pertinent Labs Comments: BUN 21H, Glu 173H, Ca 8.3L, Phos 1.6L  Pertinent Medications Reviewed: reviewed  Pertinent Medications Comments: diazepam, folic acid, pantoprazole, thiamine    Estimated/Assessed Needs  Weight Used For Calorie Calculations: 80.9 kg (178 lb 5.6 oz) (IBW)  Energy Calorie Requirements (kcal): 2427 (30 kcal/kg)  Energy Need Method: Kcal/kg  Protein Requirements: 80g (1.0g/kg)  Weight Used For Protein Calculations: 80.9 kg (178 lb 5.6 oz) (IBW)  Estimated Fluid Requirement Method: RDA Method  RDA Method (mL): 2427  CHO Requirement: 270g    Nutrition Prescription Ordered  Current Diet Order: NPO    Evaluation of Received Nutrient/Fluid Intake  I/O: 1099/1000  Energy Calories Required: not meeting needs  Protein Required: not meeting needs  Fluid Required: not meeting needs  Comments: LBM 4/3  % Intake of Estimated Energy Needs: Other: NPO  % Meal Intake: NPO    Nutrition Risk  Level of Risk/Frequency of Follow-up:  (2xweekly)     Monitor and Evaluation  Food and Nutrient Intake: food and beverage intake  Food and Nutrient Adminstration: diet order  Physical Activity and Function: nutrition-related ADLs and IADLs  Anthropometric Measurements: weight  Biochemical Data, Medical Tests and Procedures: electrolyte and renal panel  Nutrition-Focused Physical Findings: overall appearance     Nutrition Related Social Determinants of Health: SDOH: Unable to assess at this time.      Nutrition Follow-Up  RD Follow-up?: Yes

## 2024-04-09 NOTE — ANESTHESIA PREPROCEDURE EVALUATION
04/09/2024  Veto Park is a 53 y.o., male.      Pre-op Assessment    I have reviewed the Patient Summary Reports.     I have reviewed the Nursing Notes. I have reviewed the NPO Status.   I have reviewed the Medications.     Review of Systems  Anesthesia Hx:             Denies Family Hx of Anesthesia complications.    Denies Personal Hx of Anesthesia complications.                    Cardiovascular:     Hypertension                                        Endocrine:  Diabetes               Physical Exam    Airway:  Mallampati: II     Anesthesia Plan  Type of Anesthesia, risks & benefits discussed:    Anesthesia Type: Gen Natural Airway  Intra-op Monitoring Plan: Standard ASA Monitors  Post Op Pain Control Plan: multimodal analgesia  Induction:  IV  Informed Consent: Informed consent signed with the Patient and all parties understand the risks and agree with anesthesia plan.  All questions answered.   ASA Score: 3  Day of Surgery Review of History & Physical: H&P Update referred to the surgeon/provider.    Ready For Surgery From Anesthesia Perspective.   .

## 2024-04-09 NOTE — ED NOTES
Attempted to call report. Charge nurse instructed nurse writer to call back, room has not been assigned at this time. Informed Charge nurse [place] in route to transport patient.

## 2024-04-09 NOTE — CONSULTS
"[facility]     CC: "hematemesis with nausea"    HPI 53 y.o. male PMHx of ETOH use disorder, prior ETOH withdrawal seizure, GERD, HTN, DM2, COPD who presents with hematemesis x 2 days. On average he is drinking 2 pints ETOH daily, last drink was 2 days prior to admission. He began having hematemesis on the day prior to admission along with non-bloody emesis. He endorses abdominal pain. Denies any recent bowel movements. Has not eaten food in 5 days due to drinking and nausea.     Since admission, patient has had intermittent episodes of emesis. He denies any bloody emesis this AM. He had a bowel movement this morning and describes it as a "dark stool." He notes he has "dark black stools" on a regular basis. Of note, the RN witnessed the bowel movement and described it as dark green to brown in color without any evidence of bleeding.     He reports prior episode of hematemesis 2 years ago, was evaluated at [facility] with normal EGD and colon (of note, there is no evidence of this in the chart). CTA from 6/2023 with hepatomegaly, fatty change of the liver, prominent portal vein, and collaterals suggesting cirrhosis and portal hypertension. Similar findings were noted on CTAP and abd US from 4/2022. He has not seen a hepatologist outpatient.      Past Medical History  EtOH use disorder   COPD  DM 2  HTN  CVA vs TIA (2020)  ABHILASH on CPAP  Prior alcoholic pancreatitis   Prior alcohol withdrawal seizure      Physical Examination  /80 (Patient Position: Lying)   Pulse (!) 135   Temp 98.8 °F (37.1 °C) (Axillary)   Resp 18   Ht 6' (1.829 m)   Wt 105.4 kg (232 lb 5.8 oz)   SpO2 95%   BMI 31.51 kg/m²   General appearace: well appearing, no acute distress, alert   HEENT: Normocephalic, atraumatic. EOM intact, anicteric sclera.  CV: Tachycardia, regular rhythm  Resp: Normal work of breathing   Abdomen: soft, mild diffuse tenderness, non-distended abdomen with no rebound or guarding, normoactive " bowelsounds   Neuro: A&O x4, moves all extremities spontaneously     Labs:    CBC  WBC 7.59  Hgb 12.9 (down from 15.0 yesterday)  Hct 37.2  MCV 99  Plt 100    Coags   PT 12.1/ INR 1.1    Chem   Na 139  K 3.9   Cl 100  CO2 19   BUN 21  Cr 1.2    Hepatic panel  AST 69  ALT 79  AP 22  TP 7.0  Alb 3.7  Tbili 1.2    Other  Lactic acid 2.1  EtOH 196    Imaging:  CT abdomen pelvis without contrast  - Evidence of cirrhosis with gastric wall thickening     External medical records reviewed including external documents       Endoscopic History:  [place] [facility] one small cecal polyp, internal hemorrhoids. Repeat in 5 years    Assessment:  53 y.o. male PMHx of ETOH use disorder, prior ETOH withdrawal seizure, GERD, HTN, DM2, COPD who presents with hematemesis x 2 days. Patient has history of cirrhosis on imaging dating back 2 years. Pt w/ 2 point drop in hgb and tachycardia, but w/o hypotension and in the setting of acute etoh withdrawal. Source of bleeding most likely sequelae of cirrhosis, including possible variceal bleed vs GAVE, portal gastropathy, etc. Other sources include wilmer saucedo tear, PUD, AVM, etc. Plan for scope later today vs tomorrow to further evaluate.     (This is an acute illness requiring hospitalization and poses a threat to life or bodily function)    Plan:  - NPO pending EGD   - Agree with IV PPI   - Recommend initiating octreotide gtt given c/f variceal source of bleeding  - Transfuse to Hgb goal of >7.0   - Agree with correcting Afib w/ RVR, will need to be controlled prior to scope  - Pt will need outpatient screening colonoscopy    Case discussed with Dr. Link.     Alex Alarcon MD  [facility]ediatrics, PGY-2  04/09/2024 9:15 AM

## 2024-04-09 NOTE — PROGRESS NOTES
04/09/24 0208   Patient Request   Patient Requested AI alert   Nurse Notification   Charge Nurse Notified? Yes   Name of Charge Nurse PHILIPP Espinosa   Bedside Nurse Notified? Yes   Name of Bedside Nurse Philipp Shepherd   Nurse Notfication Method Secure Chat   Provider Notification   Provider Notified? Yes   Name of Provider Dr. Rowell   Provider Notification Method Secure Chat   Provider Notified Of AI Deterioration Alert

## 2024-04-09 NOTE — PROGRESS NOTES
[facility]           Pharmacy        Current Drug Shortage     Due to national backorder and [facility] is critically low on inventory of Dextrose 50% (D50) Syringes and Vials, pharmacy has automatically switched from D50% to D10% IVPB at the equivalent dose until resolution of the shortage per P&T approved protocol.               Chalo Julian, PharmD  [phone]
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
## 2024-04-09 NOTE — NURSING
RAPID RESPONSE NURSE PROACTIVE ROUNDING NOTE     Follow up on patient, 's - EKG and 5mg IVP lopressor + 25mg PO Lopressor ordered. Suggested cardiology consult.     1540: SR on telemetry monitor, HR 60's    PROVIDER ESCALATION    Physician escalation: Yes    Orders received and case discussed with Dr. Bradley .    Disposition:Remain in room 479    FOLLOW UP    Call back the Rapid Response NurseBelem at [phone] for additional questions or concerns.

## 2024-04-09 NOTE — H&P
[facility] Medicine H&P Note     Admitting Team: [facility] Hospitalist Team B  Attending Physician: Gurpreet Dexter MD  Resident: Zeyad Rowell    Date of Admit: 2024    Chief Complaint   Hematemesis x 1 day    Subjective:      History of Present Illness:  Veto Park is a 53 y.o. male with PMHx of ETOH use disorder, prior ETOH withdrawal seizure, GERD who presents with hematemesis x 2 days. Has been drinking 2 pints of ETOH daily for the last week. Stopped drinking 2 days ago. Yesterday he reports vomiting up bright red blood on his first episode of vomiting. He then continued to have 2-3 episodes of bloody emesis yesterday. Today he has had multiple episodes of emesis without blood. He is also having diffuse upper ABD pain. He reports never being told he had cirrhosis. He reports prior episode of hematemesis 2 years ago, was evaluated at [facility] with normal EGD and colon. Denies any recent bowel movements. Has not eaten food in 5 days due to drinking and now being nauseous.     Past Medical History:  GERD  ETOH use disorder  T2DM  Alcoholic pancreatitis    Home Medications:  Denies taking any daily meds    Past Surgical History:  Cholecystectomy  vasectomy    Family History:  Brother- crohns  Denies family hx of gastric cancer    Social History:  ETOH: 40 years heavy use  Tobacco: ppd for 30-40 years  Other: denies  Allergies:  Review of patient's allergies indicates:   Allergen Reactions    Codeine Nausea And Vomiting    Pcn [penicillins] Other (See Comments)     Unknown reaction    Betadine [povidone-iodine] Swelling and Other (See Comments)     redness       Health Maintaince :   Primary Care Physician: No, Primary Doctor     Review of Systems:  Pertinent items are noted in HPI. All other systems are reviewed and are negative.    Objective:   Last 24 Hour Vital Signs:  BP  Min: 150/85  Max: 185/84  Temp  Av.2 °F (36.8 °C)  Min: 97.8 °F (36.6 °C)  Max: 98.9 °F (37.2 °C)  Pulse  Avg:  100.2  Min: 91  Max: 115  Resp  Av.5  Min: 16  Max: 20  SpO2  Av.8 %  Min: 89 %  Max: 98 %  Height  Av' (182.9 cm)  Min: 6' (182.9 cm)  Max: 6' (182.9 cm)  Weight  Av.1 kg (236 lb 2.9 oz)  Min: 105.4 kg (232 lb 5.8 oz)  Max: 108.9 kg (240 lb)  Body mass index is 31.51 kg/m².  I/O last 3 completed shifts:  In: 1099.1 [IV Piggyback:1099.1]  Out: -     Physical Examination:  General: No acute distress. Appropriate behavior. Obese.   Head: normocephalic, atraumatic  Eyes: PERRL. EOMI. No conjunctival injection. No scleral icterus.  ENT: Dry mucus membranes   Neck: No lymphadenopathy. No accessory muscle use.   Cardiac: Tachycardic. Regular rhythm. No murmurs appreciated.  Pulmonary/Chest: CTAB. Normal work of breathing.   Abdomen: Soft, diffuse upper ABD tenderness, no appreciable ascites, normoactive bowel sounds.   Extremities: atraumatic, no deformities, no edema.  Skin: dry, warm, intact. No bruising or rashes.  Neuro: Alert. Oriented to person, place, time. Moving all extremities spontaneously. Normal  strength bilaterally. No asterixis.     Laboratory and Microbiology:  I have independently reviewed data.    Imaging and EKGs:  I have independently reviewed data.    EKG- sinus tachy, RBBB unchanged from prior   CT A/P- diffuse gastric thickening and cirrhosis     Assessment & Plan   Hematemesis concerning for UGIB  - ddx includes gastritis, esophageal mucosal tear, variceal bleed  - Hb 15 >> 13.3, currently HDS without active hematemesis  - INR 1.1  - IV PPI 40 mg x 2 in ED, will continue PPI BID  - continue trending CBC q 4 hr and transfuse for HDS, type and screen ordered  - daily PT/INR  - consult GI in AM for potential EGD  - NPO at [facility], no anticoagulation    ETOH use disorder   High risk ETOH withdrawal with Hx of withdrawal Sz  - 1-2 pints per day during binge episodes, has been drinking 40 years  - ETOH level 196  - Hx of ETOH seizure  - CIWA protocol, symptom driven BZD approach  -  daily IV thiamine and folate   - fall and seizure precautions, q4 vital signs     Upper ABD pain   Nausea vomiting  - lipase 142, Tbili 1.1   - CT A/P with gastritis and cirrhosis, unremarkable pancreas, no ascites noted  - continue PPI as above  - ordered CXR, ABD US  - zofran PRN for nausea    Elevated Transaminates  - AST 99, . INR 1.1  - suspect in setting of recent ETOH use and imaging with cirrhosis  - ALP and Tbili WNL  - acute hep panel ordered   - trend CMP, INR daily  - continue supportive care with IVFs    AGMA  - AG 27, bicarb 18  - suspect in setting of N/V, ETOH use  - check BHB, UA, lactate   - continue supportive care    HTN  - -170s  - not on home regimen  - hold off anti-HTN in setting of concern for UGIB  - consider coreg 6.25 BID after further evaluation    T2DM  - glucose 218 on arrival  - not on home regimen  - A1c 6% 2023  - repeat A1c, add low dose SSI if elevated    Macrocytosis  - MCV 99  - suspect 2/2 heavy ETOH use  - currently treating with IV thiamine and folate  - follow up with PCP outpatient       Code Status: full  DVT Prophylaxis: none  Diet: NPO @ [facility] for GI consult  Disposition: pending workup     Case will be discussed with attending physician and attestation to follow, please appreciate.     Zeyad Rowell MD  [facility] Internal Medicine Resident, PGY-II  [facility] Hospitalist Team B    [facility] Medicine Hospitalist Pager numbers:   [facility] Hospitalist Medicine Team A (Marium/Ivon): [phone]  [facility] Hospitalist Medicine Team B (Guerita/Maria Elena):  [phone]

## 2024-04-09 NOTE — AI DETERIORATION ALERT
Artificial Intelligence Notification  Stan      Admit Date: 2024  LOS: 1  Code Status: Full Code   Date of Consult: 2024  : 1970  Age: 53 y.o.  Weight:   Wt Readings from Last 1 Encounters:   24 105.4 kg (232 lb 5.8 oz)     Sex: male  [facility]:   MRN: [id number]  Attending Physician: Gurpreet Dexter MD  Primary Service: Networked reference to record PCT   Time AI Alert Received: ***  Time at Bedside: ***           ***      Vital Signs (Most Recent):  Temp: 98.9 °F (37.2 °C) (24)  Pulse: (!) 190 (24 0155)  Resp: 20 (24)  BP: (!) 175/81 (24)  SpO2: 95 % (24) Vital Signs (24h Range):  Temp:  [97.8 °F (36.6 °C)-98.9 °F (37.2 °C)] 98.9 °F (37.2 °C)  Pulse:  [] 190  Resp:  [16-20] 20  SpO2:  [89 %-98 %] 95 %  BP: (150-185)/(81-85) 175/81         This encounter was triggered by an Artificial Intelligence Notification.     Artificial Intelligence alert discussed with Primary team:  Name ***      Evaluation: ***    Disposition: ***

## 2024-04-10 ENCOUNTER — CLINICAL SUPPORT (OUTPATIENT)
Dept: SMOKING CESSATION | Facility: CLINIC | Age: 54
End: 2024-04-10
Payer: COMMERCIAL

## 2024-04-10 DIAGNOSIS — F17.210 CIGARETTE SMOKER: Primary | ICD-10-CM

## 2024-04-10 PROBLEM — F17.200 TOBACCO DEPENDENCY: Status: ACTIVE | Noted: 2024-04-10

## 2024-04-10 LAB
ALBUMIN SERPL BCP-MCNC: 3.5 G/DL (ref 3.5–5.2)
ALP SERPL-CCNC: 20 U/L (ref 55–135)
ALT SERPL W/O P-5'-P-CCNC: 71 U/L (ref 10–44)
ANION GAP SERPL CALC-SCNC: 12 MMOL/L (ref 8–16)
AST SERPL-CCNC: 68 U/L (ref 10–40)
BASOPHILS # BLD AUTO: 0.02 K/UL (ref 0–0.2)
BASOPHILS NFR BLD: 0.5 % (ref 0–1.9)
BILIRUB SERPL-MCNC: 1.2 MG/DL (ref 0.1–1)
BUN SERPL-MCNC: 12 MG/DL (ref 6–20)
CALCIUM SERPL-MCNC: 8 MG/DL (ref 8.7–10.5)
CHLORIDE SERPL-SCNC: 100 MMOL/L (ref 95–110)
CO2 SERPL-SCNC: 25 MMOL/L (ref 23–29)
CREAT SERPL-MCNC: 1 MG/DL (ref 0.5–1.4)
DIFFERENTIAL METHOD BLD: ABNORMAL
EOSINOPHIL # BLD AUTO: 0.2 K/UL (ref 0–0.5)
EOSINOPHIL NFR BLD: 5.4 % (ref 0–8)
ERYTHROCYTE [DISTWIDTH] IN BLOOD BY AUTOMATED COUNT: 12.6 % (ref 11.5–14.5)
EST. GFR  (NO RACE VARIABLE): >60 ML/MIN/1.73 M^2
GLUCOSE SERPL-MCNC: 153 MG/DL (ref 70–110)
HCT VFR BLD AUTO: 36.3 % (ref 40–54)
HGB BLD-MCNC: 12.3 G/DL (ref 14–18)
IMM GRANULOCYTES # BLD AUTO: 0 K/UL (ref 0–0.04)
IMM GRANULOCYTES NFR BLD AUTO: 0 % (ref 0–0.5)
INR PPP: 1.2 (ref 0.8–1.2)
LYMPHOCYTES # BLD AUTO: 1.2 K/UL (ref 1–4.8)
LYMPHOCYTES NFR BLD: 28.6 % (ref 18–48)
MCH RBC QN AUTO: 34.1 PG (ref 27–31)
MCHC RBC AUTO-ENTMCNC: 33.9 G/DL (ref 32–36)
MCV RBC AUTO: 101 FL (ref 82–98)
MONOCYTES # BLD AUTO: 0.3 K/UL (ref 0.3–1)
MONOCYTES NFR BLD: 6.2 % (ref 4–15)
NEUTROPHILS # BLD AUTO: 2.4 K/UL (ref 1.8–7.7)
NEUTROPHILS NFR BLD: 59.3 % (ref 38–73)
NRBC BLD-RTO: 0 /100 WBC
PLATELET # BLD AUTO: 66 K/UL (ref 150–450)
PMV BLD AUTO: 9.7 FL (ref 9.2–12.9)
POCT GLUCOSE: 147 MG/DL (ref 70–110)
POCT GLUCOSE: 175 MG/DL (ref 70–110)
POCT GLUCOSE: 210 MG/DL (ref 70–110)
POCT GLUCOSE: 282 MG/DL (ref 70–110)
POTASSIUM SERPL-SCNC: 3.6 MMOL/L (ref 3.5–5.1)
PROT SERPL-MCNC: 6.5 G/DL (ref 6–8.4)
PROTHROMBIN TIME: 12.6 SEC (ref 9–12.5)
RBC # BLD AUTO: 3.61 M/UL (ref 4.6–6.2)
SODIUM SERPL-SCNC: 137 MMOL/L (ref 136–145)
WBC # BLD AUTO: 4.05 K/UL (ref 3.9–12.7)

## 2024-04-10 PROCEDURE — 85610 PROTHROMBIN TIME: CPT

## 2024-04-10 PROCEDURE — 94761 N-INVAS EAR/PLS OXIMETRY MLT: CPT

## 2024-04-10 PROCEDURE — 43239 EGD BIOPSY SINGLE/MULTIPLE: CPT | Mod: ,,, | Performed by: INTERNAL MEDICINE

## 2024-04-10 PROCEDURE — S4991 NICOTINE PATCH NONLEGEND: HCPCS

## 2024-04-10 PROCEDURE — 37000009 HC ANESTHESIA EA ADD 15 MINS: Performed by: INTERNAL MEDICINE

## 2024-04-10 PROCEDURE — 0DB68ZX EXCISION OF STOMACH, VIA NATURAL OR ARTIFICIAL OPENING ENDOSCOPIC, DIAGNOSTIC: ICD-10-PCS | Performed by: INTERNAL MEDICINE

## 2024-04-10 PROCEDURE — 43244 EGD VARICES LIGATION: CPT | Performed by: INTERNAL MEDICINE

## 2024-04-10 PROCEDURE — 36415 COLL VENOUS BLD VENIPUNCTURE: CPT

## 2024-04-10 PROCEDURE — 63600175 PHARM REV CODE 636 W HCPCS: Mod: JA | Performed by: STUDENT IN AN ORGANIZED HEALTH CARE EDUCATION/TRAINING PROGRAM

## 2024-04-10 PROCEDURE — C9113 INJ PANTOPRAZOLE SODIUM, VIA: HCPCS

## 2024-04-10 PROCEDURE — 43239 EGD BIOPSY SINGLE/MULTIPLE: CPT | Performed by: INTERNAL MEDICINE

## 2024-04-10 PROCEDURE — 88305 TISSUE EXAM BY PATHOLOGIST: CPT | Performed by: PATHOLOGY

## 2024-04-10 PROCEDURE — D9220A PRA ANESTHESIA: Mod: ANES,,, | Performed by: ANESTHESIOLOGY

## 2024-04-10 PROCEDURE — 25000003 PHARM REV CODE 250: Performed by: STUDENT IN AN ORGANIZED HEALTH CARE EDUCATION/TRAINING PROGRAM

## 2024-04-10 PROCEDURE — 36415 COLL VENOUS BLD VENIPUNCTURE: CPT | Performed by: STUDENT IN AN ORGANIZED HEALTH CARE EDUCATION/TRAINING PROGRAM

## 2024-04-10 PROCEDURE — 11000001 HC ACUTE MED/SURG PRIVATE ROOM

## 2024-04-10 PROCEDURE — 27201012 HC FORCEPS, HOT/COLD, DISP: Performed by: INTERNAL MEDICINE

## 2024-04-10 PROCEDURE — 99999 PR PBB SHADOW E&M-EST. PATIENT-LVL I: CPT | Mod: PBBFAC,,,

## 2024-04-10 PROCEDURE — 63600175 PHARM REV CODE 636 W HCPCS

## 2024-04-10 PROCEDURE — 99407 BEHAV CHNG SMOKING > 10 MIN: CPT | Mod: S$GLB,,,

## 2024-04-10 PROCEDURE — 43244 EGD VARICES LIGATION: CPT | Mod: ,,, | Performed by: INTERNAL MEDICINE

## 2024-04-10 PROCEDURE — 80053 COMPREHEN METABOLIC PANEL: CPT

## 2024-04-10 PROCEDURE — 37000008 HC ANESTHESIA 1ST 15 MINUTES: Performed by: INTERNAL MEDICINE

## 2024-04-10 PROCEDURE — 25000003 PHARM REV CODE 250: Performed by: NURSE ANESTHETIST, CERTIFIED REGISTERED

## 2024-04-10 PROCEDURE — 25000003 PHARM REV CODE 250

## 2024-04-10 PROCEDURE — 88305 TISSUE EXAM BY PATHOLOGIST: CPT | Mod: 26,,, | Performed by: PATHOLOGY

## 2024-04-10 PROCEDURE — 27201022: Performed by: INTERNAL MEDICINE

## 2024-04-10 PROCEDURE — 85025 COMPLETE CBC W/AUTO DIFF WBC: CPT | Performed by: STUDENT IN AN ORGANIZED HEALTH CARE EDUCATION/TRAINING PROGRAM

## 2024-04-10 PROCEDURE — 63600175 PHARM REV CODE 636 W HCPCS: Performed by: NURSE ANESTHETIST, CERTIFIED REGISTERED

## 2024-04-10 PROCEDURE — D9220A PRA ANESTHESIA: Mod: CRNA,,, | Performed by: NURSE ANESTHETIST, CERTIFIED REGISTERED

## 2024-04-10 RX ORDER — PROPOFOL 10 MG/ML
VIAL (ML) INTRAVENOUS CONTINUOUS PRN
Status: DISCONTINUED | OUTPATIENT
Start: 2024-04-10 | End: 2024-04-10

## 2024-04-10 RX ORDER — CALCIUM CARBONATE 200(500)MG
500 TABLET,CHEWABLE ORAL DAILY PRN
Status: DISCONTINUED | OUTPATIENT
Start: 2024-04-10 | End: 2024-04-12 | Stop reason: HOSPADM

## 2024-04-10 RX ORDER — DEXMEDETOMIDINE HYDROCHLORIDE 100 UG/ML
INJECTION, SOLUTION INTRAVENOUS
Status: DISCONTINUED | OUTPATIENT
Start: 2024-04-10 | End: 2024-04-10

## 2024-04-10 RX ORDER — LIDOCAINE HYDROCHLORIDE 20 MG/ML
INJECTION INTRAVENOUS
Status: DISCONTINUED | OUTPATIENT
Start: 2024-04-10 | End: 2024-04-10

## 2024-04-10 RX ORDER — METOPROLOL TARTRATE 25 MG/1
12.5 TABLET ORAL 2 TIMES DAILY
Status: DISCONTINUED | OUTPATIENT
Start: 2024-04-10 | End: 2024-04-12 | Stop reason: HOSPADM

## 2024-04-10 RX ORDER — ACETAMINOPHEN 325 MG/1
650 TABLET ORAL ONCE
Status: COMPLETED | OUTPATIENT
Start: 2024-04-11 | End: 2024-04-11

## 2024-04-10 RX ORDER — PANTOPRAZOLE SODIUM 40 MG/1
40 TABLET, DELAYED RELEASE ORAL DAILY
Status: DISCONTINUED | OUTPATIENT
Start: 2024-04-11 | End: 2024-04-12 | Stop reason: HOSPADM

## 2024-04-10 RX ORDER — PROPOFOL 10 MG/ML
VIAL (ML) INTRAVENOUS
Status: DISCONTINUED | OUTPATIENT
Start: 2024-04-10 | End: 2024-04-10

## 2024-04-10 RX ORDER — IBUPROFEN 200 MG
1 TABLET ORAL DAILY
Status: DISCONTINUED | OUTPATIENT
Start: 2024-04-10 | End: 2024-04-12 | Stop reason: HOSPADM

## 2024-04-10 RX ADMIN — OCTREOTIDE ACETATE 50 MCG/HR: 200 INJECTION, SOLUTION INTRAVENOUS; SUBCUTANEOUS at 12:04

## 2024-04-10 RX ADMIN — DIAZEPAM 10 MG: 5 TABLET ORAL at 12:04

## 2024-04-10 RX ADMIN — INSULIN ASPART 1 UNITS: 100 INJECTION, SOLUTION INTRAVENOUS; SUBCUTANEOUS at 09:04

## 2024-04-10 RX ADMIN — GLYCOPYRROLATE 0.2 MG: 0.2 INJECTION, SOLUTION INTRAMUSCULAR; INTRAVITREAL at 01:04

## 2024-04-10 RX ADMIN — ONDANSETRON 4 MG: 2 INJECTION INTRAMUSCULAR; INTRAVENOUS at 04:04

## 2024-04-10 RX ADMIN — NICOTINE 1 PATCH: 21 PATCH, EXTENDED RELEASE TRANSDERMAL at 11:04

## 2024-04-10 RX ADMIN — INSULIN ASPART 3 UNITS: 100 INJECTION, SOLUTION INTRAVENOUS; SUBCUTANEOUS at 04:04

## 2024-04-10 RX ADMIN — METOPROLOL TARTRATE 12.5 MG: 25 TABLET, FILM COATED ORAL at 09:04

## 2024-04-10 RX ADMIN — CALCIUM CARBONATE (ANTACID) CHEW TAB 500 MG 500 MG: 500 CHEW TAB at 04:04

## 2024-04-10 RX ADMIN — SODIUM CHLORIDE: 0.9 INJECTION, SOLUTION INTRAVENOUS at 01:04

## 2024-04-10 RX ADMIN — PROPOFOL 80 MG: 10 INJECTION, EMULSION INTRAVENOUS at 01:04

## 2024-04-10 RX ADMIN — DIAZEPAM 10 MG: 5 TABLET ORAL at 11:04

## 2024-04-10 RX ADMIN — FOLIC ACID 1 MG: 5 INJECTION, SOLUTION INTRAMUSCULAR; INTRAVENOUS; SUBCUTANEOUS at 10:04

## 2024-04-10 RX ADMIN — DEXMEDETOMIDINE HYDROCHLORIDE 8 MCG: 100 INJECTION, SOLUTION, CONCENTRATE INTRAVENOUS at 01:04

## 2024-04-10 RX ADMIN — DIAZEPAM 5 MG: 10 INJECTION, SOLUTION INTRAMUSCULAR; INTRAVENOUS at 04:04

## 2024-04-10 RX ADMIN — DIAZEPAM 10 MG: 5 TABLET ORAL at 02:04

## 2024-04-10 RX ADMIN — LIDOCAINE HYDROCHLORIDE 100 MG: 20 INJECTION, SOLUTION INTRAVENOUS at 01:04

## 2024-04-10 RX ADMIN — THIAMINE HYDROCHLORIDE 100 MG: 100 INJECTION, SOLUTION INTRAMUSCULAR; INTRAVENOUS at 09:04

## 2024-04-10 RX ADMIN — PANTOPRAZOLE SODIUM 40 MG: 40 INJECTION, POWDER, LYOPHILIZED, FOR SOLUTION INTRAVENOUS at 09:04

## 2024-04-10 RX ADMIN — PROPOFOL 150 MCG/KG/MIN: 10 INJECTION, EMULSION INTRAVENOUS at 01:04

## 2024-04-10 NOTE — PROVATION PATIENT INSTRUCTIONS
Discharge Summary/Instructions after an Endoscopic Procedure  Patient Name: Veto Park  Patient MRN: [id number]  Patient YOB: 1970  Wednesday, April 10, 2024  Silverio Link MD  Dear patient,  As a result of recent federal legislation (The Federal Cures Act), you may   receive lab or pathology results from your procedure in your [facility]   account before your physician is able to contact you. Your physician or   their representative will relay the results to you with their   recommendations at their soonest availability.  Thank you,  Your health is very important to us during the Covid Crisis. Following your   procedure today, you will receive a daily text for 2 weeks asking about   signs or symptoms of Covid 19.  Please respond to this text when you   receive it so we can follow up and keep you as safe as possible.   RESTRICTIONS:  During your procedure today, you received medications for sedation.  These   medications may affect your judgment, balance and coordination.  Therefore,   for 24 hours, you have the following restrictions:   - DO NOT drive a car, operate machinery, make legal/financial decisions,   sign important papers or drink alcohol.    ACTIVITY:  Today: no heavy lifting, straining or running due to procedural   sedation/anesthesia.  The following day: return to full activity including work.  DIET:  Eat and drink normally unless instructed otherwise.     TREATMENT FOR COMMON SIDE EFFECTS:  - Mild abdominal pain, nausea, belching, bloating or excessive gas:  rest,   eat lightly and use a heating pad.  - Sore Throat: treat with throat lozenges and/or gargle with warm salt   water.  - Because air was used during the procedure, expelling large amounts of air   from your rectum or belching is normal.  - If a bowel prep was taken, you may not have a bowel movement for 1-3 days.    This is normal.  SYMPTOMS TO WATCH FOR AND REPORT TO YOUR PHYSICIAN:  1. Abdominal pain or bloating,  other than gas cramps.  2. Chest pain.  3. Back pain.  4. Signs of infection such as: chills or fever occurring within 24 hours   after the procedure.  5. Rectal bleeding, which would show as bright red, maroon, or black stools.   (A tablespoon of blood from the rectum is not serious, especially if   hemorrhoids are present.)  6. Vomiting.  7. Weakness or dizziness.  GO DIRECTLY TO THE NEAREST EMERGENCY ROOM IF YOU HAVE ANY OF THE FOLLOWING:      Difficulty breathing              Chills and/or fever over 101 F   Persistent vomiting and/or vomiting blood   Severe abdominal pain   Severe chest pain   Black, tarry stools   Bleeding- more than one tablespoon   Any other symptom or condition that you feel may need urgent attention  Your doctor recommends these additional instructions:  If any biopsies were taken, your doctors clinic will contact you in 1 to 2   weeks with any results.  - Return patient to [facility] for possible discharge same day.   - Resume previous diet.   - Continue present medications.   - Await pathology results.   - PPI daily.  - Repeat upper endoscopy in 2 weeks for retreatment for banding to   eradication.   - Return to Hepatology clinic at appointment to be scheduled.  For questions, problems or results please call your physician - Silverio Link MD.  EMERGENCY PHONE NUMBER: [phone],  LAB RESULTS: [phone]  IF A COMPLICATION OR EMERGENCY SITUATION ARISES AND YOU ARE UNABLE TO REACH   YOUR PHYSICIAN - GO DIRECTLY TO THE EMERGENCY ROOM.  Silverio Link MD  4/10/2024 1:30:40 PM  This report has been verified and signed electronically.  Dear patient,  As a result of recent federal legislation (The Federal Cures Act), you may   receive lab or pathology results from your procedure in your [facility]   account before your physician is able to contact you. Your physician or   their representative will relay the results to you with their   recommendations at their  soonest availability.  Thank you,  PROVATION

## 2024-04-10 NOTE — PLAN OF CARE
04/10/24 1022   Post-Acute Status   Post-Acute Authorization Other   Other Status Awaiting f/u Appts  (FU with [facility] clinic requested. F/U with GI requested. F/U with Cards sent today. Noted pt is self pay at this time.)       No future appointments.   Follow-up Information       Center, [facility]. Go in 1 week(s).    Why: OFFICE TO CALL PATIENT/FAMILY TO SET UP APPT TIME, DISCHARGE FOLLOW UP WITH COMMUNITY PCP, FOLLOW UP WITH PSYCHIATRY, For assistance with Substance abuse treatment, Contact [facility] for outpt therapy  Contact information:  [address] [facility] [address]  [phone]               [facility] ([facility]). Call today.    Specialty: Psychiatry  Why: As needed, For assistance with Substance abuse treatment  Contact information:  [address]  [facility] [address] [phone]             [facility] - Gastroenterology. Go in 3 week(s).    Specialty: Gastroenterology  Why: FOLLOW UP WITH GASTROENTEROLOGY  Contact information:  [address]  [address]  [facility] [address] [phone]  Additional information:  Please park in surface lot and check in at [address]

## 2024-04-10 NOTE — PLAN OF CARE
04/10/2024 1059 Fin Assist Application Submitted Jade Hudson Patient     Financial Assistance Note   Room visit made pt states single, not working in 4 weeks, no minors, not 65, no VOC. Pt meets expansion criteria. Ning ID # [id number] submitted. Ning auto approved. Added pending.    Case Program Type Program Patients   [id number] Insurance/Other Medicaid Application Veto Park (Self)

## 2024-04-10 NOTE — PROGRESS NOTES
[facility] Medicine Progress Note    Primary Team: [facility] Hospitalist Team B  Attending Physician: Gurpreet Dexter MD  Resident: Tejinder  Intern: Mirna    Date of Admit: 2024     Chief Complaint:   Chief Complaint   Patient presents with    Hematemesis     Vomiting blood x 3 days, no alcohol in 2 days, no meds in long time        Subjective/Interval Events:      Patient resting comfortably in bed in no acute distress. Pending EGD with GI today. Heart rate remains well controlled on metoprolol.     Objective    Objective   Last 24 Hour Vital Signs:  BP  Min: 133/64  Max: 193/91  Temp  Av.1 °F (36.7 °C)  Min: 97.7 °F (36.5 °C)  Max: 98.8 °F (37.1 °C)  Pulse  Av.1  Min: 64  Max: 148  Resp  Av.2  Min: 18  Max: 22  SpO2  Av.4 %  Min: 90 %  Max: 96 %  Height  Av' (182.9 cm)  Min: 6' (182.9 cm)  Max: 6' (182.9 cm)  Weight  Av.1 kg (236 lb 3.3 oz)  Min: 105.4 kg (232 lb 5.8 oz)  Max: 110.2 kg (243 lb)    Intake/Output Summary (Last 24 hours) at 4/10/2024 0736  Last data filed at 4/10/2024 0543  Gross per 24 hour   Intake 0 ml   Output 450 ml   Net -450 ml       Physical Examination:  General: No acute distress. Appropriate behavior. Obese.   Head: normocephalic, atraumatic  Neck: No lymphadenopathy. No accessory muscle use.   Cardiac: regular rate and rhythm  Pulmonary/Chest: CTAB. Normal work of breathing.   Extremities: atraumatic, no deformities, no edema.  Skin: dry, warm, intact. No bruising or rashes.  Neuro: Alert. Oriented to person, place, time. Moving all extremities spontaneously.      Assessment:     Veto Park is a 53 y.o. male with a PMHx of alcohol use disorder (complicated by DT's/Seizures), COPD, T2DM, ABHILASH who presents with hematemesis for 3 days. Patient reports numerous episodes of emesis, some with blood some without. In the ED patient tachycardic but HDS. Notably tremulous. Daily EtOH drinker x2 pints liqour daily. Last drink 2 days ago. CT abdomen with  gastric wall thickening with concern for gastritis. No varices appreciable on CT. Patient is admitted to [facility] internal medicine for workup of hematemesis with concern for UGIB in known cirrhotic in addition to alcohol withdrawal.     Plan:     Hematemesis  - ddx includes gastritis, esophageal mucosal tear, variceal bleed  - Hemoglobin remains stable  - INR 1.1  - Continue IV PPI BID  - Closely monitor vitals and hemoglobin  - Continue octreotide infusion  - Pending EGD with GI    Paroxysmal atrial fibrillation with RVR  - sinus tachycardia on admission, found to be in afib with RVR   - heart rate controlled with metoprolol tartrate 25 mg BID  - refer to cardiology upon discharge   - Chadsvasc score 2, will defer anticoagulation in setting of GI bleed and discuss risks vs benefits with patient prior to discharge      ETOH use disorder   High risk ETOH withdrawal with Hx of withdrawal Sz  Sinus Tachycardia   - 1-2 pints per day during binge episodes, has been drinking 40 years  - ETOH level 196  - Hx of ETOH seizure  - Will schedule benzo's today with PRNs available for breakthrough   symptoms: Ativan not available at present.   Valium taper:  Day 1: Valium 10 mg PO q6  Day 2: Valium 10 mg PO q8  Day 3: Valium 10 mg PO q12  Day 4: Valium 5 mg PO q12  Day 5: Valium 5 mg PO once and then discontinue taper  - daily IV thiamine and folate   - Replete Mg, P as appropriate   - fall and seizure precautions, q4 vital signs      Upper ABD pain   Nausea vomiting  - lipase 142, Tbili 1.1   - CT A/P with gastritis and cirrhosis, unremarkable pancreas, no ascites noted  - continue PPI as above  - abdominal ultrasound completed, findings consistent with splenomegaly, heterogenous liver   - zofran PRN for nausea     Elevated Transaminates  - AST 99, . INR 1.1  - suspect in setting of recent ETOH use and imaging with cirrhosis  - ALP and Tbili WNL  - acute hep panel negative   - trend CMP, INR daily  - continue supportive care  with IVFs     AGMA, resolved  - AG 27, bicarb 18, elevated ketones  - suspect secondary to alcoholic/starvation ketosis     HTN  - -170s  - not on home regimen  - hold off anti-HTN in setting of concern for UGIB  - consider coreg 6.25 BID after further evaluation     T2DM  - glucose 218 on arrival  - not on home regimen  - A1c 7.4 during admission  - SSI while admitted      Macrocytosis  - MCV 99  - suspect 2/2 heavy ETOH use  - currently treating with IV thiamine and folate  - follow up with PCP outpatient         Code Status: full  DVT Prophylaxis: none  Diet: NPO     Disposition: pending EGD    Gabriele Gregory MD   [facility] PGY-III  [facility] Internal [facility] Team A    hospitals Medicine Hospitalist Pager numbers:   [facility] Hospitalist Medicine Team A (Marium/Ivon): [phone]  hospitals Hospitalist Medicine Team B (Guerita/Maria Elena):  [phone]

## 2024-04-10 NOTE — PROGRESS NOTES
Individual Follow-Up Form    4/10/2024    Quit Date: To be determined    Clinical Status of Patient: Inpatient    Length of Service: 30 minutes    Comments: Smoking cessation education note: Pt is a 1 to 1.5 pk/day cigarette smoker x 42 yrs. Order requested for 21 mg nicotine patch Q day. Pt states ready to quit. Ambulatory referral to Smoking Cessation clinic following hospital discharge.     Diagnosis: F17.210

## 2024-04-10 NOTE — MEDICAL/APP STUDENT
[facility] Medicine Progress Note    Primary Team: [facility] Hospitalist Team B  Attending Physician: Gurpreet Dexter MD  Resident:   Intern: Leeann    Subjective:      Patient resting in bed this AM, reports slight improvement in sxs from yesterday but still c/o abd pain and HA. Denies fever, chills, n/v, chest pain, SOB, palpitations. Reports x1 episode of urinary incontinence while trying to make it to the bathroom. Denies dizziness, saddle anesthesia, weakness, numbness. Pt NPO since midnight, EGD scheduled for today.    HR consistently >130 yesterday, given 5 mg IV lopressor. HR well-controlled this AM.    Objective:     Last 24 Hour Vital Signs:  BP  Min: 133/64  Max: 193/91  Temp  Av °F (36.7 °C)  Min: 97.7 °F (36.5 °C)  Max: 98.2 °F (36.8 °C)  Pulse  Av.8  Min: 64  Max: 140  Resp  Av.2  Min: 18  Max: 22  SpO2  Av.5 %  Min: 90 %  Max: 96 %  Height  Av' (182.9 cm)  Min: 6' (182.9 cm)  Max: 6' (182.9 cm)  Weight  Av.1 kg (236 lb 3.3 oz)  Min: 105.4 kg (232 lb 5.8 oz)  Max: 110.2 kg (243 lb)  I/O last 3 completed shifts:  In: 0   Out: 1450 [Urine:1350; Emesis/NG output:100]    Physical Examination:  GENERAL: NAD. Resting in bed.   HENT: NCAT, MMM  EYES: EOMI, PERRL, anicteric, normal conjunctiva  CARDIOVASCULAR: RRR, audible S1 and S2, no M/R/G appreciated  RESPIRATORY: CTABL, no crackles or wheezes, no increased work of breathing  ABDOMEN: soft, non-tender to palpation, no guarding. Normoactive BS  EXTREMITIES: No LE edema. No calf tenderness.  SKIN: Warm and dry. No rashes/lesions to exposed skin  NEUROLOGIC: Moves all extremities spontaneously, grossly non-focal, sensation intact  MENTAL STATUS: Awake, alert, calm, cooperative    Laboratory:  Laboratory Data Reviewed: yes  Pertinent Findings:  No results displayed because visit has over 200 results.        Other Results:  EKG (my interpretation):   EKG at 08:22 on : Atrial fibrillation w/ rapid ventricular response. Right bundle  branch block.    Radiology Data Reviewed: yes  Pertinent Findings:  ECHO on 04/09:  Left Ventricle The left ventricle is normal in size. Normal wall thickness. There is concentric hypertrophy. There is normal systolic function. Biplane (2D) method of discs ejection fraction is 70%.  Right Ventricle Normal right ventricular cavity size. Systolic function is normal. TAPSE is 1.80 cm.  Left Atrium Normal left atrial size.  Right Atrium Normal right atrial size.  Aortic Valve Not well visualized due to poor acoustic window. Aortic valve peak velocity is 1.32 m/s. Mean gradient is 4 mmHg.  Mitral Valve The mitral valve is structurally normal. There is normal leaflet mobility.  Tricuspid Valve The tricuspid valve is structurally normal. There is normal leaflet mobility.  Pulmonic Valve Not well visualized due to poor acoustic window.  IVC/SVC IVC was not assessed.  Pericardium and Other Findings There is no pericardial effusion.        Current Medications:     Infusions:   octreotide (SANDOSTATIN) 500 mcg in sodium chloride 0.9% 100 mL infusion 50 mcg/hr (04/10/24 0021)        Scheduled:   diazePAM  10 mg Oral Q8H    Followed by    [START ON 4/11/2024] diazePAM  10 mg Oral Q12H    Followed by    [START ON 4/12/2024] diazePAM  5 mg Oral Q12H    Followed by    [START ON 4/13/2024] diazePAM  5 mg Oral Once    folic acid (FOLVITE) IVPB  1 mg Intravenous Daily    pantoprazole  40 mg Intravenous BID    thiamine (B-1) 100 mg in dextrose 5 % (D5W) 100 mL IVPB  100 mg Intravenous Daily        PRN:  dextrose 10%, dextrose 10%, dextrose 10%, dextrose 10%, diazePAM, glucagon (human recombinant), glucose, glucose, insulin aspart U-100, naloxone, ondansetron, sodium chloride 0.9%      Assessment:     Veto Park is a 53 y.o. male with a PMHx of alcohol use disorder (complicated by DT's/Seizures), COPD, T2DM, ABHILASH who presents with hematemesis for 3 days. Patient reports numerous episodes of emesis, some with blood some  without. In the ED patient tachycardic but HDS. Notably tremulous. Daily EtOH drinker x2 pints liqour daily. Last drink 2 days ago. CT abdomen with gastric wall thickening with concern for gastritis. No varices appreciable on CT. Patient is admitted to [facility] internal medicine for workup of hematemesis with concern for UGIB in known cirrhotic in addition to alcohol withdrawal. EGD scheduled for today.     Plan:     Hematemesis  - ddx includes gastritis, esophageal mucosal tear, variceal bleed  - Hemoglobin remains stable  - INR 1.1  - Continue IV PPI BID  - Closely monitor vitals and hemoglobin  - Continue octreotide infusion  - Pending EGD with GI     Paroxysmal atrial fibrillation with RVR  - sinus tachycardia on admission, found to be in afib with RVR   - heart rate controlled with metoprolol tartrate 25 mg BID  - refer to cardiology upon discharge   - Chadsvasc score 2, will defer anticoagulation in setting of GI bleed and discuss risks vs benefits with patient prior to discharge      ETOH use disorder   High risk ETOH withdrawal with Hx of withdrawal Sz  Sinus Tachycardia   - 1-2 pints per day during binge episodes, has been drinking 40 years  - ETOH level 196  - Hx of ETOH seizure  - Will schedule benzo's today with PRNs available for breakthrough   symptoms: Ativan not available at present.   Valium taper:  Day 1: Valium 10 mg PO q6  Day 2: Valium 10 mg PO q8  Day 3: Valium 10 mg PO q12  Day 4: Valium 5 mg PO q12  Day 5: Valium 5 mg PO once and then discontinue taper  - daily IV thiamine and folate   - Replete Mg, P as appropriate   - fall and seizure precautions, q4 vital signs      Upper ABD pain   Nausea vomiting  - lipase 142, Tbili 1.1   - CT A/P with gastritis and cirrhosis, unremarkable pancreas, no ascites noted  - continue PPI as above  - abdominal ultrasound completed, findings consistent with splenomegaly, heterogenous liver   - zofran PRN for nausea     Elevated Transaminates  - AST 99, . INR  1.1  - suspect in setting of recent ETOH use and imaging with cirrhosis  - ALP and Tbili WNL  - acute hep panel negative   - trend CMP, INR daily  - continue supportive care with IVFs     AGMA, resolved  - AG 27, bicarb 18, elevated ketones  - suspect secondary to alcoholic/starvation ketosis     HTN  - -170s  - not on home regimen  - hold off anti-HTN in setting of concern for UGIB  - consider coreg 6.25 BID after further evaluation     T2DM  - glucose 218 on arrival  - not on home regimen  - A1c 7.4 during admission  - SSI while admitted      Macrocytosis  - MCV 99  - suspect 2/2 heavy ETOH use  - currently treating with IV thiamine and folate  - follow up with PCP outpatient         Code Status: full  DVT Prophylaxis: none  Diet: NPO     Jeremiah Starks, MS3    \[facility]\"" Medicine Hospitalist Pager numbers:   [facility] Hospitalist Medicine Team A (Marium/vIon): [phone]  [facility] Hospitalist Medicine Team B (Guerita/Maria Elena):  [phone]

## 2024-04-10 NOTE — ANESTHESIA POSTPROCEDURE EVALUATION
Anesthesia Post Evaluation    Patient: Veto Park    Procedure(s) Performed: Procedure(s) (LRB):  EGD (ESOPHAGOGASTRODUODENOSCOPY) (N/A)    Final Anesthesia Type: general      Patient location during evaluation: PACU  Patient participation: Yes- Able to Participate  Level of consciousness: awake and alert  Post-procedure vital signs: reviewed and stable  Pain management: adequate  Airway patency: patent    PONV status at discharge: No PONV  Anesthetic complications: no      Cardiovascular status: blood pressure returned to baseline  Respiratory status: unassisted  Hydration status: euvolemic                Vitals Value Taken Time   /87 04/10/24 1359   Temp 37.3 °C (99.1 °F) 04/10/24 1325   Pulse 86 04/10/24 1359   Resp 16 04/10/24 1359   SpO2 98 % 04/10/24 1359         Event Time   Out of Recovery 04/10/2024 14:00:30         Pain/Swathi Score: Swathi Score: 10 (4/10/2024  1:59 PM)

## 2024-04-10 NOTE — PLAN OF CARE
Problem: Adult Inpatient Plan of Care  Goal: Plan of Care Review  Outcome: Ongoing, Progressing     Problem: Diabetes Comorbidity  Goal: Blood Glucose Level Within Targeted Range  Outcome: Ongoing, Progressing     Problem: Alcohol Withdrawal  Goal: Alcohol Withdrawal Symptom Control  Outcome: Ongoing, Progressing

## 2024-04-10 NOTE — PLAN OF CARE
Talked to the Primary nurse, report given about the procedure, patient awake and stable, ready for [facility].

## 2024-04-11 ENCOUNTER — CLINICAL SUPPORT (OUTPATIENT)
Dept: SMOKING CESSATION | Facility: CLINIC | Age: 54
End: 2024-04-11
Payer: MEDICAID

## 2024-04-11 DIAGNOSIS — F17.210 CIGARETTE SMOKER: Primary | ICD-10-CM

## 2024-04-11 LAB
ALBUMIN SERPL BCP-MCNC: 3.5 G/DL (ref 3.5–5.2)
ALP SERPL-CCNC: 24 U/L (ref 55–135)
ALT SERPL W/O P-5'-P-CCNC: 96 U/L (ref 10–44)
ANION GAP SERPL CALC-SCNC: 11 MMOL/L (ref 8–16)
AST SERPL-CCNC: 98 U/L (ref 10–40)
BASOPHILS # BLD AUTO: 0.02 K/UL (ref 0–0.2)
BASOPHILS NFR BLD: 0.4 % (ref 0–1.9)
BILIRUB SERPL-MCNC: 0.8 MG/DL (ref 0.1–1)
BUN SERPL-MCNC: 10 MG/DL (ref 6–20)
CALCIUM SERPL-MCNC: 8.5 MG/DL (ref 8.7–10.5)
CHLORIDE SERPL-SCNC: 100 MMOL/L (ref 95–110)
CO2 SERPL-SCNC: 25 MMOL/L (ref 23–29)
CREAT SERPL-MCNC: 0.9 MG/DL (ref 0.5–1.4)
DIFFERENTIAL METHOD BLD: ABNORMAL
EOSINOPHIL # BLD AUTO: 0.2 K/UL (ref 0–0.5)
EOSINOPHIL NFR BLD: 3.3 % (ref 0–8)
ERYTHROCYTE [DISTWIDTH] IN BLOOD BY AUTOMATED COUNT: 12.3 % (ref 11.5–14.5)
EST. GFR  (NO RACE VARIABLE): >60 ML/MIN/1.73 M^2
FINAL PATHOLOGIC DIAGNOSIS: NORMAL
GLUCOSE SERPL-MCNC: 136 MG/DL (ref 70–110)
GROSS: NORMAL
HCT VFR BLD AUTO: 35.2 % (ref 40–54)
HGB BLD-MCNC: 12.7 G/DL (ref 14–18)
IMM GRANULOCYTES # BLD AUTO: 0.01 K/UL (ref 0–0.04)
IMM GRANULOCYTES NFR BLD AUTO: 0.2 % (ref 0–0.5)
INR PPP: 1.2 (ref 0.8–1.2)
LYMPHOCYTES # BLD AUTO: 0.8 K/UL (ref 1–4.8)
LYMPHOCYTES NFR BLD: 17.1 % (ref 18–48)
Lab: NORMAL
MCH RBC QN AUTO: 34.8 PG (ref 27–31)
MCHC RBC AUTO-ENTMCNC: 36.1 G/DL (ref 32–36)
MCV RBC AUTO: 96 FL (ref 82–98)
MONOCYTES # BLD AUTO: 0.3 K/UL (ref 0.3–1)
MONOCYTES NFR BLD: 5.9 % (ref 4–15)
NEUTROPHILS # BLD AUTO: 3.3 K/UL (ref 1.8–7.7)
NEUTROPHILS NFR BLD: 73.1 % (ref 38–73)
NRBC BLD-RTO: 0 /100 WBC
PLATELET # BLD AUTO: 60 K/UL (ref 150–450)
PMV BLD AUTO: 10.1 FL (ref 9.2–12.9)
POCT GLUCOSE: 132 MG/DL (ref 70–110)
POCT GLUCOSE: 164 MG/DL (ref 70–110)
POCT GLUCOSE: 180 MG/DL (ref 70–110)
POCT GLUCOSE: 197 MG/DL (ref 70–110)
POTASSIUM SERPL-SCNC: 3.5 MMOL/L (ref 3.5–5.1)
PROT SERPL-MCNC: 6.7 G/DL (ref 6–8.4)
PROTHROMBIN TIME: 12.6 SEC (ref 9–12.5)
RBC # BLD AUTO: 3.65 M/UL (ref 4.6–6.2)
SODIUM SERPL-SCNC: 136 MMOL/L (ref 136–145)
WBC # BLD AUTO: 4.55 K/UL (ref 3.9–12.7)

## 2024-04-11 PROCEDURE — 85025 COMPLETE CBC W/AUTO DIFF WBC: CPT

## 2024-04-11 PROCEDURE — 63600175 PHARM REV CODE 636 W HCPCS

## 2024-04-11 PROCEDURE — 85610 PROTHROMBIN TIME: CPT

## 2024-04-11 PROCEDURE — 36415 COLL VENOUS BLD VENIPUNCTURE: CPT

## 2024-04-11 PROCEDURE — 25000003 PHARM REV CODE 250: Performed by: STUDENT IN AN ORGANIZED HEALTH CARE EDUCATION/TRAINING PROGRAM

## 2024-04-11 PROCEDURE — 25000003 PHARM REV CODE 250

## 2024-04-11 PROCEDURE — 97161 PT EVAL LOW COMPLEX 20 MIN: CPT

## 2024-04-11 PROCEDURE — 99406 BEHAV CHNG SMOKING 3-10 MIN: CPT | Mod: ,,,

## 2024-04-11 PROCEDURE — 11000001 HC ACUTE MED/SURG PRIVATE ROOM

## 2024-04-11 PROCEDURE — S4991 NICOTINE PATCH NONLEGEND: HCPCS

## 2024-04-11 PROCEDURE — 80053 COMPREHEN METABOLIC PANEL: CPT

## 2024-04-11 PROCEDURE — 99232 SBSQ HOSP IP/OBS MODERATE 35: CPT | Mod: ,,, | Performed by: INTERNAL MEDICINE

## 2024-04-11 RX ORDER — DIAZEPAM 5 MG/1
5 TABLET ORAL EVERY 12 HOURS
Status: DISCONTINUED | OUTPATIENT
Start: 2024-04-12 | End: 2024-04-11

## 2024-04-11 RX ORDER — ACETAMINOPHEN 325 MG/1
650 TABLET ORAL ONCE
Status: COMPLETED | OUTPATIENT
Start: 2024-04-11 | End: 2024-04-11

## 2024-04-11 RX ORDER — DIAZEPAM 5 MG/1
5 TABLET ORAL EVERY 8 HOURS
Status: DISCONTINUED | OUTPATIENT
Start: 2024-04-11 | End: 2024-04-11

## 2024-04-11 RX ORDER — DIAZEPAM 5 MG/1
5 TABLET ORAL EVERY 8 HOURS
Status: COMPLETED | OUTPATIENT
Start: 2024-04-11 | End: 2024-04-11

## 2024-04-11 RX ADMIN — DIAZEPAM 5 MG: 5 TABLET ORAL at 08:04

## 2024-04-11 RX ADMIN — METOPROLOL TARTRATE 12.5 MG: 25 TABLET, FILM COATED ORAL at 08:04

## 2024-04-11 RX ADMIN — ACETAMINOPHEN 650 MG: 325 TABLET ORAL at 08:04

## 2024-04-11 RX ADMIN — FOLIC ACID 1 MG: 5 INJECTION, SOLUTION INTRAMUSCULAR; INTRAVENOUS; SUBCUTANEOUS at 10:04

## 2024-04-11 RX ADMIN — NICOTINE 1 PATCH: 21 PATCH, EXTENDED RELEASE TRANSDERMAL at 08:04

## 2024-04-11 RX ADMIN — THIAMINE HYDROCHLORIDE 100 MG: 100 INJECTION, SOLUTION INTRAMUSCULAR; INTRAVENOUS at 09:04

## 2024-04-11 RX ADMIN — PANTOPRAZOLE SODIUM 40 MG: 40 TABLET, DELAYED RELEASE ORAL at 08:04

## 2024-04-11 RX ADMIN — ACETAMINOPHEN 650 MG: 325 TABLET ORAL at 12:04

## 2024-04-11 NOTE — PROGRESS NOTES
[facility] Medicine Progress Note     Primary Team: [facility] Hospitalist Team B  Attending Physician: Gurpreet Dexter MD  Resident:   Intern: Leeann     Subjective:      NAEON. Patient doing well s/p EGD yesterday.  Pt sitting up in bed, in no distress. Reports improvement in symptoms. Denies abd pain, n/v, hematemesis, chest pain, SOB, palpitations. Patient reports he was able to eat dinner last night (meat and spaghetti) post-procedure with x1 choking episode.     Objective:      Last 24 Hour Vital Signs:  BP  Min: 130/72  Max: 169/98  Temp  Av.5 °F (36.9 °C)  Min: 98 °F (36.7 °C)  Max: 99.1 °F (37.3 °C)  Pulse  Av.6  Min: 59  Max: 100  Resp  Av  Min: 16  Max: 20  SpO2  Av.9 %  Min: 94 %  Max: 98 %  I/O last 3 completed shifts:  In: 240 [P.O.:240]  Out: 450 [Urine:450]     Physical Examination:  GENERAL: NAD. Resting in bed.   HENT: NCAT, MMM  EYES: EOMI, PERRL, anicteric, normal conjunctiva  CARDIOVASCULAR: RRR, audible S1 and S2, no M/R/G appreciated  RESPIRATORY: CTABL, no crackles or wheezes, no increased work of breathing  ABDOMEN: soft, non-tender to palpation, no guarding. Normoactive BS  EXTREMITIES: No LE edema. No calf tenderness.  SKIN: Warm and dry. No rashes/lesions to exposed skin  NEUROLOGIC: Moves all extremities spontaneously, grossly non-focal, sensation intact  MENTAL STATUS: Awake, alert, calm, cooperative     Laboratory:  Laboratory Data Reviewed: yes  Pertinent Findings:  No results displayed because visit has over 200 results.          Other Results:  EKG (my interpretation):   EKG at 08:22 on : Atrial fibrillation w/ rapid ventricular response. Right bundle branch block.     Radiology Data Reviewed: yes  Pertinent Findings:  ECHO on :  Left Ventricle   The left ventricle is normal in size. Normal wall thickness. There is concentric hypertrophy. There is normal systolic function. Biplane (2D) method of discs ejection fraction is 70%.  Right Ventricle Normal right  ventricular cavity size. Systolic function is normal. TAPSE is 1.80 cm.  Left Atrium       Normal left atrial size.  Right Atrium    Normal right atrial size.  Aortic Valve     Not well visualized due to poor acoustic window. Aortic valve peak velocity is 1.32 m/s. Mean gradient is 4 mmHg.  Mitral Valve     The mitral valve is structurally normal. There is normal leaflet mobility.  Tricuspid Valve           The tricuspid valve is structurally normal. There is normal leaflet mobility.  Pulmonic Valve           Not well visualized due to poor acoustic window.  IVC/SVC          IVC was not assessed.  Pericardium and Other Findings         There is no pericardial effusion.           Current Medications:     Infusions: Discontinued Octreotide 500 mcg yesterday           Scheduled:   diazePAM  10 mg Oral Q12H     Followed by    [START ON 4/12/2024] diazePAM  5 mg Oral Q12H     Followed by    [START ON 4/13/2024] diazePAM  5 mg Oral Once    folic acid (FOLVITE) IVPB  1 mg Intravenous Daily    metoprolol tartrate  12.5 mg Oral BID    nicotine  1 patch Transdermal Daily    pantoprazole  40 mg Oral Daily    thiamine (B-1) 100 mg in dextrose 5 % (D5W) 100 mL IVPB  100 mg Intravenous Daily         PRN:  calcium carbonate, dextrose 10%, dextrose 10%, dextrose 10%, dextrose 10%, diazePAM, glucagon (human recombinant), glucose, glucose, insulin aspart U-100, naloxone, ondansetron, sodium chloride 0.9%        Assessment:      Veto Park is a 53 y.o. male with a PMHx of alcohol use disorder (complicated by DT's/Seizures), COPD, T2DM, ABHILASH who presents with hematemesis for 3 days. Patient reports numerous episodes of emesis, some with blood some without. In the ED patient tachycardic but HDS. Notably tremulous. Daily EtOH drinker x2 pints liqour daily. Last drink 2 days ago. CT abdomen with gastric wall thickening with concern for gastritis. No varices appreciable on CT. Patient is admitted to [facility] internal medicine for  workup of hematemesis with concern for UGIB in known cirrhotic in addition to alcohol withdrawal. EGD on 04/10 w/ large varices in distal esophagus, x2 bands placed.      Plan:      Hematemesis/ Esophageal Varices  -EGD on 04/10: Large (> 5 mm) varices were found in the distal esophagus. Two bands        were successfully placed with complete eradication, resulting in deflation of varices.   -Per GI, pt stable for discharge. Able to resume previous diet. PPI daily. Repeat upper endo in 2 weeks for retreatment for banding to eradication. F/up w/ hepatology clinic  - INR 1.2  - Closely monitor vitals and hemoglobin  - discontinued octreotide infusion 04/10  - PO protonix        Paroxysmal atrial fibrillation with RVR  - sinus tachycardia on admission, found to be in afib with RVR   - heart rate controlled with metoprolol tartrate 25 mg BID  - refer to cardiology upon discharge   - Chadsvasc score 2, will defer anticoagulation in setting of GI bleed and discuss risks vs benefits with patient prior to discharge   -     ETOH use disorder   High risk ETOH withdrawal with Hx of withdrawal Sz  Sinus Tachycardia   - 1-2 pints per day during binge episodes, has been drinking 40 years  - ETOH level 196  - Hx of ETOH seizure  - Scheduled benzo's with PRNs available for breakthrough   symptoms: Ativan not available at present.     -Valium taper: adjusted per pts progression; will receive last dose tn at 9 pm   - daily IV thiamine and folate   - Replete Mg, P as appropriate   - fall and seizure precautions, q4 vital signs        Upper ABD pain   Nausea vomiting, Resolved  -Pt denies abd pain, n/v today  - CT A/P with gastritis and cirrhosis, unremarkable pancreas, no ascites noted  - continue PPI as above  - abdominal ultrasound completed, findings consistent with splenomegaly, heterogenous liver   - zofran PRN for nausea     Elevated Transaminates  - AST 99, . INR 1.1 upon admission  -AST: 98, ALT: 96 today  - suspect in  setting of recent ETOH use and imaging with cirrhosis  - ALP and Tbili WNL  - acute hep panel negative   - trend CMP, INR daily  - continue supportive care with IVFs     AGMA, resolved  - AG 27, bicarb 18, elevated ketones  - suspect secondary to alcoholic/starvation ketosis     HTN  - -170  - not on home regimen  - hold off anti-HTN in setting of concern for UGIB  - consider coreg 6.25 BID after further evaluation     T2DM  - glucose 218 on arrival  - not on home regimen  - A1c 7.4 during admission  - SSI while admitted      Macrocytosis  - MCV 99  - suspect 2/2 heavy ETOH use  - currently treating with IV thiamine and folate  - follow up with PCP outpatient         Code Status: full  DVT Prophylaxis: none  Diet: Diabetic     Jeremiah Starks, MS3       I personally examined pt and labs/imaging. Discussed findings with pt. Agree with documentation as above.     Re Bradley MD

## 2024-04-11 NOTE — PROGRESS NOTES
Individual Follow-Up Form    4/11/2024    Quit Date: To be determined    Clinical Status of Patient: Inpatient    Length of Service: 30 minutes    Continuing Medication: yes  Patches    Comments: Smoking cessation education follow-up: Pt denies nicotine withdrawal symptoms with patch in use. Order requested upon discharge for 21 mg nicotine patch Q day. Reviewed details of pt's Ambulatory Smoking Cessation clinic appointment; .     Diagnosis: F17.210    Next Visit: 4/25/24 at [facility]

## 2024-04-11 NOTE — PLAN OF CARE
04/11/24 1425   Post-Acute Status   Post-Acute Authorization HME;Other   HME Status Pending payor review  (RW ordered per therapy recs. Pt may will have to pay out of pocket if Medicaid not approved)   Other Status See Comments  (Outpt therapy referral ordered. Medicaid pending. Therapy services will coordinate with patient for visits.)

## 2024-04-11 NOTE — PLAN OF CARE
04/11/24 1009   Final Note   Assessment Type Final Discharge Note   Anticipated Discharge Disposition Home   Hospital Resources/Appts/Education Provided Appointments scheduled and added to AVS   Post-Acute Status   Post-Acute Authorization Other   Other Status Awaiting f/u Appts  (Plans for EGD outpt in 2wks. F/U with Hepatology at [facility] pending. Medicaid pending noted. Pt updated at bedside.)       Potential for DC today if medically stabled. F/us requested. Pt updated at bedside.    Future Appointments   Date Time Provider Department Center   4/25/2024  2:00 PM Dante Jamil, RRT [facility]      Follow-up Information       Center, [facility]. Go in 1 week(s).    Why: OFFICE TO CALL PATIENT/FAMILY TO SET UP APPT TIME, DISCHARGE FOLLOW UP WITH COMMUNITY PCP, FOLLOW UP WITH PSYCHIATRY, For assistance with Substance abuse treatment, Contact [facility] for outpt therapy  Contact information:  [address]  [place] [facility] [address]  [phone]               [facility] ([facility]). Call today.    Specialty: Psychiatry  Why: As needed, For assistance with Substance abuse treatment  Contact information:  [address]  [facility] [address] [phone]             [facility]. Go in 3 week(s).    Specialty: Gastroenterology  Why: FOLLOW UP WITH GASTROENTEROLOGY  Contact information:  [address]  [address]  [facility] [address] [phone]  Additional information:  Please park in surface lot and check in at [address]                         Orders Placed This Encounter   Procedures    Ambulatory referral/consult to Hepatology     Standing Status:   Future     Standing Expiration Date:   5/9/2025     Referral Priority:   Urgent     Referral Type:   Consultation     Referral Reason:   Specialty Services Required     Requested Specialty:   Hepatology     Number of Visits Requested:   1     Ambulatory referral/consult to Gastroenterology     Standing Status:   Future     Standing Expiration Date:   5/9/2025     Referral Priority:   Urgent     Referral Type:   Consultation     Referral Reason:   Specialty Services Required     Requested Specialty:   Gastroenterology     Number of Visits Requested:   1

## 2024-04-11 NOTE — PROGRESS NOTES
[facility]    CC: Hematemesis     Interval history: Doing well this morning. No more episodes of emesis. Tolerating his diet. Did well with his procedure yesterday       Physical Examination  BP (!) 152/77 (Patient Position: Lying)   Pulse (!) 59   Temp 98.5 °F (36.9 °C) (Oral)   Resp 18   Ht 6' (1.829 m)   Wt 105.8 kg (233 lb 4 oz)   SpO2 (!) 94%   BMI 31.63 kg/m²   General appearance: alert, cooperative, no distress  Abdomen: soft, non-tender; bowel sounds normoactive; no organomegaly    Labs:  Hgb: 12.3  INR: 1.2    Assessment:   53 y.o. male PMHx of ETOH use disorder, prior ETOH withdrawal seizure, GERD, HTN, DM2, COPD who presents with hematemesis x 2 days. Patient has history of cirrhosis on imaging dating back 2 years. Source of bleeding from portal gastropathy. Varices seen with no stigmata of bleeding and banded.       Plan:  -Will need EGD in 2 weeks outpatient for repeat variceal banding   -PPI daily   -Needs hepatology follow up at discharge   -Check CBC daily while hospitalized   -Diet as tolerated     Okay for disposition from GI perspective    Jarrell Washington MD  [facility] Fellow, PGY- V    Alcon Gasca MD   [facility] Staff

## 2024-04-11 NOTE — MEDICAL/APP STUDENT
[facility] Medicine Progress Note    Primary Team: [facility] Hospitalist Team B  Attending Physician: Gurpreet Dexter MD  Resident:   Intern: Leeann    Subjective:      NAEON. Patient doing well s/p EGD yesterday. Pt sitting up in bed, in no distress. Reports improvement in symptoms. Denies abd pain, n/v, hematemesis, chest pain, SOB, palpitations. Patient reports he was able to eat dinner last night (meat and spaghetti) post-procedure with x1 choking episode.    Objective:     Last 24 Hour Vital Signs:  BP  Min: 130/72  Max: 169/98  Temp  Av.5 °F (36.9 °C)  Min: 98 °F (36.7 °C)  Max: 99.1 °F (37.3 °C)  Pulse  Av.6  Min: 59  Max: 100  Resp  Av  Min: 16  Max: 20  SpO2  Av.9 %  Min: 94 %  Max: 98 %  I/O last 3 completed shifts:  In: 240 [P.O.:240]  Out: 450 [Urine:450]    Physical Examination:  GENERAL: NAD. Resting in bed.   HENT: NCAT, MMM  EYES: EOMI, PERRL, anicteric, normal conjunctiva  CARDIOVASCULAR: RRR, audible S1 and S2, no M/R/G appreciated  RESPIRATORY: CTABL, no crackles or wheezes, no increased work of breathing  ABDOMEN: soft, non-tender to palpation, no guarding. Normoactive BS  EXTREMITIES: No LE edema. No calf tenderness.  SKIN: Warm and dry. No rashes/lesions to exposed skin  NEUROLOGIC: Moves all extremities spontaneously, grossly non-focal, sensation intact  MENTAL STATUS: Awake, alert, calm, cooperative    Laboratory:  Laboratory Data Reviewed: yes  Pertinent Findings:  No results displayed because visit has over 200 results.        Other Results:  EKG (my interpretation):   EKG at 08:22 on : Atrial fibrillation w/ rapid ventricular response. Right bundle branch block.    Radiology Data Reviewed: yes  Pertinent Findings:  ECHO on :  Left Ventricle The left ventricle is normal in size. Normal wall thickness. There is concentric hypertrophy. There is normal systolic function. Biplane (2D) method of discs ejection fraction is 70%.  Right Ventricle Normal right ventricular  cavity size. Systolic function is normal. TAPSE is 1.80 cm.  Left Atrium Normal left atrial size.  Right Atrium Normal right atrial size.  Aortic Valve Not well visualized due to poor acoustic window. Aortic valve peak velocity is 1.32 m/s. Mean gradient is 4 mmHg.  Mitral Valve The mitral valve is structurally normal. There is normal leaflet mobility.  Tricuspid Valve The tricuspid valve is structurally normal. There is normal leaflet mobility.  Pulmonic Valve Not well visualized due to poor acoustic window.  IVC/SVC IVC was not assessed.  Pericardium and Other Findings There is no pericardial effusion.        Current Medications:     Infusions: Discontinued Octreotide 500 mcg yesterday         Scheduled:   diazePAM  10 mg Oral Q12H    Followed by    [START ON 4/12/2024] diazePAM  5 mg Oral Q12H    Followed by    [START ON 4/13/2024] diazePAM  5 mg Oral Once    folic acid (FOLVITE) IVPB  1 mg Intravenous Daily    metoprolol tartrate  12.5 mg Oral BID    nicotine  1 patch Transdermal Daily    pantoprazole  40 mg Oral Daily    thiamine (B-1) 100 mg in dextrose 5 % (D5W) 100 mL IVPB  100 mg Intravenous Daily        PRN:  calcium carbonate, dextrose 10%, dextrose 10%, dextrose 10%, dextrose 10%, diazePAM, glucagon (human recombinant), glucose, glucose, insulin aspart U-100, naloxone, ondansetron, sodium chloride 0.9%      Assessment:     Veto Park is a 53 y.o. male with a PMHx of alcohol use disorder (complicated by DT's/Seizures), COPD, T2DM, ABHILASH who presents with hematemesis for 3 days. Patient reports numerous episodes of emesis, some with blood some without. In the ED patient tachycardic but HDS. Notably tremulous. Daily EtOH drinker x2 pints liqour daily. Last drink 2 days ago. CT abdomen with gastric wall thickening with concern for gastritis. No varices appreciable on CT. Patient is admitted to [facility] internal medicine for workup of hematemesis with concern for UGIB in known cirrhotic in addition to  alcohol withdrawal. EGD on 04/10 w/ large varices in distal esophagus, x2 bands placed.      Plan:     Hematemesis/ Esophageal Varices  -EGD on 04/10: Large (> 5 mm) varices were found in the distal esophagus. Two bands        were successfully placed with complete eradication, resulting in deflation of varices.   -Per GI, pt stable for discharge. Able to resume previous diet. PPI daily. Repeat upper endo in 2 weeks for retreatment for banding to eradication. F/up w/ hepatology clinic  - INR 1.2  - PO PPI Q daily  - Closely monitor vitals and hemoglobin  - discontinued octreotide infusion 04/10     Paroxysmal atrial fibrillation with RVR  - sinus tachycardia on admission, found to be in afib with RVR   - heart rate controlled with metoprolol tartrate 25 mg BID  - refer to cardiology upon discharge   - Chadsvasc score 2, will defer anticoagulation in setting of GI bleed and discuss risks vs benefits with patient prior to discharge      ETOH use disorder   High risk ETOH withdrawal with Hx of withdrawal Sz  Sinus Tachycardia   - 1-2 pints per day during binge episodes, has been drinking 40 years  - ETOH level 196  - Hx of ETOH seizure  - Scheduled benzo's with PRNs available for breakthrough   symptoms: Ativan not available at present.   Valium taper:  Day 1: Valium 10 mg PO q6  Day 2: Valium 10 mg PO q8  Day 3: Valium 10 mg PO q12  Day 4: Valium 5 mg PO q12  Day 5: Valium 5 mg PO once and then discontinue taper  - daily IV thiamine and folate   - Replete Mg, P as appropriate   - fall and seizure precautions, q4 vital signs      Upper ABD pain   Nausea vomiting, Resolved  -Pt denies abd pain, n/v today  - CT A/P with gastritis and cirrhosis, unremarkable pancreas, no ascites noted  - continue PPI as above  - abdominal ultrasound completed, findings consistent with splenomegaly, heterogenous liver   - zofran PRN for nausea     Elevated Transaminates  - AST 99, . INR 1.1 upon admission  -AST: 98, ALT: 96 today  -  suspect in setting of recent ETOH use and imaging with cirrhosis  - ALP and Tbili WNL  - acute hep panel negative   - trend CMP, INR daily  - continue supportive care with IVFs     AGMA, resolved  - AG 27, bicarb 18, elevated ketones  - suspect secondary to alcoholic/starvation ketosis     HTN  - -170  - not on home regimen  - hold off anti-HTN in setting of concern for UGIB  - consider coreg 6.25 BID after further evaluation     T2DM  - glucose 218 on arrival  - not on home regimen  - A1c 7.4 during admission  - SSI while admitted      Macrocytosis  - MCV 99  - suspect 2/2 heavy ETOH use  - currently treating with IV thiamine and folate  - follow up with PCP outpatient         Code Status: full  DVT Prophylaxis: none  Diet: Diabetic    Jeremiah Starks, MS3    [facility] Hospitalist Pager numbers:   [facility] Hospitalist Medicine Team A (Marium/Ivon): [phone]  [facility] Hospitalist Medicine Team B (Guerita/Maria Elena):  [phone]

## 2024-04-11 NOTE — PT/OT/SLP EVAL
Physical Therapy Evaluation    Patient Name:  Veto Park   MRN:  [id number]    Recommendations:     Discharge Recommendations: Low Intensity Therapy (OP PT)   Discharge Equipment Recommendations: walker, rolling   Barriers to discharge:  limited functional endurance    The mobility limitation cannot be sufficiently resolved by the use of a cane.   Patient's functional mobility deficit can be sufficiently resolved with the use of a rolling walker. Patient's mobility limitation significantly impairs their ability to participate in one of more activities of daily living. The use of a rolling walker will significantly improve the patient's ability to participate in MRADLS and the patient will use it on regular basis in the home.       Assessment:     Veto Park is a 53 y.o. male admitted with a medical diagnosis of Hematemesis with nausea.  He presents with the following impairments/functional limitations: weakness, impaired endurance, impaired sensation, impaired functional mobility, gait instability, impaired balance, pain, decreased lower extremity function.    PT evaluation completed. Pt's PLOF: independent with no AD. Pt at this time requires CGA with use of RW with mobility; due to overall general fatigue/weakness. Therapy recommending low intensity therapy- OP PT. Therapy will continue to progress pt as able.     Rehab Prognosis: Good; patient would benefit from acute skilled PT services to address these deficits and reach maximum level of function.    Recent Surgery: Procedure(s) (LRB):  EGD (ESOPHAGOGASTRODUODENOSCOPY) (N/A) 1 Day Post-Op    Plan:     During this hospitalization, patient to be seen 3 x/week to address the identified rehab impairments via gait training, therapeutic activities, therapeutic exercises, neuromuscular re-education and progress toward the following goals:    Plan of Care Expires:  05/11/24    Subjective     Chief Complaint: pain/fatigue  Patient/Family  Comments/goals: to increase independence  Pain/Comfort:  Pain Rating 1: 6/10  Location - Side 1: Right  Location 1: abdomen  Pain Addressed 1: Reposition, Cessation of Activity, Nurse notified  Pain Rating Post-Intervention 1:  (not rated)    Patients cultural, spiritual, Buddhist conflicts given the current situation: no    Living Environment:  Lives with wife in 1 story home with no steps to enter. WIS with built in seat.   Prior to admission, patients level of function was Independent with no AD.  Equipment used at home: none.  DME owned (not currently used): none.  Upon discharge, patient will have assistance from spouse.    Objective:     Communicated with Nurse prior to session.  Patient found HOB elevated with bed alarm  upon PT entry to room.    General Precautions: Standard, fall  Orthopedic Precautions:N/A   Braces: N/A  Respiratory Status: Room air    Exams:  Cognitive Exam:  Patient is oriented to Person, Place, Time, and Situation  Sensation:    -       Intact  light/touch to BLE; reports some chronic tingling to B toes.   RLE ROM: WFL except hip flexion due to R side abdomen pain  RLE Strength: WFL except hip flexion due to R side abdomen pain (2+/5)  LLE ROM: WFL  LLE Strength: WFL except knee extension (3-/5)    Functional Mobility:  Bed Mobility:     Supine to Sit: stand by assistance; increased time to complete due to general fatigue/weakness  Sit to Supine: stand by assistance  Transfers:     Sit to Stand:  contact guard assistance with rolling walker  Gait: ~50ft with use of RW and CGA; slowed gait speed due to fatigue/weakness      AM-PAC 6 CLICK MOBILITY  Total Score:18       Treatment & Education:  Pt educated on role of PT.   Pt requires CGA and use of RW with mobility to increase stability at this time due to overall general fatigue and muscular weakness.   Pt educated on calling for assistance with mobility in room to increase overall safety.       Patient left HOB elevated with all  lines intact, call button in reach, bed alarm on, and Nurse notified.    GOALS:   Multidisciplinary Problems       Physical Therapy Goals          Problem: Physical Therapy    Goal Priority Disciplines Outcome Goal Variances Interventions   Physical Therapy Goal     PT, PT/OT Ongoing, Progressing     Description: Goals to be met by: 24     Patient will increase functional independence with mobility by performin. Supine to sit with [place]  2. Sit to supine with [place]  3. Sit to stand transfer with Modified [place] with RW or progress to no AD.   4. Bed to chair transfer with Modified [place] using Rolling Walker or progress to no AD.   5. Gait  x 150 feet with Modified [place] using Rolling Walker or progress to no AD.                         History:     Past Medical History:   Diagnosis Date    Alcohol withdrawal seizure 2021    Alcoholic     Arthritis     COPD (chronic obstructive pulmonary disease)     Diabetes mellitus     Erectile dysfunction     GERD (gastroesophageal reflux disease)     Hypertension     ABHILASH on CPAP     Peyronie's disease     Stroke     vs TIA 2020    Toxic effect of contact with stingray 2018    right wrist       Past Surgical History:   Procedure Laterality Date    ESOPHAGOGASTRODUODENOSCOPY N/A 4/10/2024    Procedure: EGD (ESOPHAGOGASTRODUODENOSCOPY);  Surgeon: Silverio Link MD;  Location: [facility];  Service: Endoscopy;  Laterality: N/A;    LASIK      ISRAEL PLICATION N/A 2018    Procedure: PLICATION, PENIS;  Surgeon: Ralph Wilcox MD;  Location: [facility];  Service: Urology;  Laterality: N/A;    VASECTOMY         Time Tracking:     PT Received On: 24  PT Start Time: 1129     PT Stop Time: 1141  PT Total Time (min): 12 min     Billable Minutes: Evaluation 12      2024

## 2024-04-11 NOTE — ASSESSMENT & PLAN NOTE
Likely 2/2 PHG. EV noted, treated, however likely not the source of bleeding  Needs repeat EGD in 8-12 weeks to confirm eradication of EV  Trend hgb  Advance diet

## 2024-04-11 NOTE — SUBJECTIVE & OBJECTIVE
Subjective:     Interval History: EGD yesterday with EVL. PHG noted. No further bleeding overnight. Hgb stable    Review of Systems   Gastrointestinal:  Negative for abdominal distention, abdominal pain, blood in stool, nausea and vomiting.     Objective:     Vital Signs (Most Recent):  Temp: 98.4 °F (36.9 °C) (04/11/24 1205)  Pulse: 67 (04/11/24 1631)  Resp: 20 (04/11/24 1205)  BP: (!) 161/87 (04/11/24 1205)  SpO2: 96 % (04/11/24 1205) Vital Signs (24h Range):  Temp:  [98.1 °F (36.7 °C)-98.5 °F (36.9 °C)] 98.4 °F (36.9 °C)  Pulse:  [59-82] 67  Resp:  [18-20] 20  SpO2:  [94 %-96 %] 96 %  BP: (143-161)/(70-87) 161/87     Weight: 105.8 kg (233 lb 4 oz) (04/09/24 2328)  Body mass index is 31.63 kg/m².      Intake/Output Summary (Last 24 hours) at 4/11/2024 1636  Last data filed at 4/11/2024 1059  Gross per 24 hour   Intake 480 ml   Output --   Net 480 ml       Lines/Drains/Airways       Peripheral Intravenous Line  Duration                  Peripheral IV - Single Lumen 04/10/24 1010 22 G Anterior;Left Forearm 1 day                     Physical Exam  Vitals and nursing note reviewed.   Constitutional:       Appearance: He is well-developed.   HENT:      Head: Normocephalic and atraumatic.   Eyes:      General: No scleral icterus.     Pupils: Pupils are equal, round, and reactive to light.   Cardiovascular:      Rate and Rhythm: Normal rate and regular rhythm.      Heart sounds: Normal heart sounds.   Pulmonary:      Effort: Pulmonary effort is normal. No respiratory distress.      Breath sounds: Normal breath sounds.   Abdominal:      General: Bowel sounds are normal. There is no distension.      Palpations: Abdomen is soft.      Tenderness: There is no abdominal tenderness.   Musculoskeletal:         General: Normal range of motion.   Neurological:      Mental Status: He is alert and oriented to person, place, and time.      Comments: No asterixis   Psychiatric:         Behavior: Behavior normal.           Significant Labs:  Recent Lab Results  (Last 5 results in the past 24 hours)        04/11/24  1200   04/11/24  1105   04/11/24  0600   04/11/24  0301   04/10/24  2157        Albumin       3.5         ALP       24         ALT       96         Anion Gap       11         AST       98         Baso #   0.02             Basophil %   0.4             BILIRUBIN TOTAL       0.8  Comment: For infants and newborns, interpretation of results should be based  on gestational age, weight and in agreement with clinical  observations.    Premature Infant recommended reference ranges:  Up to 24 hours.............<8.0 mg/dL  Up to 48 hours............<12.0 mg/dL  3-5 days..................<15.0 mg/dL  6-29 days.................<15.0 mg/dL           BUN       10         Calcium       8.5         Chloride       100         CO2       25         Creatinine       0.9         Differential Method   Automated             eGFR       >60         Eos #   0.2             Eos %   3.3             Glucose       136         Gran # (ANC)   3.3             Gran %   73.1             Hematocrit   35.2             Hemoglobin   12.7             Immature Grans (Abs)   0.01  Comment: Mild elevation in immature granulocytes is non specific and   can be seen in a variety of conditions including stress response,   acute inflammation, trauma and pregnancy. Correlation with other   laboratory and clinical findings is essential.               Immature Granulocytes   0.2             INR       1.2  Comment: Coumadin Therapy:  2.0 - 3.0 for INR for all indicators except mechanical heart valves  and antiphospholipid syndromes which should use 2.5 - 3.5.  LOT^040^PT Inn^[id number]           Lymph #   0.8             Lymph %   17.1             MCH   34.8             MCHC   36.1             MCV   96             Mono #   0.3             Mono %   5.9             MPV   10.1             nRBC   0             Platelet Count   60             POCT Glucose 197     132     210        Potassium       3.5         PROTEIN TOTAL       6.7         PT       12.6         RBC   3.65             RDW   12.3             Sodium       136         WBC   4.55                                      Significant Imaging:  Imaging results within the past 24 hours have been reviewed.

## 2024-04-11 NOTE — PROGRESS NOTES
[place] - Telemetry  Gastroenterology  Progress Note    Patient Name: Veto Park  MRN: [id number]  Admission Date: 4/8/2024  Hospital Length of Stay: 3 days  Code Status: Full Code   Attending Provider: Gurpreet Dexter MD  Consulting Provider: Silverio Link MD  Primary Care Physician: No, Primary Doctor  Principal Problem: Hematemesis with nausea        Subjective:     Interval History: EGD yesterday with EVL. PHG noted. No further bleeding overnight. Hgb stable    Review of Systems   Gastrointestinal:  Negative for abdominal distention, abdominal pain, blood in stool, nausea and vomiting.     Objective:     Vital Signs (Most Recent):  Temp: 98.4 °F (36.9 °C) (04/11/24 1205)  Pulse: 67 (04/11/24 1631)  Resp: 20 (04/11/24 1205)  BP: (!) 161/87 (04/11/24 1205)  SpO2: 96 % (04/11/24 1205) Vital Signs (24h Range):  Temp:  [98.1 °F (36.7 °C)-98.5 °F (36.9 °C)] 98.4 °F (36.9 °C)  Pulse:  [59-82] 67  Resp:  [18-20] 20  SpO2:  [94 %-96 %] 96 %  BP: (143-161)/(70-87) 161/87     Weight: 105.8 kg (233 lb 4 oz) (04/09/24 2328)  Body mass index is 31.63 kg/m².      Intake/Output Summary (Last 24 hours) at 4/11/2024 1636  Last data filed at 4/11/2024 1059  Gross per 24 hour   Intake 480 ml   Output --   Net 480 ml       Lines/Drains/Airways       Peripheral Intravenous Line  Duration                  Peripheral IV - Single Lumen 04/10/24 1010 22 G Anterior;Left Forearm 1 day                     Physical Exam  Vitals and nursing note reviewed.   Constitutional:       Appearance: He is well-developed.   HENT:      Head: Normocephalic and atraumatic.   Eyes:      General: No scleral icterus.     Pupils: Pupils are equal, round, and reactive to light.   Cardiovascular:      Rate and Rhythm: Normal rate and regular rhythm.      Heart sounds: Normal heart sounds.   Pulmonary:      Effort: Pulmonary effort is normal. No respiratory distress.      Breath sounds: Normal breath sounds.   Abdominal:      General: Bowel  sounds are normal. There is no distension.      Palpations: Abdomen is soft.      Tenderness: There is no abdominal tenderness.   Musculoskeletal:         General: Normal range of motion.   Neurological:      Mental Status: He is alert and oriented to person, place, and time.      Comments: No asterixis   Psychiatric:         Behavior: Behavior normal.          Significant Labs:  Recent Lab Results  (Last 5 results in the past 24 hours)        04/11/24  1200   04/11/24  1105   04/11/24  0600   04/11/24  0301   04/10/24  2157        Albumin       3.5         ALP       24         ALT       96         Anion Gap       11         AST       98         Baso #   0.02             Basophil %   0.4             BILIRUBIN TOTAL       0.8  Comment: For infants and newborns, interpretation of results should be based  on gestational age, weight and in agreement with clinical  observations.    Premature Infant recommended reference ranges:  Up to 24 hours.............<8.0 mg/dL  Up to 48 hours............<12.0 mg/dL  3-5 days..................<15.0 mg/dL  6-29 days.................<15.0 mg/dL           BUN       10         Calcium       8.5         Chloride       100         CO2       25         Creatinine       0.9         Differential Method   Automated             eGFR       >60         Eos #   0.2             Eos %   3.3             Glucose       136         Gran # (ANC)   3.3             Gran %   73.1             Hematocrit   35.2             Hemoglobin   12.7             Immature Grans (Abs)   0.01  Comment: Mild elevation in immature granulocytes is non specific and   can be seen in a variety of conditions including stress response,   acute inflammation, trauma and pregnancy. Correlation with other   laboratory and clinical findings is essential.               Immature Granulocytes   0.2             INR       1.2  Comment: Coumadin Therapy:  2.0 - 3.0 for INR for all indicators except mechanical heart valves  and  antiphospholipid syndromes which should use 2.5 - 3.5.  LOT^040^PT Inn^[id number]           Lymph #   0.8             Lymph %   17.1             MCH   34.8             MCHC   36.1             MCV   96             Mono #   0.3             Mono %   5.9             MPV   10.1             nRBC   0             Platelet Count   60             POCT Glucose 197     132     210       Potassium       3.5         PROTEIN TOTAL       6.7         PT       12.6         RBC   3.65             RDW   12.3             Sodium       136         WBC   4.55                                      Significant Imaging:  Imaging results within the past 24 hours have been reviewed.  Assessment/Plan:     GI  * Hematemesis with nausea  Likely 2/2 PHG. EV noted, treated, however likely not the source of bleeding  Needs repeat EGD in 8-12 weeks to confirm eradication of EV  Trend hgb  Advance diet        Thank you for your consult. I will sign off. Please contact us if you have any additional questions.    Silverio Link MD  Gastroenterology  [place] [facility]

## 2024-04-11 NOTE — PLAN OF CARE
Problem: Physical Therapy  Goal: Physical Therapy Goal  Description: Goals to be met by: 24     Patient will increase functional independence with mobility by performin. Supine to sit with [place]  2. Sit to supine with [place]  3. Sit to stand transfer with Modified [place] with RW or progress to no AD.   4. Bed to chair transfer with Modified [place] using Rolling Walker or progress to no AD.   5. Gait  x 150 feet with Modified [place] using Rolling Walker or progress to no AD.    Outcome: Ongoing, Progressing     PT evaluation completed. Pt's PLOF: independent with no AD. Pt at this time requires CGA with use of RW with mobility; due to overall general fatigue weakness. Therapy recommending low intensity therapy- OP PT. Therapy will continue to progress pt as able.

## 2024-04-12 VITALS
HEART RATE: 64 BPM | OXYGEN SATURATION: 95 % | HEIGHT: 72 IN | TEMPERATURE: 98 F | RESPIRATION RATE: 14 BRPM | DIASTOLIC BLOOD PRESSURE: 79 MMHG | SYSTOLIC BLOOD PRESSURE: 163 MMHG | BODY MASS INDEX: 31.95 KG/M2 | WEIGHT: 235.88 LBS

## 2024-04-12 PROBLEM — K31.89 PORTAL HYPERTENSIVE GASTROPATHY: Status: ACTIVE | Noted: 2024-04-12

## 2024-04-12 PROBLEM — K76.6 PORTAL HYPERTENSIVE GASTROPATHY: Status: ACTIVE | Noted: 2024-04-12

## 2024-04-12 PROBLEM — I85.00 ESOPHAGEAL VARICES: Status: ACTIVE | Noted: 2024-04-12

## 2024-04-12 LAB
INR PPP: 1.2 (ref 0.8–1.2)
POCT GLUCOSE: 147 MG/DL (ref 70–110)
POCT GLUCOSE: 217 MG/DL (ref 70–110)
PROTHROMBIN TIME: 12.9 SEC (ref 9–12.5)

## 2024-04-12 PROCEDURE — 36415 COLL VENOUS BLD VENIPUNCTURE: CPT

## 2024-04-12 PROCEDURE — 97530 THERAPEUTIC ACTIVITIES: CPT | Mod: CQ

## 2024-04-12 PROCEDURE — S4991 NICOTINE PATCH NONLEGEND: HCPCS

## 2024-04-12 PROCEDURE — 25000003 PHARM REV CODE 250: Performed by: STUDENT IN AN ORGANIZED HEALTH CARE EDUCATION/TRAINING PROGRAM

## 2024-04-12 PROCEDURE — 85610 PROTHROMBIN TIME: CPT

## 2024-04-12 PROCEDURE — 25000003 PHARM REV CODE 250

## 2024-04-12 PROCEDURE — 97116 GAIT TRAINING THERAPY: CPT | Mod: CQ

## 2024-04-12 RX ORDER — IBUPROFEN 200 MG
1 TABLET ORAL DAILY
Qty: 14 PATCH | Refills: 3 | Status: SHIPPED | OUTPATIENT
Start: 2024-04-12 | End: 2024-05-03 | Stop reason: DRUGHIGH

## 2024-04-12 RX ORDER — MULTIVIT,CALC,MINS/IRON/FOLIC 9MG-400MCG
1 TABLET ORAL DAILY
Qty: 90 TABLET | Refills: 3 | Status: SHIPPED | OUTPATIENT
Start: 2024-04-12 | End: 2024-04-12

## 2024-04-12 RX ORDER — LANOLIN ALCOHOL/MO/W.PET/CERES
100 CREAM (GRAM) TOPICAL DAILY
Qty: 30 TABLET | Refills: 11 | Status: SHIPPED | OUTPATIENT
Start: 2024-04-12 | End: 2024-04-12

## 2024-04-12 RX ORDER — OMEPRAZOLE 20 MG/1
20 CAPSULE, DELAYED RELEASE ORAL DAILY
Qty: 90 CAPSULE | Refills: 3 | Status: SHIPPED | OUTPATIENT
Start: 2024-04-12 | End: 2024-04-12

## 2024-04-12 RX ORDER — MULTIVIT,CALC,MINS/IRON/FOLIC 9MG-400MCG
1 TABLET ORAL DAILY
Qty: 90 TABLET | Refills: 3 | Status: SHIPPED | OUTPATIENT
Start: 2024-04-12 | End: 2024-05-21

## 2024-04-12 RX ORDER — IBUPROFEN 200 MG
1 TABLET ORAL DAILY
Qty: 14 PATCH | Refills: 3 | Status: SHIPPED | OUTPATIENT
Start: 2024-04-12 | End: 2024-04-12

## 2024-04-12 RX ORDER — METOPROLOL TARTRATE 25 MG/1
12.5 TABLET, FILM COATED ORAL 2 TIMES DAILY
Qty: 90 TABLET | Refills: 3 | Status: SHIPPED | OUTPATIENT
Start: 2024-04-12 | End: 2024-04-12

## 2024-04-12 RX ORDER — AMLODIPINE BESYLATE 10 MG/1
10 TABLET ORAL DAILY
Qty: 30 TABLET | Refills: 3 | Status: SHIPPED | OUTPATIENT
Start: 2024-04-12 | End: 2024-05-21

## 2024-04-12 RX ORDER — LANOLIN ALCOHOL/MO/W.PET/CERES
100 CREAM (GRAM) TOPICAL DAILY
Qty: 30 TABLET | Refills: 11 | Status: SHIPPED | OUTPATIENT
Start: 2024-04-12 | End: 2025-04-12

## 2024-04-12 RX ORDER — LISINOPRIL 40 MG/1
40 TABLET ORAL DAILY
Qty: 30 TABLET | Refills: 3 | Status: SHIPPED | OUTPATIENT
Start: 2024-04-12

## 2024-04-12 RX ORDER — FOLIC ACID 1 MG/1
1 TABLET ORAL DAILY
Qty: 360 TABLET | Refills: 0 | Status: SHIPPED | OUTPATIENT
Start: 2024-04-12 | End: 2024-04-12 | Stop reason: HOSPADM

## 2024-04-12 RX ORDER — METOPROLOL TARTRATE 25 MG/1
12.5 TABLET, FILM COATED ORAL 2 TIMES DAILY
Qty: 90 TABLET | Refills: 3 | Status: SHIPPED | OUTPATIENT
Start: 2024-04-12 | End: 2025-04-12

## 2024-04-12 RX ORDER — PANTOPRAZOLE SODIUM 40 MG/1
40 TABLET, DELAYED RELEASE ORAL DAILY
Qty: 60 TABLET | Refills: 1 | Status: SHIPPED | OUTPATIENT
Start: 2024-04-12 | End: 2024-04-12 | Stop reason: HOSPADM

## 2024-04-12 RX ORDER — OMEPRAZOLE 20 MG/1
20 CAPSULE, DELAYED RELEASE ORAL DAILY
Qty: 90 CAPSULE | Refills: 3 | Status: SHIPPED | OUTPATIENT
Start: 2024-04-12 | End: 2025-04-12

## 2024-04-12 RX ADMIN — METOPROLOL TARTRATE 12.5 MG: 25 TABLET, FILM COATED ORAL at 10:04

## 2024-04-12 RX ADMIN — NICOTINE 1 PATCH: 21 PATCH, EXTENDED RELEASE TRANSDERMAL at 10:04

## 2024-04-12 RX ADMIN — FOLIC ACID 1 MG: 5 INJECTION, SOLUTION INTRAMUSCULAR; INTRAVENOUS; SUBCUTANEOUS at 11:04

## 2024-04-12 RX ADMIN — PANTOPRAZOLE SODIUM 40 MG: 40 TABLET, DELAYED RELEASE ORAL at 11:04

## 2024-04-12 RX ADMIN — INSULIN ASPART 2 UNITS: 100 INJECTION, SOLUTION INTRAVENOUS; SUBCUTANEOUS at 11:04

## 2024-04-12 NOTE — PLAN OF CARE
Problem: Physical Therapy  Goal: Physical Therapy Goal  Description: Goals to be met by: 24     Patient will increase functional independence with mobility by performin. Supine to sit with [place]  2. Sit to supine with [place]  3. Sit to stand transfer with Modified [place] with RW or progress to no AD.   4. Bed to chair transfer with Modified [place] using Rolling Walker or progress to no AD.   5. Gait  x 150 feet with Modified [place] using Rolling Walker or progress to no AD.    Outcome: Ongoing, Progressing

## 2024-04-12 NOTE — PLAN OF CARE
Discharge orders noted. Additional clinical references attached.    Patient's discharge instructions given by bedside RN and reviewed via this VN.  Education provided on new medication, diagnosis, and follow-up appointments.  Teach back method used. Patient verbalized understanding. All questions answered. Transport to [facility] requested. Floor nurse notified.      04/12/24 1243   AVS Confirmation   Discharge instructions and AVS given to and reviewed with patient and/or significant other. Yes

## 2024-04-12 NOTE — DISCHARGE SUMMARY
"[facility] Hospital Medicine Discharge Summary     Primary Team: [facility] Hospitalist Team B  Attending Physician: Gurpreet Dexter MD  Resident: Tejinder  Intern: Mirna     Date of Admit: 4/8/2024  Date of Discharge: 4/12/2024     Discharge to: Home  Condition: Stable     Discharge Diagnoses      Esophageal Varices/hematemesis  Paroxysmal atrial fibrillation w/ RVR  ETOH use disorder, hx of withdrawal seizure  HTN  T2DM         Patient Active Problem List   Diagnosis    Cellulitis of right upper extremity    Elevated liver enzymes    Essential hypertension    GERD (gastroesophageal reflux disease)    Type 2 diabetes mellitus, without long-term current use of insulin    Peyronie disease    Facial twitching    Stroke    Weakness    ETOH abuse    Morbid obesity    Hyperlipidemia    Left facial numbness    Alcohol withdrawal    GI bleed    Metabolic encephalopathy    Anemia    Tobacco abuse    Coffee ground emesis    Obstructive sleep apnea    Transient alteration of awareness    Alcohol withdrawal seizure    Alcohol-induced acute pancreatitis    History of GI bleed    Gallbladder sludge    Hypophosphatemia    Hypomagnesemia    Hyponatremia    Paresthesias    Hematemesis with nausea    Tobacco dependency         Consultants and Procedures      Consultants:  Gastroenterology     Procedures:   Upper GI Endoscopy     Imaging:  ECHO, CT Abd/Pelvis, Chest XR, US Abdomen     Brief History of Present Illness      Per original H&P from Dr. Rowell   "Veto Park is a 53 y.o. male with PMHx of ETOH use disorder, prior ETOH withdrawal seizure, GERD who presents with hematemesis x 2 days. Has been drinking 2 pints of ETOH daily for the last week. Stopped drinking 2 days ago. Yesterday he reports vomiting up bright red blood on his first episode of vomiting. He then continued to have 2-3 episodes of bloody emesis yesterday. Today he has had multiple episodes of emesis without blood. He is also having diffuse upper ABD pain. He " "reports never being told he had cirrhosis. He reports prior episode of hematemesis 2 years ago, was evaluated at [facility] with normal EGD and colon. Denies any recent bowel movements. Has not eaten food in 5 days due to drinking and now being nauseous. "     For the full HPI please refer to the History & Physical from this admission.     Hospital Course By Problem with Pertinent Findings      Hematemesis/ Esophageal Varices  Patient presented to [facility] ED on 04/08 for hematemesis and diffuse upper abd pain x2 days, had been drinking 2 pints of ETOH daily. CT Abd/Pelvis with findings favoring cirrhosis and extensive gastric wall thickening. ETOH level 196, H/H: 15/43.4, AG 27, bicarb 18, elevated ketones, lipase 142, AST 99, . INR 1.1. Pt transferred to [facility] on 04/08 for ongoing monitoring and management of upper GI bleed and alcohol withdrawal symptoms. Started on PPI BID. GI consulted, planned for EGD on 04/09, delayed to 04/10 due to atrial fib. Started on octreotide 50 mcg/hr IV infusion on 04/09-04/10. On EGD, large (> 5 mm) varices were found in the distal esophagus. Two bands were successfully placed with complete eradication, resulting in deflation of varices. Per GI, pt stable for discharge. Able to resume previous diet. PPI daily. Repeat upper endo in 2 weeks for retreatment for banding to eradication. F/up w/ hepatology clinic. Pt stable at this time w/no n/v, abd pain, hematemesis.     Paroxysmal atrial fibrillation with RVR  EKG on admission w/ sinus tach w/rate of 102. Found to be in atrial fib w/ RVR on 04/09. Heart rate controlled with metoprolol tartrate 25 mg BID. Tachycardic to 130s, given 5 mg IV lopressor w/ improvement in HR. Due to atrial fib, ECHO obtained on 04/09, normal systolic fxn, EF 70%, difficulty w/ study due to irregularly irregular heart rhythm during study. HR now well-controlled on metoprolol tartrate 25 mg BID. Referral to cardiology upon discharge.     ETOH " use disorder   High risk ETOH withdrawal with Hx of withdrawal Sz  ETOH level 196 on admission. Initiated CIWA protocol and q4 hr vital signs. Started on scheduled benzo's with PRNs available for breakthrough symptoms: Ativan not available at present.   Valium taper:  Day 1: Valium 10 mg PO q6  Day 2: Valium 10 mg PO q8  Day 3: Valium 10 mg PO q12  Day 4: Valium 5 mg PO q12  Day 5: Valium 5 mg PO once and then discontinue taper  Started on daily IV thiamine and folate. Throughout admission, no DT, agitation, seizures. At time of discharge, no s/sx concerning fo withdrawal.     On day of discharge, evaluation for staff physician concluded pt received maximum benefit from inpt admission. Discussed diagnosis/hospital course w/ pt. Provided resources for EtOh cessation. Pt demonstrates understanding; all questions addressed.      Discharge Medications          Medication List          ASK your doctor about these medications       amLODIPine 10 MG tablet  Commonly known as: NORVASC  Take 1 tablet (10 mg total) by mouth once daily.      carvediloL 12.5 MG tablet  Commonly known as: COREG  Take 1 tablet (12.5 mg total) by mouth 2 (two) times a day. Patient unknown of dosage      famotidine 20 MG tablet  Commonly known as: PEPCID  Take 1 tablet (20 mg total) by mouth 2 (two) times daily.      lisinopriL 40 MG tablet  Commonly known as: PRINIVIL,ZESTRIL  Take 1 tablet (40 mg total) by mouth once daily.      ondansetron 4 MG Tbdl  Commonly known as: ZOFRAN-ODT  Take 2 tablets (8 mg total) by mouth every 8 (eight) hours as needed.      pantoprazole 40 MG tablet  Commonly known as: PROTONIX  Take 1 tablet (40 mg total) by mouth 2 (two) times daily.      tadalafiL 20 MG Tab  Commonly known as: CIALIS  Take 1 tablet (20 mg total) by mouth every 72 hours as needed (ED).      THEREMS-M 9 mg iron-400 mcg Tab tablet  Generic drug: multivit-iron-FA-calcium-mins  Take 1 tablet by mouth once daily.                   Discharge  Information:   Diet:  Diabetic     Physical Activity:  As tolerated   Outpt PT as per evaluation from inpt PT recs.              Instructions:  1. Take all medications as prescribed  2. Keep all follow-up appointments  3. Return to the hospital or call your primary care physicians if any worsening symptoms such as fever, chest pain, shortness of breath, return of symptoms, or any other concerns.     Follow-Up Appointments:  -GI for repeat EGD in 8-12 weeks  -Hepatology clinic   -F/up w/ cardiology  -PT  - pt states he will call to schedule w/ PCP     Jeremiah Satrks, MS3     Re Bradley MD

## 2024-04-12 NOTE — PROGRESS NOTES
Pathologic findings for recent EGD reviewed.  No evidence of H pylori infection identified.  Some mild inflammation was noted.  Continue current medications.  Follow up as needed

## 2024-04-12 NOTE — PLAN OF CARE
Follow-up Information       Center, [facility]. Go in 1 week(s).    Why: OFFICE TO CALL PATIENT/FAMILY TO SET UP APPT TIME, DISCHARGE FOLLOW UP WITH COMMUNITY PCP, FOLLOW UP WITH PSYCHIATRY, For assistance with Substance abuse treatment, Contact [facility] for outpt therapy  Contact information:  [address]  [place] [facility] [address]  [phone]               [facility] ([facility]). Call today.    Specialty: Psychiatry  Why: As needed, For assistance with Substance abuse treatment  Contact information:  [address]  [facility] [address] [phone]             [facility] [place] - Gastroenterology. Go in 3 week(s).    Specialty: Gastroenterology  Why: FOLLOW UP WITH GASTROENTEROLOGY  Contact information:  [address]  [address]  [facility] [address] [phone]  Additional information:  Please park in surface lot and check in at [address]             Cardiology Follow up.    Why: We have requested an appointment with Cardiology to follow up your Atrial fibrillation                           [facility] Hepatology to follow up with pt after DC for appt date/time previously requested.

## 2024-04-12 NOTE — PLAN OF CARE
Problem: Adult Inpatient Plan of Care  Goal: Plan of Care Review  Outcome: Ongoing, Progressing     Problem: Diabetes Comorbidity  Goal: Blood Glucose Level Within Targeted Range  Outcome: Ongoing, Progressing     Problem: Alcohol Withdrawal  Goal: Alcohol Withdrawal Symptom Control  Outcome: Ongoing, Progressing     Problem: Seizure, Active Management  Goal: Absence of Seizure/Seizure-Related Injury  Outcome: Ongoing, Progressing

## 2024-04-12 NOTE — PT/OT/SLP PROGRESS
Physical Therapy Treatment    Patient Name:  Veto Park   MRN:  [id number]    Recommendations:     Discharge Recommendations: Low Intensity Therapy  Discharge Equipment Recommendations: walker, rolling  Barriers to discharge:  decreased mobility,strength and endurance    Assessment:     Veto Park is a 53 y.o. male admitted with a medical diagnosis of Hematemesis with nausea.  He presents with the following impairments/functional limitations: weakness, impaired endurance, impaired functional mobility, impaired balance, impaired coordination,pt with improved status and will benefit from low intensity therapy upon discharge.    Rehab Prognosis: Good; patient would benefit from acute skilled PT services to address these deficits and reach maximum level of function.    Recent Surgery: Procedure(s) (LRB):  EGD (ESOPHAGOGASTRODUODENOSCOPY) (N/A) 2 Days Post-Op    Plan:     During this hospitalization, patient to be seen 3 x/week to address the identified rehab impairments via gait training, therapeutic activities, therapeutic exercises, neuromuscular re-education and progress toward the following goals:    Plan of Care Expires:  05/11/24    Subjective     Chief Complaint: n/a  Patient/Family Comments/goals: pt is leaving today.  Pain/Comfort:  Pain Rating 1:  (no rating)  Location - Side 1: Right  Location 1: groin  Pain Addressed 1: Reposition, Distraction      Objective:     Communicated with nsg prior to session.  Patient found supine with bed alarm upon PT entry to room.     General Precautions: Standard, fall  Orthopedic Precautions: N/A  Braces: N/A  Respiratory Status: Room air     Functional Mobility:  Bed Mobility:     Supine to Sit: modified independence  Transfers:     Sit to Stand:  modified independence with no AD  Gait: amb ~500' w/o AD and Mod I/S  Balance: fair standing balance      AM-PAC 6 CLICK MOBILITY  Turning over in bed (including adjusting bedclothes, sheets and blankets)?:  4  Sitting down on and standing up from a chair with arms (e.g., wheelchair, bedside commode, etc.): 4  Moving from lying on back to sitting on the side of the bed?: 4  Moving to and from a bed to a chair (including a wheelchair)?: 4  Need to walk in hospital room?: 4  Climbing 3-5 steps with a railing?: 3  Basic Mobility Total Score: 23       Treatment & Education: issued pt HEP and reviewed pt safety.      Patient left sitting edge of bed with all lines intact, call button in reach, and [facility] notified..    GOALS:   Multidisciplinary Problems       Physical Therapy Goals          Problem: Physical Therapy    Goal Priority Disciplines Outcome Goal Variances Interventions   Physical Therapy Goal     PT, PT/OT Ongoing, Progressing     Description: Goals to be met by: 24     Patient will increase functional independence with mobility by performin. Supine to sit with [place]  2. Sit to supine with [place]  3. Sit to stand transfer with Modified [place] with RW or progress to no AD.   4. Bed to chair transfer with Modified [place] using Rolling Walker or progress to no AD.   5. Gait  x 150 feet with Modified [place] using Rolling Walker or progress to no AD.                         Time Tracking:     PT Received On: 24  PT Start Time: 40     PT Stop Time: 09  PT Total Time (min): 23 min     Billable Minutes: Gait Training 15 and Therapeutic Activity 8    Treatment Type: Treatment  PT/PTA: PTA     Number of PTA visits since last PT visit: 2024

## 2024-04-12 NOTE — PLAN OF CARE
D/c orders noted, no DME, no HH.     SW met with pt to discuss d/c plans. Pt stated he feels good and is ready to discharge home. Pt's family will provide transportation home following discharge. SW informed pt of upcoming follow up appointments. Pt verbalized understanding.     Pt is cleared to go from CM standpoint.     Future Appointments   Date Time Provider Department Center   4/25/2024  2:00 PM Dante Jamil, [facility]       04/12/24 0956   Final Note   Assessment Type Final Discharge Note   Anticipated Discharge Disposition Home   Post-Acute Status   Discharge Delays None known at this time

## 2024-04-12 NOTE — MEDICAL/APP STUDENT
[facility] Hospital Medicine Discharge Summary    Primary Team: [facility] Hospitalist Team B  Attending Physician: Gurpreet Dexter MD  Resident: Tejinder  Intern: Mirna    Date of Admit: 4/8/2024  Date of Discharge: 4/12/2024    Discharge to: Home  Condition: Stable    Discharge Diagnoses     Esophageal Varices/hematemesis  Paroxysmal atrial fibrillation w/ RVR  ETOH use disorder, hx of withdrawal seizure  AGMA  HTN  T2DM    Patient Active Problem List   Diagnosis    Cellulitis of right upper extremity    Elevated liver enzymes    Essential hypertension    GERD (gastroesophageal reflux disease)    Type 2 diabetes mellitus, without long-term current use of insulin    Peyronie disease    Facial twitching    Stroke    Weakness    ETOH abuse    Morbid obesity    Hyperlipidemia    Left facial numbness    Alcohol withdrawal    GI bleed    Metabolic encephalopathy    Anemia    Tobacco abuse    Coffee ground emesis    Obstructive sleep apnea    Transient alteration of awareness    Alcohol withdrawal seizure    Alcohol-induced acute pancreatitis    History of GI bleed    Gallbladder sludge    Hypophosphatemia    Hypomagnesemia    Hyponatremia    Paresthesias    Hematemesis with nausea    Tobacco dependency       Consultants and Procedures     Consultants:  Gastroenterology    Procedures:   Upper GI Endoscopy    Imaging:  ECHO, CT Abd/Pelvis, Chest XR, US Abdomen    Brief History of Present Illness      Veto Park is a 53 y.o. male with PMHx of ETOH use disorder, prior ETOH withdrawal seizure, GERD who presents with hematemesis x 2 days. Has been drinking 2 pints of ETOH daily for the last week. Stopped drinking 2 days ago. Yesterday he reports vomiting up bright red blood on his first episode of vomiting. He then continued to have 2-3 episodes of bloody emesis yesterday. Today he has had multiple episodes of emesis without blood. He is also having diffuse upper ABD pain. He reports never being told he had cirrhosis. He  reports prior episode of hematemesis 2 years ago, was evaluated at [facility] with normal EGD and colon. Denies any recent bowel movements. Has not eaten food in 5 days due to drinking and now being nauseous.     For the full HPI please refer to the History & Physical from this admission.    Hospital Course By Problem with Pertinent Findings     Hematemesis/ Esophageal Varices  Patient presented to [facility] ED on 04/08 for hematemesis and diffuse upper abd pain x2 days, had been drinking 2 pints of ETOH daily. CT Abd/Pelvis with findings favoring cirrhosis and extensive gastric wall thickening. ETOH level 196, H/H: 15/43.4, AG 27, bicarb 18, elevated ketones, lipase 142, AST 99, . INR 1.1. Pt transferred to [facility] on 04/08 for ongoing monitoring and management of upper GI bleed and alcohol withdrawal symptoms. Started on PPI BID. GI consulted, planned for EGD on 04/09, delayed to 04/10 due to atrial fib. Started on octreotide 50 mcg/hr IV infusion on 04/09-04/10. On EGD, large (> 5 mm) varices were found in the distal esophagus. Two bands were successfully placed with complete eradication, resulting in deflation of varices. Per GI, pt stable for discharge. Able to resume previous diet. PPI daily. Repeat upper endo in 2 weeks for retreatment for banding to eradication. F/up w/ hepatology clinic. Pt stable at this time w/no n/v, abd pain, hematemesis.    Paroxysmal atrial fibrillation with RVR  EKG on admission w/ sinus tach w/rate of 102. Found to be in atrial fib w/ RVR on 04/09. Heart rate controlled with metoprolol tartrate 25 mg BID. Tachycardic to 130s, given 5 mg IV lopressor w/ improvement in HR. Due to atrial fib, ECHO obtained on 04/09, normal systolic fxn, EF 70%, difficulty w/ study due to irregularly irregular heart rhythm during study. HR now well-controlled on metoprolol tartrate 25 mg BID. Referral to cardiology upon discharge.    ETOH use disorder   High risk ETOH withdrawal with Hx of  withdrawal Sz  ETOH level 196 on admission. Initiated CIWA protocol and q4 hr vital signs. Started on scheduled benzo's with PRNs available for breakthrough symptoms: Ativan not available at present.   Valium taper:  Day 1: Valium 10 mg PO q6  Day 2: Valium 10 mg PO q8  Day 3: Valium 10 mg PO q12  Day 4: Valium 5 mg PO q12  Day 5: Valium 5 mg PO once and then discontinue taper  Started on daily IV thiamine and folate. Throughout admission, no DT, agitation, seizures. Pt w/ improvement in n/v, abd pain. Stable at this time, no sxs of withdrawal.     AGMA  On admission AG 27, bicarb 18, elevated ketones, suspect secondary to alcoholic/starvation ketosis. Now resolved. Bicarb 25, AG 11 on day of discharge.     HTN  Elevated systolic -170s. Held off anti-HTN in setting of concern for UGIB.     T2DM  Glucose 218 on arrival. Monitored POCT glucose daily. A1c 7.4 during admission. SSI while admitted        Discharge Medications        Medication List        ASK your doctor about these medications      amLODIPine 10 MG tablet  Commonly known as: NORVASC  Take 1 tablet (10 mg total) by mouth once daily.     carvediloL 12.5 MG tablet  Commonly known as: COREG  Take 1 tablet (12.5 mg total) by mouth 2 (two) times a day. Patient unknown of dosage     famotidine 20 MG tablet  Commonly known as: PEPCID  Take 1 tablet (20 mg total) by mouth 2 (two) times daily.     lisinopriL 40 MG tablet  Commonly known as: PRINIVIL,ZESTRIL  Take 1 tablet (40 mg total) by mouth once daily.     ondansetron 4 MG Tbdl  Commonly known as: ZOFRAN-ODT  Take 2 tablets (8 mg total) by mouth every 8 (eight) hours as needed.     pantoprazole 40 MG tablet  Commonly known as: PROTONIX  Take 1 tablet (40 mg total) by mouth 2 (two) times daily.     tadalafiL 20 MG Tab  Commonly known as: CIALIS  Take 1 tablet (20 mg total) by mouth every 72 hours as needed (ED).     THEREMS-M 9 mg iron-400 mcg Tab tablet  Generic drug: multivit-iron-FA-calcium-mins  Take  1 tablet by mouth once daily.              Discharge Information:   Diet:  Diabetic    Physical Activity:  As tolerated             Instructions:  1. Take all medications as prescribed  2. Keep all follow-up appointments  3. Return to the hospital or call your primary care physicians if any worsening symptoms such as fever, chest pain, shortness of breath, return of symptoms, or any other concerns.    Follow-Up Appointments:  -GI for repeat EGD in 8-12 weeks  -Hepatology clinic   -F/up w/ cardiology  -PT      Jeremiah Starks, MS3

## 2024-04-12 NOTE — DISCHARGE INSTRUCTIONS
"Please review the information attached for further information on your health conditions which caused hospitalization. I have included information on a "Diet for Cirrhosis." I would recommend a diet low in fatty foods including fast food, fried food. Limit your salt intake to less than 2 grams (2000 miligrams) per day. Increase you intake of fresh fruits and vegetables. Please utilize the resources provided to seek assitance with alcohol abstinance and follow up with your PCP, GI, and Cardiology as referred. Return to the hospital if you have return of your previous symptoms including signs of uncontrolled bleeding/bruising, vomiting blood, blood in your stool, chest pain, shortness of breath, or palpitations.  "

## 2024-04-22 ENCOUNTER — PATIENT MESSAGE (OUTPATIENT)
Dept: UROLOGY | Facility: CLINIC | Age: 54
End: 2024-04-22
Payer: MEDICAID

## 2024-04-22 RX ORDER — TADALAFIL 20 MG/1
TABLET ORAL
Qty: 15 TABLET | Refills: 0 | Status: SHIPPED | OUTPATIENT
Start: 2024-04-22 | End: 2024-05-23

## 2024-04-23 ENCOUNTER — CLINICAL SUPPORT (OUTPATIENT)
Dept: REHABILITATION | Facility: HOSPITAL | Age: 54
End: 2024-04-23
Payer: MEDICAID

## 2024-04-23 ENCOUNTER — TELEPHONE (OUTPATIENT)
Dept: HEPATOLOGY | Facility: CLINIC | Age: 54
End: 2024-04-23
Payer: MEDICAID

## 2024-04-23 DIAGNOSIS — E11.9 TYPE 2 DIABETES MELLITUS WITHOUT COMPLICATION, WITHOUT LONG-TERM CURRENT USE OF INSULIN: Primary | Chronic | ICD-10-CM

## 2024-04-23 DIAGNOSIS — R29.898 WEAKNESS OF BOTH HIPS: ICD-10-CM

## 2024-04-23 DIAGNOSIS — R20.2 PARESTHESIAS: ICD-10-CM

## 2024-04-23 PROCEDURE — 97162 PT EVAL MOD COMPLEX 30 MIN: CPT | Mod: PO

## 2024-04-23 PROCEDURE — 97110 THERAPEUTIC EXERCISES: CPT | Mod: PO

## 2024-04-23 NOTE — TELEPHONE ENCOUNTER
Called the patient to reschedule appt per Dr. Cuba.  Patient decided to come in [facility] and see Ms. Prieto 4/3/2024.  Dx: Elevated liver. Reminder letter mailed.

## 2024-04-23 NOTE — PLAN OF CARE
[facility]  Physical Therapy Initial Evaluation    Date: 4/23/2024   Name: Veto Park  Clinic Number: [id number]    Therapy Diagnosis:   Encounter Diagnoses   Name Primary?    Type 2 diabetes mellitus without complication, without long-term current use of insulin Yes    Paresthesias      Physician: Gurpreet Dexter MD    Physician Orders: PT Eval and Treat   Medical Diagnosis from Referral: Paresthesias [R20.2], Type 2 diabetes mellitus without complication, without long-term current use of insulin [E11.9]   Evaluation Date: 4/23/2024  Authorization Period Expiration: 4/11/2025  Plan of Care Expiration: 7/23/2024  Visit # / Visits authorized: 1/ Eval    Time In: 1600  Time Out: 1700  Total Appointment Time (timed & untimed codes): 60 minutes    Precautions: Standard and Diabetes    Subjective   Date of onset: Shoulder pain (since aprox 15 yo) ; Hip (4/8/2024)  History of current condition - Veto reports: Pt reports R shoulder pain since 14yo w/ multiple hx of dislocations while playing football while younger, pt reports last dislocation around 19 with persistent feelings of instability, and intermittent mechanical sx. Pt reports a worsening of sx with reaching OH and to the side.  Pt reports onset of R hip pain since hospital stay on 4/8, pt reports 1 fall in the hospital but is unsure if it was the lack of mobility during the stay or the fall that started the hip pain. Pt reports clicking in the R hip with squatting activities. Pt reports increased sx with ambulation, stair negotiation, transfers, and ADLs/IADLs. Pt reports B N&T in the feet      Pt reports past walking and lifting program that was performed last in Aug that      Medical History:   Past Medical History:   Diagnosis Date    Alcohol withdrawal seizure 7/21/2021    Alcoholic     Arthritis     COPD (chronic obstructive pulmonary disease)     Diabetes mellitus     Erectile dysfunction     GERD (gastroesophageal  reflux disease)     Hypertension     ABHILASH on CPAP     Peyronie's disease     Stroke     vs TIA 1/14/2020    Toxic effect of contact with stingray 11/2018    right wrist       Surgical History:   Veto Park  has a past surgical history that includes Vasectomy; LASIK; Eri plication (N/A, 11/27/2018); and Esophagogastroduodenoscopy (N/A, 4/10/2024).    Medications:   Veto has a current medication list which includes the following prescription(s): amlodipine, apixaban, lisinopril, metoprolol tartrate, therems-m, nicotine, omeprazole, tadalafil, thiamine, [DISCONTINUED] atorvastatin, [DISCONTINUED] bupropion hcl (smoking deter), [DISCONTINUED] finasteride, [DISCONTINUED] loratadine, and [DISCONTINUED] metformin, and the following Facility-Administered Medications: alprostadil.    Allergies:   Review of patient's allergies indicates:   Allergen Reactions    Codeine Nausea And Vomiting    Pcn [penicillins] Other (See Comments)     Unknown reaction    Betadine [povidone-iodine] Swelling and Other (See Comments)     redness        Imaging: No recent imaging available    Prior Therapy: None  Social History: Lives with wife  Occupation: [employer] ; requires lifting, ladder negotiation, floor to stand transfers  Prior Level of Function: Independent with ADLs/IADLs, work, recreational, and health related activities  Current Level of Function: Limitations with ADLs/IADLs, work, recreational, and health related activities    Pain:  Current 7/10, worst 10/10, best 3/10   Location:   Midline LBP, R hip, R shoulder  Description: Aching, Dull, Tingling, Numb, Sharp, and Shooting  Aggravating Factors: reaching OH, walking, stair negotiation, transfers  Easing Factors: ice, heating pad, and movement    Patients goals: Pain free or managed pain with ADLs, work, resuming fitness routine    Objective     Observation: Pt A&O x 4 w/ positive affect    Posture:  Increased R illiac crest height that improves w/ hip ABD,  decreased lordotic curve. R tibial ER     Ambulation: Pt ambulates w/ an antalgic gait pattern w/ increased stance time on the R LE     Lumbar Range of Motion:    Limitations Pain   Flexion 45%   Y        Extension 25%   Y        Left Side Bending 15% Y        Right Side Bending 10% N        Left Rotation 25% N   Right Rotation 25% N      Hip ROM:  Hip ER/IR: R: 55/30 *pain ; L 55/30  Hip Flex/ext: R: 90*pain/5 ; L 95/5    Lower Extremity Strength  Right LE  Left LE    Quadriceps: 4+/5 Quadriceps: 4+/5   Hamstrings: 4-/5 Hamstrings: 4-/5   Iliospoas: 4-/5 *pain Iliospoas: 4/5   Hip extension:  3+/5 Hip extension: 3+/5   PGM: 3-/5 PGM: 3-/5   Ankle dorsiflexion: 5/5 Ankle dorsiflexion: 5/5   Ankle plantarflexion: 5/5 Ankle plantarflexion: 5/5     Sensation: Intact, c/o N&T B    Reflexes:  -Patellar (L3-L4): Diminished   -Achilles (S1): Diminished    Special Tests:  -SLR Test: Neg  -Repeated Flexion: NP  -Repeated Ext: NP  -Bridge Test: Increased R pelvic rot/lumbar ext / decreased hip height  -Slump Test: Unremarkable    SI Special Tests:   Supine to Long Sit Test:  Posterior innominate = the leg that appeared shorter will lengthen with the sit up      Palpation:   -Erector Spinae: Increased tone  -Multifidi activation: Decreased tone    Flexibility:   -Ely's test: R = + ; L = +  -Hamstring : R = + ; L = +      Fynctional:  Squat- R weight shift increased WB through forefoot, increased thoracic flex *increased R back pain  SL - Increased hip MR coming from torso w/ knee valgus          Limitation/Restriction for FOTO NOC-Neuromuscular disorder Survey    Therapist reviewed FOTO scores for Veto Park on 4/23/2024.   FOTO documents entered into [employer] - see Media section.    Limitation Score: 29%         TREATMENT   Treatment Time In: 1600  Treatment Time Out: 1700  Total Treatment time (time-based codes) separate from Evaluation: 25 minutes    All units billed as Ther-ex as per Medicaid rules    Veto  received therapeutic exercises to develop strength, endurance, ROM, flexibility, and core stabilization for 21 minutes including:    Bridges 2 x 15 (pt educate on preventing pelvic rot, achieving increased hip height w/o lumbar ext)  Squats 2 x 10 (pt educated on even WB through tripod of foot, preventing excessive forward lean/weight shift)  Clamshells GTB x 15 / side (pt educated on stable pelvis)     Veto received the following manual therapy techniques: Joint mobilizations were applied to the: R hip for 04 minutes, including:  Prone SIJ manip + set up      Home Exercises and Patient Education Provided    Education provided:   - POC, prognosis, HEP      Written Home Exercises Provided: yes.  Exercises were reviewed and Veto was able to demonstrate them prior to the end of the session.  Veto demonstrated good  understanding of the education provided.     See EMR under Patient Instructions for exercises provided 4/23/2024.    Assessment   Veto is a 53 y.o. male referred to outpatient Physical Therapy with a medical diagnosis of Paresthesias [R20.2], Type 2 diabetes mellitus without complication, without long-term current use of insulin [E11.9] . Pt presents with limited and painful lumbar ROM, limited and painful hip ROM, hip weakness, impaired NM control of LPHC, quad and HS tightness with limitations in ambulation, stair negotiation, transfers, and ADLs/IADLs with limited ability to participate in work, recreational, and health related activities.    Patient prognosis is Good.   Patientt will benefit from skilled outpatient Physical Therapy to address the deficits stated above and in the chart below, provide patient /family education, and to maximize patientt's level of independence.     Plan of care discussed with patient: Yes  Patient's spiritual, cultural and educational needs considered and patient is agreeable to the plan of care and goals as stated below:     Anticipated Barriers for therapy:  Co-morbidities    Medical Necessity is demonstrated by the following  History  Co-morbidities and personal factors that may impact the plan of care Co-morbidities:   See above past medical and surgical hx    Personal Factors:   coping style  lifestyle     high   Examination  Body Structures and Functions, activity limitations and participation restrictions that may impact the plan of care Body Regions:   back  lower extremities  trunk    Body Systems:    gross symmetry  ROM  strength  gross coordinated movement  gait  transfers  transitions  motor control    Participation Restrictions:   Work, recreational, and health related activities    Activity limitations:   Learning and applying knowledge  no deficits    General Tasks and Commands  no deficits    Communication  no deficits    Mobility  lifting and carrying objects  walking    Self care  dressing  looking after one's health    Domestic Life  cooking  doing house work (cleaning house, washing dishes, laundry)    Interactions/Relationships  no deficits    Life Areas  employment    Community and Social Life  community life  recreation and leisure         high   Clinical Presentation evolving clinical presentation with changing clinical characteristics moderate   Decision Making/ Complexity Score: moderate     Goals:  GOALS: Short Term Goals:  4-6 weeks  1.Report decreased hip pain  < / =  5/10  to increase tolerance for work and ADLs  2. Increase ROM by 5 degrees where limited in order to perform ADLs without difficulty.  3. Increase strength by 1/3 MMT grade in gluts  to increase tolerance for ADL and work activities.  4. Pt to tolerate HEP to improve ROM and independence with ADL's    Long Term Goals: 10-12 weeks  1.Report decreased hip pain < / = 0/10  to increase tolerance for work and ADLs  2.Patient goal: Resumption of fitness routine  3.Increase strength to 4+/5 in  gluts  to increase tolerance for ADL and work activities.  4. Pt will report at  > / = 71%  on FOTO  to demonstrate increase in LE function with every day tasks.      Plan   Plan of care Certification: 4/23/2024 to 7/23/2024.    Outpatient Physical Therapy 2 times weekly for 12 weeks to include the following interventions: Gait Training, Manual Therapy, Moist Heat/ Ice, Neuromuscular Re-ed, Patient Education, Therapeutic Activities, and Therapeutic Exercise.     Ever Wallis, PT, DPT

## 2024-04-25 ENCOUNTER — CLINICAL SUPPORT (OUTPATIENT)
Dept: SMOKING CESSATION | Facility: CLINIC | Age: 54
End: 2024-04-25
Payer: COMMERCIAL

## 2024-04-25 DIAGNOSIS — F17.200 NICOTINE DEPENDENCE: Primary | ICD-10-CM

## 2024-04-25 PROCEDURE — 99999 PR PBB SHADOW E&M-EST. PATIENT-LVL II: CPT | Mod: PBBFAC,,,

## 2024-04-25 PROCEDURE — 99404 PREV MED CNSL INDIV APPRX 60: CPT | Mod: S$GLB,,, | Performed by: GENERAL PRACTICE

## 2024-04-25 RX ORDER — METFORMIN HYDROCHLORIDE 500 MG/1
500 TABLET ORAL 2 TIMES DAILY WITH MEALS
COMMUNITY

## 2024-04-25 NOTE — PROGRESS NOTES
Patient states quit date was yesterday. Patient states that he has not smoked a cigarette since yesterday. Patient comes in on Nicotine Patches and reports no sided effects at this time. Will continue to follow up with patient for cessation.

## 2024-04-29 ENCOUNTER — CLINICAL SUPPORT (OUTPATIENT)
Dept: REHABILITATION | Facility: HOSPITAL | Age: 54
End: 2024-04-29
Payer: MEDICAID

## 2024-04-29 DIAGNOSIS — R20.2 PARESTHESIAS: ICD-10-CM

## 2024-04-29 DIAGNOSIS — E11.9 TYPE 2 DIABETES MELLITUS WITHOUT COMPLICATION, WITHOUT LONG-TERM CURRENT USE OF INSULIN: Primary | Chronic | ICD-10-CM

## 2024-04-29 PROBLEM — R29.898 WEAKNESS OF BOTH HIPS: Status: ACTIVE | Noted: 2024-04-29

## 2024-04-29 PROCEDURE — 97110 THERAPEUTIC EXERCISES: CPT | Mod: PO

## 2024-04-29 NOTE — PROGRESS NOTES
[facility]   Physical Therapy Treatment Note     Name: Veto Park  Clinic Number: [id number]    Therapy Diagnosis: No diagnosis found.  Physician: Gurpreet Dexter MD    Visit Date: 4/29/2024    Physician Orders: PT Eval and Treat   Medical Diagnosis from Referral: Paresthesias [R20.2], Type 2 diabetes mellitus without complication, without long-term current use of insulin [E11.9]   Evaluation Date: 4/23/2024  Authorization Period Expiration: 4/11/2025  Plan of Care Expiration: 7/23/2024  Visit # / Visits authorized: 1/20 + Eval     Time In: 0700  Time Out: 0805  Total Appointment Time (timed & untimed codes): 65 minutes     Precautions: Standard and Diabetes    FOTO 1st: 71%  FOTO 3rd:  FOTO 10th:      SUBJECTIVE     Pt reports: The hip is doing great, has been walking and going to the gym. Back and shoulders are sore from working out    He was compliant with home exercise program.  Response to previous treatment: Improved hip pain  Functional change: Improved pelvic rot on bridges    Pain: Did not verbalize/10  Location: Midline LBP, R hip, R shoulder     OBJECTIVE     Objective Measures updated at progress report unless specified.     Treatment       Veto received the treatments listed below:      Therapeutic exercises to develop strength, endurance, ROM, flexibility, posture, and core stabilization for 61 (+4 min manual listed below) minutes including:    TM 10' for improved cardiovascular and muscular endurance, and tissue extensibility    Bridges BTB 3 x 10 (pt education reinforced on preventing pelvic rot, achieving full hip height)  Clamshells BTB 3 x 10 / side (pt education on preventing pelvic rot)    Squat x 10 > 10# x 10 > 20# x 10  DL calf raise on half FR 3 x 10 (pt educated on full heel height)    Lateral band walks GTB 2 laps (pt educated on step height/control)  Monster walks fwd/bckwd GTB 2 laps (pt educated on step height/control)    SB HS curl w/ OP 3  directions to quad set 3 x 10    TA activation / practice    Crunch  x 10 (pt educated on breathing, incorporation of TA)  Mod side plank x 15s / side    No monies BTB 3 x 10 (pt educated on GH spin)  Standing shoulder IR BTB 3 x 10 / side (pt educated on GH spin, preventing excessive ER)    Manual therapy techniques: Joint mobilizations, Myofacial release, and Soft tissue Mobilization were applied to the: shoulder, hip and spine for 04 minutes, including:  Post / inf GH glides grades 1-3        Patient Education and Home Exercises     Home Exercises Provided and Patient Education Provided     Education provided:   - As mentioned above    Written Home Exercises Provided: Patient instructed to cont prior HEP. Exercises were reviewed and Veto was able to demonstrate them prior to the end of the session.  Veto demonstrated good  understanding of the education provided. See EMR under Patient Instructions for exercises provided during therapy sessions    ASSESSMENT     Pt completed session as noted above, presenting w/ improved hip and back irritability from IE. Pt benefited from min intermittent verbal cues to prevent excessive hip rot w/ table exercises that improved w/ repetition. Shoulder interventions were incorporated towards EOS 2/2 to limitations in ability to perform core strengthening exercises due to shoulder pain. Pt tolerated tx well w/ no lasting increase in sx.     Veto Is progressing well towards his goals.     Pt prognosis is Good.     Pt will continue to benefit from skilled outpatient physical therapy to address the deficits listed in the problem list box on initial evaluation, provide pt/family education and to maximize pt's level of independence in the home and community environment.     Pt's spiritual, cultural and educational needs considered and pt agreeable to plan of care and goals.     Anticipated barriers to physical therapy: Co-morbidities    Goals:   Short Term Goals:  4-6  weeks  1.Report decreased hip pain  < / =  5/10  to increase tolerance for work and ADLs  2. Increase ROM by 5 degrees where limited in order to perform ADLs without difficulty.  3. Increase strength by 1/3 MMT grade in gluts  to increase tolerance for ADL and work activities.  4. Pt to tolerate HEP to improve ROM and independence with ADL's     Long Term Goals: 10-12 weeks  1.Report decreased hip pain < / = 0/10  to increase tolerance for work and ADLs  2.Patient goal: Resumption of fitness routine  3.Increase strength to 4+/5 in  gluts  to increase tolerance for ADL and work activities.  4. Pt will report at  > / = 71% on FOTO  to demonstrate increase in LE function with every day tasks.      PLAN     Cont PT as per POC    Plan of care Certification: 4/23/2024 to 7/23/2024.     Outpatient Physical Therapy 2 times weekly for 12 weeks to include the following interventions: Gait Training, Manual Therapy, Moist Heat/ Ice, Neuromuscular Re-ed, Patient Education, Therapeutic Activities, and Therapeutic Exercise.    Ever Wallis, PT, DPT

## 2024-05-02 ENCOUNTER — CLINICAL SUPPORT (OUTPATIENT)
Dept: SMOKING CESSATION | Facility: CLINIC | Age: 54
End: 2024-05-02
Payer: COMMERCIAL

## 2024-05-02 DIAGNOSIS — F17.200 NICOTINE DEPENDENCE: Primary | ICD-10-CM

## 2024-05-02 PROCEDURE — 99402 PREV MED CNSL INDIV APPRX 30: CPT | Mod: S$GLB,,, | Performed by: GENERAL PRACTICE

## 2024-05-02 PROCEDURE — 99999 PR PBB SHADOW E&M-EST. PATIENT-LVL I: CPT | Mod: PBBFAC,,,

## 2024-05-03 RX ORDER — IBUPROFEN 200 MG
1 TABLET ORAL DAILY
Qty: 28 PATCH | Refills: 0 | Status: SHIPPED | OUTPATIENT
Start: 2024-05-03 | End: 2024-05-21

## 2024-05-04 NOTE — PROGRESS NOTES
Individual Follow-Up Form    5/3/2024    Quit Date: NA    Clinical Status of Patient: Outpatient    Length of Service: 30 minutes    Continuing Medication: yes  Patches    Other Medications: none     Target Symptoms: Withdrawal and medication side effects. The following were rated moderate (3) to severe (4) on TCRS:  Moderate (3): none  Severe (4): none    Comments: Spoke with patient via telephonic visit. Patient calling with an update with cessation. Patient states that he is still tobacco free, but had questions regarding medication. Patient states that he feels the he may need to decrease dosage of patches. Advised patient on patches at this time. Patient states that he is trying to fast track cessation and start decreasing medication. Patient advised on seeing quit as a process with the eliminating of triggers. Patient advised on behavior change, medication, withdrawals. The patient denies any abnormal behavioral or mental changes at this time.      Diagnosis: F17.200    Next Visit: 2 weeks

## 2024-05-13 ENCOUNTER — CLINICAL SUPPORT (OUTPATIENT)
Dept: REHABILITATION | Facility: HOSPITAL | Age: 54
End: 2024-05-13
Payer: MEDICAID

## 2024-05-13 DIAGNOSIS — E11.9 TYPE 2 DIABETES MELLITUS WITHOUT COMPLICATION, WITHOUT LONG-TERM CURRENT USE OF INSULIN: Primary | Chronic | ICD-10-CM

## 2024-05-13 DIAGNOSIS — R29.898 WEAKNESS OF BOTH HIPS: ICD-10-CM

## 2024-05-13 DIAGNOSIS — R20.2 PARESTHESIAS: ICD-10-CM

## 2024-05-13 PROCEDURE — 97110 THERAPEUTIC EXERCISES: CPT | Mod: PO

## 2024-05-13 NOTE — PROGRESS NOTES
[facility]   Physical Therapy Treatment Note     Name: Veto Park  Clinic Number: [id number]    Therapy Diagnosis:   Encounter Diagnoses   Name Primary?    Type 2 diabetes mellitus without complication, without long-term current use of insulin Yes    Paresthesias     Weakness of both hips      Physician: Gurpreet Dexter MD    Visit Date: 5/13/2024    Physician Orders: PT Eval and Treat   Medical Diagnosis from Referral: Paresthesias [R20.2], Type 2 diabetes mellitus without complication, without long-term current use of insulin [E11.9]   Evaluation Date: 4/23/2024  Authorization Period Expiration: 4/11/2025  Plan of Care Expiration: 7/23/2024  Visit # / Visits authorized: 2/10 + Eval     Time In: 0700  Time Out: 0800  Total Appointment Time (timed & untimed codes): 60 minutes     Precautions: Standard and Diabetes    FOTO 1st: 71%  FOTO 3rd:  FOTO 10th:      SUBJECTIVE     Pt reports: The hip is still feeling great, c/o of shoulder popping    He was compliant with home exercise program.  Response to previous treatment: Improved hip pain  Functional change: Improved pelvic rot on bridges    Pain: Did not verbalize/10  Location: Midline LBP, R hip, R shoulder     OBJECTIVE     Objective Measures updated at progress report unless specified.     Treatment       Veto received the treatments listed below:      Therapeutic exercises to develop strength, endurance, ROM, flexibility, posture, and core stabilization for 60 (+00 min manual listed below) minutes including:    TM 10' for improved cardiovascular and muscular endurance, and tissue extensibility    Bridges w/ alt march BTB 3 x 10 (pt education reinforced on preventing pelvic rot, achieving full hip height)  [facility] x 10 / side (pt education on preventing pelvic rot)    Squat tag chair 25# 3 x 10  Deadlift 35# 3 x 10 (pt educated on open book positioning)    Lateral band walks GTB w/ 10# press 2 laps (pt educated  on step height/control)  Monster walks fwd/bckwd GTB w/ 10# press 2 laps (pt educated on step height/control)    SB HS curl w/ OP 3 directions to quad set 3 x 10  DL calf raise on half FR 3 x 10 (pt educated on full heel height)      NT  TA activation / practice    Crunch  x 10 (pt educated on breathing, incorporation of TA)  Mod side plank x 15s / side    No monies BTB 3 x 10 (pt educated on GH spin)  Standing shoulder IR BTB 3 x 10 / side (pt educated on GH spin, preventing excessive ER)    Manual therapy techniques: Joint mobilizations, Myofacial release, and Soft tissue Mobilization were applied to the: shoulder, hip and spine for 00 minutes, including:    NT  Post / inf GH glides grades 1-3        Patient Education and Home Exercises     Home Exercises Provided and Patient Education Provided     Education provided:   - As mentioned above    Written Home Exercises Provided: Patient instructed to cont prior HEP. Exercises were reviewed and Veto was able to demonstrate them prior to the end of the session.  Veto demonstrated good  understanding of the education provided. See EMR under Patient Instructions for exercises provided during therapy sessions    ASSESSMENT     Pt completed session as noted above,continuing to present w/ no c/o of hip irritability. Pt benefited from mod intermittent verbal, visual, and tactile cues for proper open book positioning during deadlifts that improved w/ repetition. Pt tolerated tx well w/ no increase in sx.     Veto Is progressing well towards his goals.     Pt prognosis is Good.     Pt will continue to benefit from skilled outpatient physical therapy to address the deficits listed in the problem list box on initial evaluation, provide pt/family education and to maximize pt's level of independence in the home and community environment.     Pt's spiritual, cultural and educational needs considered and pt agreeable to plan of care and goals.     Anticipated barriers to  physical therapy: Co-morbidities    Goals:   Short Term Goals:  4-6 weeks  1.Report decreased hip pain  < / =  5/10  to increase tolerance for work and ADLs MET  2. Increase ROM by 5 degrees where limited in order to perform ADLs without difficulty.  3. Increase strength by 1/3 MMT grade in gluts  to increase tolerance for ADL and work activities.  4. Pt to tolerate HEP to improve ROM and independence with ADL's MET     Long Term Goals: 10-12 weeks  1.Report decreased hip pain < / = 0/10  to increase tolerance for work and ADLs  2.Patient goal: Resumption of fitness routine  3.Increase strength to 4+/5 in  gluts  to increase tolerance for ADL and work activities.  4. Pt will report at  > / = 71% on FOTO  to demonstrate increase in LE function with every day tasks.      PLAN     Cont PT as per POC    Plan of care Certification: 4/23/2024 to 7/23/2024.     Outpatient Physical Therapy 2 times weekly for 12 weeks to include the following interventions: Gait Training, Manual Therapy, Moist Heat/ Ice, Neuromuscular Re-ed, Patient Education, Therapeutic Activities, and Therapeutic Exercise.    Ever Wallis, PT, DPT

## 2024-05-14 PROBLEM — F10.90 ALCOHOL USE: Status: ACTIVE | Noted: 2024-05-14

## 2024-05-14 PROBLEM — Z78.9 ALCOHOL USE: Status: ACTIVE | Noted: 2024-05-14

## 2024-05-14 PROBLEM — E87.20 ACIDOSIS: Status: ACTIVE | Noted: 2024-05-14

## 2024-05-14 PROBLEM — I48.91 ATRIAL FIBRILLATION: Status: ACTIVE | Noted: 2024-05-14

## 2024-05-20 ENCOUNTER — CLINICAL SUPPORT (OUTPATIENT)
Dept: REHABILITATION | Facility: HOSPITAL | Age: 54
End: 2024-05-20
Payer: MEDICAID

## 2024-05-20 ENCOUNTER — OFFICE VISIT (OUTPATIENT)
Dept: UROLOGY | Facility: CLINIC | Age: 54
End: 2024-05-20
Attending: UROLOGY
Payer: MEDICAID

## 2024-05-20 ENCOUNTER — TELEPHONE (OUTPATIENT)
Dept: HEPATOLOGY | Facility: CLINIC | Age: 54
End: 2024-05-20
Payer: MEDICAID

## 2024-05-20 VITALS
BODY MASS INDEX: 31.95 KG/M2 | DIASTOLIC BLOOD PRESSURE: 65 MMHG | HEART RATE: 62 BPM | WEIGHT: 235.88 LBS | OXYGEN SATURATION: 100 % | RESPIRATION RATE: 12 BRPM | HEIGHT: 72 IN | SYSTOLIC BLOOD PRESSURE: 139 MMHG

## 2024-05-20 DIAGNOSIS — R29.898 WEAKNESS OF BOTH HIPS: ICD-10-CM

## 2024-05-20 DIAGNOSIS — N52.01 ERECTILE DYSFUNCTION DUE TO ARTERIAL INSUFFICIENCY: Primary | ICD-10-CM

## 2024-05-20 DIAGNOSIS — R20.2 PARESTHESIAS: ICD-10-CM

## 2024-05-20 DIAGNOSIS — N48.6 PEYRONIE DISEASE: ICD-10-CM

## 2024-05-20 DIAGNOSIS — E11.9 TYPE 2 DIABETES MELLITUS WITHOUT COMPLICATION, WITHOUT LONG-TERM CURRENT USE OF INSULIN: Primary | Chronic | ICD-10-CM

## 2024-05-20 PROCEDURE — 3051F HG A1C>EQUAL 7.0%<8.0%: CPT | Mod: CPTII,S$GLB,, | Performed by: UROLOGY

## 2024-05-20 PROCEDURE — 97110 THERAPEUTIC EXERCISES: CPT | Mod: PO

## 2024-05-20 PROCEDURE — 3008F BODY MASS INDEX DOCD: CPT | Mod: CPTII,S$GLB,, | Performed by: UROLOGY

## 2024-05-20 PROCEDURE — 3075F SYST BP GE 130 - 139MM HG: CPT | Mod: CPTII,S$GLB,, | Performed by: UROLOGY

## 2024-05-20 PROCEDURE — 4010F ACE/ARB THERAPY RXD/TAKEN: CPT | Mod: CPTII,S$GLB,, | Performed by: UROLOGY

## 2024-05-20 PROCEDURE — 1160F RVW MEDS BY RX/DR IN RCRD: CPT | Mod: CPTII,S$GLB,, | Performed by: UROLOGY

## 2024-05-20 PROCEDURE — 1159F MED LIST DOCD IN RCRD: CPT | Mod: CPTII,S$GLB,, | Performed by: UROLOGY

## 2024-05-20 PROCEDURE — 99214 OFFICE O/P EST MOD 30 MIN: CPT | Mod: S$GLB,,, | Performed by: UROLOGY

## 2024-05-20 PROCEDURE — 3078F DIAST BP <80 MM HG: CPT | Mod: CPTII,S$GLB,, | Performed by: UROLOGY

## 2024-05-20 NOTE — TELEPHONE ENCOUNTER
I received a message about this patient who was recently hospitalized with a variceal bleed and appear to have cirrhosis. He is scheduled to see me at the beginning of July. Can we get him scheduled sooner with one of the physicians?  Thank you!

## 2024-05-20 NOTE — PROGRESS NOTES
[facility]   Physical Therapy Treatment Note     Name: Veto Park  Clinic Number: [id number]    Therapy Diagnosis:   Encounter Diagnoses   Name Primary?    Type 2 diabetes mellitus without complication, without long-term current use of insulin Yes    Paresthesias     Weakness of both hips      Physician: Gurpreet Dexter MD    Visit Date: 5/20/2024    Physician Orders: PT Eval and Treat   Medical Diagnosis from Referral: Paresthesias [R20.2], Type 2 diabetes mellitus without complication, without long-term current use of insulin [E11.9]   Evaluation Date: 4/23/2024  Authorization Period Expiration: 4/11/2025  Plan of Care Expiration: 7/23/2024  Visit # / Visits authorized: 3/10 + Eval     Time In: 1350  Time Out: 1450  Total Appointment Time (timed & untimed codes): 60 minutes     Precautions: Standard and Diabetes    FOTO 1st: 71%  FOTO 3rd:  FOTO 10th:      SUBJECTIVE     Pt reports: No new issues or concerns    He was compliant with home exercise program.  Response to previous treatment: Improved hip pain  Functional change: Improved pelvic rot on bridges    Pain: Did not verbalize/10  Location: Midline LBP, R hip, R shoulder     OBJECTIVE     Objective Measures updated at progress report unless specified.     Treatment       Veto received the treatments listed below:      All units billed as Ther-ex as per Medicaid rules    Therapeutic exercises to develop strength, endurance, ROM, flexibility, posture, and core stabilization for 60 (+00 min manual listed below) minutes including:    TM 10' for improved cardiovascular and muscular endurance, and tissue extensibility    Bridges w/ alt march BlackTB 3 x 10 (pt education reinforced on preventing pelvic rot, achieving full hip height)  SL bridge 3 x 10 / side (pt educated on preventing pelvic rot, achieving full hip height)   [facility] BTB 3 x 10 / side (pt education on preventing pelvic rot)    Squat tag chair 25# 3 x  10  Deadlift 35# 3 x 10 (pt educated on open book positioning)    Side lunge 3 x 10 / side (pt educated on keeping trail leg straight / feet flat)  DL calf raise on half FR 3 x 10 (pt educated on full heel height)    Horizontal ABD GTB 3 x 10 (pt educated on preventing synergistic dominance of UT/lev scap, controlled eccentrics)  Standing shoulder ext w/ alt march GTB x 10 > BTB 2 x 10 (pt educated on glut/core recruitment during march, lat/periscapular recruitment during shoulder ext)    NT  Lateral band walks GTB w/ 10# press 2 laps (pt educated on step height/control)  Monster walks fwd/bckwd GTB w/ 10# press 2 laps (pt educated on step height/control)    SB HS curl w/ OP 3 directions to quad set 3 x 10  TA activation / practice    Crunch  x 10 (pt educated on breathing, incorporation of TA)  Mod side plank x 15s / side    No monies BTB 3 x 10 (pt educated on GH spin)  Standing shoulder IR BTB 3 x 10 / side (pt educated on GH spin, preventing excessive ER)    Manual therapy techniques: Joint mobilizations, Myofacial release, and Soft tissue Mobilization were applied to the: shoulder, hip and spine for 00 minutes, including:    NT  Post / inf GH glides grades 1-3        Patient Education and Home Exercises     Home Exercises Provided and Patient Education Provided     Education provided:   - As mentioned above    Written Home Exercises Provided: Patient instructed to cont prior HEP. Exercises were reviewed and Veto was able to demonstrate them prior to the end of the session.  Veto demonstrated good  understanding of the education provided. See EMR under Patient Instructions for exercises provided during therapy sessions    ASSESSMENT     Pt completed session as noted above. Pt continues to benefit from min to mod verbal, visual, and tactile cues for appropriate open book position during deadlifts that improves w/ repetition. Pt tolerated tx well w/ no increase in sx.      Veto Is progressing well towards  his goals.     Pt prognosis is Good.     Pt will continue to benefit from skilled outpatient physical therapy to address the deficits listed in the problem list box on initial evaluation, provide pt/family education and to maximize pt's level of independence in the home and community environment.     Pt's spiritual, cultural and educational needs considered and pt agreeable to plan of care and goals.     Anticipated barriers to physical therapy: Co-morbidities    Goals:   Short Term Goals:  4-6 weeks  1.Report decreased hip pain  < / =  5/10  to increase tolerance for work and ADLs MET  2. Increase ROM by 5 degrees where limited in order to perform ADLs without difficulty.  3. Increase strength by 1/3 MMT grade in gluts  to increase tolerance for ADL and work activities.  4. Pt to tolerate HEP to improve ROM and independence with ADL's MET     Long Term Goals: 10-12 weeks  1.Report decreased hip pain < / = 0/10  to increase tolerance for work and ADLs  2.Patient goal: Resumption of fitness routine  3.Increase strength to 4+/5 in  gluts  to increase tolerance for ADL and work activities.  4. Pt will report at  > / = 71% on FOTO  to demonstrate increase in LE function with every day tasks.      PLAN     Cont PT as per POC    Plan of care Certification: 4/23/2024 to 7/23/2024.     Outpatient Physical Therapy 2 times weekly for 12 weeks to include the following interventions: Gait Training, Manual Therapy, Moist Heat/ Ice, Neuromuscular Re-ed, Patient Education, Therapeutic Activities, and Therapeutic Exercise.    Ever Wallis, PT, DPT

## 2024-05-20 NOTE — PROGRESS NOTES
Subjective:      Veto Park is a 53 y.o. male who returns today regarding his peyronie's disease and LUTS.    He is s/p penile plication on 11/29/2018. Prior to surgery he had 80-90 degree dorsal curvature and was unable to have intercourse for almost 2 years.     He has had only a mild persistent curve since surgery, however he has noted increased of the plaque and increased curvature (dorsal). He is still able have intercourse (when he gets any erection). Without medication he does have trouble achieving full erection, but can with cialis.     He reports minimal LUTS today. Takes finasteride, prescribed elsewhere.    The following portions of the patient's history were reviewed and updated as appropriate: allergies, current medications, past family history, past medical history, past social history, past surgical history and problem list.    Review of Systems  A comprehensive multipoint review of systems was negative except as otherwise stated in the HPI.     Objective:   Vitals: /65 (BP Location: Left arm, Patient Position: Sitting, BP Method: Medium (Automatic))   Pulse 62   Resp 12   Ht 6' (1.829 m)   Wt 107 kg (235 lb 14.3 oz)   SpO2 100%   BMI 31.99 kg/m²     Physical Exam   General: alert and oriented, no acute distress  Respiratory: Symmetric expansion, non-labored breathing  Neuro: no gross deficits  Psych: normal judgment and insight, normal mood/affect and non-anxious    Lab Review     Lab Results   Component Value Date    WBC 4.55 04/11/2024    HGB 12.7 (L) 04/11/2024    HCT 35.2 (L) 04/11/2024    MCV 96 04/11/2024    PLT 60 (L) 04/11/2024     Lab Results   Component Value Date    CREATININE 0.9 04/11/2024    BUN 10 04/11/2024         Assessment and Plan:   1. Peyronie's disease  -- Doing well s/p plication    2. Erectile dysfunction due to arterial insufficiency  -- Cialis PRN    3. LUTS  -- Low bother    FU 12 mos

## 2024-05-21 ENCOUNTER — OFFICE VISIT (OUTPATIENT)
Dept: CARDIOLOGY | Facility: CLINIC | Age: 54
End: 2024-05-21
Payer: MEDICAID

## 2024-05-21 VITALS
WEIGHT: 229.75 LBS | DIASTOLIC BLOOD PRESSURE: 62 MMHG | HEART RATE: 65 BPM | BODY MASS INDEX: 31.12 KG/M2 | OXYGEN SATURATION: 98 % | HEIGHT: 72 IN | SYSTOLIC BLOOD PRESSURE: 124 MMHG

## 2024-05-21 DIAGNOSIS — E11.9 TYPE 2 DIABETES MELLITUS WITHOUT COMPLICATION, WITHOUT LONG-TERM CURRENT USE OF INSULIN: Chronic | ICD-10-CM

## 2024-05-21 DIAGNOSIS — E66.01 MORBID OBESITY: ICD-10-CM

## 2024-05-21 DIAGNOSIS — E78.5 HYPERLIPIDEMIA, UNSPECIFIED HYPERLIPIDEMIA TYPE: ICD-10-CM

## 2024-05-21 DIAGNOSIS — Z72.0 TOBACCO ABUSE: Chronic | ICD-10-CM

## 2024-05-21 DIAGNOSIS — I48.91 ATRIAL FIBRILLATION, UNSPECIFIED TYPE: ICD-10-CM

## 2024-05-21 DIAGNOSIS — K21.9 GASTROESOPHAGEAL REFLUX DISEASE, UNSPECIFIED WHETHER ESOPHAGITIS PRESENT: ICD-10-CM

## 2024-05-21 DIAGNOSIS — G47.33 OBSTRUCTIVE SLEEP APNEA: ICD-10-CM

## 2024-05-21 DIAGNOSIS — I10 ESSENTIAL HYPERTENSION: Primary | Chronic | ICD-10-CM

## 2024-05-21 DIAGNOSIS — F10.10 ETOH ABUSE: ICD-10-CM

## 2024-05-21 LAB
OHS QRS DURATION: 142 MS
OHS QTC CALCULATION: 450 MS

## 2024-05-21 PROCEDURE — 3078F DIAST BP <80 MM HG: CPT | Mod: CPTII,,,

## 2024-05-21 PROCEDURE — 3051F HG A1C>EQUAL 7.0%<8.0%: CPT | Mod: CPTII,,,

## 2024-05-21 PROCEDURE — 3074F SYST BP LT 130 MM HG: CPT | Mod: CPTII,,,

## 2024-05-21 PROCEDURE — 1160F RVW MEDS BY RX/DR IN RCRD: CPT | Mod: CPTII,,,

## 2024-05-21 PROCEDURE — 93005 ELECTROCARDIOGRAM TRACING: CPT | Mod: PBBFAC,PN | Performed by: INTERNAL MEDICINE

## 2024-05-21 PROCEDURE — 4010F ACE/ARB THERAPY RXD/TAKEN: CPT | Mod: CPTII,,,

## 2024-05-21 PROCEDURE — 1159F MED LIST DOCD IN RCRD: CPT | Mod: CPTII,,,

## 2024-05-21 PROCEDURE — 99213 OFFICE O/P EST LOW 20 MIN: CPT | Mod: PBBFAC,PN,25

## 2024-05-21 PROCEDURE — 99999 PR PBB SHADOW E&M-EST. PATIENT-LVL III: CPT | Mod: PBBFAC,,,

## 2024-05-21 PROCEDURE — 3008F BODY MASS INDEX DOCD: CPT | Mod: CPTII,,,

## 2024-05-21 PROCEDURE — 99204 OFFICE O/P NEW MOD 45 MIN: CPT | Mod: S$PBB,25,,

## 2024-05-21 PROCEDURE — 93010 ELECTROCARDIOGRAM REPORT: CPT | Mod: S$PBB,,, | Performed by: INTERNAL MEDICINE

## 2024-05-21 NOTE — ASSESSMENT & PLAN NOTE
-EKG reviewed 4/9/24- A-Fib w RVR,    -EKG in office 5/21/24- NSR 60 w RBBB  -CHADSVAS- 2   -continue apixaban 5 mg

## 2024-05-21 NOTE — PROGRESS NOTES
Subjective:    Patient ID:  Veto Park is a 53 y.o. male who presents for evaluation of No chief complaint on file.      PCP: Amelia, Primary Doctor     Referring Provider: Aidan Marr DO     HPI: Patient is a 53 y.o. male PMHx of ETOH use disorder( Hx of 2 pints ETOH daily) , prior ETOH withdrawal seizure, GERD, HTN, A-Fib,  DM-2, COPD, tobaccos use (1 ppd/ 40 yr),m ABHILASH w CPAP    who presents today to establish care and hospital follow up. Recent admission 4/8/24- 4/12/24 2/2 hematemesis w new onset A-Fib w RVR.      Hospital Course By Problem with Pertinent Findings      Hematemesis/ Esophageal Varices  Patient presented to [facility] ED on 04/08 for hematemesis and diffuse upper abd pain x2 days, had been drinking 2 pints of ETOH daily. CT Abd/Pelvis with findings favoring cirrhosis and extensive gastric wall thickening. ETOH level 196, H/H: 15/43.4, AG 27, bicarb 18, elevated ketones, lipase 142, AST 99, . INR 1.1. Pt transferred to [facility] on 04/08 for ongoing monitoring and management of upper GI bleed and alcohol withdrawal symptoms. Started on PPI BID. GI consulted, planned for EGD on 04/09, delayed to 04/10 due to atrial fib. Started on octreotide 50 mcg/hr IV infusion on 04/09-04/10. On EGD, large (> 5 mm) varices were found in the distal esophagus. Two bands were successfully placed with complete eradication, resulting in deflation of varices. Per GI, pt stable for discharge. Able to resume previous diet. PPI daily. Repeat upper endo in 2 weeks for retreatment for banding to eradication. F/up w/ hepatology clinic. Pt stable at this time w/no n/v, abd pain, hematemesis.     Paroxysmal atrial fibrillation with RVR  EKG on admission w/ sinus tach w/rate of 102. Found to be in atrial fib w/ RVR on 04/09. Heart rate controlled with metoprolol tartrate 25 mg BID. Tachycardic to 130s, given 5 mg IV lopressor w/ improvement in HR. Due to atrial fib, ECHO obtained on 04/09, normal systolic fxn, EF  70%, difficulty w/ study due to irregularly irregular heart rhythm during study. HR now well-controlled on metoprolol tartrate 25 mg BID. Referral to cardiology upon discharge.     ETOH use disorder   High risk ETOH withdrawal with Hx of withdrawal Sz  ETOH level 196 on admission. Initiated CIWA protocol and q4 hr vital signs. Started on scheduled benzo's with PRNs available for breakthrough symptoms: Ativan not available at present.   Valium taper:  Day 1: Valium 10 mg PO q6  Day 2: Valium 10 mg PO q8  Day 3: Valium 10 mg PO q12  Day 4: Valium 5 mg PO q12  Day 5: Valium 5 mg PO once and then discontinue taper  Started on daily IV thiamine and folate. Throughout admission, no DT, agitation, seizures. At time of discharge, no s/sx concerning fo withdrawal.      On day of discharge, evaluation for staff physician concluded pt received maximum benefit from inpt admission. Discussed diagnosis/hospital course w/ pt. Provided resources for EtOh cessation. Pt demonstrates understanding; all questions addressed.       He reports doing well since discharge. He denies any ETOH use since discharge.He now attends AA and is following with a therapist. Patient denies CP, SOB, palpitations, orthopnea, PND, presyncope, LOC, swelling, or claudication. Patient monitors his home BP and ranges 140/60-70s. Patient reports medication compliance without side effects  Patient does exercise regularly, walks 4 miles and attend gym since discharged. 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  EKG in office now in SR w HR 60.     Past Medical History:   Diagnosis Date    Alcohol withdrawal seizure 7/21/2021    Alcoholic     Arthritis     COPD (chronic obstructive pulmonary disease)     Diabetes mellitus     Erectile dysfunction     GERD (gastroesophageal reflux disease)     Hypertension     ABHILASH on CPAP     Peyronie's disease     Stroke     vs TIA 1/14/2020    Toxic effect of contact with stingray 11/2018    right wrist     Past  Surgical History:   Procedure Laterality Date    ESOPHAGOGASTRODUODENOSCOPY N/A 4/10/2024    Procedure: EGD (ESOPHAGOGASTRODUODENOSCOPY);  Surgeon: Silverio Link MD;  Location: [facility];  Service: Endoscopy;  Laterality: N/A;    LASIK      ISRAEL PLICATION N/A 2018    Procedure: PLICATION, PENIS;  Surgeon: Raplh Wilcox MD;  Location: [facility];  Service: Urology;  Laterality: N/A;    VASECTOMY       Social History     Socioeconomic History    Marital status:    Tobacco Use    Smoking status: Former     Current packs/day: 0.00     Average packs/day: 1.5 packs/day for 42.2 years (63.4 ttl pk-yrs)     Types: Cigarettes     Start date:      Quit date: 2024     Years since quittin.1    Smokeless tobacco: Never    Tobacco comments:     Going to sensation   Substance and Sexual Activity    Alcohol use: Yes     Comment: heavy etoh use daily;5th of vodka a day    Drug use: No    Sexual activity: Not Currently     Social Determinants of Health     Financial Resource Strain: Medium Risk (2024)    Overall Financial Resource Strain (CARDIA)     Difficulty of Paying Living Expenses: Somewhat hard   Food Insecurity: Food Insecurity Present (2024)    Hunger Vital Sign     Worried About Running Out of Food in the Last Year: Sometimes true     Ran Out of Food in the Last Year: Sometimes true   Transportation Needs: No Transportation Needs (2024)    PRAPARE - Transportation     Lack of Transportation (Medical): No     Lack of Transportation (Non-Medical): No   Physical Activity: Sufficiently Active (2024)    Exercise Vital Sign     Days of Exercise per Week: 7 days     Minutes of Exercise per Session: 90 min   Stress: Stress Concern Present (2024)    [place] [employer] - Occupational Stress Questionnaire     Feeling of Stress : To some extent   Housing Stability: High Risk (2024)    Housing Stability Vital Sign     Unable to Pay for Housing in  the Last Year: Yes     Family History   Problem Relation Name Age of Onset    Clotting disorder Father      Heart disease Father      Hypertension Father      Heart disease Mother      Hypertension Mother      Heart disease Maternal Aunt      Heart disease Maternal Uncle      Heart disease Paternal Aunt      Heart disease Paternal Uncle      Hypertension Sister      Hypertension Brother      Prostate cancer Brother         Review of patient's allergies indicates:   Allergen Reactions    Codeine Nausea And Vomiting    Pcn [penicillins] Other (See Comments)     Unknown reaction    Betadine [povidone-iodine] Swelling and Other (See Comments)     redness       Medication List with Changes/Refills   Current Medications    APIXABAN (ELIQUIS) 5 MG TAB    Take 1 tablet (5 mg total) by mouth 2 (two) times daily.    LISINOPRIL (PRINIVIL,ZESTRIL) 40 MG TABLET    Take 1 tablet (40 mg total) by mouth once daily.    METFORMIN (GLUCOPHAGE) 500 MG TABLET    Take 500 mg by mouth 2 (two) times daily with meals.    METOPROLOL TARTRATE (LOPRESSOR) 25 MG TABLET    Take 0.5 tablets (12.5 mg total) by mouth 2 (two) times daily.    OMEPRAZOLE (PRILOSEC) 20 MG CAPSULE    Take 1 capsule (20 mg total) by mouth once daily.    TADALAFIL (CIALIS) 20 MG TAB    TAKE 1 TABLET BY MOUTH EVERY 72 HOURS AS NEEDED FOR ED    THIAMINE 100 MG TABLET    Take 1 tablet (100 mg total) by mouth once daily.   Discontinued Medications    AMLODIPINE (NORVASC) 10 MG TABLET    Take 1 tablet (10 mg total) by mouth once daily.    ATORVASTATIN (LIPITOR) 20 MG TABLET    Take 1 tablet (20 mg total) by mouth every evening.    BUPROPION HCL, SMOKING DETER, (ZYBAN) 150 MG TBSR 12 HR TABLET    Take 150 mg by mouth once daily.     FINASTERIDE (PROSCAR) 5 MG TABLET    Take 5 mg by mouth once daily.    LORATADINE (CLARITIN) 10 MG TABLET    Take 10 mg by mouth once daily.    METFORMIN (GLUCOPHAGE) 500 MG TABLET    Take 500 mg by mouth once daily.      MULTIVIT-IRON-FA-CALCIUM-MINS (THEREMS-M) 9 MG IRON-400 MCG TAB TABLET    Take 1 tablet by mouth once daily.    NICOTINE (NICODERM CQ) 14 MG/24 HR    Place 1 patch onto the skin once daily.       Review of Systems   Constitutional: Negative for diaphoresis and fever.   HENT:  Negative for congestion and hearing loss.    Eyes:  Negative for blurred vision and pain.   Cardiovascular:  Negative for chest pain, claudication, dyspnea on exertion, leg swelling, near-syncope, palpitations and syncope.   Respiratory:  Negative for shortness of breath and sleep disturbances due to breathing.    Hematologic/Lymphatic: Negative for bleeding problem. Does not bruise/bleed easily.   Skin:  Negative for color change and poor wound healing.   Gastrointestinal:  Negative for abdominal pain and nausea.   Genitourinary:  Negative for bladder incontinence and flank pain.   Neurological:  Negative for focal weakness and light-headedness.        Objective:   /62 (BP Location: Left arm, Patient Position: Sitting, BP Method: Large (Manual))   Pulse 65   Ht 6' (1.829 m)   Wt 104.2 kg (229 lb 11.5 oz)   SpO2 98%   BMI 31.16 kg/m²    Physical Exam  Constitutional:       Appearance: He is well-developed. He is not diaphoretic.   HENT:      Head: Normocephalic and atraumatic.   Eyes:      General: No scleral icterus.     Pupils: Pupils are equal, round, and reactive to light.   Neck:      Vascular: No JVD.   Cardiovascular:      Rate and Rhythm: Normal rate and regular rhythm.      Pulses: Intact distal pulses.      Heart sounds: S1 normal and S2 normal. No murmur heard.     No friction rub. No gallop.   Pulmonary:      Effort: Pulmonary effort is normal. No respiratory distress.      Breath sounds: Normal breath sounds. No wheezing or rales.   Chest:      Chest wall: No tenderness.   Abdominal:      General: Bowel sounds are normal. There is no distension.      Palpations: Abdomen is soft. There is no mass.      Tenderness: There  is no abdominal tenderness. There is no rebound.   Musculoskeletal:         General: No tenderness. Normal range of motion.      Cervical back: Normal range of motion and neck supple.   Skin:     General: Skin is warm and dry.      Coloration: Skin is not pale.   Neurological:      Mental Status: He is alert and oriented to person, place, and time.      Coordination: Coordination normal.      Deep Tendon Reflexes: Reflexes normal.   Psychiatric:         Behavior: Behavior normal.         Judgment: Judgment normal.           EKG reviewed 5/21/24-  NSR  w RBBB   Cardiac echo 4/9/24  Summary    Left Ventricle: The left ventricle is normal in size. Normal wall thickness. There is concentric hypertrophy. There is normal systolic function. Biplane (2D) method of discs ejection fraction is 70%.    Right Ventricle: Normal right ventricular cavity size. Systolic function is normal. TAPSE is 1.80 cm.    Overall the study quality was technically difficult. The study was difficult due to patient's heart rhythm. Irregularly irregular rhythm was present during the study. HR 130s.     EKG reviewed 4/9/24- A-Fib w RVR,      Assessment:       1. Essential hypertension    2. Atrial fibrillation, unspecified type    3. Hyperlipidemia, unspecified hyperlipidemia type    4. Type 2 diabetes mellitus without complication, without long-term current use of insulin    5. Morbid obesity    6. Gastroesophageal reflux disease, unspecified whether esophagitis present    7. Obstructive sleep apnea    8. Tobacco abuse    9. ETOH abuse         Plan:         Essential hypertension  -Goal BP < 130/80  -amlodipine ( not taking 2/2 BLE)   -continue medication regimen-lisinopril 40 mg , metoprolol tartrate 12.5 mg  -discussed lifestyle modifications       Hyperlipidemia  -Last .6 4/6/22  -was on atorvastatin 20 mg but currently not taking  -repeat Lipid panel     Atrial fibrillation  -EKG reviewed 4/9/24- A-Fib w RVR,    -EKG in  office 5/21/24- NSR 60 w RBBB  -CHADSVAS- 2   -continue apixaban 5 mg         Type 2 diabetes mellitus, without long-term current use of insulin  -Followed w PCP  -A1c 7.4 4/9/24  -continue Medical therapy     Morbid obesity  Body mass index is 31.16 kg/m². Morbid obesity complicates all aspects of disease management from diagnostic modalities to treatment. Weight loss encouraged and health benefits explained to patient.       GERD (gastroesophageal reflux disease)  -followed by PCP  -controlled on omeprazole 20 mg     Obstructive sleep apnea  -CPAP nightly-compliant     Tobacco abuse  Counseling given: Yes  Tobacco comments: Going to sensation    The patient was counseled on the dangers of tobacco use, and was advised to quit and currently not smoking  .  Reviewed strategies to maximize success, including  not smoking  -denies cravings .      ETOH abuse  -Sober since 4/9/24  -currently enrolled in AA  -currently seeing a therapist     Total duration of face to face visit time 30 minutes.  Total time spent counseling greater than fifty percent of total visit time.  Counseling included discussion regarding imaging findings, diagnosis, possibilities, treatment options, risks and benefits.  The patient had many questions regarding the options and long-term effects      Carlos Logan, GALO  Cardiology

## 2024-05-21 NOTE — ASSESSMENT & PLAN NOTE
-Goal BP < 130/80  -amlodipine ( not taking 2/2 BLE)   -continue medication regimen-lisinopril 40 mg , metoprolol tartrate 12.5 mg  -discussed lifestyle modifications

## 2024-05-21 NOTE — ASSESSMENT & PLAN NOTE
Body mass index is 31.16 kg/m². Morbid obesity complicates all aspects of disease management from diagnostic modalities to treatment. Weight loss encouraged and health benefits explained to patient.

## 2024-05-21 NOTE — ASSESSMENT & PLAN NOTE
Counseling given: Yes  Tobacco comments: Going to sensation    The patient was counseled on the dangers of tobacco use, and was advised to quit and currently not smoking  .  Reviewed strategies to maximize success, including  not smoking  -denies cravings .

## 2024-05-23 ENCOUNTER — CLINICAL SUPPORT (OUTPATIENT)
Dept: SMOKING CESSATION | Facility: CLINIC | Age: 54
End: 2024-05-23
Payer: COMMERCIAL

## 2024-05-23 DIAGNOSIS — F17.200 NICOTINE DEPENDENCE: Primary | ICD-10-CM

## 2024-05-23 PROCEDURE — 99402 PREV MED CNSL INDIV APPRX 30: CPT | Mod: S$GLB,,, | Performed by: GENERAL PRACTICE

## 2024-05-23 PROCEDURE — 99999 PR PBB SHADOW E&M-EST. PATIENT-LVL I: CPT | Mod: PBBFAC,,,

## 2024-05-23 RX ORDER — TADALAFIL 20 MG/1
TABLET ORAL
Qty: 15 TABLET | Refills: 0 | Status: SHIPPED | OUTPATIENT
Start: 2024-05-23

## 2024-05-23 NOTE — PROGRESS NOTES
Individual Follow-Up Form    5/23/2024    Quit Date: TBD    Clinical Status of Patient: Outpatient    Length of Service: 30 minutes    Continuing Medication: no    Other Medications: none     Target Symptoms: Withdrawal and medication side effects. The following were rated moderate (3) to severe (4) on TCRS:  Moderate (3): none  Severe (4): none    Comments: Spoke with patient in regards to tobacco cessation. Patient states that he is currently tobacco free at this time. Commended patient on accomplishment. Patient states that he had quit before, but do to certain circumstances he went back to smoking. Patient states that he was able to use strategies along with medication to help with quit. Patient states that he is also no longer on medication at this this time. Patient reports no negative side effects at this time. Patient advised on relapse and relapse prevention. The patient denies any abnormal behavioral or mental changes at this time.      Diagnosis: F17.200    Next Visit: 2 weeks

## 2024-05-28 ENCOUNTER — TELEPHONE (OUTPATIENT)
Dept: UROLOGY | Facility: CLINIC | Age: 54
End: 2024-05-28
Payer: MEDICAID

## 2024-05-28 NOTE — TELEPHONE ENCOUNTER
----- Message from [place] Whatley sent at 5/28/2024  1:09 PM CDT -----  Regarding: Refil Request  Who Called:FARIDA ARAIZA [[id number]]      New Prescription or Refill : Refill      RX Name and Strength:  tadalafiL (CIALIS) 20 MG Tab         Local or Mail Order : Local              Preferred Pharmacy:[facility] [address] [place] [address] [address] [facility]      Would the patient rather a call back or a response via [facility]?no

## 2024-06-03 ENCOUNTER — CLINICAL SUPPORT (OUTPATIENT)
Dept: REHABILITATION | Facility: HOSPITAL | Age: 54
End: 2024-06-03
Payer: MEDICAID

## 2024-06-03 DIAGNOSIS — R29.898 WEAKNESS OF BOTH HIPS: ICD-10-CM

## 2024-06-03 DIAGNOSIS — E11.9 TYPE 2 DIABETES MELLITUS WITHOUT COMPLICATION, WITHOUT LONG-TERM CURRENT USE OF INSULIN: Primary | Chronic | ICD-10-CM

## 2024-06-03 DIAGNOSIS — R20.2 PARESTHESIAS: ICD-10-CM

## 2024-06-03 PROCEDURE — 97110 THERAPEUTIC EXERCISES: CPT | Mod: PO

## 2024-06-03 NOTE — PROGRESS NOTES
[facility]   Physical Therapy Treatment Note     Name: Veto Park  Clinic Number: [id number]    Therapy Diagnosis:   Encounter Diagnoses   Name Primary?    Type 2 diabetes mellitus without complication, without long-term current use of insulin Yes    Paresthesias     Weakness of both hips      Physician: Gurpreet Dexter MD    Visit Date: 6/3/2024    Physician Orders: PT Eval and Treat   Medical Diagnosis from Referral: Paresthesias [R20.2], Type 2 diabetes mellitus without complication, without long-term current use of insulin [E11.9]   Evaluation Date: 4/23/2024  Authorization Period Expiration: 4/11/2025  Plan of Care Expiration: 7/23/2024  Visit # / Visits authorized: 4/10 + Eval     Time In: 0700  Time Out: 0800  Total Appointment Time (timed & untimed codes): 60 minutes     Precautions: Standard and Diabetes    FOTO 1st: 71%  FOTO 6th: 97%  FOTO 10th:      SUBJECTIVE     Pt reports: No new issues or concerns, hip is feeling great    He was compliant with home exercise program.  Response to previous treatment: Improved hip pain  Functional change: Improved pelvic rot on bridges    Pain: Did not verbalize/10  Location: Midline LBP, R hip, R shoulder     OBJECTIVE     Objective Measures updated at progress report unless specified.     Treatment       Veto received the treatments listed below:      All units billed as Ther-ex as per Medicaid rules    Therapeutic exercises to develop strength, endurance, ROM, flexibility, posture, and core stabilization for 60 (+00 min manual listed below) minutes including:    Bridges w/ alt march BlackTB x 10 / side (pt education reinforced on preventing pelvic rot, achieving full hip height)  Clamshells lvl2 BlackTB x 10 / side (pt education on preventing pelvic rot)    SL bridge BlackTB 3 x 10 / side (pt educated on preventing pelvic rot, achieving full hip height)   Standing clamshells BalckTB 3 x 10 / side    Lateral band walks BlackTB  w/ 10# press 2 laps (pt educated on step height/control)  Monster walks fwd/bckwd BlackTB w/ 10# press 2 laps (pt educated on step height/control)    Squat tag chair 30# 3 x 10  Deadlift 40# 3 x 10 (pt educated on open book positioning)    Side lunge 10# 3 x 10 / side (pt educated on keeping trail leg straight / feet flat)  DL calf raise on half FR 3 x 10 (pt educated on full heel height)    SB HS curl w/ OP 3 directions to quad set 3 x 10    NT  Horizontal ABD GTB 3 x 10 (pt educated on preventing synergistic dominance of UT/lev scap, controlled eccentrics)  Standing shoulder ext w/ alt march GTB x 10 > BTB 2 x 10 (pt educated on glut/core recruitment during march, lat/periscapular recruitment during shoulder ext)    TM 10' for improved cardiovascular and muscular endurance, and tissue extensibility  TA activation / practice    Crunch  x 10 (pt educated on breathing, incorporation of TA)  Mod side plank x 15s / side    No monies BTB 3 x 10 (pt educated on GH spin)  Standing shoulder IR BTB 3 x 10 / side (pt educated on GH spin, preventing excessive ER)    Manual therapy techniques: Joint mobilizations, Myofacial release, and Soft tissue Mobilization were applied to the: shoulder, hip and spine for 00 minutes, including:    NT  Post / inf GH glides grades 1-3        Patient Education and Home Exercises     Home Exercises Provided and Patient Education Provided     Education provided:   - As mentioned above    Written Home Exercises Provided: Patient instructed to cont prior HEP. Exercises were reviewed and Veto was able to demonstrate them prior to the end of the session.  Veto demonstrated good  understanding of the education provided. See EMR under Patient Instructions for exercises provided during therapy sessions    ASSESSMENT     Pt completed session as noted above. Pt benefited from min intermittent cues for straight trail leg and increased hip hinge on side lunges that improves w/ repetition. Pt is doing  well, will consider D/C next session if no setbacks occur.    Veto Is progressing well towards his goals.     Pt prognosis is Good.     Pt will continue to benefit from skilled outpatient physical therapy to address the deficits listed in the problem list box on initial evaluation, provide pt/family education and to maximize pt's level of independence in the home and community environment.     Pt's spiritual, cultural and educational needs considered and pt agreeable to plan of care and goals.     Anticipated barriers to physical therapy: Co-morbidities    Goals:   Short Term Goals:  4-6 weeks  1.Report decreased hip pain  < / =  5/10  to increase tolerance for work and ADLs MET  2. Increase ROM by 5 degrees where limited in order to perform ADLs without difficulty.  3. Increase strength by 1/3 MMT grade in gluts  to increase tolerance for ADL and work activities.  4. Pt to tolerate HEP to improve ROM and independence with ADL's MET     Long Term Goals: 10-12 weeks  1.Report decreased hip pain < / = 0/10  to increase tolerance for work and ADLs MET  2.Patient goal: Resumption of fitness routine MET  3.Increase strength to 4+/5 in  gluts  to increase tolerance for ADL and work activities.  4. Pt will report at  > / = 71% on FOTO  to demonstrate increase in LE function with every day tasks. MET    PLAN     Cont PT as per POC    Plan of care Certification: 4/23/2024 to 7/23/2024.     Outpatient Physical Therapy 2 times weekly for 12 weeks to include the following interventions: Gait Training, Manual Therapy, Moist Heat/ Ice, Neuromuscular Re-ed, Patient Education, Therapeutic Activities, and Therapeutic Exercise.    Ever Wallis, PT, DPT

## 2024-06-06 ENCOUNTER — CLINICAL SUPPORT (OUTPATIENT)
Dept: SMOKING CESSATION | Facility: CLINIC | Age: 54
End: 2024-06-06
Payer: COMMERCIAL

## 2024-06-06 DIAGNOSIS — F17.200 NICOTINE DEPENDENCE: Primary | ICD-10-CM

## 2024-06-06 PROCEDURE — 99999 PR PBB SHADOW E&M-EST. PATIENT-LVL I: CPT | Mod: PBBFAC,,,

## 2024-06-06 PROCEDURE — 99402 PREV MED CNSL INDIV APPRX 30: CPT | Mod: S$GLB,,, | Performed by: GENERAL PRACTICE

## 2024-06-10 ENCOUNTER — CLINICAL SUPPORT (OUTPATIENT)
Dept: REHABILITATION | Facility: HOSPITAL | Age: 54
End: 2024-06-10
Payer: MEDICAID

## 2024-06-10 DIAGNOSIS — R29.898 WEAKNESS OF BOTH HIPS: ICD-10-CM

## 2024-06-10 DIAGNOSIS — R20.2 PARESTHESIAS: ICD-10-CM

## 2024-06-10 DIAGNOSIS — E11.9 TYPE 2 DIABETES MELLITUS WITHOUT COMPLICATION, WITHOUT LONG-TERM CURRENT USE OF INSULIN: Primary | Chronic | ICD-10-CM

## 2024-06-10 PROCEDURE — 97110 THERAPEUTIC EXERCISES: CPT | Mod: PO

## 2024-06-10 NOTE — PROGRESS NOTES
[facility]   Physical Therapy Discharge Note     Name: Veto Park  Clinic Number: [id number]    Therapy Diagnosis:   Encounter Diagnoses   Name Primary?    Type 2 diabetes mellitus without complication, without long-term current use of insulin Yes    Paresthesias     Weakness of both hips      Physician: Gurpreet Dexter MD    Visit Date: 6/10/2024    Physician Orders: PT Eval and Treat   Medical Diagnosis from Referral: Paresthesias [R20.2], Type 2 diabetes mellitus without complication, without long-term current use of insulin [E11.9]   Evaluation Date: 4/23/2024  Authorization Period Expiration: 4/11/2025  Plan of Care Expiration: 7/23/2024  Visit # / Visits authorized: 5/10 + Eval     Time In: 0700  Time Out: 0730  Total Appointment Time (timed & untimed codes): 30 minutes     Precautions: Standard and Diabetes    FOTO 1st: 71%  FOTO 4th: 97%  FOTO 5th: 97%      SUBJECTIVE     Pt reports: Hip is feeling great, is ready for D/C to HEP. Would like to review side lunges    He was compliant with home exercise program.  Response to previous treatment: Improved hip pain  Functional change: Improved pelvic rot on bridges    Pain: Did not verbalize/10  Location: Midline LBP, R hip, R shoulder     OBJECTIVE     Objective Measures updated at progress report unless specified.     Treatment       Veto received the treatments listed below:      All units billed as Ther-ex as per Medicaid rules    Therapeutic exercises to develop strength, endurance, ROM, flexibility, posture, and core stabilization for 30 (+00 min manual listed below) minutes including:    Lateral band walks BlackTB w/ 10# press 1 lap (pt educated on step height/control)  Monster walks fwd/bckwd BlackTB w/ 10# press  lap (pt educated on step height/control)    Side lunge 10# 3 x 10 / side (pt educated on keeping trail leg straight / feet flat)  Deadlift 40# 3 x 10 (pt educated on open book positioning)    HEP  review    NT  Bridges w/ alt march BlackTB x 10 / side (pt education reinforced on preventing pelvic rot, achieving full hip height)  Clamshells lvl2 BlackTB x 10 / side (pt education on preventing pelvic rot)    SL bridge BlackTB 3 x 10 / side (pt educated on preventing pelvic rot, achieving full hip height)   Standing clamshells BalckTB 3 x 10 / side    Squat tag chair 30# 3 x 10      DL calf raise on half FR 3 x 10 (pt educated on full heel height)    SB HS curl w/ OP 3 directions to quad set 3 x 10    NT  Horizontal ABD GTB 3 x 10 (pt educated on preventing synergistic dominance of UT/lev scap, controlled eccentrics)  Standing shoulder ext w/ alt march GTB x 10 > BTB 2 x 10 (pt educated on glut/core recruitment during march, lat/periscapular recruitment during shoulder ext)    TM 10' for improved cardiovascular and muscular endurance, and tissue extensibility  TA activation / practice    Crunch  x 10 (pt educated on breathing, incorporation of TA)  Mod side plank x 15s / side    No monies BTB 3 x 10 (pt educated on GH spin)  Standing shoulder IR BTB 3 x 10 / side (pt educated on GH spin, preventing excessive ER)    Manual therapy techniques: Joint mobilizations, Myofacial release, and Soft tissue Mobilization were applied to the: shoulder, hip and spine for 00 minutes, including:    NT  Post / inf GH glides grades 1-3        Patient Education and Home Exercises     Home Exercises Provided and Patient Education Provided     Education provided:   - As mentioned above    Written Home Exercises Provided: Patient instructed to cont prior HEP. Exercises were reviewed and Veto was able to demonstrate them prior to the end of the session.  Veto demonstrated good  understanding of the education provided. See EMR under Patient Instructions for exercises provided during therapy sessions    ASSESSMENT     Pt completed session as noted above. Pt has met majority of goals and is no longer symptomatic. Pt is appropriate  for D/C to HEP. Remaining technique concerns addressed with pt and HEP was reviewed w/ pt who demo'd and verbalized understanding. Pt recommenced to reach out for scheduling if any set backs occur.     Veto Is progressing well towards his goals.     Pt prognosis is Good.     Pt will continue to benefit from skilled outpatient physical therapy to address the deficits listed in the problem list box on initial evaluation, provide pt/family education and to maximize pt's level of independence in the home and community environment.     Pt's spiritual, cultural and educational needs considered and pt agreeable to plan of care and goals.     Anticipated barriers to physical therapy: Co-morbidities    Goals:   Short Term Goals:  4-6 weeks  1.Report decreased hip pain  < / =  5/10  to increase tolerance for work and ADLs MET  2. Increase ROM by 5 degrees where limited in order to perform ADLs without difficulty.  3. Increase strength by 1/3 MMT grade in gluts  to increase tolerance for ADL and work activities.  4. Pt to tolerate HEP to improve ROM and independence with ADL's MET     Long Term Goals: 10-12 weeks  1.Report decreased hip pain < / = 0/10  to increase tolerance for work and ADLs MET  2.Patient goal: Resumption of fitness routine MET  3.Increase strength to 4+/5 in  gluts  to increase tolerance for ADL and work activities.  4. Pt will report at  > / = 71% on FOTO  to demonstrate increase in LE function with every day tasks. MET    PLAN     Cont PT as per POC    Plan of care Certification: 4/23/2024 to 7/23/2024.     Outpatient Physical Therapy 2 times weekly for 12 weeks to include the following interventions: Gait Training, Manual Therapy, Moist Heat/ Ice, Neuromuscular Re-ed, Patient Education, Therapeutic Activities, and Therapeutic Exercise.    Ever Wallis, PT, DPT

## 2024-06-11 ENCOUNTER — LAB VISIT (OUTPATIENT)
Dept: LAB | Facility: HOSPITAL | Age: 54
End: 2024-06-11
Attending: INTERNAL MEDICINE
Payer: MEDICAID

## 2024-06-11 ENCOUNTER — OFFICE VISIT (OUTPATIENT)
Dept: HEPATOLOGY | Facility: CLINIC | Age: 54
End: 2024-06-11
Attending: INTERNAL MEDICINE
Payer: MEDICAID

## 2024-06-11 VITALS
HEIGHT: 72 IN | DIASTOLIC BLOOD PRESSURE: 63 MMHG | HEART RATE: 65 BPM | SYSTOLIC BLOOD PRESSURE: 127 MMHG | OXYGEN SATURATION: 98 % | WEIGHT: 234.81 LBS | RESPIRATION RATE: 18 BRPM | TEMPERATURE: 98 F | BODY MASS INDEX: 31.8 KG/M2

## 2024-06-11 DIAGNOSIS — I85.10 SECONDARY ESOPHAGEAL VARICES WITHOUT BLEEDING: ICD-10-CM

## 2024-06-11 DIAGNOSIS — R74.8 ELEVATED LIVER ENZYMES: ICD-10-CM

## 2024-06-11 DIAGNOSIS — K70.30 ALCOHOLIC CIRRHOSIS OF LIVER WITHOUT ASCITES: Chronic | ICD-10-CM

## 2024-06-11 DIAGNOSIS — K31.89 PORTAL HYPERTENSIVE GASTROPATHY: Primary | ICD-10-CM

## 2024-06-11 DIAGNOSIS — K76.6 PORTAL HYPERTENSIVE GASTROPATHY: Primary | ICD-10-CM

## 2024-06-11 LAB
AFP SERPL-MCNC: 2.6 NG/ML (ref 0–8.4)
ALBUMIN SERPL BCP-MCNC: 3.8 G/DL (ref 3.5–5.2)
ALP SERPL-CCNC: 22 U/L (ref 55–135)
ALT SERPL W/O P-5'-P-CCNC: 33 U/L (ref 10–44)
ANION GAP SERPL CALC-SCNC: 7 MMOL/L (ref 8–16)
AST SERPL-CCNC: 28 U/L (ref 10–40)
BASOPHILS # BLD AUTO: 0.05 K/UL (ref 0–0.2)
BASOPHILS NFR BLD: 0.8 % (ref 0–1.9)
BILIRUB SERPL-MCNC: 0.5 MG/DL (ref 0.1–1)
BUN SERPL-MCNC: 35 MG/DL (ref 6–20)
CALCIUM SERPL-MCNC: 10.3 MG/DL (ref 8.7–10.5)
CHLORIDE SERPL-SCNC: 102 MMOL/L (ref 95–110)
CO2 SERPL-SCNC: 27 MMOL/L (ref 23–29)
CREAT SERPL-MCNC: 1.3 MG/DL (ref 0.5–1.4)
DIFFERENTIAL METHOD BLD: ABNORMAL
EOSINOPHIL # BLD AUTO: 0.3 K/UL (ref 0–0.5)
EOSINOPHIL NFR BLD: 4.8 % (ref 0–8)
ERYTHROCYTE [DISTWIDTH] IN BLOOD BY AUTOMATED COUNT: 13.6 % (ref 11.5–14.5)
EST. GFR  (NO RACE VARIABLE): >60 ML/MIN/1.73 M^2
GLUCOSE SERPL-MCNC: 92 MG/DL (ref 70–110)
HCT VFR BLD AUTO: 39 % (ref 40–54)
HGB BLD-MCNC: 13.1 G/DL (ref 14–18)
IMM GRANULOCYTES # BLD AUTO: 0.03 K/UL (ref 0–0.04)
IMM GRANULOCYTES NFR BLD AUTO: 0.5 % (ref 0–0.5)
INR PPP: 1 (ref 0.8–1.2)
LYMPHOCYTES # BLD AUTO: 1.4 K/UL (ref 1–4.8)
LYMPHOCYTES NFR BLD: 22 % (ref 18–48)
MCH RBC QN AUTO: 33.3 PG (ref 27–31)
MCHC RBC AUTO-ENTMCNC: 33.6 G/DL (ref 32–36)
MCV RBC AUTO: 99 FL (ref 82–98)
MONOCYTES # BLD AUTO: 0.7 K/UL (ref 0.3–1)
MONOCYTES NFR BLD: 10.6 % (ref 4–15)
NEUTROPHILS # BLD AUTO: 3.8 K/UL (ref 1.8–7.7)
NEUTROPHILS NFR BLD: 61.3 % (ref 38–73)
NRBC BLD-RTO: 0 /100 WBC
PLATELET # BLD AUTO: 133 K/UL (ref 150–450)
PMV BLD AUTO: 10.4 FL (ref 9.2–12.9)
POTASSIUM SERPL-SCNC: 4.8 MMOL/L (ref 3.5–5.1)
PROT SERPL-MCNC: 7.8 G/DL (ref 6–8.4)
PROTHROMBIN TIME: 11.4 SEC (ref 9–12.5)
RBC # BLD AUTO: 3.93 M/UL (ref 4.6–6.2)
SODIUM SERPL-SCNC: 136 MMOL/L (ref 136–145)
WBC # BLD AUTO: 6.23 K/UL (ref 3.9–12.7)

## 2024-06-11 PROCEDURE — 3074F SYST BP LT 130 MM HG: CPT | Mod: CPTII,,, | Performed by: INTERNAL MEDICINE

## 2024-06-11 PROCEDURE — 80053 COMPREHEN METABOLIC PANEL: CPT | Performed by: INTERNAL MEDICINE

## 2024-06-11 PROCEDURE — 85025 COMPLETE CBC W/AUTO DIFF WBC: CPT | Performed by: INTERNAL MEDICINE

## 2024-06-11 PROCEDURE — 99999 PR PBB SHADOW E&M-EST. PATIENT-LVL IV: CPT | Mod: PBBFAC,,, | Performed by: INTERNAL MEDICINE

## 2024-06-11 PROCEDURE — 82105 ALPHA-FETOPROTEIN SERUM: CPT | Performed by: INTERNAL MEDICINE

## 2024-06-11 PROCEDURE — 99214 OFFICE O/P EST MOD 30 MIN: CPT | Mod: S$PBB,,, | Performed by: INTERNAL MEDICINE

## 2024-06-11 PROCEDURE — 1160F RVW MEDS BY RX/DR IN RCRD: CPT | Mod: CPTII,,, | Performed by: INTERNAL MEDICINE

## 2024-06-11 PROCEDURE — 80321 ALCOHOLS BIOMARKERS 1OR 2: CPT | Performed by: INTERNAL MEDICINE

## 2024-06-11 PROCEDURE — 36415 COLL VENOUS BLD VENIPUNCTURE: CPT | Performed by: INTERNAL MEDICINE

## 2024-06-11 PROCEDURE — 3051F HG A1C>EQUAL 7.0%<8.0%: CPT | Mod: CPTII,,, | Performed by: INTERNAL MEDICINE

## 2024-06-11 PROCEDURE — 3078F DIAST BP <80 MM HG: CPT | Mod: CPTII,,, | Performed by: INTERNAL MEDICINE

## 2024-06-11 PROCEDURE — 1159F MED LIST DOCD IN RCRD: CPT | Mod: CPTII,,, | Performed by: INTERNAL MEDICINE

## 2024-06-11 PROCEDURE — 85610 PROTHROMBIN TIME: CPT | Performed by: INTERNAL MEDICINE

## 2024-06-11 PROCEDURE — 99214 OFFICE O/P EST MOD 30 MIN: CPT | Mod: PBBFAC | Performed by: INTERNAL MEDICINE

## 2024-06-11 PROCEDURE — 3008F BODY MASS INDEX DOCD: CPT | Mod: CPTII,,, | Performed by: INTERNAL MEDICINE

## 2024-06-11 PROCEDURE — 4010F ACE/ARB THERAPY RXD/TAKEN: CPT | Mod: CPTII,,, | Performed by: INTERNAL MEDICINE

## 2024-06-11 NOTE — PROGRESS NOTES
HEPATOLOGY CONSULTATION    Referring Physician: Dr. Gurpreet Dexter  Current Corresponding Physician:     Reason for Consultation: Consultation for evaluation of Cirrhosis    History of Present Illness: Veto Park is a 53 y.o. malewho presents for evaluation of   Chief Complaint   Patient presents with    Cirrhosis   This 53-year-old gentleman has cirrhosis on the basis of alcohol use disorder.  He been told years ago he had a fatty liver but had never been told he had cirrhosis.  He presented 2-1/2 months ago with a portal hypertensive bleed from esophageal varices which required an upper endoscopy and band ligation.  He has had no further bleeding.  During that hospitalization he was treated for withdrawal.  I note that his liver enzymes were elevated but his liver synthetic function is normal.  An ultrasound and CT scan showed features of cirrhosis and portal hypertension but no ascites or focal mass lesions.  He has had no lower extremity edema or symptoms of hepatic encephalopathy.  He will be scheduling a follow-up upper endoscopy with his primary gastroenterologist.  He has had nothing to drink for the past 2-1/2 months and now regularly attends [facility].  He has no symptoms of chronic liver disease.    Past Medical History:   Diagnosis Date    Alcohol withdrawal seizure 7/21/2021    Alcoholic     Arthritis     COPD (chronic obstructive pulmonary disease)     Diabetes mellitus     Erectile dysfunction     GERD (gastroesophageal reflux disease)     Hypertension     ABHILASH on CPAP     Peyronie's disease     Stroke     vs TIA 1/14/2020    Toxic effect of contact with stingray 11/2018    right wrist     Outpatient Encounter Medications as of 6/11/2024   Medication Sig Dispense Refill    apixaban (ELIQUIS) 5 mg Tab Take 1 tablet (5 mg total) by mouth 2 (two) times daily. 60 tablet 11    lisinopriL (PRINIVIL,ZESTRIL) 40 MG tablet Take 1 tablet (40 mg total) by mouth once daily. 30 tablet 3     metFORMIN (GLUCOPHAGE) 500 MG tablet Take 500 mg by mouth 2 (two) times daily with meals.      metoprolol tartrate (LOPRESSOR) 25 MG tablet Take 0.5 tablets (12.5 mg total) by mouth 2 (two) times daily. 90 tablet 3    omeprazole (PRILOSEC) 20 MG capsule Take 1 capsule (20 mg total) by mouth once daily. 90 capsule 3    tadalafiL (CIALIS) 20 MG Tab TAKE 1 TABLET BY MOUTH EVERY 72 HOURS AS NEEDED FOR ERECTILE DYSFUNCTION 15 tablet 0    thiamine 100 MG tablet Take 1 tablet (100 mg total) by mouth once daily. (Patient not taking: Reported on 2024) 30 tablet 11    [DISCONTINUED] tadalafiL (CIALIS) 20 MG Tab TAKE 1 TABLET BY MOUTH EVERY 72 HOURS AS NEEDED FOR ED 15 tablet 0     Facility-Administered Encounter Medications as of 2024   Medication Dose Route Frequency Provider Last Rate Last Admin    alprostadil injection 20 mcg  20 mcg Intracavitary Once Ralph Wilcox MD         Review of patient's allergies indicates:   Allergen Reactions    Codeine Nausea And Vomiting    Pcn [penicillins] Other (See Comments)     Unknown reaction    Betadine [povidone-iodine] Swelling and Other (See Comments)     redness     Family History   Problem Relation Name Age of Onset    Clotting disorder Father      Heart disease Father      Hypertension Father      Heart disease Mother      Hypertension Mother      Heart disease Maternal Aunt      Heart disease Maternal Uncle      Heart disease Paternal Aunt      Heart disease Paternal Uncle      Hypertension Sister      Hypertension Brother      Prostate cancer Brother         Social History     Socioeconomic History    Marital status:    Tobacco Use    Smoking status: Former     Current packs/day: 0.00     Average packs/day: 1.5 packs/day for 42.2 years (63.4 ttl pk-yrs)     Types: Cigarettes     Start date:      Quit date: 2024     Years since quittin.1    Smokeless tobacco: Never    Tobacco comments:     Going to sensation   Substance and Sexual Activity     Alcohol use: Yes     Comment: heavy etoh use daily;5th of vodka a day    Drug use: No    Sexual activity: Not Currently     Social Determinants of Health     Financial Resource Strain: Medium Risk (5/20/2024)    Overall Financial Resource Strain (CARDIA)     Difficulty of Paying Living Expenses: Somewhat hard   Food Insecurity: Food Insecurity Present (5/20/2024)    Hunger Vital Sign     Worried About Running Out of Food in the Last Year: Sometimes true     Ran Out of Food in the Last Year: Sometimes true   Transportation Needs: No Transportation Needs (4/9/2024)    PRAPARE - Transportation     Lack of Transportation (Medical): No     Lack of Transportation (Non-Medical): No   Physical Activity: Sufficiently Active (5/20/2024)    Exercise Vital Sign     Days of Exercise per Week: 7 days     Minutes of Exercise per Session: 90 min   Stress: Stress Concern Present (5/20/2024)    [employer] - Occupational Stress Questionnaire     Feeling of Stress : To some extent   Housing Stability: High Risk (5/20/2024)    Housing Stability Vital Sign     Unable to Pay for Housing in the Last Year: Yes     Review of Systems   Constitutional:  Negative for activity change, appetite change, chills, fatigue and unexpected weight change.   HENT:  Negative for congestion, facial swelling and tinnitus.    Eyes:  Negative for visual disturbance.   Respiratory:  Negative for cough, shortness of breath and wheezing.    Cardiovascular:  Negative for chest pain and palpitations.   Gastrointestinal:  Negative for abdominal distention.   Genitourinary:  Negative for dysuria.   Musculoskeletal:  Negative for arthralgias, joint swelling and myalgias.   Neurological:  Negative for syncope and headaches.   Hematological:  Does not bruise/bleed easily.   Psychiatric/Behavioral:  Negative for confusion.      Vitals:    06/11/24 0832   BP: 127/63   Pulse: 65   Resp: 18   Temp: 98.1 °F (36.7 °C)       Physical  Exam  Constitutional:       Appearance: He is well-developed.   Eyes:      General: No scleral icterus.  Cardiovascular:      Rate and Rhythm: Normal rate and regular rhythm.      Heart sounds: Normal heart sounds.   Pulmonary:      Effort: Pulmonary effort is normal. No respiratory distress.      Breath sounds: Normal breath sounds. No wheezing.   Abdominal:      General: Bowel sounds are normal. There is no distension.      Palpations: Abdomen is soft. There is no mass.      Tenderness: There is no abdominal tenderness. There is no rebound.   Musculoskeletal:         General: Normal range of motion.   Lymphadenopathy:      Cervical: No cervical adenopathy.   Skin:     General: Skin is warm and dry.   Neurological:      Mental Status: He is alert and oriented to person, place, and time.         MELD 3.0: 8 at 4/12/2024  2:19 AM  MELD-Na: 8 at 4/12/2024  2:19 AM  Calculated from:  Serum Creatinine: 0.9 mg/dL (Using min of 1 mg/dL) at 4/11/2024  3:01 AM  Serum Sodium: 136 mmol/L at 4/11/2024  3:01 AM  Total Bilirubin: 0.8 mg/dL (Using min of 1 mg/dL) at 4/11/2024  3:01 AM  Serum Albumin: 3.5 g/dL at 4/11/2024  3:01 AM  INR(ratio): 1.2 at 4/12/2024  2:19 AM  Age at listing (hypothetical): 53 years  Sex: Male at 4/12/2024  2:19 AM      Lab Results   Component Value Date     (H) 04/11/2024    BUN 10 04/11/2024    CREATININE 0.9 04/11/2024    CALCIUM 8.5 (L) 04/11/2024     04/11/2024    K 3.5 04/11/2024     04/11/2024    PROT 6.7 04/11/2024    CO2 25 04/11/2024    ANIONGAP 11 04/11/2024    WBC 4.55 04/11/2024    RBC 3.65 (L) 04/11/2024    HGB 12.7 (L) 04/11/2024    HCT 35.2 (L) 04/11/2024    MCV 96 04/11/2024    MCH 34.8 (H) 04/11/2024    MCHC 36.1 (H) 04/11/2024     Lab Results   Component Value Date    RDW 12.3 04/11/2024    PLT 60 (L) 04/11/2024    MPV 10.1 04/11/2024    GRAN 3.3 04/11/2024    GRAN 73.1 (H) 04/11/2024    LYMPH 0.8 (L) 04/11/2024    LYMPH 17.1 (L) 04/11/2024    MONO 0.3 04/11/2024     MONO 5.9 04/11/2024    EOSINOPHIL 3.3 04/11/2024    BASOPHIL 0.4 04/11/2024    EOS 0.2 04/11/2024    BASO 0.02 04/11/2024    APTT 27.3 05/29/2023    BNP 14 04/22/2020    CHOL 192 04/26/2022    TRIG 117 04/26/2022    HDL 53 04/26/2022    CHOLHDL 27.6 04/26/2022    TOTALCHOLEST 3.6 04/26/2022    ALBUMIN 3.5 04/11/2024    AST 98 (H) 04/11/2024    ALT 96 (H) 04/11/2024    ALKPHOS 24 (L) 04/11/2024    MG 1.8 04/09/2024    LABPROT 12.9 (H) 04/12/2024    INR 1.2 04/12/2024       Assessment and Plan:  Patient Active Problem List   Diagnosis    Cellulitis of right upper extremity    Elevated liver enzymes    Essential hypertension    GERD (gastroesophageal reflux disease)    Type 2 diabetes mellitus, without long-term current use of insulin    Peyronie disease    Facial twitching    Stroke    Weakness    ETOH abuse    Morbid obesity    Hyperlipidemia    Left facial numbness    Alcohol withdrawal    GI bleed    Metabolic encephalopathy    Anemia    Tobacco abuse    Coffee ground emesis    Obstructive sleep apnea    Transient alteration of awareness    Alcohol withdrawal seizure    Alcohol-induced acute pancreatitis    History of GI bleed    Gallbladder sludge    Hypophosphatemia    Hypomagnesemia    Hyponatremia    Paresthesias    Hematemesis with nausea    Tobacco dependency    Portal hypertensive gastropathy    Esophageal varices    Weakness of both hips    Alcohol use    Acidosis    Atrial fibrillation    Alcoholic cirrhosis of liver without ascites     Veto Park is a 53 y.o. male withCirrhosis    Impression:  Well-compensated cirrhosis secondary to history of alcohol use disorder complicated by esophageal varices    Plan:  I encouraged the continued strict abstinence and discussed his relatively favorable prognosis if he is able to abstain from alcohol.  He will be arranging a follow-up upper endoscopy.  I will enter him into a hepatoma surveillance program.  He will return to clinic in 6 months time with  continued clinical, biochemical and ultrasound surveillance.

## 2024-06-13 LAB
CLINICAL BIOCHEMIST REVIEW: NORMAL
PLPETH BLD-MCNC: <20 NG/ML
POPETH BLD-MCNC: <20 NG/ML

## 2024-06-27 ENCOUNTER — TELEPHONE (OUTPATIENT)
Dept: UROLOGY | Facility: CLINIC | Age: 54
End: 2024-06-27
Payer: MEDICAID

## 2024-06-27 RX ORDER — TADALAFIL 20 MG/1
TABLET ORAL
Qty: 15 TABLET | Refills: 11 | Status: SHIPPED | OUTPATIENT
Start: 2024-06-27

## 2024-06-27 NOTE — TELEPHONE ENCOUNTER
MARISA foss called patient. Patient did not answer, phone has been discounted. Staff called brother to get in touch brother stated that he will message him.

## 2024-07-12 ENCOUNTER — DOCUMENTATION ONLY (OUTPATIENT)
Dept: CARDIOLOGY | Facility: CLINIC | Age: 54
End: 2024-07-12
Payer: MEDICAID

## 2024-07-12 NOTE — PROGRESS NOTES
Patient was last physically seen on 5/21/2024, see note for complete HPI.    Cardiac Risk Estimation: per the Revised Cardiac Risk Index (Circ. 100:1043, 1999), the patient's risk factors for cardiac complications include no active cardiac conditions, putting him in: RCI RISK CLASS I (0 risk factors, risk of major cardiac compl. appr. 0.5%)  -Okay to hold Apixaban (Eliquis) for 2 days prior to procedure and restart post procedure complete.  -EKG in office 5/21/24- NSR 60 w RBBB   -no additional cardiac testing is required prior to procedure.         Carlos Logan, DNP  Cardiology

## 2024-07-15 PROBLEM — K92.0 HEMATEMESIS WITH NAUSEA: Status: RESOLVED | Noted: 2024-04-08 | Resolved: 2024-07-15

## 2024-07-25 ENCOUNTER — CLINICAL SUPPORT (OUTPATIENT)
Dept: SMOKING CESSATION | Facility: CLINIC | Age: 54
End: 2024-07-25
Payer: COMMERCIAL

## 2024-07-25 DIAGNOSIS — F17.200 NICOTINE DEPENDENCE: Primary | ICD-10-CM

## 2024-07-25 PROCEDURE — 99407 BEHAV CHNG SMOKING > 10 MIN: CPT | Mod: S$GLB,,,

## 2024-07-25 NOTE — PROGRESS NOTES
Called pt to f/u on his 3 month smoking cessation quit status. Pt stated he remains tobacco free. Congratulated him on his hard work and success. Informed him of benefit period, phone follow ups, and contact information. Will complete smart form and will continue to follow up on  quit #2 episode.

## 2024-08-26 ENCOUNTER — LAB VISIT (OUTPATIENT)
Dept: LAB | Facility: HOSPITAL | Age: 54
End: 2024-08-26
Payer: MEDICAID

## 2024-08-26 ENCOUNTER — TELEPHONE (OUTPATIENT)
Dept: CARDIOLOGY | Facility: CLINIC | Age: 54
End: 2024-08-26
Payer: MEDICAID

## 2024-08-26 DIAGNOSIS — E78.5 HYPERLIPIDEMIA, UNSPECIFIED HYPERLIPIDEMIA TYPE: ICD-10-CM

## 2024-08-26 LAB
CHOLEST SERPL-MCNC: 166 MG/DL (ref 120–199)
CHOLEST/HDLC SERPL: 5.2 {RATIO} (ref 2–5)
HDLC SERPL-MCNC: 32 MG/DL (ref 40–75)
HDLC SERPL: 19.3 % (ref 20–50)
LDLC SERPL CALC-MCNC: 107.8 MG/DL (ref 63–159)
NONHDLC SERPL-MCNC: 134 MG/DL
TRIGL SERPL-MCNC: 131 MG/DL (ref 30–150)

## 2024-08-26 PROCEDURE — 36415 COLL VENOUS BLD VENIPUNCTURE: CPT | Mod: PN

## 2024-08-26 PROCEDURE — 80061 LIPID PANEL: CPT

## 2024-08-26 NOTE — TELEPHONE ENCOUNTER
I reached out to Mr. Park and informed Him of his results & HE expressed understanding of the results.    Kayli Armando  Medical Assistant  [facility]    ----- Message from Carlos Logan NP sent at 8/26/2024  4:19 PM CDT -----  Please inform Mr. Park that his lab results look fine.    Thank you,  Carlos Logan, DNP

## 2024-08-29 ENCOUNTER — PATIENT MESSAGE (OUTPATIENT)
Dept: CARDIOLOGY | Facility: CLINIC | Age: 54
End: 2024-08-29
Payer: MEDICAID

## 2024-09-04 ENCOUNTER — OFFICE VISIT (OUTPATIENT)
Dept: CARDIOLOGY | Facility: CLINIC | Age: 54
End: 2024-09-04
Payer: MEDICAID

## 2024-09-04 VITALS
OXYGEN SATURATION: 96 % | HEART RATE: 71 BPM | WEIGHT: 244.69 LBS | SYSTOLIC BLOOD PRESSURE: 142 MMHG | DIASTOLIC BLOOD PRESSURE: 72 MMHG | HEIGHT: 72 IN | BODY MASS INDEX: 33.14 KG/M2

## 2024-09-04 DIAGNOSIS — I10 ESSENTIAL HYPERTENSION: Primary | Chronic | ICD-10-CM

## 2024-09-04 DIAGNOSIS — G47.33 OBSTRUCTIVE SLEEP APNEA: ICD-10-CM

## 2024-09-04 DIAGNOSIS — F10.10 ETOH ABUSE: ICD-10-CM

## 2024-09-04 DIAGNOSIS — E66.01 MORBID OBESITY: ICD-10-CM

## 2024-09-04 DIAGNOSIS — E11.9 TYPE 2 DIABETES MELLITUS WITHOUT COMPLICATION, WITHOUT LONG-TERM CURRENT USE OF INSULIN: Chronic | ICD-10-CM

## 2024-09-04 DIAGNOSIS — I48.0 PAROXYSMAL ATRIAL FIBRILLATION: ICD-10-CM

## 2024-09-04 DIAGNOSIS — K21.9 GASTROESOPHAGEAL REFLUX DISEASE, UNSPECIFIED WHETHER ESOPHAGITIS PRESENT: ICD-10-CM

## 2024-09-04 DIAGNOSIS — E78.2 MIXED HYPERLIPIDEMIA: ICD-10-CM

## 2024-09-04 DIAGNOSIS — Z72.0 TOBACCO ABUSE: Chronic | ICD-10-CM

## 2024-09-04 PROCEDURE — 99213 OFFICE O/P EST LOW 20 MIN: CPT | Mod: PBBFAC,PN

## 2024-09-04 PROCEDURE — 1159F MED LIST DOCD IN RCRD: CPT | Mod: CPTII,,,

## 2024-09-04 PROCEDURE — 99214 OFFICE O/P EST MOD 30 MIN: CPT | Mod: S$PBB,,,

## 2024-09-04 PROCEDURE — 3077F SYST BP >= 140 MM HG: CPT | Mod: CPTII,,,

## 2024-09-04 PROCEDURE — 4010F ACE/ARB THERAPY RXD/TAKEN: CPT | Mod: CPTII,,,

## 2024-09-04 PROCEDURE — 3051F HG A1C>EQUAL 7.0%<8.0%: CPT | Mod: CPTII,,,

## 2024-09-04 PROCEDURE — 99999 PR PBB SHADOW E&M-EST. PATIENT-LVL III: CPT | Mod: PBBFAC,,,

## 2024-09-04 PROCEDURE — 3008F BODY MASS INDEX DOCD: CPT | Mod: CPTII,,,

## 2024-09-04 PROCEDURE — 1160F RVW MEDS BY RX/DR IN RCRD: CPT | Mod: CPTII,,,

## 2024-09-04 PROCEDURE — 3078F DIAST BP <80 MM HG: CPT | Mod: CPTII,,,

## 2024-09-04 RX ORDER — LISINOPRIL 40 MG/1
40 TABLET ORAL DAILY
Qty: 90 TABLET | Refills: 3 | Status: SHIPPED | OUTPATIENT
Start: 2024-09-04

## 2024-09-04 NOTE — ASSESSMENT & PLAN NOTE
-Goal BP < 130/80  -amlodipine ( not taking 2/2 BLE)   -/70s ( has been out of lisinopril for at least  2 weeks)   -continue medication regimen-lisinopril 40 mg , metoprolol tartrate 12.5 mg  -add IMDUR 30 mg   -discussed lifestyle modifications

## 2024-09-04 NOTE — ASSESSMENT & PLAN NOTE
Body mass index is 33.19 kg/m². Morbid obesity complicates all aspects of disease management from diagnostic modalities to treatment. Weight loss encouraged and health benefits explained to patient.

## 2024-09-04 NOTE — PROGRESS NOTES
Subjective:    Patient ID:  Veto Park is a 53 y.o. male who presents for evaluation of No chief complaint on file.      PCP: Amelia, Primary Doctor     Referring Provider: Aidan Marr DO     HPI: Patient is a 53 y.o. male PMHx of ETOH use disorder( Hx of 2 pints ETOH daily) , prior ETOH withdrawal seizure, GERD, HTN, A-Fib,  DM-2, COPD, tobaccos use (1 ppd/ 40 yr),m ABHILASH w CPAP who presents today for f/u appt. He was last seen on 5/21/24  to establish care and hospital follow up. Recent admission 4/8/24- 4/12/24 2/2 hematemesis/ Esophageal varices due to chronic ETOH daily use w new onset A-Fib w RVR. In office EKG patient was back in NSR w HR 60. He was continued on medical therapy. He reports doing well and continues to deny any ETOH use. He now attends AA and is following with a therapist. Patient denies CP, SOB, palpitations, orthopnea, PND, presyncope, LOC, swelling, or claudication. Patient monitors his home BP and ranges 140/60-70s. Patient reports medication compliance without side effects, but has been out of lisinopril for at least 2 weeks. Patient does exercise regularly, walks 4 miles and attend gym since discharged. 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        Past Medical History:   Diagnosis Date    Alcohol withdrawal seizure 7/21/2021    Alcoholic     Arthritis     COPD (chronic obstructive pulmonary disease)     Diabetes mellitus     Erectile dysfunction     GERD (gastroesophageal reflux disease)     Hypertension     ABHILASH on CPAP     Peyronie's disease     Stroke     vs TIA 1/14/2020    Toxic effect of contact with stingray 11/2018    right wrist     Past Surgical History:   Procedure Laterality Date    ESOPHAGOGASTRODUODENOSCOPY N/A 4/10/2024    Procedure: EGD (ESOPHAGOGASTRODUODENOSCOPY);  Surgeon: Silverio Link MD;  Location: [facility];  Service: Endoscopy;  Laterality: N/A;    LASIK      ISRAEL PLICATION N/A 11/27/2018    Procedure: PLICATION, PENIS;  Surgeon:  Ralph Wilcox MD;  Location: [facility];  Service: Urology;  Laterality: N/A;    VASECTOMY       Social History     Socioeconomic History    Marital status:    Tobacco Use    Smoking status: Former     Current packs/day: 0.00     Average packs/day: 1.5 packs/day for 42.2 years (63.4 ttl pk-yrs)     Types: Cigarettes     Start date:      Quit date: 2024     Years since quittin.4     Passive exposure: Never    Smokeless tobacco: Never    Tobacco comments:     Going to sensation   Substance and Sexual Activity    Alcohol use: Yes     Comment: heavy etoh use daily;5th of vodka a day    Drug use: No    Sexual activity: Not Currently     Social Determinants of Health     Financial Resource Strain: Medium Risk (2024)    Overall Financial Resource Strain (CARDIA)     Difficulty of Paying Living Expenses: Somewhat hard   Food Insecurity: Food Insecurity Present (2024)    Hunger Vital Sign     Worried About Running Out of Food in the Last Year: Sometimes true     Ran Out of Food in the Last Year: Sometimes true   Transportation Needs: No Transportation Needs (2024)    PRAPARE - Transportation     Lack of Transportation (Medical): No     Lack of Transportation (Non-Medical): No   Physical Activity: Sufficiently Active (2024)    Exercise Vital Sign     Days of Exercise per Week: 7 days     Minutes of Exercise per Session: 90 min   Stress: Stress Concern Present (2024)    [employer] [place] [employer] - Occupational Stress Questionnaire     Feeling of Stress : To some extent   Housing Stability: High Risk (2024)    Housing Stability Vital Sign     Unable to Pay for Housing in the Last Year: Yes     Family History   Problem Relation Name Age of Onset    Clotting disorder Father      Heart disease Father      Hypertension Father      Heart disease Mother      Hypertension Mother      Heart disease Maternal Aunt      Heart disease Maternal Uncle      Heart disease Paternal  Aunt      Heart disease Paternal Uncle      Hypertension Sister      Hypertension Brother      Prostate cancer Brother         Review of patient's allergies indicates:   Allergen Reactions    Codeine Nausea And Vomiting    Pcn [penicillins] Other (See Comments)     Unknown reaction    Betadine [povidone-iodine] Swelling and Other (See Comments)     redness       Medication List with Changes/Refills   Current Medications    APIXABAN (ELIQUIS) 5 MG TAB    Take 1 tablet (5 mg total) by mouth 2 (two) times daily.    METOPROLOL TARTRATE (LOPRESSOR) 25 MG TABLET    Take 0.5 tablets (12.5 mg total) by mouth 2 (two) times daily.    OMEPRAZOLE (PRILOSEC) 20 MG CAPSULE    Take 1 capsule (20 mg total) by mouth once daily.    TADALAFIL (CIALIS) 20 MG TAB    TAKE 1 TABLET BY MOUTH EVERY 72 HOURS AS NEEDED FOR ERECTILE DYSFUNCTION   Changed and/or Refilled Medications    Modified Medication Previous Medication    LISINOPRIL (PRINIVIL,ZESTRIL) 40 MG TABLET lisinopriL (PRINIVIL,ZESTRIL) 40 MG tablet       Take 1 tablet (40 mg total) by mouth once daily.    Take 1 tablet (40 mg total) by mouth once daily.   Discontinued Medications    METFORMIN (GLUCOPHAGE) 500 MG TABLET    Take 500 mg by mouth 2 (two) times daily with meals.    THIAMINE 100 MG TABLET    Take 1 tablet (100 mg total) by mouth once daily.       Review of Systems   Constitutional: Negative for diaphoresis and fever.   HENT:  Negative for congestion and hearing loss.    Eyes:  Negative for blurred vision and pain.   Cardiovascular:  Negative for chest pain, claudication, dyspnea on exertion, leg swelling, near-syncope, palpitations and syncope.   Respiratory:  Negative for shortness of breath and sleep disturbances due to breathing.    Hematologic/Lymphatic: Negative for bleeding problem. Does not bruise/bleed easily.   Skin:  Negative for color change and poor wound healing.   Gastrointestinal:  Negative for abdominal pain and nausea.   Genitourinary:  Negative for  bladder incontinence and flank pain.   Neurological:  Negative for focal weakness and light-headedness.        Objective:   BP (!) 142/72 (BP Location: Left arm, Patient Position: Sitting, BP Method: Large (Manual))   Pulse 71   Ht 6' (1.829 m)   Wt 111 kg (244 lb 11.4 oz)   SpO2 96%   BMI 33.19 kg/m²    Physical Exam  Constitutional:       Appearance: He is well-developed. He is not diaphoretic.   HENT:      Head: Normocephalic and atraumatic.   Eyes:      General: No scleral icterus.     Pupils: Pupils are equal, round, and reactive to light.   Neck:      Vascular: No JVD.   Cardiovascular:      Rate and Rhythm: Normal rate and regular rhythm.      Pulses: Intact distal pulses.      Heart sounds: S1 normal and S2 normal. No murmur heard.     No friction rub. No gallop.   Pulmonary:      Effort: Pulmonary effort is normal. No respiratory distress.      Breath sounds: Normal breath sounds. No wheezing or rales.   Chest:      Chest wall: No tenderness.   Abdominal:      General: Bowel sounds are normal. There is no distension.      Palpations: Abdomen is soft. There is no mass.      Tenderness: There is no abdominal tenderness. There is no rebound.   Musculoskeletal:         General: No tenderness. Normal range of motion.      Cervical back: Normal range of motion and neck supple.   Skin:     General: Skin is warm and dry.      Coloration: Skin is not pale.   Neurological:      Mental Status: He is alert and oriented to person, place, and time.      Coordination: Coordination normal.      Deep Tendon Reflexes: Reflexes normal.   Psychiatric:         Behavior: Behavior normal.         Judgment: Judgment normal.           EKG reviewed 5/21/24-  NSR  w RBBB     Cardiac echo 4/9/24  Summary    Left Ventricle: The left ventricle is normal in size. Normal wall thickness. There is concentric hypertrophy. There is normal systolic function. Biplane (2D) method of discs ejection fraction is 70%.    Right Ventricle: Normal  right ventricular cavity size. Systolic function is normal. TAPSE is 1.80 cm.    Overall the study quality was technically difficult. The study was difficult due to patient's heart rhythm. Irregularly irregular rhythm was present during the study. HR 130s.     EKG reviewed 4/9/24- A-Fib w RVR,      Assessment:       1. Essential hypertension    2. Mixed hyperlipidemia    3. Paroxysmal atrial fibrillation    4. Type 2 diabetes mellitus without complication, without long-term current use of insulin    5. Morbid obesity    6. Gastroesophageal reflux disease, unspecified whether esophagitis present    7. Obstructive sleep apnea    8. Tobacco abuse    9. ETOH abuse           Plan:         Essential hypertension  -Goal BP < 130/80  -amlodipine ( not taking 2/2 BLE)   -/70s ( has been out of lisinopril for at least  2 weeks)   -continue medication regimen-lisinopril 40 mg , metoprolol tartrate 12.5 mg  -add IMDUR 30 mg   -discussed lifestyle modifications       Hyperlipidemia  -Last .8 8/26/24  -was on atorvastatin 20 mg but currently not taking      Atrial fibrillation  -EKG reviewed 4/9/24- A-Fib w RVR,    -EKG in office 5/21/24- NSR 60 w RBBB  -CHADSVAS- 2   -continue apixaban 5 mg         Type 2 diabetes mellitus, without long-term current use of insulin  -Followed w PCP  -A1c 7.4 4/9/24  -continue Medical therapy     Morbid obesity  Body mass index is 33.19 kg/m². Morbid obesity complicates all aspects of disease management from diagnostic modalities to treatment. Weight loss encouraged and health benefits explained to patient.       GERD (gastroesophageal reflux disease)  -followed by PCP  -controlled on omeprazole 20 mg     Obstructive sleep apnea  -CPAP nightly-compliant     Tobacco abuse  -patient has restarted smoking  The patient was counseled on the dangers of tobacco use, and was advised to quit.  Reviewed strategies to maximize success, including removing cigarettes and smoking  materials from environment and stress management.      ETOH abuse  -Sober since 4/9/24  -currently enrolled in AA  -currently seeing a therapist       Total duration of face to face visit time 30 minutes.  Total time spent counseling greater than fifty percent of total visit time.  Counseling included discussion regarding imaging findings, diagnosis, possibilities, treatment options, risks and benefits.  The patient had many questions regarding the options and long-term effects      Carlos Logan, GALO  Cardiology

## 2024-09-04 NOTE — ASSESSMENT & PLAN NOTE
-patient has restarted smoking  The patient was counseled on the dangers of tobacco use, and was advised to quit.  Reviewed strategies to maximize success, including removing cigarettes and smoking materials from environment and stress management.

## 2024-10-28 ENCOUNTER — CLINICAL SUPPORT (OUTPATIENT)
Dept: SMOKING CESSATION | Facility: CLINIC | Age: 54
End: 2024-10-28
Payer: COMMERCIAL

## 2024-10-28 DIAGNOSIS — F17.200 NICOTINE DEPENDENCE: Primary | ICD-10-CM

## 2024-10-28 PROCEDURE — 99406 BEHAV CHNG SMOKING 3-10 MIN: CPT | Mod: S$GLB,,,

## 2024-12-16 ENCOUNTER — LAB VISIT (OUTPATIENT)
Dept: LAB | Facility: HOSPITAL | Age: 54
End: 2024-12-16
Attending: INTERNAL MEDICINE
Payer: MEDICAID

## 2024-12-16 DIAGNOSIS — K70.30 ALCOHOLIC CIRRHOSIS OF LIVER WITHOUT ASCITES: Chronic | ICD-10-CM

## 2024-12-16 LAB
AFP SERPL-MCNC: <2 NG/ML (ref 0–8.4)
ALBUMIN SERPL BCP-MCNC: 4.4 G/DL (ref 3.5–5.2)
ALP SERPL-CCNC: 25 U/L (ref 38–126)
ALT SERPL W/O P-5'-P-CCNC: 33 U/L (ref 10–44)
ANION GAP SERPL CALC-SCNC: 9 MMOL/L (ref 8–16)
AST SERPL-CCNC: 25 U/L (ref 15–46)
BASOPHILS # BLD AUTO: 0.03 K/UL (ref 0–0.2)
BASOPHILS NFR BLD: 0.5 % (ref 0–1.9)
BILIRUB SERPL-MCNC: 0.3 MG/DL (ref 0.1–1)
CALCIUM SERPL-MCNC: 9.9 MG/DL (ref 8.7–10.5)
CHLORIDE SERPL-SCNC: 104 MMOL/L (ref 95–110)
CO2 SERPL-SCNC: 21 MMOL/L (ref 23–29)
CREAT SERPL-MCNC: 1.31 MG/DL (ref 0.5–1.4)
DIFFERENTIAL METHOD BLD: ABNORMAL
EOSINOPHIL # BLD AUTO: 0.2 K/UL (ref 0–0.5)
EOSINOPHIL NFR BLD: 3.1 % (ref 0–8)
ERYTHROCYTE [DISTWIDTH] IN BLOOD BY AUTOMATED COUNT: 13.2 % (ref 11.5–14.5)
EST. GFR  (NO RACE VARIABLE): >60 ML/MIN/1.73 M^2
GLUCOSE SERPL-MCNC: 112 MG/DL (ref 70–110)
HCT VFR BLD AUTO: 42 % (ref 40–54)
HGB BLD-MCNC: 14.5 G/DL (ref 14–18)
IMM GRANULOCYTES # BLD AUTO: 0.03 K/UL (ref 0–0.04)
IMM GRANULOCYTES NFR BLD AUTO: 0.5 % (ref 0–0.5)
INR PPP: 1 (ref 0.8–1.2)
LYMPHOCYTES # BLD AUTO: 1.1 K/UL (ref 1–4.8)
LYMPHOCYTES NFR BLD: 19.6 % (ref 18–48)
MCH RBC QN AUTO: 33.4 PG (ref 27–31)
MCHC RBC AUTO-ENTMCNC: 34.5 G/DL (ref 32–36)
MCV RBC AUTO: 97 FL (ref 82–98)
MONOCYTES # BLD AUTO: 0.6 K/UL (ref 0.3–1)
MONOCYTES NFR BLD: 10.2 % (ref 4–15)
NEUTROPHILS # BLD AUTO: 3.6 K/UL (ref 1.8–7.7)
NEUTROPHILS NFR BLD: 66.1 % (ref 38–73)
NRBC BLD-RTO: 0 /100 WBC
PLATELET # BLD AUTO: 106 K/UL (ref 150–450)
PMV BLD AUTO: 10.4 FL (ref 9.2–12.9)
POTASSIUM SERPL-SCNC: 5 MMOL/L (ref 3.5–5.1)
PROT SERPL-MCNC: 7.9 G/DL (ref 6–8.4)
PROTHROMBIN TIME: 11.2 SEC (ref 9–12.5)
RBC # BLD AUTO: 4.34 M/UL (ref 4.6–6.2)
SODIUM SERPL-SCNC: 134 MMOL/L (ref 136–145)
UUN UR-MCNC: 43 MG/DL (ref 2–20)
WBC # BLD AUTO: 5.51 K/UL (ref 3.9–12.7)

## 2024-12-16 PROCEDURE — 82105 ALPHA-FETOPROTEIN SERUM: CPT | Mod: PN | Performed by: INTERNAL MEDICINE

## 2024-12-16 PROCEDURE — 36415 COLL VENOUS BLD VENIPUNCTURE: CPT | Mod: PN | Performed by: INTERNAL MEDICINE

## 2024-12-16 PROCEDURE — 80053 COMPREHEN METABOLIC PANEL: CPT | Mod: PN | Performed by: INTERNAL MEDICINE

## 2024-12-16 PROCEDURE — 85610 PROTHROMBIN TIME: CPT | Mod: PN | Performed by: INTERNAL MEDICINE

## 2024-12-16 PROCEDURE — 85025 COMPLETE CBC W/AUTO DIFF WBC: CPT | Mod: PN | Performed by: INTERNAL MEDICINE

## 2025-01-27 ENCOUNTER — OFFICE VISIT (OUTPATIENT)
Dept: CARDIOLOGY | Facility: CLINIC | Age: 55
End: 2025-01-27
Payer: MEDICAID

## 2025-01-27 VITALS
HEIGHT: 72 IN | DIASTOLIC BLOOD PRESSURE: 78 MMHG | HEART RATE: 63 BPM | BODY MASS INDEX: 34.52 KG/M2 | SYSTOLIC BLOOD PRESSURE: 132 MMHG | WEIGHT: 254.88 LBS | OXYGEN SATURATION: 96 %

## 2025-01-27 DIAGNOSIS — G47.33 OBSTRUCTIVE SLEEP APNEA: ICD-10-CM

## 2025-01-27 DIAGNOSIS — E66.01 MORBID OBESITY: ICD-10-CM

## 2025-01-27 DIAGNOSIS — I10 ESSENTIAL HYPERTENSION: Primary | Chronic | ICD-10-CM

## 2025-01-27 DIAGNOSIS — E11.9 TYPE 2 DIABETES MELLITUS WITHOUT COMPLICATION, WITHOUT LONG-TERM CURRENT USE OF INSULIN: Chronic | ICD-10-CM

## 2025-01-27 DIAGNOSIS — E78.2 MIXED HYPERLIPIDEMIA: ICD-10-CM

## 2025-01-27 DIAGNOSIS — K21.9 GASTROESOPHAGEAL REFLUX DISEASE, UNSPECIFIED WHETHER ESOPHAGITIS PRESENT: ICD-10-CM

## 2025-01-27 DIAGNOSIS — I48.0 PAROXYSMAL ATRIAL FIBRILLATION: ICD-10-CM

## 2025-01-27 DIAGNOSIS — F10.10 ETOH ABUSE: ICD-10-CM

## 2025-01-27 DIAGNOSIS — Z72.0 TOBACCO ABUSE: Chronic | ICD-10-CM

## 2025-01-27 PROCEDURE — 3078F DIAST BP <80 MM HG: CPT | Mod: CPTII,,,

## 2025-01-27 PROCEDURE — 4010F ACE/ARB THERAPY RXD/TAKEN: CPT | Mod: CPTII,,,

## 2025-01-27 PROCEDURE — G2211 COMPLEX E/M VISIT ADD ON: HCPCS | Mod: S$PBB,,,

## 2025-01-27 PROCEDURE — 3008F BODY MASS INDEX DOCD: CPT | Mod: CPTII,,,

## 2025-01-27 PROCEDURE — 1160F RVW MEDS BY RX/DR IN RCRD: CPT | Mod: CPTII,,,

## 2025-01-27 PROCEDURE — 99213 OFFICE O/P EST LOW 20 MIN: CPT | Mod: PBBFAC,PN

## 2025-01-27 PROCEDURE — 3075F SYST BP GE 130 - 139MM HG: CPT | Mod: CPTII,,,

## 2025-01-27 PROCEDURE — 99214 OFFICE O/P EST MOD 30 MIN: CPT | Mod: S$PBB,,,

## 2025-01-27 PROCEDURE — 99999 PR PBB SHADOW E&M-EST. PATIENT-LVL III: CPT | Mod: PBBFAC,,,

## 2025-01-27 PROCEDURE — 1159F MED LIST DOCD IN RCRD: CPT | Mod: CPTII,,,

## 2025-01-27 RX ORDER — LISINOPRIL 20 MG/1
20 TABLET ORAL DAILY
COMMUNITY

## 2025-01-27 NOTE — ASSESSMENT & PLAN NOTE
Body mass index is 34.56 kg/m². Morbid obesity complicates all aspects of disease management from diagnostic modalities to treatment. Weight loss encouraged and health benefits explained to patient.

## 2025-01-27 NOTE — ASSESSMENT & PLAN NOTE
-Goal BP < 130/80  -amlodipine ( not taking 2/2 BLE)   -controlled   -/78  -continue medication regimen-lisinopril 20 mg, metoprolol tartrate 12.5 mg   -discussed lifestyle modifications

## 2025-01-27 NOTE — ASSESSMENT & PLAN NOTE
-Last .8 8/26/24  -was on atorvastatin 20 mg but currently not taking  -discussed lifestyle modifications

## 2025-01-27 NOTE — ASSESSMENT & PLAN NOTE
-EKG reviewed 4/9/24- A-Fib w RVR,    -EKG in office 5/21/24- NSR 60 w RBBB  -CHADSVAS- 2   -continue apixaban 5 mg ( patient no longer taking, he stopped about 2 months ago)

## 2025-01-27 NOTE — PROGRESS NOTES
"Subjective:    Patient ID:  Veto Park is a 54 y.o. male who presents for evaluation of No chief complaint on file.      PCP: Amelia, Primary Doctor     Referring Provider: Aidan Marr DO     HPI: Patient is a 53 y.o. male PMHx of ETOH use disorder( Hx of 2 pints ETOH daily) sober X 10 months, prior ETOH withdrawal seizure, GERD, HTN, A-Fib,  DM-2, COPD, tobaccos use (1 ppd/ 40 yr),m ABHILASH w CPAP who presents today for f/u appt. He was last seen on 9/4/24 for f/u appt and was continued on medical therapy. He was previously seen for new onset A-Fib w RVR. In office EKG patient was back in NSR w HR 60. He reports doing well and continues to deny any ETOH use. He now attends AA and is following with a therapist. Patient denies CP, SOB, palpitations, orthopnea, PND, presyncope, LOC, swelling, or claudication. Patient monitors his home BP and ranges 130s/80s. Patient reports medication compliance without side effects, but reports he stopped taking his apixaban 2 months ago. He states" I don't need that medicine and does want to restart the medicine." Patient does exercise regularly, walks 4 miles and attend gym since discharged. 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        Past Medical History:   Diagnosis Date    Alcohol withdrawal seizure 7/21/2021    Alcoholic     Arthritis     COPD (chronic obstructive pulmonary disease)     Diabetes mellitus     Erectile dysfunction     GERD (gastroesophageal reflux disease)     Hypertension     ABHILASH on CPAP     Peyronie's disease     Stroke     vs TIA 1/14/2020    Toxic effect of contact with stingray 11/2018    right wrist     Past Surgical History:   Procedure Laterality Date    ESOPHAGOGASTRODUODENOSCOPY N/A 4/10/2024    Procedure: EGD (ESOPHAGOGASTRODUODENOSCOPY);  Surgeon: Silverio Link MD;  Location: [facility];  Service: Endoscopy;  Laterality: N/A;    LASIK      ISRAEL PLICATION N/A 11/27/2018    Procedure: PLICATION, PENIS;  Surgeon: Ralph" CHRISTIAN Wilcox MD;  Location: [facility];  Service: Urology;  Laterality: N/A;    VASECTOMY       Social History     Socioeconomic History    Marital status:    Tobacco Use    Smoking status: Former     Current packs/day: 0.00     Average packs/day: 1.5 packs/day for 42.2 years (63.4 ttl pk-yrs)     Types: Cigarettes     Start date:      Quit date: 2024     Years since quittin.8     Passive exposure: Never    Smokeless tobacco: Never    Tobacco comments:     Going to sensation   Substance and Sexual Activity    Alcohol use: Yes     Comment: heavy etoh use daily;5th of vodka a day    Drug use: No    Sexual activity: Not Currently     Social Drivers of Health     Financial Resource Strain: Medium Risk (2024)    Overall Financial Resource Strain (CARDIA)     Difficulty of Paying Living Expenses: Somewhat hard   Food Insecurity: Food Insecurity Present (2024)    Hunger Vital Sign     Worried About Running Out of Food in the Last Year: Sometimes true     Ran Out of Food in the Last Year: Sometimes true   Transportation Needs: No Transportation Needs (2024)    PRAPARE - Transportation     Lack of Transportation (Medical): No     Lack of Transportation (Non-Medical): No   Physical Activity: Sufficiently Active (2024)    Exercise Vital Sign     Days of Exercise per Week: 7 days     Minutes of Exercise per Session: 90 min   Stress: Stress Concern Present (2024)    [place] [employer] - Occupational Stress Questionnaire     Feeling of Stress : To some extent   Housing Stability: High Risk (2024)    Housing Stability Vital Sign     Unable to Pay for Housing in the Last Year: Yes     Family History   Problem Relation Name Age of Onset    Clotting disorder Father      Heart disease Father      Hypertension Father      Heart disease Mother      Hypertension Mother      Heart disease Maternal Aunt      Heart disease Maternal Uncle      Heart disease Paternal Aunt       Heart disease Paternal Uncle      Hypertension Sister      Hypertension Brother      Prostate cancer Brother         Review of patient's allergies indicates:   Allergen Reactions    Codeine Nausea And Vomiting    Pcn [penicillins] Other (See Comments)     Unknown reaction    Betadine [povidone-iodine] Swelling and Other (See Comments)     redness       Medication List with Changes/Refills   Current Medications    APIXABAN (ELIQUIS) 5 MG TAB    Take 1 tablet (5 mg total) by mouth 2 (two) times daily.    LISINOPRIL (PRINIVIL,ZESTRIL) 20 MG TABLET    Take 20 mg by mouth once daily.    METOPROLOL TARTRATE (LOPRESSOR) 25 MG TABLET    Take 0.5 tablets (12.5 mg total) by mouth 2 (two) times daily.    OMEPRAZOLE (PRILOSEC) 20 MG CAPSULE    Take 1 capsule (20 mg total) by mouth once daily.    TADALAFIL (CIALIS) 20 MG TAB    TAKE 1 TABLET BY MOUTH EVERY 72 HOURS AS NEEDED FOR ERECTILE DYSFUNCTION   Discontinued Medications    LISINOPRIL (PRINIVIL,ZESTRIL) 40 MG TABLET    Take 1 tablet (40 mg total) by mouth once daily.       Review of Systems   Constitutional: Negative for diaphoresis and fever.   HENT:  Negative for congestion and hearing loss.    Eyes:  Negative for blurred vision and pain.   Cardiovascular:  Negative for chest pain, claudication, dyspnea on exertion, leg swelling, near-syncope, palpitations and syncope.   Respiratory:  Negative for shortness of breath and sleep disturbances due to breathing.    Hematologic/Lymphatic: Negative for bleeding problem. Does not bruise/bleed easily.   Skin:  Negative for color change and poor wound healing.   Gastrointestinal:  Negative for abdominal pain and nausea.   Genitourinary:  Negative for bladder incontinence and flank pain.   Neurological:  Negative for focal weakness and light-headedness.        Objective:   /78 (BP Location: Left arm, Patient Position: Sitting)   Pulse 63   Ht 6' (1.829 m)   Wt 115.6 kg (254 lb 13.6 oz)   SpO2 96%   BMI 34.56 kg/m²     Physical Exam  Constitutional:       Appearance: He is well-developed. He is not diaphoretic.   HENT:      Head: Normocephalic and atraumatic.   Eyes:      General: No scleral icterus.     Pupils: Pupils are equal, round, and reactive to light.   Neck:      Vascular: No JVD.   Cardiovascular:      Rate and Rhythm: Normal rate and regular rhythm.      Pulses: Intact distal pulses.      Heart sounds: S1 normal and S2 normal. No murmur heard.     No friction rub. No gallop.   Pulmonary:      Effort: Pulmonary effort is normal. No respiratory distress.      Breath sounds: Normal breath sounds. No wheezing or rales.   Chest:      Chest wall: No tenderness.   Abdominal:      General: Bowel sounds are normal. There is no distension.      Palpations: Abdomen is soft. There is no mass.      Tenderness: There is no abdominal tenderness. There is no rebound.   Musculoskeletal:         General: No tenderness. Normal range of motion.      Cervical back: Normal range of motion and neck supple.   Skin:     General: Skin is warm and dry.      Coloration: Skin is not pale.   Neurological:      Mental Status: He is alert and oriented to person, place, and time.      Coordination: Coordination normal.      Deep Tendon Reflexes: Reflexes normal.   Psychiatric:         Behavior: Behavior normal.         Judgment: Judgment normal.           EKG reviewed 5/21/24-  NSR  w RBBB     Cardiac echo 4/9/24  Summary    Left Ventricle: The left ventricle is normal in size. Normal wall thickness. There is concentric hypertrophy. There is normal systolic function. Biplane (2D) method of discs ejection fraction is 70%.    Right Ventricle: Normal right ventricular cavity size. Systolic function is normal. TAPSE is 1.80 cm.    Overall the study quality was technically difficult. The study was difficult due to patient's heart rhythm. Irregularly irregular rhythm was present during the study. HR 130s.     EKG reviewed 4/9/24- A-Fib w RVR,       Assessment:       1. Essential hypertension    2. Mixed hyperlipidemia    3. Paroxysmal atrial fibrillation    4. ETOH abuse    5. Type 2 diabetes mellitus without complication, without long-term current use of insulin    6. Morbid obesity    7. Gastroesophageal reflux disease, unspecified whether esophagitis present    8. Tobacco abuse    9. Obstructive sleep apnea             Plan:         Essential hypertension  -Goal BP < 130/80  -amlodipine ( not taking 2/2 BLE)   -controlled   -/78  -continue medication regimen-lisinopril 20 mg, metoprolol tartrate 12.5 mg   -discussed lifestyle modifications       Hyperlipidemia  -Last .8 8/26/24  -was on atorvastatin 20 mg but currently not taking  -discussed lifestyle modifications     Atrial fibrillation  -EKG reviewed 4/9/24- A-Fib w RVR,    -EKG in office 5/21/24- NSR 60 w RBBB  -CHADSVAS- 2   -continue apixaban 5 mg ( patient no longer taking, he stopped about 2 months ago)          ETOH abuse  -Sober since 4/9/24  -currently enrolled in AA  -currently seeing a therapist     Type 2 diabetes mellitus, without long-term current use of insulin  -Followed w PCP  -A1c 7.4 4/9/24  -continue Medical therapy     Morbid obesity  Body mass index is 34.56 kg/m². Morbid obesity complicates all aspects of disease management from diagnostic modalities to treatment. Weight loss encouraged and health benefits explained to patient.       GERD (gastroesophageal reflux disease)  -followed by PCP  -controlled on omeprazole 20 mg     Tobacco abuse  -patient has restarted smoking  The patient was counseled on the dangers of tobacco use, and was advised to quit.  Reviewed strategies to maximize success, including removing cigarettes and smoking materials from environment and stress management.      Obstructive sleep apnea  -CPAP nightly-compliant         Total duration of face to face visit time 30 minutes.  Total time spent counseling greater than fifty percent of  total visit time.  Counseling included discussion regarding imaging findings, diagnosis, possibilities, treatment options, risks and benefits.  The patient had many questions regarding the options and long-term effects      Carlos Logan DNP  Cardiology

## 2025-04-01 ENCOUNTER — OFFICE VISIT (OUTPATIENT)
Dept: HEPATOLOGY | Facility: CLINIC | Age: 55
End: 2025-04-01
Attending: INTERNAL MEDICINE
Payer: MEDICAID

## 2025-04-01 ENCOUNTER — LAB VISIT (OUTPATIENT)
Dept: LAB | Facility: HOSPITAL | Age: 55
End: 2025-04-01
Attending: INTERNAL MEDICINE
Payer: MEDICAID

## 2025-04-01 VITALS
WEIGHT: 246.5 LBS | OXYGEN SATURATION: 98 % | DIASTOLIC BLOOD PRESSURE: 60 MMHG | HEART RATE: 64 BPM | HEIGHT: 72 IN | TEMPERATURE: 98 F | SYSTOLIC BLOOD PRESSURE: 129 MMHG | RESPIRATION RATE: 18 BRPM | BODY MASS INDEX: 33.39 KG/M2

## 2025-04-01 DIAGNOSIS — K70.30 ALCOHOLIC CIRRHOSIS OF LIVER WITHOUT ASCITES: Primary | Chronic | ICD-10-CM

## 2025-04-01 DIAGNOSIS — K70.30 ALCOHOLIC CIRRHOSIS OF LIVER WITHOUT ASCITES: ICD-10-CM

## 2025-04-01 LAB
ABSOLUTE EOSINOPHIL (OHS): 0.3 K/UL
ABSOLUTE MONOCYTE (OHS): 0.58 K/UL (ref 0.3–1)
ABSOLUTE NEUTROPHIL COUNT (OHS): 3.98 K/UL (ref 1.8–7.7)
AFP SERPL-MCNC: 2.7 NG/ML
ALBUMIN SERPL BCP-MCNC: 4.4 G/DL (ref 3.5–5.2)
ALP SERPL-CCNC: 28 UNIT/L (ref 40–150)
ALT SERPL W/O P-5'-P-CCNC: 32 UNIT/L (ref 10–44)
ANION GAP (OHS): 9 MMOL/L (ref 8–16)
AST SERPL-CCNC: 26 UNIT/L (ref 11–45)
BASOPHILS # BLD AUTO: 0.04 K/UL
BASOPHILS NFR BLD AUTO: 0.6 %
BILIRUB SERPL-MCNC: 0.7 MG/DL (ref 0.1–1)
BUN SERPL-MCNC: 29 MG/DL (ref 6–20)
CALCIUM SERPL-MCNC: 10.1 MG/DL (ref 8.7–10.5)
CHLORIDE SERPL-SCNC: 98 MMOL/L (ref 95–110)
CO2 SERPL-SCNC: 28 MMOL/L (ref 23–29)
CREAT SERPL-MCNC: 1.1 MG/DL (ref 0.5–1.4)
ERYTHROCYTE [DISTWIDTH] IN BLOOD BY AUTOMATED COUNT: 12.3 % (ref 11.5–14.5)
GFR SERPLBLD CREATININE-BSD FMLA CKD-EPI: >60 ML/MIN/1.73/M2
GLUCOSE SERPL-MCNC: 103 MG/DL (ref 70–110)
HCT VFR BLD AUTO: 45.9 % (ref 40–54)
HGB BLD-MCNC: 16.4 GM/DL (ref 14–18)
IMM GRANULOCYTES # BLD AUTO: 0.02 K/UL (ref 0–0.04)
IMM GRANULOCYTES NFR BLD AUTO: 0.3 % (ref 0–0.5)
INR PPP: 1 (ref 0.8–1.2)
LYMPHOCYTES # BLD AUTO: 1.83 K/UL (ref 1–4.8)
MCH RBC QN AUTO: 34.5 PG (ref 27–31)
MCHC RBC AUTO-ENTMCNC: 35.7 G/DL (ref 32–36)
MCV RBC AUTO: 97 FL (ref 82–98)
NUCLEATED RBC (/100WBC) (OHS): 0 /100 WBC
PLATELET # BLD AUTO: 125 K/UL (ref 150–450)
PMV BLD AUTO: 11 FL (ref 9.2–12.9)
POTASSIUM SERPL-SCNC: 4.3 MMOL/L (ref 3.5–5.1)
PROT SERPL-MCNC: 8.9 GM/DL (ref 6–8.4)
PROTHROMBIN TIME: 11.4 SECONDS (ref 9–12.5)
RBC # BLD AUTO: 4.75 M/UL (ref 4.6–6.2)
RELATIVE EOSINOPHIL (OHS): 4.4 %
RELATIVE LYMPHOCYTE (OHS): 27.1 % (ref 18–48)
RELATIVE MONOCYTE (OHS): 8.6 % (ref 4–15)
RELATIVE NEUTROPHIL (OHS): 59 % (ref 38–73)
SODIUM SERPL-SCNC: 135 MMOL/L (ref 136–145)
WBC # BLD AUTO: 6.75 K/UL (ref 3.9–12.7)

## 2025-04-01 PROCEDURE — 85610 PROTHROMBIN TIME: CPT

## 2025-04-01 PROCEDURE — 3074F SYST BP LT 130 MM HG: CPT | Mod: CPTII,,, | Performed by: INTERNAL MEDICINE

## 2025-04-01 PROCEDURE — 1160F RVW MEDS BY RX/DR IN RCRD: CPT | Mod: CPTII,,, | Performed by: INTERNAL MEDICINE

## 2025-04-01 PROCEDURE — 82105 ALPHA-FETOPROTEIN SERUM: CPT

## 2025-04-01 PROCEDURE — 99213 OFFICE O/P EST LOW 20 MIN: CPT | Mod: PBBFAC | Performed by: INTERNAL MEDICINE

## 2025-04-01 PROCEDURE — 99213 OFFICE O/P EST LOW 20 MIN: CPT | Mod: S$PBB,,, | Performed by: INTERNAL MEDICINE

## 2025-04-01 PROCEDURE — 85025 COMPLETE CBC W/AUTO DIFF WBC: CPT

## 2025-04-01 PROCEDURE — 80321 ALCOHOLS BIOMARKERS 1OR 2: CPT

## 2025-04-01 PROCEDURE — 80053 COMPREHEN METABOLIC PANEL: CPT

## 2025-04-01 PROCEDURE — 36415 COLL VENOUS BLD VENIPUNCTURE: CPT

## 2025-04-01 PROCEDURE — 3078F DIAST BP <80 MM HG: CPT | Mod: CPTII,,, | Performed by: INTERNAL MEDICINE

## 2025-04-01 PROCEDURE — 3008F BODY MASS INDEX DOCD: CPT | Mod: CPTII,,, | Performed by: INTERNAL MEDICINE

## 2025-04-01 PROCEDURE — 4010F ACE/ARB THERAPY RXD/TAKEN: CPT | Mod: CPTII,,, | Performed by: INTERNAL MEDICINE

## 2025-04-01 PROCEDURE — 99999 PR PBB SHADOW E&M-EST. PATIENT-LVL III: CPT | Mod: PBBFAC,,, | Performed by: INTERNAL MEDICINE

## 2025-04-01 PROCEDURE — 1159F MED LIST DOCD IN RCRD: CPT | Mod: CPTII,,, | Performed by: INTERNAL MEDICINE

## 2025-04-01 RX ORDER — HYDROCODONE BITARTRATE AND ACETAMINOPHEN 7.5; 325 MG/1; MG/1
1 TABLET ORAL EVERY 6 HOURS
COMMUNITY
Start: 2025-03-29

## 2025-04-01 RX ORDER — CLINDAMYCIN HYDROCHLORIDE 300 MG/1
300 CAPSULE ORAL EVERY 8 HOURS
COMMUNITY
Start: 2025-03-26

## 2025-04-01 RX ORDER — IBUPROFEN 800 MG/1
800 TABLET ORAL EVERY 8 HOURS PRN
COMMUNITY
Start: 2024-11-22

## 2025-04-01 RX ORDER — CELECOXIB 100 MG/1
1 CAPSULE ORAL 2 TIMES DAILY
COMMUNITY
Start: 2025-01-15 | End: 2025-05-15

## 2025-04-04 LAB
PLPETH BLD-MCNC: <10 NG/ML
POPETH BLD-MCNC: <10 NG/ML
TOXICOLOGIST REVIEW: NORMAL

## 2025-04-10 ENCOUNTER — TELEPHONE (OUTPATIENT)
Dept: SMOKING CESSATION | Facility: CLINIC | Age: 55
End: 2025-04-10
Payer: MEDICAID

## 2025-04-10 NOTE — TELEPHONE ENCOUNTER
Called patient about 12 month follow up. Left message for patient to call [phone]. Resolved quit episode.

## 2025-04-28 NOTE — PROGRESS NOTES
HEPATOLOGY FOLLOW UP    Referring Physician:   Current Corresponding Physician:     Veto Park is here for follow up of Cirrhosis      HPI    This 54-year-old gentleman has well-compensated cirrhosis secondary to history of alcohol use disorder now in remission.  He had presented with a portal hypertensive bleed.  His most recent Peth test is negative.  His ultrasound shows no focal mass lesions or ascites.  He has had no further symptoms of GI bleeding.  He has no symptoms of hepatic encephalopathy.  Encounter Medications[1]  Review of patient's allergies indicates:   Allergen Reactions    Codeine Nausea And Vomiting    Pcn [penicillins] Other (See Comments)     Unknown reaction    Betadine [povidone-iodine] Swelling and Other (See Comments)     redness     Past Medical History:   Diagnosis Date    Alcohol withdrawal seizure 7/21/2021    Alcoholic     Arthritis     COPD (chronic obstructive pulmonary disease)     Diabetes mellitus     Erectile dysfunction     GERD (gastroesophageal reflux disease)     Hypertension     ABHILASH on CPAP     Peyronie's disease     Stroke     vs TIA 1/14/2020    Toxic effect of contact with stingray 11/2018    right wrist       Review of Systems   Constitutional:  Negative for activity change, appetite change, chills, fatigue and unexpected weight change.   HENT:  Negative for congestion, facial swelling and tinnitus.    Eyes:  Negative for visual disturbance.   Respiratory:  Negative for cough, shortness of breath and wheezing.    Cardiovascular:  Negative for chest pain and palpitations.   Gastrointestinal:  Negative for abdominal distention.   Genitourinary:  Negative for dysuria.   Musculoskeletal:  Negative for arthralgias, joint swelling and myalgias.   Neurological:  Negative for syncope and headaches.   Hematological:  Does not bruise/bleed easily.   Psychiatric/Behavioral:  Negative for confusion.      Vitals:    04/01/25 1431   BP: 129/60   Pulse: 64   Resp: 18    Temp: 98 °F (36.7 °C)       Physical Exam  Constitutional:       Appearance: He is well-developed.   Eyes:      General: No scleral icterus.  Cardiovascular:      Rate and Rhythm: Normal rate and regular rhythm.      Heart sounds: Normal heart sounds.   Pulmonary:      Effort: Pulmonary effort is normal. No respiratory distress.      Breath sounds: Normal breath sounds. No wheezing.   Abdominal:      General: Bowel sounds are normal. There is no distension.      Palpations: Abdomen is soft. There is no mass.      Tenderness: There is no abdominal tenderness. There is no rebound.   Musculoskeletal:         General: Normal range of motion.   Lymphadenopathy:      Cervical: No cervical adenopathy.   Skin:     General: Skin is warm and dry.   Neurological:      Mental Status: He is alert and oriented to person, place, and time.         MELD 3.0: 9 at 4/1/2025  3:04 PM  MELD-Na: 7 at 4/1/2025  3:04 PM  Calculated from:  Serum Creatinine: 1.1 mg/dL at 4/1/2025  3:04 PM  Serum Sodium: 135 mmol/L at 4/1/2025  3:04 PM  Total Bilirubin: 0.7 mg/dL (Using min of 1 mg/dL) at 4/1/2025  3:04 PM  Serum Albumin: 4.4 g/dL (Using max of 3.5 g/dL) at 4/1/2025  3:04 PM  INR(ratio): 1 at 4/1/2025  3:04 PM  Age at listing (hypothetical): 54 years  Sex: Male at 4/1/2025  3:04 PM      Lab Results   Component Value Date     (H) 12/16/2024    BUN 29 (H) 04/01/2025    CREATININE 1.1 04/01/2025    CALCIUM 10.1 04/01/2025    CALCIUM 9.9 12/16/2024     (L) 04/01/2025     (L) 12/16/2024    K 4.3 04/01/2025    K 5.0 12/16/2024    CL 98 04/01/2025     12/16/2024    PROT 7.9 12/16/2024    CO2 28 04/01/2025    CO2 21 (L) 12/16/2024    ANIONGAP 9 04/01/2025    WBC 6.75 04/01/2025    RBC 4.75 04/01/2025    RBC 4.34 (L) 12/16/2024    HGB 16.4 04/01/2025    HGB 14.5 12/16/2024    HCT 45.9 04/01/2025    HCT 42.0 12/16/2024    MCV 97 04/01/2025    MCV 97 12/16/2024    MCH 34.5 (H) 04/01/2025    MCHC 35.7 04/01/2025    MCHC 34.5  12/16/2024     Lab Results   Component Value Date    RDW 12.3 04/01/2025    RDW 13.2 12/16/2024     (L) 04/01/2025     (L) 12/16/2024    MPV 11.0 04/01/2025    GRAN 3.6 12/16/2024    GRAN 66.1 12/16/2024    LYMPH 27.1 04/01/2025    LYMPH 1.83 04/01/2025    LYMPH 1.1 12/16/2024    LYMPH 19.6 12/16/2024    MONO 8.6 04/01/2025    MONO 0.58 04/01/2025    MONO 0.6 12/16/2024    MONO 10.2 12/16/2024    EOSINOPHIL 3.1 12/16/2024    BASOPHIL 0.6 04/01/2025    BASOPHIL 0.04 04/01/2025    BASOPHIL 0.5 12/16/2024    EOS 4.4 04/01/2025    EOS 0.30 04/01/2025    EOS 0.2 12/16/2024    BASO 0.03 12/16/2024    APTT 27.3 05/29/2023    BNP 14 04/22/2020    CHOL 166 08/26/2024    TRIG 131 08/26/2024    HDL 32 (L) 08/26/2024    CHOLHDL 19.3 (L) 08/26/2024    TOTALCHOLEST 5.2 (H) 08/26/2024    ALBUMIN 4.4 04/01/2025    ALBUMIN 4.4 12/16/2024    AST 26 04/01/2025    AST 25 12/16/2024    ALT 32 04/01/2025    ALT 33 12/16/2024    ALKPHOS 28 (L) 04/01/2025    ALKPHOS 25 (L) 12/16/2024    MG 1.8 04/09/2024    LABPROT 8.9 (H) 04/01/2025    LABPROT 11.4 04/01/2025    LABPROT 11.2 12/16/2024    INR 1.0 04/01/2025    INR 1.0 12/16/2024       Assessment and Plan:  Problem List[2]  Veto Park is a 54 y.o. male withCirrhosis    Impression:    Well-compensated cirrhosis secondary to history of alcohol use disorder now in remission    Plan:   I encouraged continued strict abstinence from alcohol.  He will return to clinic in 6 months time with continued clinical, biochemical and ultrasound surveillance.       [1]   Outpatient Encounter Medications as of 4/1/2025   Medication Sig Dispense Refill    celecoxib (CELEBREX) 100 MG capsule Take 1 capsule by mouth 2 (two) times daily.      clindamycin (CLEOCIN) 300 MG capsule Take 300 mg by mouth every 8 (eight) hours.      HYDROcodone-acetaminophen (NORCO) 7.5-325 mg per tablet Take 1 tablet by mouth every 6 (six) hours.      ibuprofen (ADVIL,MOTRIN) 800 MG tablet Take 800 mg by  mouth every 8 (eight) hours as needed.      lisinopriL (PRINIVIL,ZESTRIL) 20 MG tablet Take 20 mg by mouth once daily.      metoprolol tartrate (LOPRESSOR) 25 MG tablet Take 0.5 tablets (12.5 mg total) by mouth 2 (two) times daily. 90 tablet 3    omeprazole (PRILOSEC) 20 MG capsule Take 1 capsule (20 mg total) by mouth once daily. 90 capsule 3    tadalafiL (CIALIS) 20 MG Tab TAKE 1 TABLET BY MOUTH EVERY 72 HOURS AS NEEDED FOR ERECTILE DYSFUNCTION 15 tablet 11    [] apixaban (ELIQUIS) 5 mg Tab Take 1 tablet (5 mg total) by mouth 2 (two) times daily. (Patient not taking: Reported on 2025) 60 tablet 11     Facility-Administered Encounter Medications as of 2025   Medication Dose Route Frequency Provider Last Rate Last Admin    alprostadil injection 20 mcg  20 mcg Intracavitary Once Ralph Wilcox MD       [2]   Patient Active Problem List  Diagnosis    Cellulitis of right upper extremity    Elevated liver enzymes    Essential hypertension    GERD (gastroesophageal reflux disease)    Type 2 diabetes mellitus, without long-term current use of insulin    Peyronie disease    Facial twitching    Stroke    Weakness    ETOH abuse    Morbid obesity    Hyperlipidemia    Left facial numbness    Alcohol withdrawal    GI bleed    Metabolic encephalopathy    Anemia    Tobacco abuse    Coffee ground emesis    Obstructive sleep apnea    Transient alteration of awareness    Alcohol withdrawal seizure    Alcohol-induced acute pancreatitis    History of GI bleed    Gallbladder sludge    Hypophosphatemia    Hypomagnesemia    Hyponatremia    Paresthesias    Tobacco dependency    Portal hypertensive gastropathy    Esophageal varices    Weakness of both hips    Alcohol use    Acidosis    Atrial fibrillation    Alcoholic cirrhosis of liver without ascites

## 2025-06-04 ENCOUNTER — PATIENT OUTREACH (OUTPATIENT)
Dept: ADMINISTRATIVE | Facility: HOSPITAL | Age: 55
End: 2025-06-04
Payer: MEDICAID

## 2025-08-21 ENCOUNTER — OFFICE VISIT (OUTPATIENT)
Dept: CARDIOLOGY | Facility: CLINIC | Age: 55
End: 2025-08-21
Payer: MEDICAID

## 2025-08-21 VITALS
HEIGHT: 72 IN | SYSTOLIC BLOOD PRESSURE: 155 MMHG | WEIGHT: 257.69 LBS | BODY MASS INDEX: 34.9 KG/M2 | HEART RATE: 64 BPM | DIASTOLIC BLOOD PRESSURE: 72 MMHG | OXYGEN SATURATION: 97 %

## 2025-08-21 DIAGNOSIS — I48.0 PAROXYSMAL ATRIAL FIBRILLATION: ICD-10-CM

## 2025-08-21 DIAGNOSIS — E78.2 MIXED HYPERLIPIDEMIA: Primary | ICD-10-CM

## 2025-08-21 DIAGNOSIS — E66.811 CLASS 1 OBESITY DUE TO EXCESS CALORIES WITH SERIOUS COMORBIDITY AND BODY MASS INDEX (BMI) OF 34.0 TO 34.9 IN ADULT: ICD-10-CM

## 2025-08-21 DIAGNOSIS — G47.33 OBSTRUCTIVE SLEEP APNEA: ICD-10-CM

## 2025-08-21 DIAGNOSIS — E11.9 TYPE 2 DIABETES MELLITUS WITHOUT COMPLICATION, WITHOUT LONG-TERM CURRENT USE OF INSULIN: Chronic | ICD-10-CM

## 2025-08-21 DIAGNOSIS — K21.9 GASTROESOPHAGEAL REFLUX DISEASE, UNSPECIFIED WHETHER ESOPHAGITIS PRESENT: ICD-10-CM

## 2025-08-21 DIAGNOSIS — E66.09 CLASS 1 OBESITY DUE TO EXCESS CALORIES WITH SERIOUS COMORBIDITY AND BODY MASS INDEX (BMI) OF 34.0 TO 34.9 IN ADULT: ICD-10-CM

## 2025-08-21 DIAGNOSIS — I10 ESSENTIAL HYPERTENSION: Chronic | ICD-10-CM

## 2025-08-21 DIAGNOSIS — F10.10 ETOH ABUSE: ICD-10-CM

## 2025-08-21 DIAGNOSIS — Z72.0 TOBACCO ABUSE: Chronic | ICD-10-CM

## 2025-08-21 PROCEDURE — 99999 PR PBB SHADOW E&M-EST. PATIENT-LVL III: CPT | Mod: PBBFAC,,,

## 2025-08-21 PROCEDURE — 99213 OFFICE O/P EST LOW 20 MIN: CPT | Mod: PBBFAC,PN

## 2025-08-21 PROCEDURE — 1159F MED LIST DOCD IN RCRD: CPT | Mod: CPTII,,,

## 2025-08-21 PROCEDURE — 3078F DIAST BP <80 MM HG: CPT | Mod: CPTII,,,

## 2025-08-21 PROCEDURE — 3008F BODY MASS INDEX DOCD: CPT | Mod: CPTII,,,

## 2025-08-21 PROCEDURE — 3077F SYST BP >= 140 MM HG: CPT | Mod: CPTII,,,

## 2025-08-21 PROCEDURE — 99214 OFFICE O/P EST MOD 30 MIN: CPT | Mod: S$PBB,,,

## 2025-08-21 PROCEDURE — 1160F RVW MEDS BY RX/DR IN RCRD: CPT | Mod: CPTII,,,

## 2025-08-21 PROCEDURE — 4010F ACE/ARB THERAPY RXD/TAKEN: CPT | Mod: CPTII,,,

## 2025-08-21 RX ORDER — BLOOD-GLUCOSE METER
EACH MISCELLANEOUS
COMMUNITY
Start: 2025-08-11

## 2025-08-21 RX ORDER — CALCIUM CITRATE/VITAMIN D3 200MG-6.25
TABLET ORAL
COMMUNITY
Start: 2025-08-11

## 2025-08-21 RX ORDER — LANCETS 33 GAUGE
EACH MISCELLANEOUS
COMMUNITY
Start: 2025-08-11

## (undated) DEVICE — GLOVE BIOGEL SKINSENSE PI 7.5

## (undated) DEVICE — GAUZE SPONGE 4'X4 12 PLY

## (undated) DEVICE — DRAIN PENROSE XRAY 18 X 1/4 ST

## (undated) DEVICE — ADHESIVE SURG LIQ 2 OZ

## (undated) DEVICE — ELECTRODE NEEDLE 1IN

## (undated) DEVICE — SUT 2/0 36IN ETHIBOND EXCE

## (undated) DEVICE — MARKER SKIN STND TIP BLUE BARR

## (undated) DEVICE — SEE MEDLINE ITEM 156930

## (undated) DEVICE — SOL 9P NACL IRR PIC IL

## (undated) DEVICE — SEE MEDLINE ITEM 157148

## (undated) DEVICE — SPONGE KITTNER 1/4X 5/8 L STRL

## (undated) DEVICE — SYR B-D DISP CONTROL 10CC100/C

## (undated) DEVICE — NDL HYPO REG 25G X 1 1/2

## (undated) DEVICE — SEE MEDLINE ITEM 152529

## (undated) DEVICE — SEE MEDLINE ITEM 152622

## (undated) DEVICE — TRAY DRY SKIN SCRUB PREP